# Patient Record
Sex: MALE
[De-identification: names, ages, dates, MRNs, and addresses within clinical notes are randomized per-mention and may not be internally consistent; named-entity substitution may affect disease eponyms.]

---

## 2017-01-23 ENCOUNTER — HOSPITAL (OUTPATIENT)
Dept: OTHER | Age: 67
End: 2017-01-23
Attending: INTERNAL MEDICINE

## 2017-01-23 LAB
GLUCOSE BLDC GLUCOMTR-MCNC: 137 MG/DL (ref 65–99)
GLUCOSE BLDC GLUCOMTR-MCNC: 140 MG/DL (ref 65–99)

## 2017-01-30 ENCOUNTER — HOSPITAL (OUTPATIENT)
Dept: OTHER | Age: 67
End: 2017-01-30
Attending: INTERNAL MEDICINE

## 2017-01-30 LAB
GLUCOSE BLDC GLUCOMTR-MCNC: 191 MG/DL (ref 65–99)
GLUCOSE BLDC GLUCOMTR-MCNC: 373 MG/DL (ref 65–99)

## 2017-01-31 ENCOUNTER — DIAGNOSTIC TRANS (OUTPATIENT)
Dept: OTHER | Age: 67
End: 2017-01-31

## 2017-01-31 LAB
BUN SERPL-MCNC: 36 MG/DL (ref 10–20)
BUN/CREAT SERPL: 28 (ref 7–25)
CREAT SERPL-MCNC: 1.29 MG/DL (ref 0.67–1.17)
GLUCOSE BLDC GLUCOMTR-MCNC: 122 MG/DL (ref 65–99)
GLUCOSE BLDC GLUCOMTR-MCNC: 161 MG/DL (ref 65–99)

## 2017-02-07 PROBLEM — I21.4 NON-ST ELEVATION MYOCARDIAL INFARCTION (NSTEMI), SUBSEQUENT EPISODE OF CARE (HCC): Status: ACTIVE | Noted: 2017-02-07

## 2017-02-07 PROBLEM — R94.30 CARDIAC LV EJECTION FRACTION >40%: Status: ACTIVE | Noted: 2017-02-07

## 2017-02-07 PROBLEM — Z95.5 PRESENCE OF DRUG COATED STENT IN RIGHT CORONARY ARTERY: Status: ACTIVE | Noted: 2017-02-07

## 2017-02-07 PROBLEM — IMO0002 CARDIAC LV EJECTION FRACTION >40%: Status: ACTIVE | Noted: 2017-02-07

## 2017-02-07 PROBLEM — Z95.5 PRESENCE OF STENT IN CORONARY ARTERY: Status: ACTIVE | Noted: 2017-02-07

## 2017-02-07 PROBLEM — I25.10 ATHEROSCLEROSIS OF NATIVE CORONARY ARTERY OF NATIVE HEART WITHOUT ANGINA PECTORIS: Status: ACTIVE | Noted: 2017-02-07

## 2017-02-07 PROBLEM — Z95.5 PRESENCE OF DRUG COATED STENT IN LAD CORONARY ARTERY: Status: ACTIVE | Noted: 2017-02-07

## 2017-02-24 PROBLEM — N18.30 CKD STAGE 3 DUE TO TYPE 2 DIABETES MELLITUS (HCC): Status: ACTIVE | Noted: 2017-02-24

## 2017-02-24 PROBLEM — E11.22 CKD STAGE 3 DUE TO TYPE 2 DIABETES MELLITUS (HCC): Status: ACTIVE | Noted: 2017-02-24

## 2017-02-24 PROBLEM — I25.10 CORONARY ARTERY DISEASE INVOLVING NATIVE CORONARY ARTERY OF NATIVE HEART WITHOUT ANGINA PECTORIS: Status: ACTIVE | Noted: 2017-02-24

## 2017-04-12 ENCOUNTER — HOSPITAL (OUTPATIENT)
Dept: OTHER | Age: 67
End: 2017-04-12
Attending: EMERGENCY MEDICINE

## 2017-12-22 PROCEDURE — 82570 ASSAY OF URINE CREATININE: CPT | Performed by: INTERNAL MEDICINE

## 2017-12-22 PROCEDURE — 82043 UR ALBUMIN QUANTITATIVE: CPT | Performed by: INTERNAL MEDICINE

## 2017-12-22 PROCEDURE — 81001 URINALYSIS AUTO W/SCOPE: CPT | Performed by: INTERNAL MEDICINE

## 2018-01-05 PROBLEM — M17.0 PRIMARY OSTEOARTHRITIS OF BOTH KNEES: Status: ACTIVE | Noted: 2018-01-05

## 2018-01-07 PROBLEM — M25.561 CHRONIC PAIN OF BOTH KNEES: Status: ACTIVE | Noted: 2018-01-07

## 2018-01-07 PROBLEM — G89.29 CHRONIC PAIN OF BOTH KNEES: Status: ACTIVE | Noted: 2018-01-07

## 2018-01-07 PROBLEM — M25.562 CHRONIC PAIN OF BOTH KNEES: Status: ACTIVE | Noted: 2018-01-07

## 2018-03-20 PROBLEM — M75.42 IMPINGEMENT SYNDROME OF SHOULDER, LEFT: Status: ACTIVE | Noted: 2018-03-20

## 2018-12-14 ENCOUNTER — HOSPITAL (OUTPATIENT)
Dept: OTHER | Age: 68
End: 2018-12-14
Attending: EMERGENCY MEDICINE

## 2018-12-14 ENCOUNTER — DIAGNOSTIC TRANS (OUTPATIENT)
Dept: OTHER | Age: 68
End: 2018-12-14

## 2018-12-14 LAB
ANALYZER ANC (IANC): ABNORMAL
ANION GAP SERPL CALC-SCNC: 18 MMOL/L (ref 10–20)
BASOPHILS # BLD: 0 THOUSAND/MCL (ref 0–0.3)
BASOPHILS NFR BLD: 1 %
BUN SERPL-MCNC: 38 MG/DL (ref 6–20)
BUN/CREAT SERPL: 27 (ref 7–25)
CALCIUM SERPL-MCNC: 9.1 MG/DL (ref 8.4–10.2)
CHLORIDE: 106 MMOL/L (ref 98–107)
CO2 SERPL-SCNC: 23 MMOL/L (ref 21–32)
CREAT SERPL-MCNC: 1.39 MG/DL (ref 0.67–1.17)
DIFFERENTIAL METHOD BLD: ABNORMAL
EOSINOPHIL # BLD: 0.2 THOUSAND/MCL (ref 0.1–0.5)
EOSINOPHIL NFR BLD: 2 %
ERYTHROCYTE [DISTWIDTH] IN BLOOD: 11.7 % (ref 11–15)
GLUCOSE SERPL-MCNC: 174 MG/DL (ref 65–99)
HEMATOCRIT: 38.7 % (ref 39–51)
HGB BLD-MCNC: 13.3 GM/DL (ref 13–17)
IMM GRANULOCYTES # BLD AUTO: 0 THOUSAND/MCL (ref 0–0.2)
IMM GRANULOCYTES NFR BLD: 0 %
LYMPHOCYTES # BLD: 1.3 THOUSAND/MCL (ref 1–4)
LYMPHOCYTES NFR BLD: 18 %
MCH RBC QN AUTO: 33.2 PG (ref 26–34)
MCHC RBC AUTO-ENTMCNC: 34.4 GM/DL (ref 32–36.5)
MCV RBC AUTO: 96.5 FL (ref 78–100)
MONOCYTES # BLD: 0.7 THOUSAND/MCL (ref 0.3–0.9)
MONOCYTES NFR BLD: 9 %
NEUTROPHILS # BLD: 4.8 THOUSAND/MCL (ref 1.8–7.7)
NEUTROPHILS NFR BLD: 70 %
NEUTS SEG NFR BLD: ABNORMAL %
NRBC (NRBCRE): 0 /100 WBC
PLATELET # BLD: 242 THOUSAND/MCL (ref 140–450)
POTASSIUM SERPL-SCNC: 3.9 MMOL/L (ref 3.4–5.1)
RBC # BLD: 4.01 MILLION/MCL (ref 4.5–5.9)
SODIUM SERPL-SCNC: 143 MMOL/L (ref 135–145)
TROPONIN I SERPL HS-MCNC: <0.02 NG/ML
TROPONIN I SERPL HS-MCNC: <0.02 NG/ML
WBC # BLD: 7 THOUSAND/MCL (ref 4.2–11)

## 2018-12-15 ENCOUNTER — CHARTING TRANS (OUTPATIENT)
Dept: OTHER | Age: 68
End: 2018-12-15

## 2018-12-18 PROCEDURE — 81001 URINALYSIS AUTO W/SCOPE: CPT | Performed by: INTERNAL MEDICINE

## 2019-03-25 ENCOUNTER — HOSPITAL ENCOUNTER (INPATIENT)
Facility: HOSPITAL | Age: 69
LOS: 3 days | Discharge: HOME | DRG: 194 | End: 2019-03-28
Attending: HOSPITALIST | Admitting: HOSPITALIST
Payer: MEDICARE

## 2019-03-25 PROBLEM — J18.9 PNEUMONIA: Status: ACTIVE | Noted: 2019-03-25

## 2019-03-25 PROCEDURE — 87486 CHLMYD PNEUM DNA AMP PROBE: CPT | Performed by: HOSPITALIST

## 2019-03-25 PROCEDURE — 87581 M.PNEUMON DNA AMP PROBE: CPT | Performed by: HOSPITALIST

## 2019-03-25 PROCEDURE — 87798 DETECT AGENT NOS DNA AMP: CPT | Performed by: HOSPITALIST

## 2019-03-25 PROCEDURE — 87633 RESP VIRUS 12-25 TARGETS: CPT | Performed by: HOSPITALIST

## 2019-03-25 PROCEDURE — 87040 BLOOD CULTURE FOR BACTERIA: CPT | Performed by: PHYSICIAN ASSISTANT

## 2019-03-25 PROCEDURE — 82962 GLUCOSE BLOOD TEST: CPT

## 2019-03-25 RX ORDER — DEXTROSE MONOHYDRATE 25 G/50ML
50 INJECTION, SOLUTION INTRAVENOUS
Status: DISCONTINUED | OUTPATIENT
Start: 2019-03-25 | End: 2019-03-28

## 2019-03-25 RX ORDER — AMLODIPINE BESYLATE 5 MG/1
10 TABLET ORAL DAILY
Status: DISCONTINUED | OUTPATIENT
Start: 2019-03-26 | End: 2019-03-28

## 2019-03-25 RX ORDER — SODIUM PHOSPHATE, DIBASIC AND SODIUM PHOSPHATE, MONOBASIC 7; 19 G/133ML; G/133ML
1 ENEMA RECTAL ONCE AS NEEDED
Status: DISCONTINUED | OUTPATIENT
Start: 2019-03-25 | End: 2019-03-28

## 2019-03-25 RX ORDER — HYDRALAZINE HYDROCHLORIDE 20 MG/ML
10 INJECTION INTRAMUSCULAR; INTRAVENOUS EVERY 6 HOURS PRN
Status: DISCONTINUED | OUTPATIENT
Start: 2019-03-25 | End: 2019-03-26

## 2019-03-25 RX ORDER — SODIUM CHLORIDE 9 MG/ML
INJECTION, SOLUTION INTRAVENOUS CONTINUOUS
Status: ACTIVE | OUTPATIENT
Start: 2019-03-25 | End: 2019-03-26

## 2019-03-25 RX ORDER — NEBIVOLOL 10 MG/1
10 TABLET ORAL DAILY
Status: DISCONTINUED | OUTPATIENT
Start: 2019-03-26 | End: 2019-03-27

## 2019-03-25 RX ORDER — HEPARIN SODIUM 5000 [USP'U]/ML
5000 INJECTION, SOLUTION INTRAVENOUS; SUBCUTANEOUS EVERY 8 HOURS SCHEDULED
Status: DISCONTINUED | OUTPATIENT
Start: 2019-03-25 | End: 2019-03-28

## 2019-03-25 RX ORDER — POLYETHYLENE GLYCOL 3350 17 G/17G
17 POWDER, FOR SOLUTION ORAL DAILY PRN
Status: DISCONTINUED | OUTPATIENT
Start: 2019-03-25 | End: 2019-03-28

## 2019-03-25 RX ORDER — ONDANSETRON 2 MG/ML
4 INJECTION INTRAMUSCULAR; INTRAVENOUS EVERY 6 HOURS PRN
Status: DISCONTINUED | OUTPATIENT
Start: 2019-03-25 | End: 2019-03-28

## 2019-03-25 RX ORDER — ASPIRIN 325 MG
325 TABLET ORAL DAILY
Status: DISCONTINUED | OUTPATIENT
Start: 2019-03-26 | End: 2019-03-28

## 2019-03-25 RX ORDER — CLOPIDOGREL BISULFATE 75 MG/1
75 TABLET ORAL DAILY
Status: DISCONTINUED | OUTPATIENT
Start: 2019-03-26 | End: 2019-03-28

## 2019-03-25 RX ORDER — METOCLOPRAMIDE HYDROCHLORIDE 5 MG/ML
5 INJECTION INTRAMUSCULAR; INTRAVENOUS EVERY 8 HOURS PRN
Status: DISCONTINUED | OUTPATIENT
Start: 2019-03-25 | End: 2019-03-28

## 2019-03-25 RX ORDER — ACETAMINOPHEN 325 MG/1
650 TABLET ORAL EVERY 6 HOURS PRN
Status: DISCONTINUED | OUTPATIENT
Start: 2019-03-25 | End: 2019-03-28

## 2019-03-25 RX ORDER — BISACODYL 10 MG
10 SUPPOSITORY, RECTAL RECTAL
Status: DISCONTINUED | OUTPATIENT
Start: 2019-03-25 | End: 2019-03-28

## 2019-03-25 NOTE — H&P
[facility] Hospitalist H&P       CC: direct admit from [facility] for suspected pneumonia    PCP: Casimir Leyden, MD    History of Present Illness: Pt is a 79 yo with mmp including but not limited to HTN/HL, CAD, DMII with nephropathy, CKD III, is directly a Maximiliano  2/7/2017    [place]     • Pure hypercholesterolemia 5/2/2008   • Refused influenza vaccine 9/24/12   • S/P drug eluting coronary stent placement 1/23/17    R coronary x 2, Dr. Gabriela Ballesteros   • Screening for cardiovascular condition 9-6-11    [facility] nl wit edema, says hands slightly edematous by 25% (hard to appreciate)   Abdomen:   Soft, non-tender.  Obese, Bowel sounds normal. . Non distended, no overt hernias   Extremities: Extremities b/l LE with trace edema   Skin: Skin color, texture, turgor normal. No pending  -dona prn  -supportive care, O2 protocol  -CXR with R perihilar masslike density  -UA neg, BC pending.  Holding off on PCT given ANIBAL/CKD  -incidental finding of elevated pro-BNP but clinically pt looks more infectious-- could also be elevated d/

## 2019-03-25 NOTE — PROGRESS NOTES
Pt came to  room 501 at 1850 accompanied with wife and transport. alertoriented. c/o sob and 02 sats were . placed 5L02 via NC.02 sats 90%. MD at bedside.   At 1900 pts 02 lowered to 2L via NC

## 2019-03-26 ENCOUNTER — APPOINTMENT (OUTPATIENT)
Dept: CT IMAGING | Facility: HOSPITAL | Age: 69
DRG: 194 | End: 2019-03-26
Attending: INTERNAL MEDICINE
Payer: MEDICARE

## 2019-03-26 PROCEDURE — 71250 CT THORAX DX C-: CPT | Performed by: INTERNAL MEDICINE

## 2019-03-26 PROCEDURE — 94640 AIRWAY INHALATION TREATMENT: CPT

## 2019-03-26 PROCEDURE — 85007 BL SMEAR W/DIFF WBC COUNT: CPT | Performed by: HOSPITALIST

## 2019-03-26 PROCEDURE — 84132 ASSAY OF SERUM POTASSIUM: CPT | Performed by: HOSPITALIST

## 2019-03-26 PROCEDURE — 85025 COMPLETE CBC W/AUTO DIFF WBC: CPT | Performed by: HOSPITALIST

## 2019-03-26 PROCEDURE — 80053 COMPREHEN METABOLIC PANEL: CPT | Performed by: HOSPITALIST

## 2019-03-26 PROCEDURE — 82962 GLUCOSE BLOOD TEST: CPT

## 2019-03-26 PROCEDURE — 83605 ASSAY OF LACTIC ACID: CPT | Performed by: INTERNAL MEDICINE

## 2019-03-26 PROCEDURE — 83735 ASSAY OF MAGNESIUM: CPT | Performed by: HOSPITALIST

## 2019-03-26 PROCEDURE — 85027 COMPLETE CBC AUTOMATED: CPT | Performed by: HOSPITALIST

## 2019-03-26 PROCEDURE — 84145 PROCALCITONIN (PCT): CPT | Performed by: INTERNAL MEDICINE

## 2019-03-26 PROCEDURE — 84443 ASSAY THYROID STIM HORMONE: CPT | Performed by: HOSPITALIST

## 2019-03-26 RX ORDER — HYDRALAZINE HYDROCHLORIDE 20 MG/ML
5 INJECTION INTRAMUSCULAR; INTRAVENOUS
Status: DISCONTINUED | OUTPATIENT
Start: 2019-03-26 | End: 2019-03-28

## 2019-03-26 RX ORDER — POTASSIUM CHLORIDE 14.9 MG/ML
20 INJECTION INTRAVENOUS ONCE
Status: COMPLETED | OUTPATIENT
Start: 2019-03-26 | End: 2019-03-26

## 2019-03-26 RX ORDER — IPRATROPIUM BROMIDE AND ALBUTEROL SULFATE 2.5; .5 MG/3ML; MG/3ML
3 SOLUTION RESPIRATORY (INHALATION)
Status: DISCONTINUED | OUTPATIENT
Start: 2019-03-26 | End: 2019-03-27

## 2019-03-26 RX ORDER — MAGNESIUM OXIDE 400 MG (241.3 MG MAGNESIUM) TABLET
400 TABLET ONCE
Status: COMPLETED | OUTPATIENT
Start: 2019-03-26 | End: 2019-03-26

## 2019-03-26 RX ORDER — SODIUM CHLORIDE 9 MG/ML
INJECTION, SOLUTION INTRAVENOUS CONTINUOUS
Status: DISCONTINUED | OUTPATIENT
Start: 2019-03-26 | End: 2019-03-26

## 2019-03-26 RX ORDER — ROSUVASTATIN CALCIUM 10 MG/1
10 TABLET, COATED ORAL NIGHTLY
Status: DISCONTINUED | OUTPATIENT
Start: 2019-03-26 | End: 2019-03-28

## 2019-03-26 RX ORDER — ECHINACEA PURPUREA EXTRACT 125 MG
1 TABLET ORAL
Status: DISCONTINUED | OUTPATIENT
Start: 2019-03-26 | End: 2019-03-28

## 2019-03-26 NOTE — PROGRESS NOTES
Pt is a 79 y/o male admitted with cough,sob due to pna.alert oriented. on room air,02 sats 90-93%. no fever,pain and N/V.up as tolerated. on ctx and zithromax. encouraged ambulation and IS.k and mag replaced. wife at bedside. RVP negative. bp is in the 160s. [place]

## 2019-03-26 NOTE — CM/SW NOTE
03/26/19 1500   CM/SW Screening   Referral Source    Information Source Chart review;Nursing rounds   Patient's Mental Status Alert;Oriented   Patient's [address]   Patient lives with Spouse   Patient Status Prior to Admission

## 2019-03-26 NOTE — PLAN OF CARE
Problem: Patient/Family Goals  Goal: Patient/Family Short Term Goal  Patient's Short Term Goal: 3/26-resolve pna    Interventions:   - 3/26-iv abx,02 as needed  - See additional Care Plan goals for specific interventions  Outcome: Progressing

## 2019-03-26 NOTE — CONSULTS
Pulmonary Consult       NAME: Bird Mcnair - ROOM: 176/476-J - MRN: [id number] - Age: 80year old - :  1950    Date of Admission: 3/25/2019  6:21 PM  Admission Diagnosis: Pneumonia  Pneumonia    History of Present Illness: asked to see patient for for neuro f/u   • Type 1 diabetes mellitus ([facility] [address]    • Unspecified essential hypertension       Past Surgical History:   Procedure Laterality Date   • COLONOSCOPY  12/15/2008    tics, rhoids, polyps, repeat in 2013   • HEMORRHOIDECTOMY     • TONSILLECTOMY Taking   Blood Glucose Monitoring Suppl (ONETOUCH ULTRA 2) W/DEVICE Does not apply Kit One Touch Ultra 2 device for testing Disp: 1 kit Rfl: 0 Taking       Tetanus Toxoid              Social History    Socioeconomic History      Marital status:  Seat Belt: Yes        Self-Exams: No          discussed    Social History Narrative      Not on file    Family History   Adopted: Yes         Scheduled Medication:  • potassium chloride 40mEq IVPB (peripheral line)  40 mEq Intravenous Once    Followed b Resp 16   Wt 221 lb 1.9 oz (100.3 kg)   SpO2 (!) 89%   BMI 36.80 kg/m²   General:  Alert, cooperative, no distress, appears stated age. Head:  Normocephalic, without obvious abnormality, atraumatic. Eyes:  Conjunctivae/corneas clear.    Neck: Supple, sy 10ˆ3/µL    Lymphocytes Absolute 0.70 (L) 0.90 - 4.00 10ˆ3/µL    Monocytes Absolute 1.22 (H) 0.10 - 0.60 10ˆ3/µL    Eosinophils Absolute 0.03 0.00 - 0.30 10ˆ3/µL    Basophils Absolute 0.02 0.00 - 0.10 10ˆ3/µL    nRBC Absolute 0.000 0.000 - 0.012 10ˆ3/µL 99 mg/dL    Sodium 138 136 - 145 mmol/L    Potassium 3.2 (L) 3.5 - 5.1 mmol/L    Chloride 103 98 - 107 mmol/L    CO2 25.0 21.0 - 32.0 mmol/L    Anion Gap 10 0 - 18 mmol/L    BUN 33 (H) 7 - 18 mg/dL    Creatinine 1.82 (H) 0.70 - 1.30 mg/dL    BUN/CREA Ratio Manual 19 %    Monocyte % Manual 8 %    Eosinophil % Manual 1 %    Basophil % Manual 1 %    Total Cells Counted 100     RBC Morphology Normal Normal, Slide reviewed, see previous RBC morphology.     Platelet Morphology Normal Normal   POCT GLUCOSE    Ksenia

## 2019-03-26 NOTE — PROGRESS NOTES
[facility] Hospitalist Progress Note     PCP: Agatha Paniagua MD    CC:  Follow up    SUBJECTIVE:  Sitting up in bed, feels better than yesterday. +still coughing productive but sob improved.  No pain    OBJECTIVE:  Temp:  [98.3 °F (36.8 °C)-100 °F (37.8 mg Intravenous Q24H   • Insulin Aspart Pen  1-5 Units Subcutaneous TID CC and HS   • Nebivolol HCl  10 mg Oral Daily   • amLODIPine Besylate  10 mg Oral Daily   • aspirin  325 mg Oral Daily   • Clopidogrel Bisulfate  75 mg Oral Daily       Saline Nasal Spr Questions/concerns were discussed with patient and/or family by bedside.          Thank Stuart Bashir MD    [facility] Hospitalist  Pager: [phone]

## 2019-03-26 NOTE — PROGRESS NOTES
03/26/19 1035   Clinical Encounter Type   Visited With Patient   Routine Visit ( provided introductory visit. )   Referral To Nurse  ( provided HPOA form to patient. Patient would like to talk with his wife before completing the form.

## 2019-03-26 NOTE — PLAN OF CARE
Diabetes/Glucose Control    • Glucose maintained within prescribed range Progressing          Patient/Family Goals    • Patient/Family Long Term Goal Progressing    • Patient/Family Short Term Goal Progressing          RESPIRATORY - ADULT    • Achieves opt

## 2019-03-26 NOTE — PROGRESS NOTES
[facility] Pharmacy Note:  Renal Dose Adjustment for Metoclopramide (REGLAN)    David Chua has been prescribed Metoclopramide (REGLAN) 10 mg every 8 hours as needed for N/V. Estimated Creatinine Clearance: 32 mL/min (A) (based on SCr of 1.95 mg/dL (H)).

## 2019-03-26 NOTE — PROGRESS NOTES
03/26/19 0042   Provider Notification   Reason for Communication Change in status; Review case     Pt bp still elevated after apresoline was given, new temp, paged MD for new prn orders, lactic, and nebs per pt request.wctm

## 2019-03-26 NOTE — CONSULTS
[address]    PATIENT'S NAME: Yordy Sen   ATTENDING PHYSICIAN: Margy Sainz. Leopoldo Swanson MD   CONSULTING PHYSICIAN: Mike Leon M.D.    PATIENT ACCOUNT#:   [de-identified]    LOCATION:  [address] [place] [address]  MEDICAL RECORD #:   [id number]       DATE OF BIRTH: SYSTEMS:  Was obtained and negative, including no history of thyroid disease. PHYSICAL EXAMINATION:    GENERAL:  He is a pleasant, heavyset gentleman in no distress. VITAL SIGNS:  Blood pressure is 166/74, pulse 68. HEENT:  Head is normocephalic.

## 2019-03-26 NOTE — PROGRESS NOTES
NURSING ADMISSION NOTE      Patient admitted via Cart  Oriented to room. Safety precautions initiated. Bed in low position. Call light in reach. Med rec and admission navigator complete. RN notified. Paged consults.  All pt and family questions an

## 2019-03-27 PROCEDURE — 84132 ASSAY OF SERUM POTASSIUM: CPT | Performed by: HOSPITALIST

## 2019-03-27 PROCEDURE — 84484 ASSAY OF TROPONIN QUANT: CPT | Performed by: INTERNAL MEDICINE

## 2019-03-27 PROCEDURE — 80048 BASIC METABOLIC PNL TOTAL CA: CPT | Performed by: HOSPITALIST

## 2019-03-27 PROCEDURE — 94640 AIRWAY INHALATION TREATMENT: CPT

## 2019-03-27 PROCEDURE — 82962 GLUCOSE BLOOD TEST: CPT

## 2019-03-27 PROCEDURE — 85025 COMPLETE CBC W/AUTO DIFF WBC: CPT | Performed by: HOSPITALIST

## 2019-03-27 PROCEDURE — 83735 ASSAY OF MAGNESIUM: CPT | Performed by: HOSPITALIST

## 2019-03-27 RX ORDER — IPRATROPIUM BROMIDE AND ALBUTEROL SULFATE 2.5; .5 MG/3ML; MG/3ML
3 SOLUTION RESPIRATORY (INHALATION)
Status: DISCONTINUED | OUTPATIENT
Start: 2019-03-27 | End: 2019-03-28

## 2019-03-27 RX ORDER — HYDRALAZINE HYDROCHLORIDE 50 MG/1
50 TABLET, FILM COATED ORAL EVERY 8 HOURS SCHEDULED
Status: DISCONTINUED | OUTPATIENT
Start: 2019-03-27 | End: 2019-03-28

## 2019-03-27 RX ORDER — POTASSIUM CHLORIDE 20 MEQ/1
40 TABLET, EXTENDED RELEASE ORAL ONCE
Status: COMPLETED | OUTPATIENT
Start: 2019-03-27 | End: 2019-03-27

## 2019-03-27 RX ORDER — LABETALOL 200 MG/1
200 TABLET, FILM COATED ORAL EVERY 8 HOURS SCHEDULED
Status: DISCONTINUED | OUTPATIENT
Start: 2019-03-27 | End: 2019-03-28

## 2019-03-27 RX ORDER — FLUTICASONE PROPIONATE 50 MCG
1 SPRAY, SUSPENSION (ML) NASAL DAILY
Status: DISCONTINUED | OUTPATIENT
Start: 2019-03-27 | End: 2019-03-28

## 2019-03-27 RX ORDER — GUAIFENESIN 600 MG
600 TABLET, EXTENDED RELEASE 12 HR ORAL 2 TIMES DAILY
Status: DISCONTINUED | OUTPATIENT
Start: 2019-03-27 | End: 2019-03-28

## 2019-03-27 RX ORDER — POTASSIUM CHLORIDE 20 MEQ/1
40 TABLET, EXTENDED RELEASE ORAL EVERY 4 HOURS
Status: COMPLETED | OUTPATIENT
Start: 2019-03-27 | End: 2019-03-27

## 2019-03-27 RX ORDER — SODIUM CHLORIDE 9 MG/ML
INJECTION, SOLUTION INTRAVENOUS CONTINUOUS
Status: DISCONTINUED | OUTPATIENT
Start: 2019-03-27 | End: 2019-03-28

## 2019-03-27 RX ORDER — IBUPROFEN 200 MG
CAPSULE ORAL 2 TIMES DAILY
Status: DISCONTINUED | OUTPATIENT
Start: 2019-03-27 | End: 2019-03-28

## 2019-03-27 NOTE — PROGRESS NOTES
[facility] PULMONARY/CRITICAL CARE    S: No new events overnight. Feeling better today.     Meds:  • Potassium Chloride ER  40 mEq Oral Q4H   • hydrALAzine HCl  50 mg Oral Q8H [address]   • Labetalol HCl  200 mg Oral Q8H [address]   • guaiFENesin ER  600 mg Oral BID   • Fluti Lab  03/25/19   1519  03/26/19   0657  03/27/19   0652   RBC  3.94  3.48*  3.53*   HGB  13.1  11.5*  11.6*   HCT  35.6*  32.0*  32.7*   MCV  90.4  92.0  92.6   MCH  33.2  33.0  32.9   MCHC  36.8  35.9  35.5   RDW  11.5  11.5  11.9   NEPRELIM   --   3.77 Medicine

## 2019-03-27 NOTE — PROGRESS NOTES
[facility] Cardiology/[facility]    Progress Note    Christiano Gee  80year old  [id number]  Avis Silva MD    ASSESSMENT:    1. Chest discomfort with atypical features.   2.       Coronary artery disease status post weight 230 lb 3.2 oz (104.4 kg), SpO2 99 %. General: Alert and oriented in no apparent distress. HEENT: No focal deficits. Neck: No JVD, carotids no bruits. Cardiac: Regular rate and rhythm, S1, S2 normal, no murmur, rub or gallop.   Lungs: Clear withou Metoclopramide HCl (REGLAN) injection 5 mg, 5 mg, Intravenous, Q8H PRN  •  azithromycin (ZITHROMAX) 500 mg in sodium chloride 0.9% 250 mL IVPB, 500 mg, Intravenous, Q24H  •  glucose (DEX4) oral liquid 15 g, 15 g, Oral, Q15 Min PRN **OR** Glucose-Vitamin C

## 2019-03-27 NOTE — PROGRESS NOTES
Multidisciplinary Discharge Rounds held 3/27/2019. Treatment team members present today include , , Charge Nurse,  Nurse, RT, PT and Pharmacy caring for [employer].      Other care providers present:    Patient Active Problem

## 2019-03-27 NOTE — PROGRESS NOTES
[facility] Hospitalist Progress Note     PCP: Amilcar Dowling MD    CC:  Follow up    SUBJECTIVE:  Pt sitting up in bed, on RA. Says feels congested. Still with ROSENBAUM but much improved. No cp/n/v/f/c.   Asking for neosporin for cut he received on L finger f ALT  16   AST  14*   ALB  2.5*       Recent Labs   Lab  03/26/19   0801  03/26/19   1148  03/26/19   1603  03/26/19   2058  03/27/19   0805   PGLU  178*  155*  174*  243*  218*       Recent Labs   Lab  03/25/19   1519  03/27/19   0652   TROP  <0.017  <0. masslike density  -pulmonary consult, appreciate. On abx as above  -CT chest was recommended but pt with ANIBAL on CKD  -CT chest RLL consolidating mass- ?infectious, ? inflammatory, ?  Neoplasm      **acute kidney injury on CKD III- bumped slightly as IVF d/c'

## 2019-03-28 VITALS
DIASTOLIC BLOOD PRESSURE: 71 MMHG | TEMPERATURE: 98 F | HEART RATE: 74 BPM | RESPIRATION RATE: 22 BRPM | BODY MASS INDEX: 38 KG/M2 | SYSTOLIC BLOOD PRESSURE: 168 MMHG | OXYGEN SATURATION: 97 % | WEIGHT: 231 LBS

## 2019-03-28 PROCEDURE — 82962 GLUCOSE BLOOD TEST: CPT

## 2019-03-28 PROCEDURE — 94640 AIRWAY INHALATION TREATMENT: CPT

## 2019-03-28 PROCEDURE — 90471 IMMUNIZATION ADMIN: CPT

## 2019-03-28 PROCEDURE — 83735 ASSAY OF MAGNESIUM: CPT | Performed by: HOSPITALIST

## 2019-03-28 PROCEDURE — 94668 MNPJ CHEST WALL SBSQ: CPT

## 2019-03-28 PROCEDURE — 80048 BASIC METABOLIC PNL TOTAL CA: CPT | Performed by: INTERNAL MEDICINE

## 2019-03-28 PROCEDURE — 94664 DEMO&/EVAL PT USE INHALER: CPT

## 2019-03-28 RX ORDER — HYDRALAZINE HYDROCHLORIDE 25 MG/1
75 TABLET, FILM COATED ORAL EVERY 8 HOURS SCHEDULED
Qty: 90 TABLET | Refills: 5 | Status: SHIPPED | OUTPATIENT
Start: 2019-03-28 | End: 2019-04-05

## 2019-03-28 RX ORDER — ROSUVASTATIN CALCIUM 10 MG/1
10 TABLET, COATED ORAL NIGHTLY
Qty: 30 TABLET | Refills: 2 | Status: SHIPPED | OUTPATIENT
Start: 2019-03-28 | End: 2019-06-18

## 2019-03-28 RX ORDER — ASPIRIN 325 MG
325 TABLET ORAL DAILY
Refills: 0 | Status: ON HOLD | COMMUNITY
Start: 2019-03-29 | End: 2020-12-29

## 2019-03-28 RX ORDER — CEFUROXIME AXETIL 500 MG/1
500 TABLET ORAL 2 TIMES DAILY
Qty: 10 TABLET | Refills: 0 | Status: SHIPPED | OUTPATIENT
Start: 2019-03-28 | End: 2019-04-02

## 2019-03-28 RX ORDER — LABETALOL 200 MG/1
200 TABLET, FILM COATED ORAL EVERY 8 HOURS SCHEDULED
Qty: 30 TABLET | Refills: 2 | Status: SHIPPED | OUTPATIENT
Start: 2019-03-28 | End: 2019-04-24

## 2019-03-28 NOTE — PROGRESS NOTES
[facility] hospitalist Daily Note    Patient was seen/examined on 3/28/19    S breathing Is better  Denies bleeding at this time  Aware of anemia and will follow up with PCP and GI and get evaluated  No chest pain  No nausea  Aware that he needs to be evaluated f

## 2019-03-28 NOTE — CERTIFICATION
**Certification    PHYSICIAN Certification of Need for Inpatient Hospitalization    Based on the his current state of illness, Oseas Aliza requires inpatient hospitalization for his Pneumonia

## 2019-03-28 NOTE — PROGRESS NOTES
[address] Patient Status:  Inpatient    1950 MRN [id number]   [facility] 5NW-A Attending Satnam Ash MD   Hosp Day # 3 PCP Ruben Carson MD     Pulm / Critical Care Progress Note     S: didn't sleep we Normocephalic, without obvious abnormality, atraumatic. Lungs: slight crackles R base   Chest wall: No tenderness or deformity. Heart: Regular rate and rhythm, normal S1S2, no murmur. Abdomen: soft, non-tender, non-distended, positive BS.    Extremity at that time, with eventual ct chest as outlined above.              Roseann Bustos M.D.  [facility] pulmonary / critical care  3/28/2019  10:21 AM

## 2019-03-28 NOTE — PROGRESS NOTES
Multidisciplinary Discharge Rounds held 3/28/2019. Treatment team members present today include , , Charge Nurse,  Nurse, RT, PT and Pharmacy caring for [employer].      Other care providers present:    Patient Active Problem

## 2019-03-28 NOTE — PROGRESS NOTES
[facility] Cardiology/[facility]    Progress Note    Melchor Interiano  80year old  [id number]  Ramona Alberto MD    ASSESSMENT:    1. Chest discomfort with atypical features.   2.       Coronary artery disease status post weight 231 lb (104.8 kg), SpO2 95 %. General: Alert and oriented in no apparent distress. HEENT: No focal deficits. Neck: No JVD, carotids no bruits. Cardiac: Regular rate and rhythm, S1, S2 normal, no murmur, rub or gallop.   Lungs: Clear without wheez ENEMA (FLEET) 7-19 GM/118ML enema 133 mL, 1 enema, Rectal, Once PRN  •  ondansetron HCl (ZOFRAN) injection 4 mg, 4 mg, Intravenous, Q6H PRN  •  Metoclopramide HCl (REGLAN) injection 5 mg, 5 mg, Intravenous, Q8H PRN  •  glucose (DEX4) oral liquid 15 g, 15 g

## 2019-03-28 NOTE — PROGRESS NOTES
NURSING DISCHARGE NOTE    Discharged Home via Wheelchair. Accompanied by Spouse and Support staff  Belongings Taken by patient/family. Pt and wife received detailed discharge instructions. PIV removed. New prescriptions and side effects discussed.

## 2019-04-10 NOTE — DISCHARGE SUMMARY
[facility]  Discharge Summary    Belkis Romero Patient Status:  Inpatient    1950 MRN [id number]   [facility] 5NW-A Attending No att. providers found   Hosp Day # 3 PCP Zander Sanchez MD     Date of Admission: 3/25/2019 details     CAD seen by cardiology  Currently no chest pain  Cont ASA and plavix     DM2 insulin ordered in the hospital   Due to elevated Creatinine,  your Metformin and Invokana are stopped for now.  Follow up with Dr. Almaz Aragon MD  Superficial tablet, R-2    hydrALAzine HCl 25 MG Oral Tab  Take 3 tablets (75 mg total) by mouth every 8 (eight) hours. , Normal, Disp-90 tablet, R-5    Labetalol HCl 200 MG Oral Tab  Take 1 tablet (200 mg total) by mouth every 8 (eight) hours. , Normal, Disp-30 tablet, please call to make appt within a week after you leave hospital  Contact information:  [address] [place] Virk MD In 1 week.     Specialty:  Internal Medicine  Contac or green/yellow discharge)  5. difficulty breathing, headache or visual disturbances  6. hives  7. persistent dizziness or light-headedness  8. extreme fatigue  9. Numbness/tingling  10.  Difficulty urinating or defecating  11. bleeding     Do not drive whe

## 2019-04-12 ENCOUNTER — HOSPITAL ENCOUNTER (OUTPATIENT)
Dept: ULTRASOUND IMAGING | Facility: HOSPITAL | Age: 69
Discharge: HOME | End: 2019-04-12
Attending: NURSE PRACTITIONER
Payer: MEDICARE

## 2019-04-12 ENCOUNTER — HOSPITAL ENCOUNTER (OUTPATIENT)
Dept: GENERAL RADIOLOGY | Facility: HOSPITAL | Age: 69
Discharge: HOME | End: 2019-04-12
Attending: RADIOLOGY
Payer: MEDICARE

## 2019-04-12 ENCOUNTER — LAB ENCOUNTER (OUTPATIENT)
Dept: LAB | Facility: HOSPITAL | Age: 69
End: 2019-04-12
Attending: RADIOLOGY
Payer: MEDICARE

## 2019-04-12 VITALS
HEIGHT: 65.5 IN | DIASTOLIC BLOOD PRESSURE: 67 MMHG | RESPIRATION RATE: 20 BRPM | TEMPERATURE: 99 F | WEIGHT: 230 LBS | HEART RATE: 70 BPM | OXYGEN SATURATION: 95 % | SYSTOLIC BLOOD PRESSURE: 148 MMHG | BODY MASS INDEX: 37.86 KG/M2

## 2019-04-12 DIAGNOSIS — J90 PLEURAL EFFUSION ON RIGHT: ICD-10-CM

## 2019-04-12 DIAGNOSIS — J90 PLEURAL EFFUSION ON RIGHT: Primary | ICD-10-CM

## 2019-04-12 PROCEDURE — 87116 MYCOBACTERIA CULTURE: CPT

## 2019-04-12 PROCEDURE — 85610 PROTHROMBIN TIME: CPT

## 2019-04-12 PROCEDURE — 71045 X-RAY EXAM CHEST 1 VIEW: CPT | Performed by: RADIOLOGY

## 2019-04-12 PROCEDURE — 87206 SMEAR FLUORESCENT/ACID STAI: CPT | Performed by: NURSE PRACTITIONER

## 2019-04-12 PROCEDURE — 82945 GLUCOSE OTHER FLUID: CPT

## 2019-04-12 PROCEDURE — 89051 BODY FLUID CELL COUNT: CPT

## 2019-04-12 PROCEDURE — 36415 COLL VENOUS BLD VENIPUNCTURE: CPT

## 2019-04-12 PROCEDURE — 82150 ASSAY OF AMYLASE: CPT

## 2019-04-12 PROCEDURE — 82800 BLOOD PH: CPT

## 2019-04-12 PROCEDURE — 83615 LACTATE (LD) (LDH) ENZYME: CPT

## 2019-04-12 PROCEDURE — 84478 ASSAY OF TRIGLYCERIDES: CPT

## 2019-04-12 PROCEDURE — 32555 ASPIRATE PLEURA W/ IMAGING: CPT | Performed by: NURSE PRACTITIONER

## 2019-04-12 PROCEDURE — 89050 BODY FLUID CELL COUNT: CPT

## 2019-04-12 PROCEDURE — 87205 SMEAR GRAM STAIN: CPT

## 2019-04-12 PROCEDURE — 84157 ASSAY OF PROTEIN OTHER: CPT

## 2019-04-12 PROCEDURE — 82378 CARCINOEMBRYONIC ANTIGEN: CPT

## 2019-04-12 PROCEDURE — 87070 CULTURE OTHR SPECIMN AEROBIC: CPT

## 2019-04-12 PROCEDURE — 82465 ASSAY BLD/SERUM CHOLESTEROL: CPT

## 2019-04-12 NOTE — IMAGING NOTE
Pt had 500 mL removed from right side of the chest by Dr. Emili Cuellar , band-aid applied and CDI. Pt tolerated procedure well, VSS on RA. Report called to [facility] RN and reported off that pt awaiting chest XRAY.  Pt awaiting transport to room #2247 with family a

## 2019-04-14 PROBLEM — Z09 HOSPITAL DISCHARGE FOLLOW-UP: Status: ACTIVE | Noted: 2019-04-14

## 2019-04-14 PROBLEM — E78.2 MIXED HYPERLIPIDEMIA: Status: ACTIVE | Noted: 2019-04-14

## 2019-07-10 PROCEDURE — 82570 ASSAY OF URINE CREATININE: CPT | Performed by: INTERNAL MEDICINE

## 2019-07-10 PROCEDURE — 84156 ASSAY OF PROTEIN URINE: CPT | Performed by: INTERNAL MEDICINE

## 2019-10-01 PROBLEM — M70.22 OLECRANON BURSITIS, LEFT ELBOW: Status: ACTIVE | Noted: 2019-10-01

## 2019-11-20 ENCOUNTER — APPOINTMENT (OUTPATIENT)
Dept: GENERAL RADIOLOGY | Facility: HOSPITAL | Age: 69
End: 2019-11-20
Attending: EMERGENCY MEDICINE
Payer: MEDICARE

## 2019-11-20 ENCOUNTER — HOSPITAL ENCOUNTER (EMERGENCY)
Facility: HOSPITAL | Age: 69
Discharge: HOME | End: 2019-11-20
Attending: EMERGENCY MEDICINE
Payer: MEDICARE

## 2019-11-20 VITALS
TEMPERATURE: 98 F | SYSTOLIC BLOOD PRESSURE: 178 MMHG | DIASTOLIC BLOOD PRESSURE: 77 MMHG | RESPIRATION RATE: 19 BRPM | OXYGEN SATURATION: 96 % | HEART RATE: 71 BPM

## 2019-11-20 DIAGNOSIS — R42 DIZZINESS: Primary | ICD-10-CM

## 2019-11-20 DIAGNOSIS — N28.9 RENAL INSUFFICIENCY: ICD-10-CM

## 2019-11-20 DIAGNOSIS — I10 ESSENTIAL HYPERTENSION: ICD-10-CM

## 2019-11-20 PROCEDURE — 93010 ELECTROCARDIOGRAM REPORT: CPT

## 2019-11-20 PROCEDURE — 99284 EMERGENCY DEPT VISIT MOD MDM: CPT

## 2019-11-20 PROCEDURE — 36415 COLL VENOUS BLD VENIPUNCTURE: CPT

## 2019-11-20 PROCEDURE — 84484 ASSAY OF TROPONIN QUANT: CPT | Performed by: EMERGENCY MEDICINE

## 2019-11-20 PROCEDURE — 71045 X-RAY EXAM CHEST 1 VIEW: CPT | Performed by: EMERGENCY MEDICINE

## 2019-11-20 PROCEDURE — 93005 ELECTROCARDIOGRAM TRACING: CPT

## 2019-11-20 PROCEDURE — 99285 EMERGENCY DEPT VISIT HI MDM: CPT

## 2019-11-20 PROCEDURE — 80053 COMPREHEN METABOLIC PANEL: CPT | Performed by: EMERGENCY MEDICINE

## 2019-11-20 PROCEDURE — 85025 COMPLETE CBC W/AUTO DIFF WBC: CPT | Performed by: EMERGENCY MEDICINE

## 2019-11-20 NOTE — ED PROVIDER NOTES
Patient Seen in: [facility] Emergency Department      History   Patient presents with:  Dizziness (neurologic)    Stated Complaint: DIZZINESS    HPI    Patient is a 46-year-old male presents the emergency room complaining of dizziness.   Patient has right coronary artery Distal 3/28 mm Xience & Prox 3.5x18mm Xience 1/24/2017 2/7/2017    Good Shahid    • Presence of DRUG stent in coronary artery 1/31/2017 mid RI 3/38mm Xience  2/7/2017    Good Shahid     • Problems with swallowing    • Pure hypercholesterole murmurs. Regular rate and rhythm. Abdomen: Soft and nontender throughout. No rebound or guarding  Extremities: No clubbing/cyanosis/edema. Skin: No rashes, no pallor  Neuro: Awake oriented ×3. Nonfocal.  Good strength throughout. Normal speech.   Nonf Initial blood pressure elevated here though improved with observation. Blood work reviewed. Renal insufficiently slightly elevated from baseline. Patient is being followed for this. Troponin negative.   EKG normal.  Discussed with cardiology on-call for

## 2019-11-20 NOTE — ED INITIAL ASSESSMENT (HPI)
Patient presents to the ER with complaint of dizziness, onset this morning upon awakening. Neuro intact. Denies any dizziness at this present time. No acute distress noted.

## 2019-11-20 NOTE — ED NOTES
Pt ambulated well to brp and back to a4. Side rails up, call light with in reach, wife at bedside, vs done.

## 2019-12-19 PROBLEM — E11.9 DIABETES MELLITUS (HCC): Status: ACTIVE | Noted: 2019-12-19

## 2020-02-11 PROCEDURE — 88305 TISSUE EXAM BY PATHOLOGIST: CPT | Performed by: INTERNAL MEDICINE

## 2020-09-21 PROBLEM — J44.1 CHRONIC OBSTRUCTIVE PULMONARY DISEASE WITH (ACUTE) EXACERBATION (HCC): Status: ACTIVE | Noted: 2020-09-21

## 2020-09-21 PROBLEM — D63.1 ANEMIA DUE TO STAGE 4 CHRONIC KIDNEY DISEASE (HCC): Status: ACTIVE | Noted: 2020-09-21

## 2020-09-21 PROBLEM — J81.0 ACUTE PULMONARY EDEMA (HCC): Status: ACTIVE | Noted: 2020-09-21

## 2020-09-21 PROBLEM — N18.4 ANEMIA DUE TO STAGE 4 CHRONIC KIDNEY DISEASE (HCC): Status: ACTIVE | Noted: 2020-09-21

## 2020-09-21 PROBLEM — N18.4 ANEMIA DUE TO STAGE 4 CHRONIC KIDNEY DISEASE: Status: ACTIVE | Noted: 2020-09-21

## 2020-09-21 PROBLEM — D63.1 ANEMIA DUE TO STAGE 4 CHRONIC KIDNEY DISEASE: Status: ACTIVE | Noted: 2020-09-21

## 2020-09-23 PROBLEM — M17.11 PRIMARY OSTEOARTHRITIS OF RIGHT KNEE: Status: ACTIVE | Noted: 2020-09-23

## 2020-09-28 PROBLEM — J18.9 PNEUMONIA: Status: RESOLVED | Noted: 2019-03-25 | Resolved: 2020-09-28

## 2020-09-28 PROBLEM — J44.1 CHRONIC OBSTRUCTIVE PULMONARY DISEASE WITH (ACUTE) EXACERBATION (HCC): Status: RESOLVED | Noted: 2020-09-21 | Resolved: 2020-09-28

## 2020-09-28 PROBLEM — J81.0 ACUTE PULMONARY EDEMA (HCC): Status: RESOLVED | Noted: 2020-09-21 | Resolved: 2020-09-28

## 2020-10-05 PROCEDURE — 88305 TISSUE EXAM BY PATHOLOGIST: CPT | Performed by: INTERNAL MEDICINE

## 2020-12-23 PROBLEM — M17.11 PRIMARY OSTEOARTHRITIS OF RIGHT KNEE: Status: RESOLVED | Noted: 2020-09-23 | Resolved: 2020-12-23

## 2020-12-23 PROBLEM — E10.22 CKD STAGE 4 DUE TO TYPE 1 DIABETES MELLITUS (HCC): Status: ACTIVE | Noted: 2017-02-24

## 2020-12-23 PROBLEM — R94.30 CARDIAC LV EJECTION FRACTION >40%: Status: RESOLVED | Noted: 2017-02-07 | Resolved: 2020-12-23

## 2020-12-23 PROBLEM — N18.4 CKD STAGE 4 DUE TO TYPE 1 DIABETES MELLITUS (HCC): Status: ACTIVE | Noted: 2017-02-24

## 2020-12-23 PROBLEM — M70.22 OLECRANON BURSITIS, LEFT ELBOW: Status: RESOLVED | Noted: 2019-10-01 | Resolved: 2020-12-23

## 2020-12-23 PROBLEM — IMO0002 CARDIAC LV EJECTION FRACTION >40%: Status: RESOLVED | Noted: 2017-02-07 | Resolved: 2020-12-23

## 2020-12-26 ENCOUNTER — LAB ENCOUNTER (OUTPATIENT)
Dept: LAB | Facility: HOSPITAL | Age: 70
End: 2020-12-26
Attending: INTERNAL MEDICINE
Payer: MEDICARE

## 2020-12-26 DIAGNOSIS — K86.2 PANCREAS CYST: ICD-10-CM

## 2020-12-29 ENCOUNTER — ANESTHESIA EVENT (OUTPATIENT)
Dept: ENDOSCOPY | Facility: HOSPITAL | Age: 70
End: 2020-12-29
Payer: MEDICARE

## 2020-12-29 ENCOUNTER — HOSPITAL ENCOUNTER (OUTPATIENT)
Facility: HOSPITAL | Age: 70
Setting detail: HOSPITAL OUTPATIENT SURGERY
Discharge: HOME | End: 2020-12-29
Attending: INTERNAL MEDICINE | Admitting: INTERNAL MEDICINE
Payer: MEDICARE

## 2020-12-29 ENCOUNTER — ANESTHESIA (OUTPATIENT)
Dept: ENDOSCOPY | Facility: HOSPITAL | Age: 70
End: 2020-12-29
Payer: MEDICARE

## 2020-12-29 VITALS
OXYGEN SATURATION: 99 % | WEIGHT: 205 LBS | HEART RATE: 53 BPM | RESPIRATION RATE: 20 BRPM | BODY MASS INDEX: 33.75 KG/M2 | SYSTOLIC BLOOD PRESSURE: 148 MMHG | HEIGHT: 65.5 IN | DIASTOLIC BLOOD PRESSURE: 58 MMHG

## 2020-12-29 DIAGNOSIS — R10.9 RIGHT SIDED ABDOMINAL PAIN: ICD-10-CM

## 2020-12-29 DIAGNOSIS — K86.2 PANCREAS CYST: Primary | ICD-10-CM

## 2020-12-29 PROCEDURE — 82962 GLUCOSE BLOOD TEST: CPT

## 2020-12-29 PROCEDURE — 88305 TISSUE EXAM BY PATHOLOGIST: CPT | Performed by: INTERNAL MEDICINE

## 2020-12-29 PROCEDURE — 0DB98ZX EXCISION OF DUODENUM, VIA NATURAL OR ARTIFICIAL OPENING ENDOSCOPIC, DIAGNOSTIC: ICD-10-PCS | Performed by: INTERNAL MEDICINE

## 2020-12-29 PROCEDURE — 0DB68ZX EXCISION OF STOMACH, VIA NATURAL OR ARTIFICIAL OPENING ENDOSCOPIC, DIAGNOSTIC: ICD-10-PCS | Performed by: INTERNAL MEDICINE

## 2020-12-29 PROCEDURE — 0DJ08ZZ INSPECTION OF UPPER INTESTINAL TRACT, VIA NATURAL OR ARTIFICIAL OPENING ENDOSCOPIC: ICD-10-PCS | Performed by: INTERNAL MEDICINE

## 2020-12-29 RX ORDER — LEVOFLOXACIN 5 MG/ML
500 INJECTION, SOLUTION INTRAVENOUS ONCE
Status: DISCONTINUED | OUTPATIENT
Start: 2020-12-29 | End: 2020-12-29

## 2020-12-29 RX ORDER — DEXTROSE MONOHYDRATE 25 G/50ML
50 INJECTION, SOLUTION INTRAVENOUS
Status: DISCONTINUED | OUTPATIENT
Start: 2020-12-29 | End: 2020-12-29

## 2020-12-29 RX ORDER — LIDOCAINE HYDROCHLORIDE 40 MG/ML
INJECTION, SOLUTION RETROBULBAR; TOPICAL AS NEEDED
Status: DISCONTINUED | OUTPATIENT
Start: 2020-12-29 | End: 2020-12-29 | Stop reason: SURG

## 2020-12-29 RX ORDER — LEVOFLOXACIN 5 MG/ML
INJECTION, SOLUTION INTRAVENOUS
Status: DISCONTINUED
Start: 2020-12-29 | End: 2020-12-29 | Stop reason: WASHOUT

## 2020-12-29 RX ORDER — SODIUM CHLORIDE, SODIUM LACTATE, POTASSIUM CHLORIDE, CALCIUM CHLORIDE 600; 310; 30; 20 MG/100ML; MG/100ML; MG/100ML; MG/100ML
INJECTION, SOLUTION INTRAVENOUS CONTINUOUS
Status: DISCONTINUED | OUTPATIENT
Start: 2020-12-29 | End: 2020-12-29

## 2020-12-29 RX ADMIN — SODIUM CHLORIDE, SODIUM LACTATE, POTASSIUM CHLORIDE, CALCIUM CHLORIDE: 600; 310; 30; 20 INJECTION, SOLUTION INTRAVENOUS at 09:48:00

## 2020-12-29 RX ADMIN — LIDOCAINE HYDROCHLORIDE 50 MG: 40 INJECTION, SOLUTION RETROBULBAR; TOPICAL at 09:29:00

## 2020-12-29 NOTE — OPERATIVE REPORT
[facility] OPERATIVE REPORT   PATIENT NAME: Skylar Emeyr  MRN: [id number]  DATE OF OPERATION: 12/29/2020  PREOPERATIVE DIAGNOSIS: weight loss, RUQ abdominal pain, pancreatic cysts  POSTOPERATIVE DIAGNOSIS:    1.   EGD- normal except for small gastric Pancreatic duct was normal in caliber throughout. Pancreatic duct was seen and measured 1.8 mm in the neck and was seen going toward the head of pancreas. The pancreatic tail was seen next to the left kidney and spleen.   The scope was advanced into the d consultation. I really appreciate it.     Phillip De Luna MD

## 2020-12-29 NOTE — ANESTHESIA POSTPROCEDURE EVALUATION
[address] Patient Status:  Hospital Outpatient Surgery   Age/Gender 72year old male MRN [id number]   Location [facility]. Attending Shana Walters MD   Hosp Day # 0 PCP Chapin Melara MD       Anesthesia Post-op N

## 2020-12-29 NOTE — ANESTHESIA PREPROCEDURE EVALUATION
PRE-OP EVALUATION    Patient Name: Saúl Cameron    Pre-op Diagnosis: Right sided abdominal pain [R10.9]  Pancreas cyst [K86.2]    Procedure(s):  ENDOSCOPIC ULTRASOUND (EUS) ESOPHAGOGASTRODUODENOSCOPY (EGD)  POSSIBLE FINE NEEDLE ASPIRATION      Surgeon(s) 90 tablet, Rfl: 4, 12/28/2020 at Unknown time    •  Olmesartan Medoxomil 40 MG Oral Tab, Take 1 tablet (40 mg total) by mouth daily. , Disp: 90 tablet, Rfl: 3, 12/28/2020 at Unknown time    •  CLOPIDOGREL BISULFATE 75 MG Oral Tab, TAKE 1 TABLET BY MOUTH ONC for flexible sigmoidoscopy           (+) chronic renal disease and CRI                   Cardiovascular  Comment: MI 2017  Stent in place    ECG reviewed.           (+) obesity  (+) hypertension     (+) CAD  (+) past MI  (+) CABG/stent Pulmonary    Pulmonary exam normal.                 Other findings            ASA: 3   Plan: MAC  NPO status verified and patient meets guidelines. Post-procedure pain management plan discussed with surgeon and patient.     Comment: I explained the intri

## 2020-12-29 NOTE — H&P
History & Physical Examination    Patient Name: Saúl Cameron  MRN: [id number]  [facility]: [id number]  YOB: 1950    Diagnosis: Right sided abdominal pain [R10.9]  Pancreas cyst [K86.2]      Present Illness:  Saúl Cameron is a 72year old male is period, had CT, MRI, EEG, labs, carotid dop and echo all nl, will see Dr. Sanjuana Ramon for neuro f/u   • Type 1 diabetes mellitus ([facility] [address]    • Visual impairment     prescription glasses       Procedure: EGD    Physician Pre-Sedation Assessment    Pre-Sedation As Check if Physical Exam is Normal If not normal, please explain:   HEENT [x ] [x ]    NECK & BACK [x ] [x ]    HEART [x ] [x ]    LUNGS [x ] [x ]    ABDOMEN [x ] [x ]    UROGENITAL [ ] [ ]    EXTREMITIES [ ] [ ]    OTHER        [ x ] I have discussed the ri

## 2021-01-18 NOTE — PROGRESS NOTES
EGD showed a small gastric lesion. Here are the biopsy/pathology findings from your recent EGD (Upper  Endoscopy): These was intestinal metaplasia- intestinal cells- no cancer or even pre-cancerous cells were seen.   No Vera's esophagus was noted

## 2021-04-11 DIAGNOSIS — Z23 NEED FOR VACCINATION: ICD-10-CM

## 2021-12-28 PROBLEM — Z95.5 PRESENCE OF DRUG COATED STENT IN LAD CORONARY ARTERY: Status: RESOLVED | Noted: 2017-02-07 | Resolved: 2021-12-28

## 2021-12-28 PROBLEM — Z95.5 PRESENCE OF STENT IN CORONARY ARTERY: Status: RESOLVED | Noted: 2017-02-07 | Resolved: 2021-12-28

## 2022-08-10 RX ORDER — ACETAMINOPHEN 500 MG
1000 TABLET ORAL ONCE
Status: CANCELLED | OUTPATIENT
Start: 2022-08-10 | End: 2022-08-10

## 2022-08-13 ENCOUNTER — LAB ENCOUNTER (OUTPATIENT)
Dept: LAB | Age: 72
End: 2022-08-13
Attending: INTERNAL MEDICINE
Payer: MEDICARE

## 2022-08-13 DIAGNOSIS — Z20.822 ENCOUNTER FOR PREPROCEDURE SCREENING LABORATORY TESTING FOR COVID-19: ICD-10-CM

## 2022-08-13 DIAGNOSIS — Z01.812 ENCOUNTER FOR PREPROCEDURE SCREENING LABORATORY TESTING FOR COVID-19: ICD-10-CM

## 2022-08-14 LAB — SARS-COV-2 RNA RESP QL NAA+PROBE: NOT DETECTED

## 2022-08-15 ENCOUNTER — ANESTHESIA EVENT (OUTPATIENT)
Dept: ENDOSCOPY | Facility: HOSPITAL | Age: 72
End: 2022-08-15
Payer: MEDICARE

## 2022-08-16 ENCOUNTER — HOSPITAL ENCOUNTER (OUTPATIENT)
Facility: HOSPITAL | Age: 72
Setting detail: HOSPITAL OUTPATIENT SURGERY
Discharge: HOME | End: 2022-08-16
Attending: INTERNAL MEDICINE | Admitting: INTERNAL MEDICINE
Payer: MEDICARE

## 2022-08-16 ENCOUNTER — ANESTHESIA (OUTPATIENT)
Dept: ENDOSCOPY | Facility: HOSPITAL | Age: 72
End: 2022-08-16
Payer: MEDICARE

## 2022-08-16 VITALS
RESPIRATION RATE: 20 BRPM | DIASTOLIC BLOOD PRESSURE: 61 MMHG | HEIGHT: 65 IN | WEIGHT: 190 LBS | OXYGEN SATURATION: 96 % | SYSTOLIC BLOOD PRESSURE: 129 MMHG | HEART RATE: 50 BPM | BODY MASS INDEX: 31.65 KG/M2 | TEMPERATURE: 98 F

## 2022-08-16 DIAGNOSIS — Z01.812 ENCOUNTER FOR PREPROCEDURE SCREENING LABORATORY TESTING FOR COVID-19: Primary | ICD-10-CM

## 2022-08-16 DIAGNOSIS — Z20.822 ENCOUNTER FOR PREPROCEDURE SCREENING LABORATORY TESTING FOR COVID-19: Primary | ICD-10-CM

## 2022-08-16 LAB — GLUCOSE BLD-MCNC: 131 MG/DL (ref 70–99)

## 2022-08-16 PROCEDURE — 0DB68ZX EXCISION OF STOMACH, VIA NATURAL OR ARTIFICIAL OPENING ENDOSCOPIC, DIAGNOSTIC: ICD-10-PCS | Performed by: INTERNAL MEDICINE

## 2022-08-16 PROCEDURE — 88305 TISSUE EXAM BY PATHOLOGIST: CPT | Performed by: INTERNAL MEDICINE

## 2022-08-16 PROCEDURE — 82962 GLUCOSE BLOOD TEST: CPT

## 2022-08-16 PROCEDURE — 88104 CYTOPATH FL NONGYN SMEARS: CPT | Performed by: INTERNAL MEDICINE

## 2022-08-16 RX ORDER — METOCLOPRAMIDE HYDROCHLORIDE 5 MG/ML
10 INJECTION INTRAMUSCULAR; INTRAVENOUS AS NEEDED
Status: DISCONTINUED | OUTPATIENT
Start: 2022-08-16 | End: 2022-08-16

## 2022-08-16 RX ORDER — DIPHENHYDRAMINE HYDROCHLORIDE 50 MG/ML
12.5 INJECTION INTRAMUSCULAR; INTRAVENOUS AS NEEDED
Status: DISCONTINUED | OUTPATIENT
Start: 2022-08-16 | End: 2022-08-16

## 2022-08-16 RX ORDER — NICOTINE POLACRILEX 4 MG
30 LOZENGE BUCCAL
Status: DISCONTINUED | OUTPATIENT
Start: 2022-08-16 | End: 2022-08-16

## 2022-08-16 RX ORDER — SODIUM CHLORIDE, SODIUM LACTATE, POTASSIUM CHLORIDE, CALCIUM CHLORIDE 600; 310; 30; 20 MG/100ML; MG/100ML; MG/100ML; MG/100ML
INJECTION, SOLUTION INTRAVENOUS CONTINUOUS
Status: DISCONTINUED | OUTPATIENT
Start: 2022-08-16 | End: 2022-08-16

## 2022-08-16 RX ORDER — ONDANSETRON 2 MG/ML
4 INJECTION INTRAMUSCULAR; INTRAVENOUS AS NEEDED
Status: DISCONTINUED | OUTPATIENT
Start: 2022-08-16 | End: 2022-08-16

## 2022-08-16 RX ORDER — NALOXONE HYDROCHLORIDE 0.4 MG/ML
80 INJECTION, SOLUTION INTRAMUSCULAR; INTRAVENOUS; SUBCUTANEOUS AS NEEDED
Status: DISCONTINUED | OUTPATIENT
Start: 2022-08-16 | End: 2022-08-16

## 2022-08-16 RX ORDER — NICOTINE POLACRILEX 4 MG
15 LOZENGE BUCCAL
Status: DISCONTINUED | OUTPATIENT
Start: 2022-08-16 | End: 2022-08-16

## 2022-08-16 RX ORDER — DEXTROSE MONOHYDRATE 25 G/50ML
50 INJECTION, SOLUTION INTRAVENOUS
Status: DISCONTINUED | OUTPATIENT
Start: 2022-08-16 | End: 2022-08-16

## 2022-08-16 RX ORDER — LIDOCAINE HYDROCHLORIDE 10 MG/ML
INJECTION, SOLUTION EPIDURAL; INFILTRATION; INTRACAUDAL; PERINEURAL AS NEEDED
Status: DISCONTINUED | OUTPATIENT
Start: 2022-08-16 | End: 2022-08-16 | Stop reason: SURG

## 2022-08-16 RX ORDER — HYDROMORPHONE HYDROCHLORIDE 1 MG/ML
0.4 INJECTION, SOLUTION INTRAMUSCULAR; INTRAVENOUS; SUBCUTANEOUS EVERY 5 MIN PRN
Status: DISCONTINUED | OUTPATIENT
Start: 2022-08-16 | End: 2022-08-16

## 2022-08-16 RX ADMIN — LIDOCAINE HYDROCHLORIDE 50 MG: 10 INJECTION, SOLUTION EPIDURAL; INFILTRATION; INTRACAUDAL; PERINEURAL at 08:26:00

## 2022-08-16 NOTE — ANESTHESIA POSTPROCEDURE EVALUATION
[address] Patient Status:  Hospital Outpatient Surgery   Age/Gender 71year old male MRN [id number]   Location [address] [address] Attending Lisette Millan MD   Hosp Day # 0 PCP Keiry Sarabia MD       Anesthesia Post-op Note    ESOPHAGOGASTRODUODENOSCOPY (EGD) with biopsies and brushing      Procedure Summary     Date: 08/16/22 Room / Location: [facility] ENDOSCOPY 03 / [facility] ENDOSCOPY    Anesthesia Start: 5162 Anesthesia Stop: 5003    Procedure: ESOPHAGOGASTRODUODENOSCOPY (EGD) with biopsies and brushing  (N/A ) Diagnosis: (Adriano Farah)    Surgeons: Lisette Millan MD Anesthesiologist: Rachael Farah MD    Anesthesia Type: MAC ASA Status: 3          Anesthesia Type: MAC    Vitals Value Taken Time   /59 08/16/22 0838   Temp  08/16/22 0838   Pulse 51 08/16/22 0838   Resp 16 08/16/22 0838   SpO2 93 08/16/22 0838       Patient Location: Endoscopy    Anesthesia Type: MAC    Airway Patency: patent    Postop Pain Control: adequate    Mental Status: mildly sedated but able to meaningfully participate in the post-anesthesia evaluation    Nausea/Vomiting: none    Cardiopulmonary/Hydration status: stable euvolemic    Complications: no apparent anesthesia related complications    Postop vital signs: stable    Dental Exam: Unchanged from Preop    Patient to be discharged home when criteria met.

## 2022-08-16 NOTE — OPERATIVE REPORT
OPERATIVE REPORT   PATIENT NAME: Sherly Daley  MRN: [id number]  DATE OF OPERATION: 8/16/2022  PREOPERATIVE DIAGNOSIS: previous gastric nodule showed intestinal metaplasia; here for surveillance  POSTOPERATIVE DIAGNOSIS:    1.  No gastric nodule seen   2. Mild diffuse candida esophagitis  PROCEDURE PERFORMED: Esophagogastroduodenoscopy  SEDATION MEDICATIONS: MAC  MODERATE SEDATION TIME: None. Deep sedation provided by anesthesia  PREPROCEDURE ASSESSMENT: The indication for this procedure is to assess for gastric nodule. The patient was identified by myself and nursing staff in the exam room. Informed consent was obtained. The patient was seen in clinic and a full H&P was obtained. On brief physical examination, airway is patent. Chest is clear. Heart has regular rate and rhythm. Abdomen is soft, nontender with good bowel sounds. A medication list was taken by nursing today and reviewed by myself. The patient is an ASA grade 2. PROCEDURE NOTE: The procedure was completed without difficulty. The patient tolerated the procedure well. The endoscope was inserted through the mouth and advanced to the level of the duodenum, 3rd portion. Esophagus appeared normal without stricture or esophagitis. There was diffuse white exudate throughout the esophagus suggestive of Candida esophagitis. Esophageal brushings were taken for histology. No hiatal hernia or Vera's esophagus was noted. Stomach was normal in the antrum and the body. Duodenum was normal in the bulb and 2nd duodenum. Retroflexed view in the stomach revealed normal fundus and cardia. Previously seen gastric nodule was no longer seen. This was likely erosive in nature. No biopsies were taken from this area. Random gastric biopsies were taken for Helicobacter pylori. There were no immediate complications. FINDINGS   1. Previously seen gastric nodule was likely erosive in nature and has resolved with antiacid medications. 2. Candida esophagitis. This is most likely related to underlying diabetes. He may also be using a steroid nasal spray or inhaler. RECOMMENDATIONS: If Lizbeth identified, will treat with fluconazole for 21 days. DISCHARGE:  On discharge, the patient was given an after-visit summary detailing the procedure, findings, followup plans, and an updated medication list.     Thank you very much for the consultation. I really appreciate it.     Phuong Ivey MD

## 2022-08-22 LAB — NON GYNE INTERPRETATION: NEGATIVE

## 2023-09-02 ENCOUNTER — HOSPITAL ENCOUNTER (EMERGENCY)
Facility: HOSPITAL | Age: 73
Discharge: HOME | End: 2023-09-02
Attending: EMERGENCY MEDICINE
Payer: MEDICARE

## 2023-09-02 VITALS
SYSTOLIC BLOOD PRESSURE: 178 MMHG | HEIGHT: 66 IN | DIASTOLIC BLOOD PRESSURE: 71 MMHG | BODY MASS INDEX: 30.53 KG/M2 | OXYGEN SATURATION: 95 % | WEIGHT: 190 LBS | HEART RATE: 73 BPM | TEMPERATURE: 99 F | RESPIRATION RATE: 17 BRPM

## 2023-09-02 DIAGNOSIS — I10 PRIMARY HYPERTENSION: Primary | ICD-10-CM

## 2023-09-02 LAB
ALBUMIN SERPL-MCNC: 4.1 G/DL (ref 3.4–5)
ALBUMIN/GLOB SERPL: 1.3 {RATIO} (ref 1–2)
ALP LIVER SERPL-CCNC: 117 U/L
ALT SERPL-CCNC: 19 U/L
ANION GAP SERPL CALC-SCNC: 7 MMOL/L (ref 0–18)
AST SERPL-CCNC: 20 U/L (ref 15–37)
BASOPHILS # BLD AUTO: 0.03 X10(3) UL (ref 0–0.2)
BASOPHILS NFR BLD AUTO: 0.7 %
BILIRUB SERPL-MCNC: 0.5 MG/DL (ref 0.1–2)
BUN BLD-MCNC: 85 MG/DL (ref 7–18)
CALCIUM BLD-MCNC: 9.2 MG/DL (ref 8.5–10.1)
CHLORIDE SERPL-SCNC: 107 MMOL/L (ref 98–112)
CO2 SERPL-SCNC: 28 MMOL/L (ref 21–32)
CREAT BLD-MCNC: 4.62 MG/DL
EGFRCR SERPLBLD CKD-EPI 2021: 13 ML/MIN/1.73M2 (ref 60–?)
EOSINOPHIL # BLD AUTO: 0.11 X10(3) UL (ref 0–0.7)
EOSINOPHIL NFR BLD AUTO: 2.7 %
ERYTHROCYTE [DISTWIDTH] IN BLOOD BY AUTOMATED COUNT: 13.2 %
GLOBULIN PLAS-MCNC: 3.1 G/DL (ref 2.8–4.4)
GLUCOSE BLD-MCNC: 263 MG/DL (ref 70–99)
HCT VFR BLD AUTO: 34.4 %
HGB BLD-MCNC: 12 G/DL
IMM GRANULOCYTES # BLD AUTO: 0.01 X10(3) UL (ref 0–1)
IMM GRANULOCYTES NFR BLD: 0.2 %
LYMPHOCYTES # BLD AUTO: 0.9 X10(3) UL (ref 1–4)
LYMPHOCYTES NFR BLD AUTO: 21.7 %
MCH RBC QN AUTO: 34.3 PG (ref 26–34)
MCHC RBC AUTO-ENTMCNC: 34.9 G/DL (ref 31–37)
MCV RBC AUTO: 98.3 FL
MONOCYTES # BLD AUTO: 0.65 X10(3) UL (ref 0.1–1)
MONOCYTES NFR BLD AUTO: 15.7 %
NEUTROPHILS # BLD AUTO: 2.44 X10 (3) UL (ref 1.5–7.7)
NEUTROPHILS # BLD AUTO: 2.44 X10(3) UL (ref 1.5–7.7)
NEUTROPHILS NFR BLD AUTO: 59 %
OSMOLALITY SERPL CALC.SUM OF ELEC: 329 MOSM/KG (ref 275–295)
PLATELET # BLD AUTO: 205 10(3)UL (ref 150–450)
POTASSIUM SERPL-SCNC: 4.7 MMOL/L (ref 3.5–5.1)
PROT SERPL-MCNC: 7.2 G/DL (ref 6.4–8.2)
RBC # BLD AUTO: 3.5 X10(6)UL
SODIUM SERPL-SCNC: 142 MMOL/L (ref 136–145)
TROPONIN I HIGH SENSITIVITY: 20 NG/L
WBC # BLD AUTO: 4.1 X10(3) UL (ref 4–11)

## 2023-09-02 PROCEDURE — 84484 ASSAY OF TROPONIN QUANT: CPT | Performed by: EMERGENCY MEDICINE

## 2023-09-02 PROCEDURE — 93010 ELECTROCARDIOGRAM REPORT: CPT

## 2023-09-02 PROCEDURE — 96374 THER/PROPH/DIAG INJ IV PUSH: CPT

## 2023-09-02 PROCEDURE — 99284 EMERGENCY DEPT VISIT MOD MDM: CPT

## 2023-09-02 PROCEDURE — 93005 ELECTROCARDIOGRAM TRACING: CPT

## 2023-09-02 PROCEDURE — 99285 EMERGENCY DEPT VISIT HI MDM: CPT

## 2023-09-02 PROCEDURE — 80053 COMPREHEN METABOLIC PANEL: CPT | Performed by: EMERGENCY MEDICINE

## 2023-09-02 PROCEDURE — 85025 COMPLETE CBC W/AUTO DIFF WBC: CPT | Performed by: EMERGENCY MEDICINE

## 2023-09-02 RX ORDER — LACTULOSE 10 G/15ML
20 SOLUTION ORAL AS NEEDED
COMMUNITY

## 2023-09-02 RX ORDER — HYDRALAZINE HYDROCHLORIDE 20 MG/ML
10 INJECTION INTRAMUSCULAR; INTRAVENOUS ONCE
Status: COMPLETED | OUTPATIENT
Start: 2023-09-02 | End: 2023-09-02

## 2023-09-02 RX ORDER — ISOSORBIDE MONONITRATE 60 MG/1
60 TABLET, EXTENDED RELEASE ORAL DAILY
COMMUNITY

## 2023-09-02 RX ORDER — HYDRALAZINE HYDROCHLORIDE 100 MG/1
100 TABLET, FILM COATED ORAL 2 TIMES DAILY
Qty: 28 TABLET | Refills: 0 | Status: SHIPPED | OUTPATIENT
Start: 2023-09-02 | End: 2023-09-16

## 2023-09-02 RX ORDER — CLOPIDOGREL BISULFATE 75 MG/1
75 TABLET ORAL DAILY
COMMUNITY

## 2023-09-03 LAB
ATRIAL RATE: 70 BPM
P-R INTERVAL: 320 MS
Q-T INTERVAL: 388 MS
QRS DURATION: 72 MS
QTC CALCULATION (BEZET): 419 MS
R AXIS: -11 DEGREES
T AXIS: 12 DEGREES
VENTRICULAR RATE: 70 BPM

## 2023-09-03 NOTE — ED INITIAL ASSESSMENT (HPI)
Persistent high BP with elevated SBP= >195 DBP=90s. Mild headache.  Recently dc from [facility] for CP, + changes with home HTN pills

## 2024-09-23 ENCOUNTER — HOSPITAL ENCOUNTER (EMERGENCY)
Facility: HOSPITAL | Age: 74
Discharge: HOME | End: 2024-09-23
Attending: EMERGENCY MEDICINE
Payer: MEDICARE

## 2024-09-23 VITALS
HEART RATE: 58 BPM | SYSTOLIC BLOOD PRESSURE: 143 MMHG | DIASTOLIC BLOOD PRESSURE: 63 MMHG | WEIGHT: 190 LBS | HEIGHT: 65 IN | RESPIRATION RATE: 14 BRPM | OXYGEN SATURATION: 100 % | TEMPERATURE: 97 F | BODY MASS INDEX: 31.65 KG/M2

## 2024-09-23 DIAGNOSIS — N18.9 CHRONIC RENAL FAILURE, UNSPECIFIED CKD STAGE: Primary | ICD-10-CM

## 2024-09-23 LAB
ALBUMIN SERPL-MCNC: 4.1 G/DL (ref 3.2–4.8)
ALBUMIN/GLOB SERPL: 1.8 {RATIO} (ref 1–2)
ALP LIVER SERPL-CCNC: 166 U/L
ALT SERPL-CCNC: <7 U/L
ANION GAP SERPL CALC-SCNC: 16 MMOL/L (ref 0–18)
AST SERPL-CCNC: 11 U/L (ref ?–34)
BASOPHILS # BLD AUTO: 0.03 X10(3) UL (ref 0–0.2)
BASOPHILS NFR BLD AUTO: 0.2 %
BILIRUB SERPL-MCNC: 0.2 MG/DL (ref 0.2–1.1)
BUN BLD-MCNC: 100 MG/DL (ref 9–23)
CALCIUM BLD-MCNC: 9.4 MG/DL (ref 8.7–10.4)
CHLORIDE SERPL-SCNC: 98 MMOL/L (ref 98–112)
CO2 SERPL-SCNC: 20 MMOL/L (ref 21–32)
CREAT BLD-MCNC: 13 MG/DL
EGFRCR SERPLBLD CKD-EPI 2021: 4 ML/MIN/1.73M2 (ref 60–?)
EOSINOPHIL # BLD AUTO: 0.1 X10(3) UL (ref 0–0.7)
EOSINOPHIL NFR BLD AUTO: 0.7 %
ERYTHROCYTE [DISTWIDTH] IN BLOOD BY AUTOMATED COUNT: 12.6 %
GLOBULIN PLAS-MCNC: 2.3 G/DL (ref 2–3.5)
GLUCOSE BLD-MCNC: 177 MG/DL (ref 70–99)
HCT VFR BLD AUTO: 29.1 %
HGB BLD-MCNC: 10.7 G/DL
IMM GRANULOCYTES # BLD AUTO: 0.17 X10(3) UL (ref 0–1)
IMM GRANULOCYTES NFR BLD: 1.1 %
LYMPHOCYTES # BLD AUTO: 0.98 X10(3) UL (ref 1–4)
LYMPHOCYTES NFR BLD AUTO: 6.6 %
MCH RBC QN AUTO: 35.4 PG (ref 26–34)
MCHC RBC AUTO-ENTMCNC: 36.8 G/DL (ref 31–37)
MCV RBC AUTO: 96.4 FL
MONOCYTES # BLD AUTO: 1.18 X10(3) UL (ref 0.1–1)
MONOCYTES NFR BLD AUTO: 8 %
NEUTROPHILS # BLD AUTO: 12.35 X10 (3) UL (ref 1.5–7.7)
NEUTROPHILS # BLD AUTO: 12.35 X10(3) UL (ref 1.5–7.7)
NEUTROPHILS NFR BLD AUTO: 83.4 %
OSMOLALITY SERPL CALC.SUM OF ELEC: 314 MOSM/KG (ref 275–295)
PLATELET # BLD AUTO: 266 10(3)UL (ref 150–450)
POTASSIUM SERPL-SCNC: 3 MMOL/L (ref 3.5–5.1)
PROT SERPL-MCNC: 6.4 G/DL (ref 5.7–8.2)
RBC # BLD AUTO: 3.02 X10(6)UL
SODIUM SERPL-SCNC: 134 MMOL/L (ref 136–145)
WBC # BLD AUTO: 14.8 X10(3) UL (ref 4–11)

## 2024-09-23 PROCEDURE — 99283 EMERGENCY DEPT VISIT LOW MDM: CPT

## 2024-09-23 PROCEDURE — 80053 COMPREHEN METABOLIC PANEL: CPT | Performed by: EMERGENCY MEDICINE

## 2024-09-23 PROCEDURE — 85025 COMPLETE CBC W/AUTO DIFF WBC: CPT | Performed by: EMERGENCY MEDICINE

## 2024-09-23 PROCEDURE — 99284 EMERGENCY DEPT VISIT MOD MDM: CPT

## 2024-09-23 PROCEDURE — 36415 COLL VENOUS BLD VENIPUNCTURE: CPT

## 2024-09-23 NOTE — ED INITIAL ASSESSMENT (HPI)
Pt ambulatory to ED, unable to urinate for 1 week, pt is a dialysis patient, states he has an occasional urge to urinate but is unable to go, called nephrologist today who referred pt to ED for further follow up

## 2024-09-24 NOTE — ED PROVIDER NOTES
Patient Seen in: [facility] Emergency Department      History     Chief Complaint   Patient presents with    Urinary Symptoms            Stated Complaint: no urine output in one week, sees nephrology    Subjective:   HPI    74-year-old male presenting emerged part.  No urine output.  Patient receives peritoneal dialysis every night.  He has had decreased urine output as of late only 70 cc recorded by urology/nephrology last week.  Patient has not been able to urinate for leakage, sometimes feels an urge that he has a sleep to urinate but nothing comes out he has noticed a sensation of bloating no suprapubic discomfort no flank pain no other exacerbating factors or associated symptoms he denies fevers chills he denies abdominal pain    Objective:   Past Medical History:    Anemia due to stage 4 chronic kidney disease (HCC)    sees Dr. 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 Redding, last visit 11/11/20, 5 stents total since 2009    Diabetes mellitus ([facility])    Diabetic eye exam ([facility])    [place] vision    Essential hypertension    Gastric lesion    Dr. 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 Orr, SHAYNE LAD    History of cardiovascular stress test    IPMN (intraductal papillary mucinous neoplasm)    saw Dr. Moura, seeing Dr. Hearn, EUS 12/29/20    Living will in place    OBESITY    JANAY (obstructive sleep apnea)    AHI 6 RDI 6 REM AHI 0 Supine AHI 46 non-supine AHI 1.3 Sao2 Dirk 90%     Osteoarthritis    knees    Pneumonia due to organism    Post covid-19 condition, unspecified    asymptomatic; not hospitalized; no current symptoms    Presence of drug coated stent  in LAD coronary artery 1/31/2017 3x12 mm Xience     good Shahid      Presence of drug coated stent in right coronary artery Distal 3/28 mm Xience & Prox 3.5x18mm Xience 1/24/2017    Good Shahid     Presence of DRUG stent in coronary artery 1/31/2017 mid RI 3/38mm Xience     Good Shahid      Pure hypercholesterolemia    TGA (transient global amnesia)    at [facility]. couldn't remember about 12 hr period, had CT, MRI, EEG, labs, carotid dop and echo all nl, will see Dr. Laguna for neuro f/u    Type 1 diabetes mellitus (HCC)    Vertigo    Visual impairment    prescription glasses              Past Surgical History:   Procedure Laterality Date    Cath bare metal stent (bms)      x5 ?    Cath percutaneous  transluminal coronary angioplasty      Colonoscopy  12/15/2008    tics, rhoids, polyps, repeat in 2013    Colonoscopy  02/14/2020    mult tubulovillous adenomas, Dr. Moura. repeat 2023    Colonoscopy N/A 2/11/2020    Procedure: COLONOSCOPY, POSSIBLE BIOPSY, POSSIBLE POLYPECTOMY 40304;  Surgeon: Tay Moura MD;  Location: [facility]    Egd  12/2020    gastric cardia lesion- intestinal metaplasia    Hemorrhoidectomy      Tonsillectomy  age 6                Social History     Socioeconomic History    Marital status:     Number of children: 3   Occupational History    Occupation: retail, part time   Tobacco Use    Smoking status: Never    Smokeless tobacco: Never   Vaping Use    Vaping status: Never Used   Substance and Sexual Activity    Alcohol use: No    Drug use: No    Sexual activity: Yes     Partners: Female   Other Topics Concern     Service No    Blood Transfusions No    Exercise No     Comment: discussed, \" little to none.\" 12/19/19, 12/23/20    Seat Belt Yes    Self-Exams Yes     Social Determinants of Health      Received from [facility]              Review of Systems    Positive for stated Chief Complaint: Urinary Symptoms (/)    Other systems are as noted in  HPI.  Constitutional and vital signs reviewed.      All other systems reviewed and negative except as noted above.    Physical Exam     ED Triage Vitals [09/23/24 1900]   BP (!) 161/67   Pulse 59   Resp 18   Temp 97.3 °F (36.3 °C)   Temp src Temporal   SpO2 97 %   O2 Device        Current Vitals:   Vital Signs  BP: (!) 161/67  Pulse: 59  Resp: 18  Temp: 97.3 °F (36.3 °C)  Temp src: Temporal    Oxygen Therapy  SpO2: 97 %            Physical Exam    Awake alert patient appears no distress HEENT exam is normal lungs are clear cardiovascular exam regular rhythm abdomen soft nontender extremities no Cyanosis or edema no rash back exam is normal skin is nondiaphoretic     ED Course     Labs Reviewed   COMP METABOLIC PANEL (14) - Abnormal; Notable for the following components:       Result Value    Glucose 177 (*)     Sodium 134 (*)     Potassium 3.0 (*)     CO2 20.0 (*)      (*)     Creatinine 13.00 (*)     Calculated Osmolality 314 (*)     eGFR-Cr 4 (*)     ALT <7 (*)     Alkaline Phosphatase 166 (*)     All other components within normal limits   CBC WITH DIFFERENTIAL WITH PLATELET - Abnormal; Notable for the following components:    WBC 14.8 (*)     RBC 3.02 (*)     HGB 10.7 (*)     HCT 29.1 (*)     MCH 35.4 (*)     Neutrophil Absolute Prelim 12.35 (*)     Neutrophil Absolute 12.35 (*)     Lymphocyte Absolute 0.98 (*)     Monocyte Absolute 1.18 (*)     All other components within normal limits   URINALYSIS WITH CULTURE REFLEX   RAINBOW DRAW GOLD   RAINBOW DRAW BLUE             ,differential diagnosis includes acute renal failure UTI         MDM                                         Medical Decision Making  74-year-old male presenting with urinary urgency.  IV established cardiac monitor shows a sinus rhythm pulse ox shows no signs of hypoxia.  Bladder scan shows nothing on bladder scan straight cath shows no urine output laboratory tests show stable hemoglobin level and elevated BUN and creatinine potassium  level is low I believe this elevated BUN and creatinine is because she still has to do peritoneal dialysis tonight patient was discussed with nephrology on-call patient will be discharged home to do his peritoneal dialysis he is to return to the emergency department for worsening symptoms other complaints    Amount and/or Complexity of Data Reviewed  Labs: ordered. Decision-making details documented in ED Course.  ECG/medicine tests: ordered and independent interpretation performed. Decision-making details documented in ED Course.        Disposition and Plan     Clinical Impression:  1. Chronic renal failure, unspecified CKD stage         Disposition:  Discharge  9/23/2024 10:34 pm    Follow-up:  Hawk Cochran DO  [address]  [facility] [address]  [phone]    Follow up in 2 day(s)            Medications Prescribed:  Current Discharge Medication List

## 2024-10-20 ENCOUNTER — HOSPITAL ENCOUNTER (EMERGENCY)
Facility: HOSPITAL | Age: 74
Discharge: HOME | End: 2024-10-20
Attending: EMERGENCY MEDICINE
Payer: MEDICARE

## 2024-10-20 ENCOUNTER — APPOINTMENT (OUTPATIENT)
Dept: CT IMAGING | Facility: HOSPITAL | Age: 74
End: 2024-10-20
Attending: EMERGENCY MEDICINE
Payer: MEDICARE

## 2024-10-20 ENCOUNTER — HOSPITAL ENCOUNTER (INPATIENT)
Facility: HOSPITAL | Age: 74
LOS: 2 days | Discharge: HOME | End: 2024-10-23
Attending: EMERGENCY MEDICINE | Admitting: HOSPITALIST
Payer: MEDICARE

## 2024-10-20 VITALS
WEIGHT: 190 LBS | HEIGHT: 65 IN | TEMPERATURE: 98 F | HEART RATE: 72 BPM | DIASTOLIC BLOOD PRESSURE: 56 MMHG | SYSTOLIC BLOOD PRESSURE: 113 MMHG | RESPIRATION RATE: 20 BRPM | BODY MASS INDEX: 31.65 KG/M2 | OXYGEN SATURATION: 99 %

## 2024-10-20 DIAGNOSIS — N18.6 ESRD ON PERITONEAL DIALYSIS (HCC): ICD-10-CM

## 2024-10-20 DIAGNOSIS — Z99.2 ESRD ON PERITONEAL DIALYSIS (HCC): ICD-10-CM

## 2024-10-20 DIAGNOSIS — R10.9 ABDOMINAL PAIN OF UNKNOWN ETIOLOGY: Primary | ICD-10-CM

## 2024-10-20 DIAGNOSIS — E87.6 HYPOKALEMIA: ICD-10-CM

## 2024-10-20 DIAGNOSIS — T85.71XA PERITONITIS ASSOCIATED WITH PERITONEAL DIALYSIS, INITIAL ENCOUNTER (HCC): Primary | ICD-10-CM

## 2024-10-20 DIAGNOSIS — R60.0 BILATERAL LEG EDEMA: ICD-10-CM

## 2024-10-20 LAB
ALBUMIN SERPL-MCNC: 4.1 G/DL (ref 3.2–4.8)
ALBUMIN/GLOB SERPL: 1.6 {RATIO} (ref 1–2)
ALP LIVER SERPL-CCNC: 137 U/L
ALT SERPL-CCNC: 13 U/L
ANION GAP SERPL CALC-SCNC: 8 MMOL/L (ref 0–18)
AST SERPL-CCNC: 14 U/L (ref ?–34)
BASOPHILS # BLD AUTO: 0.02 X10(3) UL (ref 0–0.2)
BASOPHILS NFR BLD AUTO: 0.5 %
BASOPHILS NFR PRT: 0 %
BILIRUB SERPL-MCNC: 0.3 MG/DL (ref 0.2–1.1)
BUN BLD-MCNC: 51 MG/DL (ref 9–23)
CALCIUM BLD-MCNC: 9.7 MG/DL (ref 8.7–10.4)
CHLORIDE SERPL-SCNC: 99 MMOL/L (ref 98–112)
CO2 SERPL-SCNC: 29 MMOL/L (ref 21–32)
COLOR FLD: COLORLESS
CREAT BLD-MCNC: 7.99 MG/DL
EGFRCR SERPLBLD CKD-EPI 2021: 7 ML/MIN/1.73M2 (ref 60–?)
EOSINOPHIL # BLD AUTO: 0.06 X10(3) UL (ref 0–0.7)
EOSINOPHIL NFR BLD AUTO: 1.4 %
EOSINOPHIL NFR PRT: 2 %
ERYTHROCYTE [DISTWIDTH] IN BLOOD BY AUTOMATED COUNT: 12.8 %
GLOBULIN PLAS-MCNC: 2.5 G/DL (ref 2–3.5)
GLUCOSE BLD-MCNC: 203 MG/DL (ref 70–99)
HCT VFR BLD AUTO: 27.2 %
HGB BLD-MCNC: 9.7 G/DL
IMM GRANULOCYTES # BLD AUTO: 0.03 X10(3) UL (ref 0–1)
IMM GRANULOCYTES NFR BLD: 0.7 %
LIPASE SERPL-CCNC: 26 U/L (ref 12–53)
LYMPHOCYTES # BLD AUTO: 0.91 X10(3) UL (ref 1–4)
LYMPHOCYTES NFR BLD AUTO: 21.5 %
LYMPHOCYTES NFR PRT: 9 %
MCH RBC QN AUTO: 34.8 PG (ref 26–34)
MCHC RBC AUTO-ENTMCNC: 35.7 G/DL (ref 31–37)
MCV RBC AUTO: 97.5 FL
MESOTHL CELL NFR PRT: 4 %
MONOCYTES # BLD AUTO: 0.64 X10(3) UL (ref 0.1–1)
MONOCYTES NFR BLD AUTO: 15.1 %
MONOS+MACROS NFR PRT: 31 %
NEUTROPHILS # BLD AUTO: 2.58 X10 (3) UL (ref 1.5–7.7)
NEUTROPHILS # BLD AUTO: 2.58 X10(3) UL (ref 1.5–7.7)
NEUTROPHILS NFR BLD AUTO: 60.8 %
NEUTROPHILS PERITONEAL FLUID: 54 %
OSMOLALITY SERPL CALC.SUM OF ELEC: 301 MOSM/KG (ref 275–295)
PLATELET # BLD AUTO: 217 10(3)UL (ref 150–450)
POTASSIUM SERPL-SCNC: 2.9 MMOL/L (ref 3.5–5.1)
PROT SERPL-MCNC: 6.6 G/DL (ref 5.7–8.2)
RBC # BLD AUTO: 2.79 X10(6)UL
RBC PERITONEAL FLUID: <3000 /MM3
SODIUM SERPL-SCNC: 136 MMOL/L (ref 136–145)
TOTAL CELLS COUNTED FLD: 100
TURBIDITY CSF QL: CLEAR
WBC # BLD AUTO: 4.2 X10(3) UL (ref 4–11)
WBC # PRT: 1735 /MM3

## 2024-10-20 PROCEDURE — 99285 EMERGENCY DEPT VISIT HI MDM: CPT

## 2024-10-20 PROCEDURE — 85025 COMPLETE CBC W/AUTO DIFF WBC: CPT | Performed by: EMERGENCY MEDICINE

## 2024-10-20 PROCEDURE — 83690 ASSAY OF LIPASE: CPT | Performed by: EMERGENCY MEDICINE

## 2024-10-20 PROCEDURE — 96365 THER/PROPH/DIAG IV INF INIT: CPT

## 2024-10-20 PROCEDURE — 87070 CULTURE OTHR SPECIMN AEROBIC: CPT | Performed by: EMERGENCY MEDICINE

## 2024-10-20 PROCEDURE — 74176 CT ABD & PELVIS W/O CONTRAST: CPT | Performed by: EMERGENCY MEDICINE

## 2024-10-20 PROCEDURE — 87077 CULTURE AEROBIC IDENTIFY: CPT | Performed by: EMERGENCY MEDICINE

## 2024-10-20 PROCEDURE — 87205 SMEAR GRAM STAIN: CPT | Performed by: EMERGENCY MEDICINE

## 2024-10-20 PROCEDURE — 87040 BLOOD CULTURE FOR BACTERIA: CPT | Performed by: EMERGENCY MEDICINE

## 2024-10-20 PROCEDURE — 36415 COLL VENOUS BLD VENIPUNCTURE: CPT

## 2024-10-20 PROCEDURE — 89051 BODY FLUID CELL COUNT: CPT | Performed by: EMERGENCY MEDICINE

## 2024-10-20 PROCEDURE — 80053 COMPREHEN METABOLIC PANEL: CPT | Performed by: EMERGENCY MEDICINE

## 2024-10-20 PROCEDURE — 83690 ASSAY OF LIPASE: CPT

## 2024-10-20 PROCEDURE — 87186 SC STD MICRODIL/AGAR DIL: CPT | Performed by: EMERGENCY MEDICINE

## 2024-10-20 PROCEDURE — 80053 COMPREHEN METABOLIC PANEL: CPT

## 2024-10-20 PROCEDURE — 89050 BODY FLUID CELL COUNT: CPT | Performed by: EMERGENCY MEDICINE

## 2024-10-20 PROCEDURE — 85025 COMPLETE CBC W/AUTO DIFF WBC: CPT

## 2024-10-20 RX ORDER — VANCOMYCIN HYDROCHLORIDE
15 ONCE
Status: COMPLETED | OUTPATIENT
Start: 2024-10-20 | End: 2024-10-21

## 2024-10-20 RX ORDER — VANCOMYCIN HYDROCHLORIDE
15 EVERY 12 HOURS
Status: DISCONTINUED | OUTPATIENT
Start: 2024-10-20 | End: 2024-10-21

## 2024-10-20 RX ORDER — DEXTROSE MONOHYDRATE, SODIUM CHLORIDE, SODIUM LACTATE, CALCIUM CHLORIDE, MAGNESIUM CHLORIDE 2.5; 538; 448; 18.4; 5.08 G/100ML; MG/100ML; MG/100ML; MG/100ML; MG/100ML
5000 SOLUTION INTRAPERITONEAL
Status: COMPLETED | OUTPATIENT
Start: 2024-10-20 | End: 2024-10-20

## 2024-10-20 RX ORDER — POTASSIUM CHLORIDE 1500 MG/1
40 TABLET, EXTENDED RELEASE ORAL ONCE
Status: COMPLETED | OUTPATIENT
Start: 2024-10-20 | End: 2024-10-20

## 2024-10-20 NOTE — ED PROVIDER NOTES
Patient Seen in: [facility] Emergency Department      History     Chief Complaint   Patient presents with    Abdomen/Flank Pain     Stated Complaint: abdominal pain    Subjective:   HPI      74-year-old male with history of ESRD on peritoneal dialysis presents reporting generalized abdominal pain over the last 2 to 3 days.  Says the pain is worse at night while his dialysis fluid is draining.  He started feeling it Friday night but Saturday morning he was feeling much better and they spent the day in [place].  Over the night last night the pain increased again.  It is still present at this time but not very severe.  Denies any fever.  No vomiting or diarrhea.  Spoke with his PD nurse who instructed him to go to the emergency room for evaluation.    Objective:     Past Medical History:    Anemia due to stage 4 chronic kidney disease (HCC)    sees Dr. 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 Redding, last visit 11/11/20, 5 stents total since 2009    Diabetes mellitus ([facility])    Diabetic eye exam ([facility])    [place] vision    Essential hypertension    Gastric lesion    Dr. 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 Orr, SHAYNE LAD    History of cardiovascular stress test    IPMN (intraductal papillary mucinous neoplasm)    saw Dr. Moura, seeing Dr. Hearn, EUS 12/29/20    Living will in place    OBESITY    JANAY (obstructive sleep apnea)    AHI 6 RDI 6 REM AHI 0 Supine AHI 46 non-supine AHI 1.3 Sao2 Dirk 90%     Osteoarthritis    knees    Pneumonia due to organism    Post covid-19 condition, unspecified    asymptomatic; not hospitalized; no current symptoms    Presence of  drug coated stent in LAD coronary artery 1/31/2017 3x12 mm Xience     good Shahid      Presence of drug coated stent in right coronary artery Distal 3/28 mm Xience & Prox 3.5x18mm Xience 1/24/2017    Good Shahid     Presence of DRUG stent in coronary artery 1/31/2017 mid RI 3/38mm Xience     Good Shahid      Pure hypercholesterolemia    TGA (transient global amnesia)    at [facility]. couldn't remember about 12 hr period, had CT, MRI, EEG, labs, carotid dop and echo all nl, will see Dr. Laguna for neuro f/u    Type 1 diabetes mellitus (HCC)    Vertigo    Visual impairment    prescription glasses              Past Surgical History:   Procedure Laterality Date    Cath bare metal stent (bms)      x5 ?    Cath percutaneous  transluminal coronary angioplasty      Colonoscopy  12/15/2008    tics, rhoids, polyps, repeat in 2013    Colonoscopy  02/14/2020    mult tubulovillous adenomas, Dr. Moura. repeat 2023    Colonoscopy N/A 2/11/2020    Procedure: COLONOSCOPY, POSSIBLE BIOPSY, POSSIBLE POLYPECTOMY 00943;  Surgeon: Tay Moura MD;  Location: [facility]    Egd  12/2020    gastric cardia lesion- intestinal metaplasia    Hemorrhoidectomy      Tonsillectomy  age 6                Social History     Socioeconomic History    Marital status:     Number of children: 3   Occupational History    Occupation: retail, part time   Tobacco Use    Smoking status: Never    Smokeless tobacco: Never   Vaping Use    Vaping status: Never Used   Substance and Sexual Activity    Alcohol use: No    Drug use: No    Sexual activity: Yes     Partners: Female   Other Topics Concern     Service No    Blood Transfusions No    Exercise No     Comment: discussed, \" little to none.\" 12/19/19, 12/23/20    Seat Belt Yes    Self-Exams Yes     Social Drivers of Health      Received from [facility]                  Physical Exam     ED Triage Vitals [10/20/24 1228]   /72   Pulse 68   Resp 18   Temp 98.1 °F (36.7  °C)   Temp src    SpO2 96 %   O2 Device None (Room air)       Current Vitals:   Vital Signs  BP: 113/56  Pulse: 72  Resp: 20  Temp: 98.1 °F (36.7 °C)  MAP (mmHg): 75    Oxygen Therapy  SpO2: 99 %  O2 Device: None (Room air)        Physical Exam  General:  Vitals as listed.  No acute distress   Lungs: good air exchange and clear   Heart: regular rate rhythm and no murmur   Abdomen: Tenderness on palpation of the abdomen primarily on the left abdomen in the left lower quadrant of the abdomen.  No rebound.  Normal bowel sounds.  Distention consistent with peritoneal dialysis.  No abdominal masses.  No peritoneal signs   Extremities: no edema, normal peripheral pulses   Neuro: Alert oriented and nonfocal   Skin: no rashes or nodules    ED Course     Labs Reviewed   COMP METABOLIC PANEL (14) - Abnormal; Notable for the following components:       Result Value    Glucose 203 (*)     Potassium 2.9 (*)     BUN 51 (*)     Creatinine 7.99 (*)     Calculated Osmolality 301 (*)     eGFR-Cr 7 (*)     Alkaline Phosphatase 137 (*)     All other components within normal limits   CBC WITH DIFFERENTIAL WITH PLATELET - Abnormal; Notable for the following components:    RBC 2.79 (*)     HGB 9.7 (*)     HCT 27.2 (*)     MCH 34.8 (*)     Lymphocyte Absolute 0.91 (*)     All other components within normal limits   LIPASE - Normal   CELL COUNT PERITONEAL FLUID   CELL COUNT/DIFF PERITONEAL FL   PATH COMMENT PERITONEAL FLUID   BODY FLUID CULT,AEROBIC AND ANAEROBIC            CT ABDOMEN+PELVIS(CPT=74176)    Result Date: 10/20/2024  PROCEDURE:  CT ABDOMEN+PELVIS (CPT=74176)  COMPARISON:  None.  INDICATIONS:  abdominal pain  TECHNIQUE:  Unenhanced multislice CT scanning was performed from the dome of the diaphragm to the pubic symphysis.  Dose reduction techniques were used. Dose information is transmitted to the ACR (American College of Radiology) NRDR (National Radiology Data Registry) which includes the Dose Index Registry.  PATIENT STATED  HISTORY: (As transcribed by Technologist)  Patient c/o lower abdominal pain for the past 3 days.     FINDINGS:  LUNG BASES:  Linear atelectasis within left lower lobe.  Coronary artery calcifications.  Mild bilateral gynecomastia. LIVER:  Nodular contour to the liver. BILIARY:   Gallbladder is unremarkable in appearance.  No intrahepatic biliary dilatation. SPLEEN:  Normal.  No enlargement or focal lesion. PANCREAS:  No coni-pancreatic inflammatory stranding ADRENALS:  Normal.  No mass or enlargement.  KIDNEYS:  The kidneys are symmetric in size and shape without evidence of hydronephrosis.  No obstructing urolithiasis.  Parapelvic right renal cyst.  Left renal cortical cyst. BOWEL/MESENTERY:  Bowel is normal in caliber. No evidence of obstruction.  Ascites.  Colonic diverticulosis without evidence of diverticulitis.  The appendix is normal appearance.  There is a peritoneal dialysis catheter with tip coiled within the left abdomen.  Mild wall thickening of the rectum. PELVIS:  Bladder wall thickening.  Prostatomegaly measuring 5.2 x 4.1 cm.  Bilateral fat containing inguinal hernias, right greater than left.  Free pelvic fluid.   AORTA/VASCULAR:  Atheromatous calcifications of the aorta. BONES:  Degenerative changes of the lumbar spine.            CONCLUSION:  1. Mild wall thickening of the rectum which may represent proctitis.  Correlation with clinical symptoms is recommended.. 2. Ascites.  There is a peritoneal dialysis catheter noted with tip coiled within the left abdomen. 3. Colonic diverticulosis without evidence of diverticulitis. 4. Bladder wall thickening.  Correlation with urinalysis is recommended. 5. Bilateral fat containing inguinal hernias, right greater than left. 6. Nodular contour to the liver suggestive of cirrhotic changes.    LOCATION:  [facility]   Dictated by (CST): Marie Jaimes MD on 10/20/2024 at 3:19 PM     Finalized by (CST): Marie Jaimes MD on 10/20/2024 at 3:22 PM             MDM       74-year-old male in peritoneal dialysis presents reporting abdominal pain over the last 3 days.  Fluctuating in intensity.  No infectious symptoms.    Additional history obtained by patient's wife who reports that he was complaining of pain more overnight while he was draining his peritoneal dialysis fluid and during the day the pain has been much less    Differential includes but is not limited to diverticulitis, colitis, kidney stone, SBP ,, a life threat.    CBC, CMP, lipase, PD fluid analysis, CT abdomen and pelvis ordered for further evaluation.    My independent interpretation of CT scan is that there is no obvious diverticulitis.    CBC shows no leukocytosis.  Lipase within normal limits and does not suggest acute pancreatitis.  Normal LFTs.  Hypokalemia with potassium at 2.9.  40 mEq oral potassium ordered.  We were able to test the PD fluid and reviewed it with nephrology and it does not suggest significant infection/SBP at this time.  Patient is afebrile, comfortable.  Okay for discharge home.  Instructed to notify his PD nurse of his lab results and workup today.  Patient and his wife are comfortable with plan for discharge home.  Instructed to continue monitoring symptoms and return if fever, worsening pain, or with any concerns.            Medical Decision Making      Disposition and Plan     Clinical Impression:  1. Abdominal pain of unknown etiology         Disposition:  Discharge  10/20/2024  8:52 pm    Follow-up:  Jacob Hicks MD  [address]  [address] [place] [id number] [phone]    Follow up      Notify your PD nurse of today's pain and results.  Results are in your MyChart    Follow up            Medications Prescribed:  Current Discharge Medication List              Supplementary Documentation:

## 2024-10-20 NOTE — ED QUICK NOTES
PT confined verbally that he dopes not produce urine, he will not be able to provide a urine sample

## 2024-10-20 NOTE — ED QUICK NOTES
Rounding Completed    Plan of Care reviewed. Waiting for CT scan results.  Elimination needs assessed.    Bed is locked and in lowest position. Call light within reach.

## 2024-10-20 NOTE — ED QUICK NOTES
Rounding Completed    Plan of Care reviewed. Waiting for [facility] call back to determine if they can collect dialysis fluid as requested by [facility]  ( They spoke with Dr. Handley)     Elimination needs assessed.  Provided .    Bed is locked and in lowest position. Call light within reach.

## 2024-10-20 NOTE — ED INITIAL ASSESSMENT (HPI)
Pt here with c.o abdominal pain since Friday,  hx of peritoneal dialysis.  Pt denies fever and vomiting.

## 2024-10-21 PROBLEM — E87.6 HYPOKALEMIA: Status: ACTIVE | Noted: 2024-10-21

## 2024-10-21 PROBLEM — N18.6 ESRD ON PERITONEAL DIALYSIS (HCC): Status: ACTIVE | Noted: 2024-10-21

## 2024-10-21 PROBLEM — Z99.2 ESRD ON PERITONEAL DIALYSIS (HCC): Status: ACTIVE | Noted: 2024-10-21

## 2024-10-21 LAB
ANION GAP SERPL CALC-SCNC: 7 MMOL/L (ref 0–18)
ANION GAP SERPL CALC-SCNC: 9 MMOL/L (ref 0–18)
BUN BLD-MCNC: 50 MG/DL (ref 9–23)
BUN BLD-MCNC: 54 MG/DL (ref 9–23)
CALCIUM BLD-MCNC: 9 MG/DL (ref 8.7–10.4)
CALCIUM BLD-MCNC: 9.6 MG/DL (ref 8.7–10.4)
CHLORIDE SERPL-SCNC: 100 MMOL/L (ref 98–112)
CHLORIDE SERPL-SCNC: 100 MMOL/L (ref 98–112)
CO2 SERPL-SCNC: 25 MMOL/L (ref 21–32)
CO2 SERPL-SCNC: 26 MMOL/L (ref 21–32)
CREAT BLD-MCNC: 8.15 MG/DL
CREAT BLD-MCNC: 8.34 MG/DL
EGFRCR SERPLBLD CKD-EPI 2021: 6 ML/MIN/1.73M2 (ref 60–?)
EGFRCR SERPLBLD CKD-EPI 2021: 6 ML/MIN/1.73M2 (ref 60–?)
ERYTHROCYTE [DISTWIDTH] IN BLOOD BY AUTOMATED COUNT: 12.8 %
EST. AVERAGE GLUCOSE BLD GHB EST-MCNC: 160 MG/DL (ref 68–126)
GLUCOSE BLD-MCNC: 113 MG/DL (ref 70–99)
GLUCOSE BLD-MCNC: 176 MG/DL (ref 70–99)
GLUCOSE BLD-MCNC: 177 MG/DL (ref 70–99)
GLUCOSE BLD-MCNC: 220 MG/DL (ref 70–99)
GLUCOSE BLD-MCNC: 337 MG/DL (ref 70–99)
HBA1C MFR BLD: 7.2 % (ref ?–5.7)
HCT VFR BLD AUTO: 24.7 %
HGB BLD-MCNC: 8.8 G/DL
MAGNESIUM SERPL-MCNC: 1.4 MG/DL (ref 1.6–2.6)
MCH RBC QN AUTO: 34.6 PG (ref 26–34)
MCHC RBC AUTO-ENTMCNC: 35.6 G/DL (ref 31–37)
MCV RBC AUTO: 97.2 FL
OSMOLALITY SERPL CALC.SUM OF ELEC: 293 MOSM/KG (ref 275–295)
OSMOLALITY SERPL CALC.SUM OF ELEC: 300 MOSM/KG (ref 275–295)
PHOSPHATE SERPL-MCNC: 3.4 MG/DL (ref 2.4–5.1)
PLATELET # BLD AUTO: 208 10(3)UL (ref 150–450)
POTASSIUM SERPL-SCNC: 2.8 MMOL/L (ref 3.5–5.1)
POTASSIUM SERPL-SCNC: 3 MMOL/L (ref 3.5–5.1)
POTASSIUM SERPL-SCNC: 3.3 MMOL/L (ref 3.5–5.1)
RBC # BLD AUTO: 2.54 X10(6)UL
SODIUM SERPL-SCNC: 132 MMOL/L (ref 136–145)
SODIUM SERPL-SCNC: 135 MMOL/L (ref 136–145)
WBC # BLD AUTO: 4.7 X10(3) UL (ref 4–11)

## 2024-10-21 PROCEDURE — 97161 PT EVAL LOW COMPLEX 20 MIN: CPT

## 2024-10-21 PROCEDURE — 83735 ASSAY OF MAGNESIUM: CPT

## 2024-10-21 PROCEDURE — 83036 HEMOGLOBIN GLYCOSYLATED A1C: CPT | Performed by: INTERNAL MEDICINE

## 2024-10-21 PROCEDURE — 96367 TX/PROPH/DG ADDL SEQ IV INF: CPT

## 2024-10-21 PROCEDURE — 85027 COMPLETE CBC AUTOMATED: CPT

## 2024-10-21 PROCEDURE — 84100 ASSAY OF PHOSPHORUS: CPT | Performed by: INTERNAL MEDICINE

## 2024-10-21 PROCEDURE — 84132 ASSAY OF SERUM POTASSIUM: CPT | Performed by: INTERNAL MEDICINE

## 2024-10-21 PROCEDURE — 3E1M39Z IRRIGATION OF PERITONEAL CAVITY USING DIALYSATE, PERCUTANEOUS APPROACH: ICD-10-PCS | Performed by: INTERNAL MEDICINE

## 2024-10-21 PROCEDURE — 80048 BASIC METABOLIC PNL TOTAL CA: CPT

## 2024-10-21 PROCEDURE — 82962 GLUCOSE BLOOD TEST: CPT

## 2024-10-21 PROCEDURE — 97530 THERAPEUTIC ACTIVITIES: CPT

## 2024-10-21 RX ORDER — CALCITRIOL 0.25 UG/1
0.25 CAPSULE, LIQUID FILLED ORAL
Status: DISCONTINUED | OUTPATIENT
Start: 2024-10-21 | End: 2024-10-23

## 2024-10-21 RX ORDER — ISOSORBIDE MONONITRATE 30 MG/1
60 TABLET, EXTENDED RELEASE ORAL DAILY
Status: DISCONTINUED | OUTPATIENT
Start: 2024-10-22 | End: 2024-10-23

## 2024-10-21 RX ORDER — TENAPANOR 31.9 MG/1
30 TABLET, FILM COATED ORAL 2 TIMES DAILY
COMMUNITY

## 2024-10-21 RX ORDER — ACETAMINOPHEN 500 MG
500 TABLET ORAL EVERY 4 HOURS PRN
Status: DISCONTINUED | OUTPATIENT
Start: 2024-10-21 | End: 2024-10-23

## 2024-10-21 RX ORDER — SENNOSIDES 8.6 MG
17.2 TABLET ORAL NIGHTLY PRN
Status: DISCONTINUED | OUTPATIENT
Start: 2024-10-21 | End: 2024-10-23

## 2024-10-21 RX ORDER — FUROSEMIDE 40 MG/1
80 TABLET ORAL DAILY
Status: DISCONTINUED | OUTPATIENT
Start: 2024-10-22 | End: 2024-10-23

## 2024-10-21 RX ORDER — HEPARIN SODIUM 5000 [USP'U]/ML
5000 INJECTION, SOLUTION INTRAVENOUS; SUBCUTANEOUS EVERY 8 HOURS SCHEDULED
Status: DISCONTINUED | OUTPATIENT
Start: 2024-10-21 | End: 2024-10-22

## 2024-10-21 RX ORDER — AMLODIPINE BESYLATE 5 MG/1
5 TABLET ORAL DAILY
Status: DISCONTINUED | OUTPATIENT
Start: 2024-10-22 | End: 2024-10-23

## 2024-10-21 RX ORDER — POTASSIUM CHLORIDE 750 MG/1
10 TABLET, EXTENDED RELEASE ORAL DAILY
Status: DISCONTINUED | OUTPATIENT
Start: 2024-10-22 | End: 2024-10-23

## 2024-10-21 RX ORDER — ONDANSETRON 2 MG/ML
4 INJECTION INTRAMUSCULAR; INTRAVENOUS EVERY 6 HOURS PRN
Status: DISCONTINUED | OUTPATIENT
Start: 2024-10-21 | End: 2024-10-23

## 2024-10-21 RX ORDER — METOCLOPRAMIDE HYDROCHLORIDE 5 MG/ML
10 INJECTION INTRAMUSCULAR; INTRAVENOUS EVERY 8 HOURS PRN
Status: DISCONTINUED | OUTPATIENT
Start: 2024-10-21 | End: 2024-10-21 | Stop reason: SDUPTHER

## 2024-10-21 RX ORDER — NICOTINE POLACRILEX 4 MG
30 LOZENGE BUCCAL
Status: DISCONTINUED | OUTPATIENT
Start: 2024-10-21 | End: 2024-10-23

## 2024-10-21 RX ORDER — CLOPIDOGREL BISULFATE 75 MG/1
75 TABLET ORAL DAILY
Status: DISCONTINUED | OUTPATIENT
Start: 2024-10-22 | End: 2024-10-22

## 2024-10-21 RX ORDER — HYDROCODONE BITARTRATE AND ACETAMINOPHEN 5; 325 MG/1; MG/1
1 TABLET ORAL EVERY 4 HOURS PRN
Status: DISCONTINUED | OUTPATIENT
Start: 2024-10-21 | End: 2024-10-23

## 2024-10-21 RX ORDER — POTASSIUM CHLORIDE 750 MG/1
20 CAPSULE, EXTENDED RELEASE ORAL DAILY
Status: ON HOLD | COMMUNITY
End: 2024-10-23

## 2024-10-21 RX ORDER — CARVEDILOL 6.25 MG/1
6.25 TABLET ORAL 2 TIMES DAILY WITH MEALS
Status: DISCONTINUED | OUTPATIENT
Start: 2024-10-21 | End: 2024-10-23

## 2024-10-21 RX ORDER — POTASSIUM CHLORIDE 1500 MG/1
20 TABLET, EXTENDED RELEASE ORAL ONCE
Status: DISCONTINUED | OUTPATIENT
Start: 2024-10-21 | End: 2024-10-21 | Stop reason: HOSPADM

## 2024-10-21 RX ORDER — ACETAMINOPHEN 325 MG/1
650 TABLET ORAL EVERY 4 HOURS PRN
Status: DISCONTINUED | OUTPATIENT
Start: 2024-10-21 | End: 2024-10-23

## 2024-10-21 RX ORDER — BISACODYL 10 MG
10 SUPPOSITORY, RECTAL RECTAL
Status: DISCONTINUED | OUTPATIENT
Start: 2024-10-21 | End: 2024-10-23

## 2024-10-21 RX ORDER — LACTULOSE 10 G/15ML
20 SOLUTION ORAL EVERY 6 HOURS PRN
Status: DISCONTINUED | OUTPATIENT
Start: 2024-10-21 | End: 2024-10-23

## 2024-10-21 RX ORDER — POTASSIUM CHLORIDE 1500 MG/1
20 TABLET, EXTENDED RELEASE ORAL ONCE
Status: COMPLETED | OUTPATIENT
Start: 2024-10-21 | End: 2024-10-21

## 2024-10-21 RX ORDER — CARVEDILOL 12.5 MG/1
25 TABLET ORAL 2 TIMES DAILY WITH MEALS
Status: DISCONTINUED | OUTPATIENT
Start: 2024-10-21 | End: 2024-10-21

## 2024-10-21 RX ORDER — NICOTINE POLACRILEX 4 MG
15 LOZENGE BUCCAL
Status: DISCONTINUED | OUTPATIENT
Start: 2024-10-21 | End: 2024-10-23

## 2024-10-21 RX ORDER — POTASSIUM CHLORIDE 1500 MG/1
40 TABLET, EXTENDED RELEASE ORAL ONCE
Status: COMPLETED | OUTPATIENT
Start: 2024-10-21 | End: 2024-10-21

## 2024-10-21 RX ORDER — METOCLOPRAMIDE HYDROCHLORIDE 5 MG/ML
5 INJECTION INTRAMUSCULAR; INTRAVENOUS EVERY 8 HOURS PRN
Status: DISCONTINUED | OUTPATIENT
Start: 2024-10-21 | End: 2024-10-23

## 2024-10-21 RX ORDER — DEXTROSE MONOHYDRATE, SODIUM CHLORIDE, SODIUM LACTATE, CALCIUM CHLORIDE, MAGNESIUM CHLORIDE 2.5; 538; 448; 18.4; 5.08 G/100ML; MG/100ML; MG/100ML; MG/100ML; MG/100ML
5000 SOLUTION INTRAPERITONEAL
Status: DISCONTINUED | OUTPATIENT
Start: 2024-10-22 | End: 2024-10-22

## 2024-10-21 RX ORDER — CHOLECALCIFEROL (VITAMIN D3) 50 MCG
2000 TABLET ORAL DAILY
Status: DISCONTINUED | OUTPATIENT
Start: 2024-10-22 | End: 2024-10-23

## 2024-10-21 RX ORDER — POLYETHYLENE GLYCOL 3350 17 G/17G
17 POWDER, FOR SOLUTION ORAL DAILY PRN
Status: DISCONTINUED | OUTPATIENT
Start: 2024-10-21 | End: 2024-10-23

## 2024-10-21 RX ORDER — DEXTROSE MONOHYDRATE 25 G/50ML
50 INJECTION, SOLUTION INTRAVENOUS
Status: DISCONTINUED | OUTPATIENT
Start: 2024-10-21 | End: 2024-10-23

## 2024-10-21 RX ORDER — ROSUVASTATIN CALCIUM 10 MG/1
10 TABLET, COATED ORAL DAILY
Status: DISCONTINUED | OUTPATIENT
Start: 2024-10-21 | End: 2024-10-23

## 2024-10-21 RX ORDER — HYDROCODONE BITARTRATE AND ACETAMINOPHEN 5; 325 MG/1; MG/1
2 TABLET ORAL EVERY 4 HOURS PRN
Status: DISCONTINUED | OUTPATIENT
Start: 2024-10-21 | End: 2024-10-23

## 2024-10-21 NOTE — ED QUICK NOTES
Rounding Completed.    Plan of Care reviewed. Waiting for disposition.  Elimination needs assessed.    Bed is locked and in lowest position. Call light within reach.

## 2024-10-21 NOTE — ED QUICK NOTES
Rounding Completed. Bedside cleaning done. Pt ate 80% of dinner served. Cardiac and pulse ox monitoring resumed.    Plan of Care reviewed. Waiting for lab results.  Elimination needs assessed.    Bed is locked and in lowest position. Call light within reach.

## 2024-10-21 NOTE — OCCUPATIONAL THERAPY NOTE
Received OT evaluation order.  Per PT, patient is independent with mobility and ADL. No skilled OT needs, no ADL concerns. Will sign off.

## 2024-10-21 NOTE — ED QUICK NOTES
Orders for admission, patient is aware of plan and ready to go upstairs. Any questions, please call ED RN Marla at extension [phone].     Patient Covid vaccination status: Fully vaccinated     COVID Test Ordered in ED: None    COVID Suspicion at Admission: N/A    Running Infusions:      Mental Status/LOC at time of transport: AO x 4    Other pertinent information:   CIWA score: N/A   NIH score:  N/A

## 2024-10-21 NOTE — H&P
[facility] HOSPITALIST  History and Physical     Tariq Nolan Patient Status:  Inpatient    1950 MRN [id number]   Location [facility] 5NW-A Attending Carine Conner MD   Hosp Day # 0 PCP Jacob Hicks MD     Chief Complaint:   Andominal/flank pain    History of Present Illness: Tariq Nolan is a 74 year old male with PMH sig  for CAD w/ stents, anemia, DM2, HTN, DL, and  ESRD on PD who presents to the ED with abdominal pain.  Patient was seen in the ED earlier where he had extensive workup including CT abdomen, blood work, analysis of PD fluid.  Due to lack of concern for peritonitis patient was discharged.  However nephrologist called and had patient come back to ED for admission due to concern for SBP.  Patient was started on IV cefepime and vancomycin.  Nephrology was consulted, ID was consulted.  Patient was then admitted for further care and management.      Past Medical History:  Past Medical History:    Anemia due to stage 4 chronic kidney disease (HCC)    sees Dr. Cochran    Asymptomatic PVCs    EKG at [facility]    Cardiac LV ejection fraction 55%    Chronic kidney disease due to type 2 diabetes mellitus (HCC)    Constipation    Coronary atherosclerosis    Coronary atherosclerosis of native coronary artery    sees Dr. Redding, last visit 20, 5 stents total since     Diabetes mellitus (HCC)    Diabetic eye exam ([facility])    [place] vision    Essential hypertension    Gastric lesion    Dr. 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 Orr, SHAYNE LAD    History of cardiovascular stress test    IPMN (intraductal papillary mucinous neoplasm)    saw Dr. Moura, seeing Dr. Hearn, EUS 20    Living will in place    OBESITY    JANAY (obstructive sleep apnea)    AHI 6 RDI 6 REM AHI 0 Supine AHI 46 non-supine AHI 1.3 Sao2 Dirk 90%     Osteoarthritis    knees     Pneumonia due to organism    Post covid-19 condition, unspecified    asymptomatic; not hospitalized; no current symptoms    Presence of drug coated stent in LAD coronary artery 1/31/2017 3x12 mm Xience     good Shahid      Presence of drug coated stent in right coronary artery Distal 3/28 mm Xience & Prox 3.5x18mm Xience 1/24/2017    Good Shahid     Presence of DRUG stent in coronary artery 1/31/2017 mid RI 3/38mm Xience     Good Shahid      Pure hypercholesterolemia    TGA (transient global amnesia)    at [facility]. couldn't remember about 12 hr period, had CT, MRI, EEG, labs, carotid dop and echo all nl, will see Dr. Laguna for neuro f/u    Type 1 diabetes mellitus (HCC)    Vertigo    Visual impairment    prescription glasses        Past Surgical History:   Past Surgical History:   Procedure Laterality Date    Cath bare metal stent (bms)      x5 ?    Cath percutaneous  transluminal coronary angioplasty      Colonoscopy  12/15/2008    tics, rhoids, polyps, repeat in 2013    Colonoscopy  02/14/2020    mult tubulovillous adenomas, Dr. Moura. repeat 2023    Colonoscopy N/A 2/11/2020    Procedure: COLONOSCOPY, POSSIBLE BIOPSY, POSSIBLE POLYPECTOMY 05446;  Surgeon: Tay Moura MD;  Location: [facility]    Egd  12/2020    gastric cardia lesion- intestinal metaplasia    Hemorrhoidectomy      Tonsillectomy  age 6       Social History:  reports that he has never smoked. He has never used smokeless tobacco. He reports that he does not drink alcohol and does not use drugs.    Family History:   Family History   Adopted: Yes   Problem Relation Age of Onset    No Known Problems Daughter     No Known Problems Daughter     No Known Problems Daughter         Allergies: Allergies[1]    Medications:  Medications Ordered Prior to Encounter[2]    Review of Systems:   A comprehensive 14 point review of systems was completed.    Pertinent positives and negatives noted in the HPI.    Physical Exam:    /51 (BP Location:  Right arm)   Pulse 68   Temp 98.9 °F (37.2 °C) (Oral)   Resp 18   Ht 5' 5\" (1.651 m)   Wt 192 lb 14.4 oz (87.5 kg)   SpO2 98%   BMI 32.10 kg/m²   General: No acute distress. Alert and oriented x 3.  HEENT: Normocephalic atraumatic. Moist mucous membranes. EOM-I. PERRLA. Anicteric.  Neck: No lymphadenopathy. No JVD. No carotid bruits.  Respiratory: Clear to auscultation bilaterally. No wheezes. No rhonchi.  Cardiovascular: S1, S2. Regular rate and rhythm. No murmurs, rubs or gallops. Equal pulses.   Chest and Back: No tenderness or deformity.  Abdomen: Soft, nontender, nondistended.  Positive bowel sounds. No rebound, guarding or organomegaly.  Neurologic: No focal neurological deficits. CNII-XII grossly intact.  Musculoskeletal: Moves all extremities.  Extremities: No edema or cyanosis.  Integument: No rashes or lesions.   Psychiatric: Appropriate mood and affect.      Diagnostic Data:      Labs:  Recent Labs   Lab 10/20/24  1242 10/21/24  0635   WBC 4.2 4.7   HGB 9.7* 8.8*   MCV 97.5 97.2   .0 208.0       Recent Labs   Lab 10/20/24  1242 10/20/24  2328   * 177*   BUN 51* 54*   CREATSERUM 7.99* 8.34*   CA 9.7 9.6   ALB 4.1  --     132*   K 2.9* 3.0*   CL 99 100   CO2 29.0 25.0   ALKPHO 137*  --    AST 14  --    ALT 13  --    BILT 0.3  --    TP 6.6  --        Estimated Creatinine Clearance: 6.9 mL/min (A) (based on SCr of 8.34 mg/dL (H)).    No results for input(s): \"PTP\", \"INR\" in the last 168 hours.    COVID-19 Lab Results    COVID-19  Lab Results   Component Value Date    COVID19 Not Detected 08/13/2022    COVID19 Not Detected 12/26/2020    COVID19 NEGATIVE 10/01/2020       Pro-Calcitonin  No results for input(s): \"PCT\" in the last 168 hours.    Cardiac  No results for input(s): \"TROP\", \"PBNP\" in the last 168 hours.    Creatinine Kinase  No results for input(s): \"CK\" in the last 168 hours.    Inflammatory Markers  No results for input(s): \"CRP\", \"MADELIN\", \"LDH\", \"DDIMER\" in the last 168  hours.    No results for input(s): \"TROP\", \"TROPHS\", \"CK\" in the last 168 hours.    Imaging: Imaging data reviewed in Epic.      ASSESSMENT / PLAN:    Tariq Nolan is a 74 year old male with PMH sig  for CAD w/ stents, anemia, DM2, HTN, DL, and  ESRD on PD who presents to the ED with abdominal pain.     Abd pain concern for SBP  ESRD on PD  -this is recurrent acute secondary peritonitis  -fu PD cultures  -stop iv cefepime and cont IV vanco  -ID following >> apprec recs  -supportive care  -PT/OT    Hypokalemia  Hypomagnesemia  -replace lytes per protocol    DM2  -hold home meds  -A1c 7.2  -ISS+accuchecks  -cannot use our in house accuchecks due to issue with PD fluid >> dw diabetes APN >> ok to use the CGM  -pt understand risks involved with using his own CGM and willing to accept that risk    HTN  -resume home meds    DL  -statin    Anemia  -hgb 9.7 >> 8.8  -no bleeding  -monitor    CAD sp stents  -resume home meds    Quality:  DVT Prophylaxis: heparin, scds  CODE status: full code   Gavin: no  If COVID testing is negative, may discontinue isolation: yes     Plan of care discussed with patient and all questions answered.         Carine Conner MD  [facility] Hospitalist  Pager [phone]  Answering Service number: [phone]          **Certification      PHYSICIAN Certification of Need for Inpatient Hospitalization - Initial Certification    Patient will require inpatient services that will reasonably be expected to span two midnight's based on the clinical documentation in H+P.   Based on patients current state of illness, I anticipate that, after discharge, patient will require TBD.                  [1]   Allergies  Allergen Reactions    Tetanus-Diphtheria Toxoids Td  [Diphteria And Tetanus] OTHER (SEE COMMENTS)    Tetanus Toxoid    [2]   Current Facility-Administered Medications on File Prior to Encounter   Medication Dose Route Frequency Provider Last Rate Last Admin    [COMPLETED] potassium chloride (Klor-Con  M20) tab 40 mEq  40 mEq Oral Once Arnoldo Handley MD   40 mEq at 10/20/24 2100     Current Outpatient Medications on File Prior to Encounter   Medication Sig Dispense Refill    Potassium Chloride ER 10 MEQ Oral Cap CR Take 2 capsules (20 mEq total) by mouth daily.      apixaban 5 MG Oral Tab Take 1 tablet (5 mg total) by mouth 2 (two) times daily.      CALCIUM CITRATE OR Take 667 mg by mouth in the morning, at noon, and at bedtime.      clopidogrel 75 MG Oral Tab Take 1 tablet (75 mg total) by mouth daily.      isosorbide mononitrate ER 60 MG Oral Tablet 24 Hr Take 1 tablet (60 mg total) by mouth daily.      furosemide 40 MG Oral Tab Take 1 tablet (40 mg total) by mouth daily. (Patient taking differently: Take 2 tablets (80 mg total) by mouth daily.) 90 tablet 1    AMLODIPINE 10 MG Oral Tab TAKE 1 TABLET(10 MG) BY MOUTH DAILY (Patient taking differently: Take 0.5 tablets (5 mg total) by mouth daily.) 90 tablet 0    CARVEDILOL 25 MG Oral Tab TAKE 1 TABLET(25 MG) BY MOUTH TWICE DAILY WITH MEALS 180 tablet 0    ROSUVASTATIN 10 MG Oral Tab TAKE 1 TABLET BY MOUTH EVERY NIGHT (Patient taking differently: Take 2 tablets (20 mg total) by mouth daily.) 90 tablet 0    insulin glargine (LANTUS SOLOSTAR) 100 UNIT/ML Subcutaneous Solution Pen-injector Inject 20 Units into the skin nightly. (Patient taking differently: Inject 25 Units into the skin nightly.) 15.3 mL 4    Meclizine HCl 12.5 MG Oral Tab Take 1 tablet (12.5 mg total) by mouth 3 (three) times daily as needed. (Patient taking differently: Take 1 tablet (12.5 mg total) by mouth 3 (three) times daily as needed for Dizziness.) 30 tablet 1    Cholecalciferol (VITAMIN D) 2000 UNITS Oral Tab Take 2,000 Units by mouth daily. 30 tablet 11    cloNIDine 0.05 mg Oral Tab Take 1 Partial Tablet (0.05 mg total) by mouth at bedtime. (Patient not taking: Reported on 10/21/2024)      lactulose 10 GM/15ML Oral Solution Take 30 mL (20 g total) by mouth as needed.      Azelastine HCl 0.1  % Nasal Solution 2 sprays by Nasal route 2 (two) times daily. 30 mL 5    CLONIDINE 0.3 MG/24HR Transdermal Patch Weekly APPLY 1 PATCH TOPICALLY TO THE SKIN 1 TIME A WEEK 12 patch 1    calcitriol 0.25 MCG Oral Cap TAKE 1 CAPSULE BY MOUTH 3 TIMES PER WEEK, EVERY MONDAY WEDNESDAY AND FRIDAY (Patient taking differently: Take 1 capsule (0.25 mcg total) by mouth daily. Monday and thursdays) 40 capsule 1    Glucose Blood (ONETOUCH VERIO) In Vitro Strip USE TO CHECK BLOOD GLUCOSE FOUR TIMES DAILY AS DIRECTED. 150 strip 3    OneTouch Delica Lancets 33G Does not apply Misc Check BG 4 times daily 150 each 6    Insulin Pen Needle (BD PEN NEEDLE JAVIER U/F) 32G X 4 MM Does not apply Misc Inject 1 each into the skin daily. 100 each 4    Glucose Blood (BLOOD GLUCOSE TEST) In Vitro Strip Check blood sugars 4x daily. 400 strip 3    ONETOUCH DELICA LANCETS FINE Does not apply Misc 1 lancet by Finger stick route 2 (two) times daily. 300 each 5    Blood Glucose Monitoring Suppl (ONETOUCH ULTRA 2) W/DEVICE Does not apply Kit One Touch Ultra 2 device for testing 1 kit 0

## 2024-10-21 NOTE — PROGRESS NOTES
[facility] Dosing Service      Initial Pharmacokinetic Consult for Vancomycin Dosing     Tariq Nolan is a 74 year old male who is being initiated on vancomycin therapy for intra-abdominal infection.  Pharmacy has been asked to dose vancomycin by Dr. Mary Gramajo.  The initial treatment and monitoring approach will be non-AUC strategy.        Weight and Temperature:    Wt Readings from Last 1 Encounters:   10/20/24 86.2 kg (190 lb)        Temp Readings from Last 1 Encounters:   10/20/24 98.3 °F (36.8 °C) (Oral)      Labs:   Recent Labs   Lab 10/20/24  1242 10/20/24  2328   CREATSERUM 7.99* 8.34*      Estimated Creatinine Clearance: 6.9 mL/min (A) (based on SCr of 8.34 mg/dL (H)).     Recent Labs   Lab 10/20/24  1242   WBC 4.2          The Pharmacokinetic Target is:      Trough/random 10-15 mg/L    Renal Dosing Considerations:      Peritoneal Dialysis     Assessment/Plan:     Initial/Loading dose: Has received 1250 mg IV (15 mg/kg, capped at 2250 mg) x 1 loading dose.      Maintenance dose: Pharmacy will dose vancomycin per levels.    Monitorin) Plan for vancomycin random level to be obtained in approximately 48 hours.    2) Pharmacy will order SCr as clinically indicated to assess renal function.    3) Pharmacy will monitor for toxicity and efficacy, adjust vancomycin dose and/or frequency, and order vancomycin levels as appropriate per the Pharmacy and Therapeutics Committee approved protocol until discontinuation of the medication.       We appreciate the opportunity to assist in the care of this patient.     Karla Jones PharmD  10/21/2024  12:38 AM  [facility] Extension: [phone]

## 2024-10-21 NOTE — ED PROVIDER NOTES
Patient Seen in: [facility] Emergency Department      History     Chief Complaint   Patient presents with    Abdomen/Flank Pain     Stated Complaint: Direct admit    Subjective:   HPI      74-year-old male history of end-stage renal disease on peritoneal dialysis presents emergency room for evaluation of abdominal pain.  Patient was seen in the ER earlier today, had extensive workup performed including CT of the abdomen, blood work and peritoneal dialysis fluid evaluation.  Patient was discharged and eventually called back due to concern for peritonitis after review of the peritoneal fluid.  Nephrologist did recommend patient be admitted and be given 1 g of IV cefepime and vancomycin.  Patient states he does have some dull discomfort in the abdomen denies any chest pain or shortness of breath.    Objective:     Past Medical History:    Anemia due to stage 4 chronic kidney disease (HCC)    sees Dr. Cochran    Asymptomatic PVCs    EKG at [facility]    Cardiac LV ejection fraction 55%    Chronic kidney disease due to type 2 diabetes mellitus (HCC)    Constipation    Coronary atherosclerosis    Coronary atherosclerosis of native coronary artery    sees Dr. Redding, last visit 11/11/20, 5 stents total since 2009    Diabetes mellitus ([facility])    Diabetic eye exam ([facility])    [place] vision    Essential hypertension    Gastric lesion    Dr. 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 Orr, SHAYNE LAD    History of cardiovascular stress test    IPMN (intraductal papillary mucinous neoplasm)    saw Dr. Moura, seeing Dr. Hearn, EUS 12/29/20    Living will in place    OBESITY    JANAY (obstructive sleep apnea)    AHI 6 RDI 6 REM AHI 0 Supine AHI 46 non-supine AHI 1.3 Sao2 Dirk 90%     Osteoarthritis    knees    Pneumonia due to organism    Post covid-19 condition, unspecified    asymptomatic; not  hospitalized; no current symptoms    Presence of drug coated stent in LAD coronary artery 1/31/2017 3x12 mm Xience     good Shahid      Presence of drug coated stent in right coronary artery Distal 3/28 mm Xience & Prox 3.5x18mm Xience 1/24/2017    Good Shahid     Presence of DRUG stent in coronary artery 1/31/2017 mid RI 3/38mm Xience     Good Shahid      Pure hypercholesterolemia    TGA (transient global amnesia)    at [facility]. couldn't remember about 12 hr period, had CT, MRI, EEG, labs, carotid dop and echo all nl, will see Dr. Laguna for neuro f/u    Type 1 diabetes mellitus (HCC)    Vertigo    Visual impairment    prescription glasses              Past Surgical History:   Procedure Laterality Date    Cath bare metal stent (bms)      x5 ?    Cath percutaneous  transluminal coronary angioplasty      Colonoscopy  12/15/2008    tics, rhoids, polyps, repeat in 2013    Colonoscopy  02/14/2020    mult tubulovillous adenomas, Dr. Moura. repeat 2023    Colonoscopy N/A 2/11/2020    Procedure: COLONOSCOPY, POSSIBLE BIOPSY, POSSIBLE POLYPECTOMY 22418;  Surgeon: Tay Moura MD;  Location: [facility]    Egd  12/2020    gastric cardia lesion- intestinal metaplasia    Hemorrhoidectomy      Tonsillectomy  age 6                Social History     Socioeconomic History    Marital status:     Number of children: 3   Occupational History    Occupation: retail, part time   Tobacco Use    Smoking status: Never    Smokeless tobacco: Never   Vaping Use    Vaping status: Never Used   Substance and Sexual Activity    Alcohol use: No    Drug use: No    Sexual activity: Yes     Partners: Female   Other Topics Concern     Service No    Blood Transfusions No    Exercise No     Comment: discussed, \" little to none.\" 12/19/19, 12/23/20    Seat Belt Yes    Self-Exams Yes     Social Drivers of Health      Received from [facility]    [facility]                  Physical Exam     ED Triage Vitals   BP 10/20/24 2330  125/68   Pulse 10/20/24 2330 75   Resp 10/20/24 2330 21   Temp 10/20/24 2343 98.3 °F (36.8 °C)   Temp src 10/20/24 2343 Oral   SpO2 10/20/24 2330 100 %   O2 Device 10/20/24 2330 None (Room air)       Current Vitals:   Vital Signs  BP: 134/64  Pulse: 73  Resp: 16  Temp: 98.3 °F (36.8 °C)  Temp src: Oral  MAP (mmHg): 83    Oxygen Therapy  SpO2: 100 %  O2 Device: None (Room air)        Physical Exam  GENERAL: Patient is awake, alert, well-appearing, in no acute distress.  HEENT:  no scleral icterus.  Mucous membranes are moist  HEART: Regular rate and rhythm, no murmurs.  LUNGS: Clear to auscultation bilaterally.  No Rales, no rhonchi, no wheezing, no stridor.  ABDOMEN: Soft, nondistended, mild mid abdominal tender, bowel sounds are present, no rebound, no rigidity, no guarding.no pulsatile masses.  EXTREMITIES: No peripheral edema, no calf tenderness      ED Course     Labs Reviewed   BASIC METABOLIC PANEL (8) - Abnormal; Notable for the following components:       Result Value    Glucose 177 (*)     Sodium 132 (*)     Potassium 3.0 (*)     BUN 54 (*)     Creatinine 8.34 (*)     eGFR-Cr 6 (*)     All other components within normal limits   BASIC METABOLIC PANEL (8)   MAGNESIUM   CBC, PLATELET; NO DIFFERENTIAL   PHOSPHORUS   BLOOD CULTURE   BLOOD CULTURE                   MDM        Differential diagnosis before testing includes but not limited to peritonitis, electrolyte abnormality, which is a medical condition that poses a threat to life/function    Past Medical History/comorbidities-as noted in HPI        Discussion of management (consult/physicians, social work, pharmacy,ect) [facility] nephrology Dr. Bello, abhi hospitalist     Medications Provided: IV cefepime, IV vancomycin    Course of Events during Emergency Room Visit include IV established blood work obtained.  Results and imaging from ER visit from earlier today reviewed including peritoneal fluid and CT of the abdomen.  Blood cultures were performed.   BMP performed as patient was noted to be hypokalemic earlier today did receive potassium at earlier ER visit.  Patient was given cefepime and vancomycin after discussion with nephrologist, also discussed with hospitalist physician.  Patient was admitted for further evaluation and treatment.  Patient and wife agree with plan.    Shared decision making was utilized           Disposition:    Admission  I have discussed with the patient the results of test, differential diagnosis, and treatment plan. They expressed clear understanding of these instructions and agrees to the plan provided.   s were answered.    Note to patient: The 21st Century Cures Act makes medical notes like these available to patients in the interest of transparency. However, this is a medical document intended as peer to peer communication. It is written in medical language and may contain abbreviations or verbiage that are unfamiliar. It may appear blunt or direct. Medical documents are intended to carry relevant information, facts as evident, and the clinical opinion of the practitioner.             Admission disposition: 10/20/2024 11:12 PM           Medical Decision Making      Disposition and Plan     Clinical Impression:  1. Peritonitis associated with peritoneal dialysis, initial encounter ([facility])    2. ESRD on peritoneal dialysis ([facility])    3. Hypokalemia         Disposition:  Admit  10/20/2024 11:12 pm    Follow-up:  No follow-up provider specified.        Medications Prescribed:  Current Discharge Medication List              Supplementary Documentation:         Hospital Problems       Present on Admission  Date Reviewed: 9/23/2024            ICD-10-CM Noted POA    * (Principal) Peritonitis associated with peritoneal dialysis, initial encounter ([facility]) T85.71XA 10/20/2024 Unknown    ESRD on peritoneal dialysis ([facility]) N18.6, Z99.2 10/21/2024 Unknown    Hypokalemia E87.6 10/21/2024 Unknown

## 2024-10-21 NOTE — ED NOTES
Dr. Handley spoke to Dr. Bello and they want patient to be admitted for IV abx related too spontaneous bacterial peritonitis.  This writer spoke with patient and he is going to be coming back this evening.

## 2024-10-21 NOTE — PLAN OF CARE
Pt is a&ox4. PD patient.  on RA. NSR on tele. IV abx. Heparin. Aneuric. Up SBA. Tolerating diabetic diet w/ QID accuchecks. Potassium replaced per nephrology. Home med Xphozah taken to pharmacy for verification. ID consulted. Jimmieius called at for PD tonight. Pt and wife updated on poc. No further needs at this time.    Problem: Diabetes/Glucose Control  Goal: Glucose maintained within prescribed range  Description: INTERVENTIONS:  - Monitor Blood Glucose as ordered  - Assess for signs and symptoms of hyperglycemia and hypoglycemia  - Administer ordered medications to maintain glucose within target range  - Assess barriers to adequate nutritional intake and initiate nutrition consult as needed  - Instruct patient on self management of diabetes  Outcome: Progressing

## 2024-10-21 NOTE — CONSULTS
Infectious Disease Initial Consultation      Date of admission: 10/20/2024 11:10 PM     Date of service: 10/21/24 7:54 AM    Consult requested by: Carine Conner MD     Reason for consult: Secondary peritonitis    Chief complaint: Abdominal pain    History of present illness: Tariq Nolan is a 74 year old male with history of end-stage renal disease on peritoneal dialysis, who presented to the emergency room with worsening abdominal pain.  The patient reported the pain had started 2 to 3 days prior to admission.  His pain is worse during his dialysis sessions.  No fevers or chills.  No other constitutional symptoms.    In the emergency room, the patient was afebrile, hemodynamically stable.  Labs revealed anemia otherwise unremarkable CBC.  BMP consistent with his end-stage renal disease.  LFTs were unremarkable.  Peritoneal fluid analysis was obtained that showed 1735 white blood cells, 54% of which were neutrophils.  Cultures from her aspiration of fluid at this point are growing gram-positive rods along with gram-positive cocci in pairs and clusters.  Case was discussed with ID and the patient has started on cefepime and vancomycin.  Blood cultures are no growth to date.    Review of systems:  All other components of the review of systems are negative, except those described in the history of present illness.     Past Medical History:    Anemia due to stage 4 chronic kidney disease (HCC)    sees Dr. 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 Redding, last visit 11/11/20, 5 stents total since 2009    Diabetes mellitus ([facility])    Diabetic eye exam ([facility])    [place] vision    Essential hypertension    Gastric lesion    Dr. 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 Orr, SHAYNE LAD    History of cardiovascular stress test    IPMN (intraductal papillary mucinous neoplasm)    saw Dr. Moura, seeing Dr. Hearn, EUS 12/29/20    Living will in place    OBESITY    JANAY (obstructive sleep apnea)    AHI 6 RDI 6 REM AHI 0 Supine AHI 46 non-supine AHI 1.3 Sao2 Dirk 90%     Osteoarthritis    knees    Pneumonia due to organism    Post covid-19 condition, unspecified    asymptomatic; not hospitalized; no current symptoms    Presence of drug coated stent in LAD coronary artery 1/31/2017 3x12 mm Xience     good Shahid      Presence of drug coated stent in right coronary artery Distal 3/28 mm Xience & Prox 3.5x18mm Xience 1/24/2017    Good Shahid     Presence of DRUG stent in coronary artery 1/31/2017 mid RI 3/38mm Xience     Good Shahid      Pure hypercholesterolemia    TGA (transient global amnesia)    at [facility]. couldn't remember about 12 hr period, had CT, MRI, EEG, labs, carotid dop and echo all nl, will see Dr. Laguna for neuro f/u    Type 1 diabetes mellitus (HCC)    Vertigo    Visual impairment    prescription glasses     Past Surgical History:   Procedure Laterality Date    Cath bare metal stent (bms)      x5 ?    Cath percutaneous  transluminal coronary angioplasty      Colonoscopy  12/15/2008    tics, rhoids, polyps, repeat in 2013    Colonoscopy  02/14/2020    mult tubulovillous adenomas, Dr. Moura. repeat 2023    Colonoscopy N/A 2/11/2020    Procedure: COLONOSCOPY, POSSIBLE BIOPSY, POSSIBLE POLYPECTOMY 80873;  Surgeon: Tay Moura MD;  Location: [facility]    Egd  12/2020    gastric cardia lesion- intestinal metaplasia    Hemorrhoidectomy      Tonsillectomy  age 6     Social History     Socioeconomic History    Marital status:     Number of children: 3   Occupational History    Occupation: retail, part time   Tobacco Use    Smoking status: Never    Smokeless tobacco:  Never   Vaping Use    Vaping status: Never Used   Substance and Sexual Activity    Alcohol use: No    Drug use: No    Sexual activity: Yes     Partners: Female   Other Topics Concern     Service No    Blood Transfusions No    Exercise No     Comment: discussed, \" little to none.\" 12/19/19, 12/23/20    Seat Belt Yes    Self-Exams Yes     Social Drivers of Health     Food Insecurity: No Food Insecurity (10/21/2024)    Food Insecurity     Food Insecurity: Never true   Transportation Needs: No Transportation Needs (10/21/2024)    Transportation Needs     Lack of Transportation: No   Housing Stability: Low Risk  (10/21/2024)    Housing Stability     Housing Instability: No     Family History   Adopted: Yes   Problem Relation Age of Onset    No Known Problems Daughter     No Known Problems Daughter     No Known Problems Daughter      Reviewed, see above    Medications:    potassium chloride    cefepime     Allergies:  Allergies[1]    Physical Exam:  Vitals:    10/21/24 0552   BP: 121/51   Pulse: 68   Resp: 18   Temp: 98.9 °F (37.2 °C)     Vitals signs and nursing note reviewed.   Constitutional:       Appearance: Normal appearance.   HENT:      Head: Normocephalic and atraumatic.      Mouth: Mucous membranes are moist.   Neck:      Musculoskeletal: Neck supple.   Cardiovascular:      Rate and Rhythm: Normal rate.      Heart sounds: Normal heart sounds. No murmur. No friction rub. No gallop.    Pulmonary:      Effort: Pulmonary effort is normal. No respiratory distress.      Breath sounds: Normal breath sounds. No stridor. No wheezing, rhonchi or rales.   Chest:      Chest wall: No tenderness.   Abdominal:      General: Abdomen is flat. There is no distension.      Palpations: Abdomen is soft. There is no mass.      Tenderness: There is no tenderness. There is no guarding or rebound.      Hernia: No hernia is present.   Musculoskeletal:      Right lower leg: No edema.      Left lower leg: No edema.   Skin:      General: Skin is warm and dry.   Neurological:      General: No focal deficit present.      Mental Status: Alert and oriented to person, place, and time.     Laboratory data:  I have independently reviewed all lab results; including old microbiological results.  Lab Results   Component Value Date    WBC 4.7 10/21/2024    HGB 8.8 10/21/2024    HCT 24.7 10/21/2024    .0 10/21/2024    CREATSERUM 8.34 10/20/2024    BUN 54 10/20/2024     10/20/2024    K 3.0 10/20/2024     10/20/2024    CO2 25.0 10/20/2024     10/20/2024    CA 9.6 10/20/2024        Recent Labs   Lab 10/20/24  1242 10/21/24  0635   RBC 2.79* 2.54*   HGB 9.7* 8.8*   HCT 27.2* 24.7*   MCV 97.5 97.2   MCH 34.8* 34.6*   MCHC 35.7 35.6   RDW 12.8 12.8   NEPRELIM 2.58  --    WBC 4.2 4.7   .0 208.0       Microbiology data:  No results found for this visit on 10/20/24.      Radiology:  I have reviewed all imaging data available independently.   CT abdomen pelvis on 10/21:  Mild thickening of the rectal wall.  Ascites consistent with his known peritoneal dialysis.  No other acute infectious abnormalities.    Impression:  Tariq Nolan is a 74 year old male with     Recurrent acute secondary peritonitis  Peritoneal fluid with gram-positive cocci and gram-positive rods  Peritoneal analysis with 1775 white blood cells, 54% of which were neutrophils  Currently on IV vancomycin and IV cefepime  The patient reports a previous episode a month ago for which she received 2 weeks of intraperitoneal antibiotics  End-stage renal disease  On peritoneal dialysis  Antibiotics will be renally adjusted    Recommendations:     Discontinue IV cefepime  Continue IV vancomycin for now  Continue to follow-up on peritoneal fluid cultures  Continue to monitor daily labs for antibiotic toxicity  Further recommendations will depend on the above work-up and clinical progress     The plan of care was discussed with the primary hospital team, Carine Conner,  MD     Recommendations were also discussed with the patient; all questions were answered.     Thank you for this consultation. Please don't hesitate to call the ID team for questions or any acute changes in patient's clinical condition.    Please note that this report has been produced using speech recognition software and may contain errors related to that system including, but not limited to, errors in grammar, punctuation, and spelling, as well as words and phrases that possibly may have been recognized inappropriately.  If there are any questions or concerns, contact the dictating provider for clarification.    The  [employer] Cures Act makes medical notes like these available to patients in the interest of transparency. Please be advised this is a medical document. Medical documents are intended to carry relevant information, facts as evident, and the clinical opinion of the practitioner. The medical note is intended as peer to peer communication and may appear blunt or direct. It is written in medical language and may contain abbreviations or verbiage that are unfamiliar.     Sofia Stafford MD  [facility]. Tel: [phone]. Fax: [phone].     Tariq Nolan : 1950 MRN: [id number] CSN: [id number]          [1]   Allergies  Allergen Reactions    Tetanus-Diphtheria Toxoids Td  [Diphteria And Tetanus] OTHER (SEE COMMENTS)    Tetanus Toxoid

## 2024-10-21 NOTE — ED QUICK NOTES
ED PCT - Patrick called lab to inquire about estimated time for results of  cell count - \"not too much longer.\" This was relayed to ED Physician - Dr. Handley.

## 2024-10-21 NOTE — CONSULTS
[facility]   part of [facility]    Report of Consultation    Tariq Nolan Patient Status:  Inpatient    1950 MRN [id number]   Location [facility] 5NW-A Attending Carine Conner MD   Hosp Day # 0 PCP Jacob Hicks MD     Date of consult: 10/21/2024    REASON FOR CONSULT:     ESRD    HISTORY OF PRESENT ILLNESS:     Tariq Nolan is a a(n) 74 year old male w ho ESRD on PD, CAD sp stents, DM, HTN, HL who presented with abdominal pain x 3 days. Notes has been on PD x 1 year about. With Andrey Wu [place]. Makes very little urine. Recently added LF with purple due to decreased urine. He also notes recently started on potassium pill     REVIEW OF SYSTEMS:     Please see HPI for pertinent positives. 10 point review of systems otherwise reviewed and negative.     HISTORY:     Past Medical History:    Anemia due to stage 4 chronic kidney disease (HCC)    sees Dr. 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 Redding, last visit 20, 5 stents total since     Diabetes mellitus (HCC)    Diabetic eye exam ([facility])    [place] vision    Essential hypertension    Gastric lesion    Dr. 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 Orr, SHAYNE LAD    History of cardiovascular stress test    IPMN (intraductal papillary mucinous neoplasm)    saw Dr. Moura, seeing Dr. Hearn, EUS 20    Living will in place    OBESITY    JANAY (obstructive sleep apnea)    AHI 6 RDI 6 REM AHI 0 Supine AHI 46 non-supine AHI 1.3 Sao2 Dirk 90%     Osteoarthritis    knees    Pneumonia due to organism    Post covid-19 condition, unspecified    asymptomatic; not  hospitalized; no current symptoms    Presence of drug coated stent in LAD coronary artery 1/31/2017 3x12 mm Xience     good Shahid      Presence of drug coated stent in right coronary artery Distal 3/28 mm Xience & Prox 3.5x18mm Xience 1/24/2017    Good Shahid     Presence of DRUG stent in coronary artery 1/31/2017 mid RI 3/38mm Xience     Good Shahid      Pure hypercholesterolemia    TGA (transient global amnesia)    at [facility]. couldn't remember about 12 hr period, had CT, MRI, EEG, labs, carotid dop and echo all nl, will see Dr. Laguna for neuro f/u    Type 1 diabetes mellitus (HCC)    Vertigo    Visual impairment    prescription glasses     Past Surgical History:   Procedure Laterality Date    Cath bare metal stent (bms)      x5 ?    Cath percutaneous  transluminal coronary angioplasty      Colonoscopy  12/15/2008    tics, rhoids, polyps, repeat in 2013    Colonoscopy  02/14/2020    mult tubulovillous adenomas, Dr. Moura. repeat 2023    Colonoscopy N/A 2/11/2020    Procedure: COLONOSCOPY, POSSIBLE BIOPSY, POSSIBLE POLYPECTOMY 90603;  Surgeon: Tay Moura MD;  Location: [facility]    Egd  12/2020    gastric cardia lesion- intestinal metaplasia    Hemorrhoidectomy      Tonsillectomy  age 6     Family History   Adopted: Yes   Problem Relation Age of Onset    No Known Problems Daughter     No Known Problems Daughter     No Known Problems Daughter       reports that he has never smoked. He has never used smokeless tobacco. He reports that he does not drink alcohol and does not use drugs.    ALLERGIES:     Allergies[1]    MEDICATIONS:       Current Facility-Administered Medications:     glucose (Dex4) 15 GM/59ML oral liquid 15 g, 15 g, Oral, Q15 Min PRN **OR** glucose (Glutose) 40% oral gel 15 g, 15 g, Oral, Q15 Min PRN **OR** glucose-vitamin C (Dex-4) chewable tab 4 tablet, 4 tablet, Oral, Q15 Min PRN **OR** dextrose 50% injection 50 mL, 50 mL, Intravenous, Q15 Min PRN **OR** glucose (Dex4) 15 GM/59ML  oral liquid 30 g, 30 g, Oral, Q15 Min PRN **OR** glucose (Glutose) 40% oral gel 30 g, 30 g, Oral, Q15 Min PRN **OR** glucose-vitamin C (Dex-4) chewable tab 8 tablet, 8 tablet, Oral, Q15 Min PRN    insulin aspart (NovoLOG) 100 Units/mL FlexPen 1-10 Units, 1-10 Units, Subcutaneous, TID AC and HS    heparin (Porcine) 5000 UNIT/ML injection 5,000 Units, 5,000 Units, Subcutaneous, Q8H INDU    acetaminophen (Tylenol Extra Strength) tab 500 mg, 500 mg, Oral, Q4H PRN    ondansetron (Zofran) 4 MG/2ML injection 4 mg, 4 mg, Intravenous, Q6H PRN    polyethylene glycol (PEG 3350) (Miralax) 17 g oral packet 17 g, 17 g, Oral, Daily PRN    sennosides (Senokot) tab 17.2 mg, 17.2 mg, Oral, Nightly PRN    bisacodyl (Dulcolax) 10 MG rectal suppository 10 mg, 10 mg, Rectal, Daily PRN    melatonin tab 3 mg, 3 mg, Oral, Nightly PRN    acetaminophen (Tylenol) tab 650 mg, 650 mg, Oral, Q4H PRN **OR** HYDROcodone-acetaminophen (Norco) 5-325 MG per tab 1 tablet, 1 tablet, Oral, Q4H PRN **OR** HYDROcodone-acetaminophen (Norco) 5-325 MG per tab 2 tablet, 2 tablet, Oral, Q4H PRN    metoclopramide (Reglan) 5 mg/mL injection 5 mg, 5 mg, Intravenous, Q8H PRN    lactulose (CHRONULAC) 10 GM/15ML solution 20 g, 20 g, Oral, Q6H PRN    [START ON 10/22/2024] amLODIPine (Norvasc) tab 5 mg, 5 mg, Oral, Daily    calcitriol (Rocaltrol) cap 0.25 mcg, 0.25 mcg, Oral, Once per day on Monday Thursday    carvedilol (Coreg) tab 25 mg, 25 mg, Oral, BID with meals    [START ON 10/22/2024] cholecalciferol (Vitamin D3) tab 2,000 Units, 2,000 Units, Oral, Daily    [START ON 10/22/2024] clopidogrel (Plavix) tab 75 mg, 75 mg, Oral, Daily    [START ON 10/22/2024] furosemide (Lasix) tab 80 mg, 80 mg, Oral, Daily    [START ON 10/22/2024] isosorbide mononitrate ER (Imdur) 24 hr tab 60 mg, 60 mg, Oral, Daily    rosuvastatin (Crestor) tab 10 mg, 10 mg, Oral, Daily    [START ON 10/22/2024] potassium chloride (Klor-Con M10) tab 10 mEq, 10 mEq, Oral, Daily    Tenapanor HCl (CKD)  30 mg Tablet \"Patient Supplied\", 30 mg, Oral, BID  No current outpatient medications on file.         PHYSICAL EXAM:     Vital Signs: BP (!) 148/93 (BP Location: Right arm)   Pulse 60   Temp 98.5 °F (36.9 °C) (Oral)   Resp 18   Ht 5' 5\" (1.651 m)   Wt 192 lb 14.4 oz (87.5 kg)   SpO2 100%   BMI 32.10 kg/m²   Temp (24hrs), Av.4 °F (36.9 °C), Min:98 °F (36.7 °C), Max:98.9 °F (37.2 °C)       Intake/Output Summary (Last 24 hours) at 10/21/2024 1739  Last data filed at 10/21/2024 0026  Gross per 24 hour   Intake 100 ml   Output --   Net 100 ml     Wt Readings from Last 3 Encounters:   10/21/24 192 lb 14.4 oz (87.5 kg)   10/20/24 190 lb (86.2 kg)   24 190 lb (86.2 kg)       General: NAD  HEENT: NCAT, MMM, EOMI  Neck: Supple   Cardiac: Regular rate and rhythm   Lungs: CTAB   Abdomen: Soft, non-tender, non-distended   : No CVA tenderness  Extremities: no leg edema  Neurologic/Psych: mentating well, no asterixis  Skin: No rashes    LABORATORY DATA:       Lab Results   Component Value Date     (H) 10/21/2024    BUN 50 (H) 10/21/2024    BUNCREA 26.0 (H) 2022    CREATSERUM 8.15 (H) 10/21/2024    ANIONGAP 9 10/21/2024    GFR >59 10/05/2010    GFRNAA 28 (L) 2019    GFRAA 33 (L) 2019    CA 9.0 10/21/2024    OSMOCALC 300 (H) 10/21/2024    ALKPHO 137 (H) 10/20/2024    AST 14 10/20/2024    ALT 13 10/20/2024    BILT 0.3 10/20/2024    TP 6.6 10/20/2024    ALB 4.1 10/20/2024    GLOBULIN 2.5 10/20/2024    AGRATIO 2.1 06/10/2015     (L) 10/21/2024    K 3.3 (L) 10/21/2024     10/21/2024    CO2 26.0 10/21/2024     Lab Results   Component Value Date    WBC 4.7 10/21/2024    RBC 2.54 (L) 10/21/2024    HGB 8.8 (L) 10/21/2024    HCT 24.7 (L) 10/21/2024    .0 10/21/2024    MPV 9.9 2012    MCV 97.2 10/21/2024    MCH 34.6 (H) 10/21/2024    MCHC 35.6 10/21/2024    RDW 12.8 10/21/2024    NEPRELIM 2.58 10/20/2024    NEUTABS 3.98 2019    LYMPHABS 1.06 2019    EOSABS 0.06  03/26/2019    BASABS 0.06 03/26/2019    NEUT 41 03/26/2019    LYMPH 19 03/26/2019    MON 8 03/26/2019    EOS 1 03/26/2019    BASO 1 03/26/2019    NEPERCENT 60.8 10/20/2024    LYPERCENT 21.5 10/20/2024    MOPERCENT 15.1 10/20/2024    EOPERCENT 1.4 10/20/2024    BAPERCENT 0.5 10/20/2024    NE 2.58 10/20/2024    LYMABS 0.91 (L) 10/20/2024    MOABSO 0.64 10/20/2024    EOABSO 0.06 10/20/2024    BAABSO 0.02 10/20/2024     Lab Results   Component Value Date    MALBP 5.9 02/28/2022    CREUR 33.38 02/28/2022     Lab Results   Component Value Date    COLORUR Yellow 12/18/2018    CLARITY Clear 12/18/2018    SPECGRAVITY 1.009 02/28/2022    GLUUR >=500 (A) 12/18/2018    BILUR Negative 12/18/2018    KETUR Negative 12/18/2018    BLOODURINE Negative 12/18/2018    PHURINE 5.0 12/18/2018    PROUR 9.9 02/28/2022    UROBILINOGEN <2.0 12/18/2018    NITRITE Negative 02/28/2022    LEUUR Negative 12/18/2018    NMIC Microscopic not indicated 12/28/2016    WBCUR 1-4 12/18/2018    RBCUR 0-2 12/18/2018    EPIUR None Seen 12/18/2018    BACUR None seen 02/28/2022    HYLUR Present (A) 12/18/2018         IMAGING:     All imaging studies personally reviewed.    No results found.      ASSESSMENT/PLAN:   Tariq Nolan is a a(n) 74 year old male w ho ESRD on PD, CAD sp stents, DM, HTN, HL who presented with abdominal pain x 3 days. Notes has been on PD x 1 year about. With Andrey Wu. Makes very little urine. Recently added LF with purple due to decreased urine. He also notes recently started on potassium pill     ESRD on PD  -- home Rx = 8.5hrs, 4 exchanges, 2.8L fill volume, alternates w all green and half green/half yellow, LF 2L purple   -- continue nightly PD  -- on lasix, unclear if needs this given minimal UOP  -- avoid morphine. Renally dose meds     Abdominal pain 2/2 Peritonitis  -- sp CT scan - defer other finding to primary team   -- per pt had peritonitis a month or so ago as well   -- on abx. Cultures pending. ID  consult appreciated     Secondary hyperparathyroidism   -- continue calcitriol     Hypokalemia  -- replete and monitor per nephrology. Do not use electrolyte protocol  Got 40 this am, ordered another 20 + his standing 10meq    Anemia  -- monitor     D/w RN and Dr Stafford    Thank you for allowing me to participate in the care of your patient. Please do not hesitate to contact me with concerns or questions.    Evelina Collado MD  [facility]    10/21/2024  5:39 PM         [1]   Allergies  Allergen Reactions    Tetanus-Diphtheria Toxoids Td  [Diphteria And Tetanus] OTHER (SEE COMMENTS)    Tetanus Toxoid

## 2024-10-21 NOTE — ED QUICK NOTES
Rounding Completed. Report received from YURIY Pratt    Plan of Care reviewed. Waiting for in patient room.  Elimination needs assessed.  Provided updates.    Bed is locked and in lowest position. Call light within reach.

## 2024-10-21 NOTE — PROGRESS NOTES
NURSING ADMISSION NOTE      Patient admitted via Cart  Oriented to room 525  Safety precautions initiated.  Bed in low position.    Admission navigator completed.   Alert x4. Glasses. Room air. Vitals WNL. Anuric. Up SBA. Denies any c/o pain at this time. Per pt and wife, pt is unable to receive finger sticks to checks glucose levels. Has glucose monitor to R arm and has a red id card in his wallet (copy in chart) stating that finger sticks may show an inaccurate reading, thus causing us to give an inacurrate amount of insulin which may interact with the fluid he received during peritoneal dialysis. Receiving PD at this time. Updated on POC, all questions and concerns addressed to understanding.     Notified MD regarding peritoneal fluid results  Consulted ID    Call light in reach.

## 2024-10-21 NOTE — PHYSICAL THERAPY NOTE
PHYSICAL THERAPY EVALUATION - INPATIENT     Room Number: 525/525-A  Evaluation Date: 10/21/2024  Type of Evaluation: Initial  Physician Order: PT Eval and Treat    Presenting Problem: peritonitis  Co-Morbidities : CAD, ESRD on dialysis  Reason for Therapy: Mobility Dysfunction and Discharge Planning    PHYSICAL THERAPY ASSESSMENT   Patient is a 74 year old male admitted 10/20/2024 for peritonitis.   Patient is currently functioning near baseline with bed mobility, transfers, and gait. Prior to admission, patient's baseline is independent.       PLAN  Patient has been evaluated and presents with no skilled Physical Therapy needs at this time.  Patient discharged from Physical Therapy services.  Please re-order if a new functional limitation presents during this admission.         GOALS  Patient was able to achieve the following goals ...    Patient was able to transfer At previous, functional level  Safely and independently   Patient able to ambulate on level surfaces At previous, functional level  Safely and independently     HOME SITUATION  Type of Home: [facility]  Home Layout: One level                     Lives With: Spouse              Prior Level of [place]: Pt reports ind PTA, works and enjoys spending time with woodworking at [employer].      SUBJECTIVE  \"I'm still pretty active.\"    OBJECTIVE  Precautions: Bed/chair alarm  Fall Risk: High fall risk    WEIGHT BEARING RESTRICTION     PAIN ASSESSMENT  Ratin          COGNITION  Overall Cognitive Status:  WFL - within functional limits    RANGE OF MOTION AND STRENGTH ASSESSMENT  Upper extremity ROM and strength are within functional limits     Lower extremity ROM is within functional limits     Lower extremity strength is within functional limits     BALANCE  Static Sitting: Good  Dynamic Sitting: Good  Static Standing: Good  Dynamic Standing: Good    ADDITIONAL TESTS                                    ACTIVITY TOLERANCE                         O2 WALK        NEUROLOGICAL FINDINGS                        AM-PAC '6-Clicks' INPATIENT SHORT FORM - BASIC MOBILITY  How much difficulty does the patient currently have...  Patient Difficulty: Turning over in bed (including adjusting bedclothes, sheets and blankets)?: None   Patient Difficulty: Sitting down on and standing up from a chair with arms (e.g., wheelchair, bedside commode, etc.): None   Patient Difficulty: Moving from lying on back to sitting on the side of the bed?: None   How much help from another person does the patient currently need...   Help from Another: Moving to and from a bed to a chair (including a wheelchair)?: None   Help from Another: Need to walk in hospital room?: None   Help from Another: Climbing 3-5 steps with a railing?: None       AM-PAC Score:  Raw Score: 24   Approx Degree of Impairment: 0%   Standardized Score (AM-PAC Scale): 61.14   CMS Modifier (G-Code): CH    FUNCTIONAL ABILITY STATUS  Gait Assessment   Functional Mobility/Gait Assessment  Gait Assistance: Independent  Distance (ft): 600  Assistive Device: None  Pattern: Within Functional Limits    Skilled Therapy Provided     Bed Mobility:  Rolling: ind  Supine to sit: ind   Sit to supine: ind     Transfer Mobility:  Sit to stand: ind   Stand to sit: ind  Gait = ind in hallway above.    Therapist's comments:RN present to assist with changing tele battery while ambulating hallway.  Encouraged pt to ambulate frequently throughout the day.    Exercise/Education Provided:  Bed mobility  Body mechanics  Energy conservation  Functional activity tolerated  Gait training  Posture  Transfer training    Patient End of Session: Needs met;RN aware of session/findings;All patient questions and concerns addressed;In bed;Hospital anti-slip socks;Family present    Patient Evaluation Complexity Level:  History Low - no personal factors and/or co-morbidities   Examination of body systems Low -  addressing 1-2 elements   Clinical Presentation Low- Stable    Clinical Decision Making Low Complexity       PT Session Time: 20 minutes    Therapeutic Activity: 8 minutes

## 2024-10-22 LAB
ALBUMIN SERPL-MCNC: 3.5 G/DL (ref 3.2–4.8)
ANION GAP SERPL CALC-SCNC: 10 MMOL/L (ref 0–18)
BASOPHILS # BLD AUTO: 0.02 X10(3) UL (ref 0–0.2)
BASOPHILS NFR BLD AUTO: 0.4 %
BUN BLD-MCNC: 46 MG/DL (ref 9–23)
CALCIUM BLD-MCNC: 9 MG/DL (ref 8.7–10.4)
CHLORIDE SERPL-SCNC: 100 MMOL/L (ref 98–112)
CO2 SERPL-SCNC: 24 MMOL/L (ref 21–32)
CREAT BLD-MCNC: 7.97 MG/DL
EGFRCR SERPLBLD CKD-EPI 2021: 7 ML/MIN/1.73M2 (ref 60–?)
EOSINOPHIL # BLD AUTO: 0.06 X10(3) UL (ref 0–0.7)
EOSINOPHIL NFR BLD AUTO: 1.3 %
ERYTHROCYTE [DISTWIDTH] IN BLOOD BY AUTOMATED COUNT: 12.6 %
GLUCOSE BLD-MCNC: 244 MG/DL (ref 70–99)
GLUCOSE BLD-MCNC: 254 MG/DL (ref 70–99)
GLUCOSE BLD-MCNC: 284 MG/DL (ref 70–99)
GLUCOSE BLD-MCNC: 295 MG/DL (ref 70–99)
GLUCOSE BLD-MCNC: 335 MG/DL (ref 70–99)
HCT VFR BLD AUTO: 26.4 %
HGB BLD-MCNC: 9.4 G/DL
IMM GRANULOCYTES # BLD AUTO: 0.02 X10(3) UL (ref 0–1)
IMM GRANULOCYTES NFR BLD: 0.4 %
LYMPHOCYTES # BLD AUTO: 0.82 X10(3) UL (ref 1–4)
LYMPHOCYTES NFR BLD AUTO: 17.7 %
MAGNESIUM SERPL-MCNC: 1.3 MG/DL (ref 1.6–2.6)
MCH RBC QN AUTO: 34.6 PG (ref 26–34)
MCHC RBC AUTO-ENTMCNC: 35.6 G/DL (ref 31–37)
MCV RBC AUTO: 97.1 FL
MONOCYTES # BLD AUTO: 0.58 X10(3) UL (ref 0.1–1)
MONOCYTES NFR BLD AUTO: 12.6 %
NEUTROPHILS # BLD AUTO: 3.12 X10 (3) UL (ref 1.5–7.7)
NEUTROPHILS # BLD AUTO: 3.12 X10(3) UL (ref 1.5–7.7)
NEUTROPHILS NFR BLD AUTO: 67.6 %
OSMOLALITY SERPL CALC.SUM OF ELEC: 303 MOSM/KG (ref 275–295)
PHOSPHATE SERPL-MCNC: 3.3 MG/DL (ref 2.4–5.1)
PLATELET # BLD AUTO: 198 10(3)UL (ref 150–450)
POTASSIUM SERPL-SCNC: 3 MMOL/L (ref 3.5–5.1)
RBC # BLD AUTO: 2.72 X10(6)UL
SODIUM SERPL-SCNC: 134 MMOL/L (ref 136–145)
VANCOMYCIN TROUGH SERPL-MCNC: 15.7 UG/ML (ref 10–20)
WBC # BLD AUTO: 4.6 X10(3) UL (ref 4–11)

## 2024-10-22 PROCEDURE — 80069 RENAL FUNCTION PANEL: CPT | Performed by: INTERNAL MEDICINE

## 2024-10-22 PROCEDURE — 80202 ASSAY OF VANCOMYCIN: CPT | Performed by: INTERNAL MEDICINE

## 2024-10-22 PROCEDURE — 85025 COMPLETE CBC W/AUTO DIFF WBC: CPT | Performed by: INTERNAL MEDICINE

## 2024-10-22 PROCEDURE — 82962 GLUCOSE BLOOD TEST: CPT

## 2024-10-22 PROCEDURE — 83735 ASSAY OF MAGNESIUM: CPT | Performed by: INTERNAL MEDICINE

## 2024-10-22 RX ORDER — CLOPIDOGREL BISULFATE 75 MG/1
75 TABLET ORAL DAILY
Status: DISCONTINUED | OUTPATIENT
Start: 2024-10-23 | End: 2024-10-23

## 2024-10-22 RX ORDER — DEXTROSE MONOHYDRATE, SODIUM CHLORIDE, SODIUM LACTATE, CALCIUM CHLORIDE, MAGNESIUM CHLORIDE 2.5; 538; 448; 18.4; 5.08 G/100ML; MG/100ML; MG/100ML; MG/100ML; MG/100ML
5000 SOLUTION INTRAPERITONEAL
Status: COMPLETED | OUTPATIENT
Start: 2024-10-22 | End: 2024-10-22

## 2024-10-22 RX ORDER — DEXTROSE MONOHYDRATE, SODIUM CHLORIDE, SODIUM LACTATE, CALCIUM CHLORIDE, MAGNESIUM CHLORIDE 1.5; 538; 448; 18.4; 5.08 G/100ML; MG/100ML; MG/100ML; MG/100ML; MG/100ML
5000 SOLUTION INTRAPERITONEAL
Status: COMPLETED | OUTPATIENT
Start: 2024-10-22 | End: 2024-10-22

## 2024-10-22 RX ORDER — MAGNESIUM SULFATE 1 G/100ML
1 INJECTION INTRAVENOUS ONCE
Status: COMPLETED | OUTPATIENT
Start: 2024-10-22 | End: 2024-10-23

## 2024-10-22 RX ORDER — DEXTROSE MONOHYDRATE, SODIUM CHLORIDE, SODIUM LACTATE, CALCIUM CHLORIDE, MAGNESIUM CHLORIDE 1.5; 538; 448; 18.4; 5.08 G/100ML; MG/100ML; MG/100ML; MG/100ML; MG/100ML
5000 SOLUTION INTRAPERITONEAL
Status: DISCONTINUED | OUTPATIENT
Start: 2024-10-23 | End: 2024-10-22

## 2024-10-22 NOTE — PLAN OF CARE
Alert x4. Room air. NSR on tele. Alexandrea. Accuchecks QID- CGM to R arm. Up ad joie. PD ongoing tonight until 9am. C/o generalized discomfort but refusing pain meds at this time. Updated on POC, no questions or concerns. Verbalized understanding. Call light within reach.     Problem: Diabetes/Glucose Control  Goal: Glucose maintained within prescribed range  Description: INTERVENTIONS:  - Monitor Blood Glucose as ordered  - Assess for signs and symptoms of hyperglycemia and hypoglycemia  - Administer ordered medications to maintain glucose within target range  - Assess barriers to adequate nutritional intake and initiate nutrition consult as needed  - Instruct patient on self management of diabetes  Outcome: Progressing     Problem: Patient/Family Goals  Goal: Patient/Family Long Term Goal  Description: Patient's Long Term Goal: discharge with appropriate resources    Interventions:  - comply with POC  - See additional Care Plan goals for specific interventions  Outcome: Progressing  Goal: Patient/Family Short Term Goal  Description: Patient's Short Term Goal:   10/21 noc: feel better, go home     Interventions:   - PD nightly  - See additional Care Plan goals for specific interventions  Outcome: Progressing

## 2024-10-22 NOTE — CDS QUERY
Clinical Documentation Clarification     Dear Doctor Dominique,   Is peritonitis associated with or due to peritoneal dialysis?   (  ) Peritonitis is associated with or due to peritoneal dialysis.   (  ) Peritonitis is not associated with or due to peritoneal dialysis.  (  ) Unable to determine_______________________________________________________    Clinical Documentation from the chart     Clinical Indicators:  >H&P- patients comes in with abdominal pain.  Abd pain concern for SBP ESRD on PD -this is recurrent acute secondary peritonitis.  >10/21/24 ID consult- he patient reported the pain had started 2 to 3 days prior to admission. His pain is worse during his dialysis sessions. Impression- Recurrent acute secondary peritonitis Peritoneal fluid with gram-positive cocci and gram-positive rods Peritoneal analysis with 1775 white blood cells, 54% of which were neutrophils  Currently on IV vancomycin and IV cefepime The patient reports a previous episode a month ago for which she received 2  weeks of intraperitoneal antibiotics    Risk factors: Advanced, age, ESRD on PD, recent acute secondary peritonitis a month ago.     Treatment Renal and Infectious disease consults, IV antibiotics, supportive care      Use of terms such as suspected, possible, or probable (associated with a specific diagnosis that is being evaluated, monitored, or treated as if it exists) are acceptable and can be coded in the inpatient setting, when documented at the time of discharge.   Please add any additional documentation to your progress note and continue to document this through discharge.  If you have any questions, please contact Clinical : Dorinda Conde RN /BSN @ [phone]   Thank You!                                                    THIS FORM IS A PERMANENT PART OF THE MEDICAL RECORD

## 2024-10-22 NOTE — PROGRESS NOTES
[facility]   part of [facility]    Nephrology Progress Note    Tariq Nolan Attending:  Carine Conner MD     Cc: ESRD    SUBJECTIVE     Some abd pain, No complaints  PD completed, UF 1363cc    PHYSICAL EXAM   Vital signs: /63 (BP Location: Right arm)   Pulse 70   Temp 98 °F (36.7 °C) (Oral)   Resp 19   Ht 5' 5\" (1.651 m)   Wt 192 lb 14.4 oz (87.5 kg)   SpO2 99%   BMI 32.10 kg/m²   Temp (24hrs), Av.2 °F (36.8 °C), Min:98 °F (36.7 °C), Max:98.5 °F (36.9 °C)     No intake or output data in the 24 hours ending 10/22/24 1129  Wt Readings from Last 3 Encounters:   10/21/24 192 lb 14.4 oz (87.5 kg)   10/20/24 190 lb (86.2 kg)   24 190 lb (86.2 kg)     General: NAD  HEENT: NCAT, EOMI, MMM  Neck: Supple   Cardiac: Regular rate and rhythm   Lungs: CTAB  Abdomen: Soft, non-tender, nondistended   Extremities: No edema  Neurologic: No asterxis  Skin: Warm and dry, no rashes     MEDS     Current Facility-Administered Medications   Medication Dose Route Frequency    potassium chloride 40 mEq in 250mL sodium chloride 0.9% IVPB premix  40 mEq Intravenous Once    apixaban (Eliquis) tab 5 mg  5 mg Oral BID    Vancomycin: PHARMACY DOSING  1 each Intravenous See Admin Instructions (RX holding)    [START ON 10/23/2024] clopidogrel (Plavix) tab 75 mg  75 mg Oral Daily    glucose (Dex4) 15 GM/59ML oral liquid 15 g  15 g Oral Q15 Min PRN    Or    glucose (Glutose) 40% oral gel 15 g  15 g Oral Q15 Min PRN    Or    glucose-vitamin C (Dex-4) chewable tab 4 tablet  4 tablet Oral Q15 Min PRN    Or    dextrose 50% injection 50 mL  50 mL Intravenous Q15 Min PRN    Or    glucose (Dex4) 15 GM/59ML oral liquid 30 g  30 g Oral Q15 Min PRN    Or    glucose (Glutose) 40% oral gel 30 g  30 g Oral Q15 Min PRN    Or    glucose-vitamin C (Dex-4) chewable tab 8 tablet  8 tablet Oral Q15 Min PRN    insulin aspart (NovoLOG) 100 Units/mL FlexPen 1-10 Units  1-10 Units Subcutaneous TID AC and HS    acetaminophen (Tylenol Extra  Strength) tab 500 mg  500 mg Oral Q4H PRN    ondansetron (Zofran) 4 MG/2ML injection 4 mg  4 mg Intravenous Q6H PRN    polyethylene glycol (PEG 3350) (Miralax) 17 g oral packet 17 g  17 g Oral Daily PRN    sennosides (Senokot) tab 17.2 mg  17.2 mg Oral Nightly PRN    bisacodyl (Dulcolax) 10 MG rectal suppository 10 mg  10 mg Rectal Daily PRN    melatonin tab 3 mg  3 mg Oral Nightly PRN    acetaminophen (Tylenol) tab 650 mg  650 mg Oral Q4H PRN    Or    HYDROcodone-acetaminophen (Norco) 5-325 MG per tab 1 tablet  1 tablet Oral Q4H PRN    Or    HYDROcodone-acetaminophen (Norco) 5-325 MG per tab 2 tablet  2 tablet Oral Q4H PRN    metoclopramide (Reglan) 5 mg/mL injection 5 mg  5 mg Intravenous Q8H PRN    lactulose (CHRONULAC) 10 GM/15ML solution 20 g  20 g Oral Q6H PRN    amLODIPine (Norvasc) tab 5 mg  5 mg Oral Daily    calcitriol (Rocaltrol) cap 0.25 mcg  0.25 mcg Oral Once per day on Monday Thursday    cholecalciferol (Vitamin D3) tab 2,000 Units  2,000 Units Oral Daily    furosemide (Lasix) tab 80 mg  80 mg Oral Daily    isosorbide mononitrate ER (Imdur) 24 hr tab 60 mg  60 mg Oral Daily    rosuvastatin (Crestor) tab 10 mg  10 mg Oral Daily    potassium chloride (Klor-Con M10) tab 10 mEq  10 mEq Oral Daily    Tenapanor HCl (CKD) 30 mg Tablet \"Patient Supplied\"  30 mg Oral BID    dextrose 2.5%, calcium 2.5 meq/L peritoneal solution  5,000 mL Intraperitoneal Once in dialysis    carvedilol (Coreg) tab 6.25 mg  6.25 mg Oral BID with meals       LABS     Lab Results   Component Value Date    WBC 4.6 10/22/2024    HGB 9.4 10/22/2024    HCT 26.4 10/22/2024    .0 10/22/2024    CREATSERUM 7.97 10/22/2024    BUN 46 10/22/2024     10/22/2024    K 3.0 10/22/2024     10/22/2024    CO2 24.0 10/22/2024     10/22/2024    CA 9.0 10/22/2024    ALB 3.5 10/22/2024    MG 1.3 10/22/2024    PHOS 3.3 10/22/2024    PGLU 295 10/22/2024       IMAGING   All imaging studies personally reviewed.    No orders to display      10/20/24 CT scan   CONCLUSION:    1. Mild wall thickening of the rectum which may represent proctitis.  Correlation with clinical symptoms is recommended..   2. Ascites.  There is a peritoneal dialysis catheter noted with tip coiled within the left abdomen.   3. Colonic diverticulosis without evidence of diverticulitis.   4. Bladder wall thickening.  Correlation with urinalysis is recommended.   5. Bilateral fat containing inguinal hernias, right greater than left.   6. Nodular contour to the liver suggestive of cirrhotic changes.      ASSESSMENT & PLAN   74 year old male w ho ESRD on PD, CAD sp stents, DM, HTN, HL who presented with abdominal pain x 3 days. Notes has been on PD x 1 year about. With Andrey Wu [place]. Makes very little urine. Recently added LF with purple due to decreased urine. He also notes recently started on potassium pill      ESRD on PD  -- home Rx = 8.5hrs, 4 exchanges, 2.8L fill volume, alternates w all green and half green/half yellow, LF 2L purple   -- continue nightly PD  -- on lasix, unclear if needs this given minimal UOP  -- avoid morphine. Renally dose meds      Abdominal pain 2/2 Peritonitis  -- sp CT scan - defer other finding to primary team   -- per pt had peritonitis a month or so ago as well   -- on abx. Cultures w Staph epidermidis, Corynebacterium species, not JK or striatum abnormal. ID consult appreciated. Recommend 3wks IP vanco through 11/10/24. Follow-up with ID.      Secondary hyperparathyroidism   -- continue calcitriol      Hypokalemia  -- replete and monitor per nephrology. Do not use electrolyte protocol  -- has home standing 10meq KCL, will plan to increase to 20 on discharge    Hypomagnesium   -- getting 1g Mg IV     Anemia  -- monitor     CT findings --> see above/see report. defer to primary team re non-renal findings including possible proctitis, diverticulosis without diverticulitis, bladder wall thickening, bilateral fat containing inguinal  hernias, nodular contour to the liver suggestive of cirrhotic changes       D/w RN and Dr Rivera Chan PD RN Mac[facility]a at [facility] outpt x 2 -- they will not have the vanco till tomorrow so will keep pt today and discharge tomorrow. Would also recommend anti-fungal ppx protocol  D/w pharmacy -- vanco trough 15.7 today sp IV vanco on 10/21. Will Plan for Getting vanco 15mg/kg in 2.5% bag, will let dwell 6hrs, then drain. Then start on his nightly PD  Approximally > 35min of non-face to face prolonged service relating to on-going patient management as discussed above    Thank you for allowing me to participate in the care of this patient. Please do not hesitate to call with questions or concerns.       Evelina Collado MD  [facility]ph[facility]

## 2024-10-22 NOTE — PROGRESS NOTES
[facility] Hospitalist Progress Note       SUBJECTIVE:  Pt feels okay, some abd pain  No nausea no vomiting  No fevers noted     OBJECTIVE:  Scheduled Meds:    potassium chloride  40 mEq Intravenous Once    magnesium sulfate  1 g Intravenous Once    apixaban  5 mg Oral BID    Vancomycin IV  1 each Intravenous See Admin Instructions (RX holding)    [START ON 10/23/2024] clopidogrel  75 mg Oral Daily    insulin aspart  1-10 Units Subcutaneous TID AC and HS    amLODIPine  5 mg Oral Daily    calcitriol  0.25 mcg Oral Once per day on Monday Thursday    cholecalciferol  2,000 Units Oral Daily    furosemide  80 mg Oral Daily    isosorbide mononitrate ER  60 mg Oral Daily    rosuvastatin  10 mg Oral Daily    potassium chloride  10 mEq Oral Daily    Tenapanor HCl (CKD)  30 mg Oral BID    dextrose 2.5%, calcium 2.5 meq/L  5,000 mL Intraperitoneal Once in dialysis    carvedilol  6.25 mg Oral BID with meals     Continuous Infusions:   PRN Meds:   glucose **OR** glucose **OR** glucose-vitamin C **OR** dextrose **OR** glucose **OR** glucose **OR** glucose-vitamin C    acetaminophen    ondansetron    polyethylene glycol (PEG 3350)    sennosides    bisacodyl    melatonin    acetaminophen **OR** HYDROcodone-acetaminophen **OR** HYDROcodone-acetaminophen    metoclopramide    lactulose    Vitals  Vitals:    10/22/24 0735   BP: 138/63   Pulse: 70   Resp: 19   Temp: 98 °F (36.7 °C)         Exam   Gen-    no acute distress, alert and oriented x 3   RESP-   Lungs CTA, normal respiratory effort  CV-      Heart RRR, no mgr  Abd-    soft, nondistended, nontender, bowel sounds present  Skin-    no rash  Neuro-  no focal neurologic deficits  Ext-      No edema in extremities   Psych- alert and oriented x 3     Labs:     Chem:  Recent Labs   Lab 10/20/24  1242 10/20/24  2328 10/21/24  0635 10/21/24  1423 10/22/24  0622    132* 135*  --  134*   K 2.9* 3.0* 2.8* 3.3* 3.0*   CL 99 100 100  --  100   CO2 29.0 25.0 26.0  --  24.0   BUN 51* 54* 50*   --  46*   MG  --   --  1.4*  --  1.3*   ALT 13  --   --   --   --    AST 14  --   --   --   --    ALB 4.1  --   --   --  3.5       HEM:  Recent Labs   Lab 10/20/24  1242 10/21/24  0635 10/22/24  0622   WBC 4.2 4.7 4.6   HGB 9.7* 8.8* 9.4*   .0 208.0 198.0   MCV 97.5 97.2 97.1       Coagulation:  Recent Labs   Lab 10/20/24  1242 10/21/24  0635 10/22/24  0622   HCT 27.2* 24.7* 26.4*   HGB 9.7* 8.8* 9.4*   .0 208.0 198.0            AP:   74 year old male with PMH sig  for CAD w/ stents, anemia, DM2, HTN, DL, and  ESRD on PD who presents to the ED with abdominal pain.      Abd pain concern for SBP  ESRD on PD  -this is recurrent acute secondary peritonitis  -fu PD cultures  -abx per ID   -ID following >> apprec recs  -supportive care  -PT/OT     Hypokalemia  Hypomagnesemia  -replace lytes per protocol     DM2  -hold home meds  -A1c 7.2  -ISS+accuchecks  -cannot use our in house accuchecks due to issue with PD fluid >> dw diabetes APN >> ok to use the CGM  -pt understand risks involved with using his own CGM and willing to accept that risk     HTN  -resume home meds     DL  -statin     Anemia  -hgb 9.7 >> 8.8  -no bleeding  -monitor     CAD sp stents  -resume home meds          Sydnie Fox MD

## 2024-10-22 NOTE — PROGRESS NOTES
[facility]   part of [facility] Infectious Disease  Progress Note    Tariq Nolan Patient Status:  Inpatient    1950 MRN [id number]   Location [facility] 5NW-A Attending Carine Conner MD   Hosp Day # 1 PCP Jacob Hicks MD     Subjective:  Patient seen and examined lying in bed. Reports feeling the same with no changes. No acute overnight events. Still with some waxing/waning generalized abdominal pain; however, none at present time. Remains afebrile. Denies n/v/d. Otherwise no new complaints.     Objective:  Blood pressure 138/63, pulse 70, temperature 98 °F (36.7 °C), temperature source Oral, resp. rate 19, height 5' 5\" (1.651 m), weight 192 lb 14.4 oz (87.5 kg), SpO2 99%.    Intake/Output:  No intake or output data in the 24 hours ending 10/22/24 0858    Physical Exam:  General: Awake, alert, non-tox, NAD.  HEENT:  Oropharynx clear, trachea ML.  Heart: RRR S1S2 no murmurs.  Lungs: Essentially CTA b/l, no rhonchi, rales, wheezes.  Abdomen: Soft, NT/ND.  BS present.  No guarding or rebound.  Extremity: No edema.  Neurological: No focal deficits.  Derm:  Warm and dry.    Lab Data Review:  Lab Results   Component Value Date    WBC 4.6 10/22/2024    HGB 9.4 10/22/2024    HCT 26.4 10/22/2024    .0 10/22/2024    CREATSERUM 7.97 10/22/2024    BUN 46 10/22/2024     10/22/2024    K 3.0 10/22/2024     10/22/2024    CO2 24.0 10/22/2024     10/22/2024    CA 9.0 10/22/2024    ALB 3.5 10/22/2024    MG 1.3 10/22/2024    PHOS 3.3 10/22/2024        Cultures:  Hospital Encounter on 10/20/24   1. Blood Culture     Status: None (Preliminary result)    Collection Time: 10/20/24 11:28 PM    Specimen: Blood,peripheral   Result Value Ref Range    Blood Culture Result No Growth 1 Day N/A       Radiology:  CT ABDOMEN+PELVIS(CPT=74176)    Result Date: 10/20/2024  CONCLUSION:  1. Mild wall thickening of the rectum which may represent proctitis.  Correlation  with clinical symptoms is recommended.. 2. Ascites.  There is a peritoneal dialysis catheter noted with tip coiled within the left abdomen. 3. Colonic diverticulosis without evidence of diverticulitis. 4. Bladder wall thickening.  Correlation with urinalysis is recommended. 5. Bilateral fat containing inguinal hernias, right greater than left. 6. Nodular contour to the liver suggestive of cirrhotic changes.    LOCATION:  [facility]   Dictated by (CST): Marie Jaimes MD on 10/20/2024 at 3:19 PM     Finalized by (CST): Marie Jaimes MD on 10/20/2024 at 3:22 PM          Assessment and Plan:  1.  Recurrent acute secondary peritonitis  - Patient reports previous episode a month ago for which he received 2 weeks of intraperitoneal antibiotics.  - Blood cultures negative.  - Peritoneal fluid isolating Corynebacterium spp and Staph epi thus far.  - Peritoneal analysis with 1735 WBCs, 54% neutrophils.  - IV vancomycin, ongoing.    2.  ESRD  - On peritoneal dialysis.  - Antibiotics renally adjusted.  - As per nephrology.    3.  Recs  - Will transition from IV vancomycin to IP vancomycin at this time, pharmacy to dose. Plan to discharge on same, IP vancomycin, with pharmacy to dose to complete 3 weeks through 11/10/24. Discussed plan of care with Pharmacy who will coordinate abx with nephrology/[facility].  - F/u WBC and fever curve.  - F/u cultures.  - Dialysis as per nephrology.  - Supportive care as per the primary team.  - Patient stable for discharge from ID perspective pending clearance from other care teams.  - Please draw weekly CBC with diff, CMP, CRP, ESR and vancomycin trough for duration of abx therapy and fax results to [facility] at [phone].  - F/u with ID in 1 week or sooner if necessary.  - Discussed plan of care with nursing.  - Discussed plan of care with patient.  All questions addressed understanding verbalized.  - Further recommendations pending clinical course.    Discussed case with ID  attending/collaborating physician, Dr. Sofia Stafford, who is in agreement with the above plan of care    Please note that this report has been produced using speech recognition software and may contain errors related to that system including, but not limited to, errors in grammar, punctuation, and spelling, as well as words and phrases that possibly may have been recognized inappropriately.  If there are any questions or concerns, contact the dictating provider for clarification.     The 21st Century Cures Act makes medical notes like these available to patients in the interest of transparency. Please be advised this is a medical document. Medical documents are intended to carry relevant information, facts as evident, and the clinical opinion of the practitioner. The medical note is intended as peer to peer communication and may appear blunt or direct. It is written in medical language and may contain abbreviations or verbiage that are unfamiliar.     If you have any questions or concerns please call [facility] Infectious Disease at [phone].     BISHOP Don    10/22/2024  8:58 AM

## 2024-10-22 NOTE — PROGRESS NOTES
[facility] Dosing Service      Initial Pharmacokinetic Consult for Vancomycin Dosing     Tariq Nolan is a 74 year old male who was initially on vancomycin therapy but has been switched to intraperitoneal vancomycin therapy for  peritonitis .  Pharmacy has been dosing vancomycin per Dr. Stafford. The treatment and monitoring approach will be non-AUC strategy.        Weight and Temperature:    Wt Readings from Last 1 Encounters:   10/21/24 87.5 kg (192 lb 14.4 oz)        Temp Readings from Last 1 Encounters:   10/22/24 98 °F (36.7 °C) (Oral)      Labs:   Recent Labs   Lab 10/20/24  2328 10/21/24  0635 10/22/24  0622   CREATSERUM 8.34* 8.15* 7.97*      Estimated Creatinine Clearance: 7.2 mL/min (A) (based on SCr of 7.97 mg/dL (H)).     Recent Labs   Lab 10/20/24  1242 10/21/24  0635 10/22/24  0622   WBC 4.2 4.7 4.6      10/22/24 1039    Vancomycin trough 15.7 mcg/ml     The Pharmacokinetic Target is:    Trough/random 15-20 mg/L (applies to IV dosing in HD and IP dosing in APD)    Renal Dosing Considerations:    APD: Current regimen:  CCPD nightly,  8.5 hours, 4 exchanges, fill volume 2.9L. No last fill.  All 2.5 % dextrose bags     Assessment/Plan:     Vancomycin trough of 15.7 mcg/ml following systemic dose of 1250 mg on 10/21 @ 0027 around the time of nightly PD session.      This level is appropriate and we will initiate intraperitoneal vancomycin 1250 mg (15 mg/kg) by adding it to the peritoneal solution for tonight.      Maintenance dose: Pharmacy will dose intraperitoneal vancomycin at 1250 mg IV per levels .    Monitorin) Plan for vancomycin random level to be obtained in approximately 48 hours.    2) Pharmacy will order SCr as clinically indicated to assess renal function.    3) Pharmacy will monitor for toxicity and efficacy, adjust vancomycin dose and/or frequency, and order vancomycin levels as appropriate per the Pharmacy and Therapeutics Committee approved protocol until  discontinuation of the medication.       We appreciate the opportunity to assist in the care of this patient.     Latesha Reyes, PharmD  10/22/2024  2:21 PM  [facility] Extension: [phone]

## 2024-10-23 VITALS
RESPIRATION RATE: 16 BRPM | SYSTOLIC BLOOD PRESSURE: 113 MMHG | WEIGHT: 190.06 LBS | HEART RATE: 57 BPM | HEIGHT: 65 IN | DIASTOLIC BLOOD PRESSURE: 66 MMHG | TEMPERATURE: 98 F | BODY MASS INDEX: 31.67 KG/M2 | OXYGEN SATURATION: 98 %

## 2024-10-23 LAB
ALBUMIN SERPL-MCNC: 3.7 G/DL (ref 3.2–4.8)
ANION GAP SERPL CALC-SCNC: 9 MMOL/L (ref 0–18)
BASOPHILS # BLD AUTO: 0.03 X10(3) UL (ref 0–0.2)
BASOPHILS NFR BLD AUTO: 0.6 %
BUN BLD-MCNC: 44 MG/DL (ref 9–23)
CALCIUM BLD-MCNC: 9.6 MG/DL (ref 8.7–10.4)
CHLORIDE SERPL-SCNC: 98 MMOL/L (ref 98–112)
CO2 SERPL-SCNC: 26 MMOL/L (ref 21–32)
CREAT BLD-MCNC: 7.54 MG/DL
EGFRCR SERPLBLD CKD-EPI 2021: 7 ML/MIN/1.73M2 (ref 60–?)
EOSINOPHIL # BLD AUTO: 0.06 X10(3) UL (ref 0–0.7)
EOSINOPHIL NFR BLD AUTO: 1.1 %
ERYTHROCYTE [DISTWIDTH] IN BLOOD BY AUTOMATED COUNT: 12.4 %
GLUCOSE BLD-MCNC: 202 MG/DL (ref 70–99)
GLUCOSE BLD-MCNC: 267 MG/DL (ref 70–99)
GLUCOSE BLD-MCNC: 285 MG/DL (ref 70–99)
HCT VFR BLD AUTO: 27.9 %
HGB BLD-MCNC: 10 G/DL
IMM GRANULOCYTES # BLD AUTO: 0.03 X10(3) UL (ref 0–1)
IMM GRANULOCYTES NFR BLD: 0.6 %
LYMPHOCYTES # BLD AUTO: 0.9 X10(3) UL (ref 1–4)
LYMPHOCYTES NFR BLD AUTO: 17.2 %
MAGNESIUM SERPL-MCNC: 1.5 MG/DL (ref 1.6–2.6)
MCH RBC QN AUTO: 34.7 PG (ref 26–34)
MCHC RBC AUTO-ENTMCNC: 35.8 G/DL (ref 31–37)
MCV RBC AUTO: 96.9 FL
MONOCYTES # BLD AUTO: 0.54 X10(3) UL (ref 0.1–1)
MONOCYTES NFR BLD AUTO: 10.3 %
NEUTROPHILS # BLD AUTO: 3.67 X10 (3) UL (ref 1.5–7.7)
NEUTROPHILS # BLD AUTO: 3.67 X10(3) UL (ref 1.5–7.7)
NEUTROPHILS NFR BLD AUTO: 70.2 %
OSMOLALITY SERPL CALC.SUM OF ELEC: 297 MOSM/KG (ref 275–295)
PHOSPHATE SERPL-MCNC: 3.4 MG/DL (ref 2.4–5.1)
PLATELET # BLD AUTO: 224 10(3)UL (ref 150–450)
POTASSIUM SERPL-SCNC: 3.2 MMOL/L (ref 3.5–5.1)
RBC # BLD AUTO: 2.88 X10(6)UL
SODIUM SERPL-SCNC: 133 MMOL/L (ref 136–145)
WBC # BLD AUTO: 5.2 X10(3) UL (ref 4–11)

## 2024-10-23 PROCEDURE — 85025 COMPLETE CBC W/AUTO DIFF WBC: CPT | Performed by: INTERNAL MEDICINE

## 2024-10-23 PROCEDURE — 80069 RENAL FUNCTION PANEL: CPT | Performed by: INTERNAL MEDICINE

## 2024-10-23 PROCEDURE — 82962 GLUCOSE BLOOD TEST: CPT

## 2024-10-23 PROCEDURE — 83735 ASSAY OF MAGNESIUM: CPT | Performed by: INTERNAL MEDICINE

## 2024-10-23 RX ORDER — CALCITRIOL 0.25 UG/1
0.25 CAPSULE, LIQUID FILLED ORAL SEE ADMIN INSTRUCTIONS
COMMUNITY

## 2024-10-23 RX ORDER — CARVEDILOL 6.25 MG/1
6.25 TABLET ORAL 2 TIMES DAILY WITH MEALS
COMMUNITY

## 2024-10-23 RX ORDER — ONDANSETRON 4 MG/1
4 TABLET, ORALLY DISINTEGRATING ORAL EVERY 8 HOURS PRN
Qty: 20 TABLET | Refills: 0 | Status: SHIPPED
Start: 2024-10-23

## 2024-10-23 RX ORDER — FUROSEMIDE 40 MG/1
80 TABLET ORAL DAILY
Status: SHIPPED | COMMUNITY
Start: 2024-10-23

## 2024-10-23 RX ORDER — INSULIN GLARGINE 100 [IU]/ML
25 INJECTION, SOLUTION SUBCUTANEOUS NIGHTLY
Status: SHIPPED | COMMUNITY
Start: 2024-10-23

## 2024-10-23 RX ORDER — ROSUVASTATIN CALCIUM 20 MG/1
20 TABLET, COATED ORAL DAILY
Status: ON HOLD | COMMUNITY
Start: 2024-09-18 | End: 2024-10-23

## 2024-10-23 RX ORDER — MAGNESIUM SULFATE 1 G/100ML
1 INJECTION INTRAVENOUS ONCE
Status: COMPLETED | OUTPATIENT
Start: 2024-10-23 | End: 2024-10-23

## 2024-10-23 RX ORDER — ROSUVASTATIN CALCIUM 20 MG/1
20 TABLET, COATED ORAL DAILY
Status: SHIPPED | COMMUNITY
Start: 2024-10-23

## 2024-10-23 RX ORDER — MAGNESIUM CHLORIDE 71.5 G/G
1 TABLET ORAL DAILY
Qty: 30 TABLET | Refills: 0 | Status: SHIPPED | OUTPATIENT
Start: 2024-10-23

## 2024-10-23 RX ORDER — POTASSIUM CHLORIDE 750 MG/1
20 CAPSULE, EXTENDED RELEASE ORAL 2 TIMES DAILY
Qty: 30 CAPSULE | Refills: 0 | Status: SHIPPED | OUTPATIENT
Start: 2024-10-23

## 2024-10-23 NOTE — PROGRESS NOTES
Infectious Disease Progress Note      Date of admission: 10/20/2024 11:10 PM     Reason for consult: Secondary peritonitis    Referring physician: Carine Conner MD    Subjective: The patient is feeling better.  Abdominal pain is improving.  No nausea or vomiting.  No diarrhea.  No shortness of breath.  No cough or sputum production.    The rest of the systems were reviewed and found to be negative except was mentioned above    Interval events: This is a 74-year-old male patient with history of end-stage renal disease, currently on peritoneal dialysis, peritoneal cultures now with staph epi and corynebacterium species not striatum or JK.  Initially on IV cefepime and IV vancomycin, currently on IP vancomycin    Medications:    potassium chloride    magnesium sulfate    lidocaine-menthol    apixaban    Vancomycin IV    clopidogrel    glucose **OR** glucose **OR** glucose-vitamin C **OR** dextrose **OR** glucose **OR** glucose **OR** glucose-vitamin C    insulin aspart    acetaminophen    ondansetron    polyethylene glycol (PEG 3350)    sennosides    bisacodyl    melatonin    acetaminophen **OR** HYDROcodone-acetaminophen **OR** HYDROcodone-acetaminophen    metoclopramide    lactulose    amLODIPine    calcitriol    cholecalciferol    furosemide    isosorbide mononitrate ER    rosuvastatin    potassium chloride    Tenapanor HCl (CKD)    carvedilol     Allergies:  Allergies[1]    Physical Exam:  Vitals:    10/23/24 0810   BP: 130/55   Pulse: 57   Resp: 19   Temp: 98.4 °F (36.9 °C)     Vitals signs and nursing note reviewed.   Constitutional:       Appearance: Normal appearance.   HENT:      Head: Normocephalic and atraumatic.      Mouth: Mucous membranes are moist.   Neck:      Musculoskeletal: Neck supple.   Cardiovascular:      Rate and Rhythm: Normal rate.   Pulmonary:      Effort: Pulmonary effort is normal. No respiratory distress.   Abdominal:      General: Abdomen is flat. There is no distension.  PD catheter  noted without any signs of infection     Palpations: Abdomen is soft. There is no mass.      Tenderness: There is mild diffuse tenderness. There is no guarding or rebound.      Hernia: No hernia is present.   Musculoskeletal:      Right lower leg: No edema.      Left lower leg: No edema.   Skin:     General: Skin is warm and dry.   Neurological:      General: No focal deficit present.      Mental Status: Alert and oriented to person, place, and time.       Laboratory data:  I have reviewed all the lab results independently.  Lab Results   Component Value Date    WBC 5.2 10/23/2024    HGB 10.0 10/23/2024    HCT 27.9 10/23/2024    .0 10/23/2024    CREATSERUM 7.54 10/23/2024    BUN 44 10/23/2024     10/23/2024    K 3.2 10/23/2024    CL 98 10/23/2024    CO2 26.0 10/23/2024     10/23/2024    CA 9.6 10/23/2024    ALB 3.7 10/23/2024    MG 1.5 10/23/2024    PHOS 3.4 10/23/2024      Recent Labs   Lab 10/23/24  0643   RBC 2.88*   HGB 10.0*   HCT 27.9*   MCV 96.9   MCH 34.7*   MCHC 35.8   RDW 12.4   NEPRELIM 3.67   WBC 5.2   .0      Microbiology data:  Hospital Encounter on 10/20/24   1. Blood Culture     Status: None (Preliminary result)    Collection Time: 10/20/24 11:28 PM    Specimen: Blood,peripheral   Result Value Ref Range    Blood Culture Result No Growth 2 Days N/A        Impression:  Tariq Nolan is a 74 year old male with    Recurrent acute secondary peritonitis  Peritoneal fluid cultures with Staphylococcus epidermidis and Corynebacterium, not striatum or JK  Peritoneal analysis with 1775 white blood cells, 54% of which were neutrophils  Currently on IP vancomycin  The patient reports a previous episode a month ago for which she received 2 weeks of intraperitoneal antibiotics  End-stage renal disease  On peritoneal dialysis  Antibiotics will be renally adjusted    Recommendations:    Discharge patient on 3 weeks of IP vancomycin, pharmacy to dose  ID will sign off, please call us with  any questions or changes status.  Follow-up with infectious disease as needed    The plan of care was discussed with the primary hospital team, Carine Conner MD     Recommendations were also discussed with the patient; all questions were answered.     Thank you for this consultation. Please don't hesitate to call the ID team for questions or any acute changes in patient's clinical condition.    Please note that this report has been produced using speech recognition software and may contain errors related to that system including, but not limited to, errors in grammar, punctuation, and spelling, as well as words and phrases that possibly may have been recognized inappropriately.  If there are any questions or concerns, contact the dictating provider for clarification.    The  Century Cures Act makes medical notes like these available to patients in the interest of transparency. Please be advised this is a medical document. Medical documents are intended to carry relevant information, facts as evident, and the clinical opinion of the practitioner. The medical note is intended as peer to peer communication and may appear blunt or direct. It is written in medical language and may contain abbreviations or verbiage that are unfamiliar.     Sofia Stafford MD  [facility]. Tel: [phone]. Fax: [phone].     Tariq Nolan : 1950 MRN: [id number] [facility]: [id number]          [1]   Allergies  Allergen Reactions    Tetanus-Diphtheria Toxoids Td  [Diphteria And Tetanus] OTHER (SEE COMMENTS)    Tetanus Toxoid

## 2024-10-23 NOTE — PROGRESS NOTES
[facility]   part of [facility]    Nephrology Progress Note    Tariq Nolan Attending:  Carine Conner MD     Cc: ESRD    SUBJECTIVE     Some abd pain, No complaints  PD completed     PHYSICAL EXAM   Vital signs: /66 (BP Location: Right arm)   Pulse 57   Temp 98.1 °F (36.7 °C) (Oral)   Resp 16   Ht 5' 5\" (1.651 m)   Wt 190 lb 0.6 oz (86.2 kg)   SpO2 98%   BMI 31.62 kg/m²   Temp (24hrs), Av.6 °F (37 °C), Min:97.8 °F (36.6 °C), Max:99 °F (37.2 °C)       Intake/Output Summary (Last 24 hours) at 10/23/2024 1250  Last data filed at 10/23/2024 0635  Gross per 24 hour   Intake 110 ml   Output 2742 ml   Net -2632 ml     Wt Readings from Last 3 Encounters:   10/23/24 190 lb 0.6 oz (86.2 kg)   10/20/24 190 lb (86.2 kg)   24 190 lb (86.2 kg)     General: NAD  HEENT: NCAT, EOMI, MMM  Neck: Supple   Cardiac: Regular rate and rhythm   Lungs: CTAB  Abdomen: Soft, non-tender, nondistended   Extremities: No edema  Neurologic: No asterxis  Skin: Warm and dry, no rashes     MEDS     Current Facility-Administered Medications   Medication Dose Route Frequency    potassium chloride 40 mEq in 250mL sodium chloride 0.9% IVPB premix  40 mEq Intravenous Once    magnesium sulfate in dextrose 5% 1 g/100mL infusion premix 1 g  1 g Intravenous Once    lidocaine-menthol 4-1 % patch 1 patch  1 patch Transdermal Daily    apixaban (Eliquis) tab 5 mg  5 mg Oral BID    Vancomycin: PHARMACY DOSING  1 each Intravenous See Admin Instructions (RX holding)    clopidogrel (Plavix) tab 75 mg  75 mg Oral Daily    glucose (Dex4) 15 GM/59ML oral liquid 15 g  15 g Oral Q15 Min PRN    Or    glucose (Glutose) 40% oral gel 15 g  15 g Oral Q15 Min PRN    Or    glucose-vitamin C (Dex-4) chewable tab 4 tablet  4 tablet Oral Q15 Min PRN    Or    dextrose 50% injection 50 mL  50 mL Intravenous Q15 Min PRN    Or    glucose (Dex4) 15 GM/59ML oral liquid 30 g  30 g Oral Q15 Min PRN    Or    glucose (Glutose) 40% oral gel 30 g  30 g Oral  Q15 Min PRN    Or    glucose-vitamin C (Dex-4) chewable tab 8 tablet  8 tablet Oral Q15 Min PRN    insulin aspart (NovoLOG) 100 Units/mL FlexPen 1-10 Units  1-10 Units Subcutaneous TID AC and HS    acetaminophen (Tylenol Extra Strength) tab 500 mg  500 mg Oral Q4H PRN    ondansetron (Zofran) 4 MG/2ML injection 4 mg  4 mg Intravenous Q6H PRN    polyethylene glycol (PEG 3350) (Miralax) 17 g oral packet 17 g  17 g Oral Daily PRN    sennosides (Senokot) tab 17.2 mg  17.2 mg Oral Nightly PRN    bisacodyl (Dulcolax) 10 MG rectal suppository 10 mg  10 mg Rectal Daily PRN    melatonin tab 3 mg  3 mg Oral Nightly PRN    acetaminophen (Tylenol) tab 650 mg  650 mg Oral Q4H PRN    Or    HYDROcodone-acetaminophen (Norco) 5-325 MG per tab 1 tablet  1 tablet Oral Q4H PRN    Or    HYDROcodone-acetaminophen (Norco) 5-325 MG per tab 2 tablet  2 tablet Oral Q4H PRN    metoclopramide (Reglan) 5 mg/mL injection 5 mg  5 mg Intravenous Q8H PRN    lactulose (CHRONULAC) 10 GM/15ML solution 20 g  20 g Oral Q6H PRN    amLODIPine (Norvasc) tab 5 mg  5 mg Oral Daily    calcitriol (Rocaltrol) cap 0.25 mcg  0.25 mcg Oral Once per day on Monday Thursday    cholecalciferol (Vitamin D3) tab 2,000 Units  2,000 Units Oral Daily    furosemide (Lasix) tab 80 mg  80 mg Oral Daily    isosorbide mononitrate ER (Imdur) 24 hr tab 60 mg  60 mg Oral Daily    rosuvastatin (Crestor) tab 10 mg  10 mg Oral Daily    potassium chloride (Klor-Con M10) tab 10 mEq  10 mEq Oral Daily    Tenapanor HCl (CKD) 30 mg Tablet \"Patient Supplied\"  30 mg Oral BID    carvedilol (Coreg) tab 6.25 mg  6.25 mg Oral BID with meals       LABS     Lab Results   Component Value Date    WBC 5.2 10/23/2024    HGB 10.0 10/23/2024    HCT 27.9 10/23/2024    .0 10/23/2024    CREATSERUM 7.54 10/23/2024    BUN 44 10/23/2024     10/23/2024    K 3.2 10/23/2024    CL 98 10/23/2024    CO2 26.0 10/23/2024     10/23/2024    CA 9.6 10/23/2024    ALB 3.7 10/23/2024    MG 1.5  10/23/2024    PHOS 3.4 10/23/2024    PGLU 202 10/23/2024       IMAGING   All imaging studies personally reviewed.    No orders to display     10/20/24 CT scan   CONCLUSION:    1. Mild wall thickening of the rectum which may represent proctitis.  Correlation with clinical symptoms is recommended..   2. Ascites.  There is a peritoneal dialysis catheter noted with tip coiled within the left abdomen.   3. Colonic diverticulosis without evidence of diverticulitis.   4. Bladder wall thickening.  Correlation with urinalysis is recommended.   5. Bilateral fat containing inguinal hernias, right greater than left.   6. Nodular contour to the liver suggestive of cirrhotic changes.      ASSESSMENT & PLAN   74 year old male w ho ESRD on PD, CAD sp stents, DM, HTN, HL who presented with abdominal pain x 3 days. Notes has been on PD x 1 year about. With Andrey [facility] [place]. Makes very little urine. Recently added LF with purple due to decreased urine. He also notes recently started on potassium pill      ESRD on PD  -- home Rx = 8.5hrs, 4 exchanges, 2.8L fill volume, alternates w all green and half green/half yellow, LF 2L purple   -- continue nightly PD  -- on lasix, unclear if needs this given minimal UOP  -- avoid morphine. Renally dose meds      Abdominal pain 2/2 Peritonitis  -- sp CT scan - defer other finding to primary team   -- per pt had peritonitis a month or so ago as well   -- on abx. Cultures w Staph epidermidis, Corynebacterium species, not JK or striatum abnormal. ID consult appreciated. Recommend 3wks IP vanco through 11/10/24. Sp first dose 10/22/24. Follow-up with ID.      Secondary hyperparathyroidism   -- continue calcitriol      Hypokalemia  -- replete and monitor per nephrology. Do not use electrolyte protocol  -- has home standing 10meq KCL, will plan to increase to 20 BID on discharge    Hypomagnesium   -- getting 1g Mg IV. Start 1 tab on discharge     Anemia  -- monitor     CT findings --> see  above/see report. defer to primary team re non-renal findings including possible proctitis, diverticulosis without diverticulitis, bladder wall thickening, bilateral fat containing inguinal hernias, nodular contour to the liver suggestive of cirrhotic changes    Ok to discharge from nephrology   Kcl 20 BID. 1 slomg tab   Labs next week   abx per PD unit and ID  D/w RN and Dr Cochran      Thank you for allowing me to participate in the care of this patient. Please do not hesitate to call with questions or concerns.       Evelina Collado MD  [facility] Nephrology

## 2024-10-23 NOTE — PROGRESS NOTES
10/23/24    To whom it may concern,        Tariq Nolan can return to work with minimal walking.  He was admitted at [facility] 10/21 until 10/23.            Sydnie Fxo MD  [phone]

## 2024-10-23 NOTE — PROGRESS NOTES
NURSING DISCHARGE NOTE    Discharged Home via Ambulatory.  Accompanied by Spouse  Belongings Taken by patient/family.  Discharge instructions discussed with patient and his wife, questions answered at bedside. Antibiotic administration with PD arranged with existing company. Patient verbalized understanding and agreement to discharge. Left with wife at 1655.

## 2024-10-23 NOTE — DISCHARGE SUMMARY
[facility] Hospitalist Discharge Summary    Patient ID  Tariq Nolan  [id number]  74 year old  7/22/1950  Admit date: 10/20/2024  Discharge date: No discharge date for patient encounter. ***  Attending: Carine Conner MD   Primary Care Physician: Jacob Hicks MD ***  Reason for admission: *** (see HPI on HP for further detail)  Discharge condition: {condition:18234}  Disposition: {disposition:83819}    Important follow up:  -PCP within 7*** 14*** days or other ***  -specialists: ***    -labs: ***  -radiology: ***    Additional patient instructions  ***n/a    Discharge med list     Medication List        START taking these medications      ondansetron 4 MG Tbdp  Commonly known as: Zofran-ODT  Take 1 tablet (4 mg total) by mouth every 8 (eight) hours as needed for Nausea.            CHANGE how you take these medications      Potassium Chloride ER 10 MEQ Cpcr  Commonly known as: MICRO-K  Take 2 capsules (20 mEq total) by mouth 2 (two) times daily.  What changed: when to take this            CONTINUE taking these medications      amLODIPine 10 MG Tabs  Commonly known as: Norvasc  TAKE 1 TABLET(10 MG) BY MOUTH DAILY     apixaban 5 MG Tabs  Commonly known as: Eliquis     azelastine 0.1 % Soln  Commonly known as: Astelin  2 sprays by Nasal route 2 (two) times daily.     BD Pen Needle Jyoti U/F 32G X 4 MM Misc  Generic drug: Insulin Pen Needle  Inject 1 each into the skin daily.     * Blood Glucose Test Strp  Check blood sugars 4x daily.     * OneTouch Verio Strp  USE TO CHECK BLOOD GLUCOSE FOUR TIMES DAILY AS DIRECTED.     calcitriol 0.25 MCG Caps  Commonly known as: Rocaltrol     CALCIUM CITRATE OR     carvedilol 6.25 MG Tabs  Commonly known as: Coreg     clopidogrel 75 MG Tabs  Commonly known as: Plavix     furosemide 40 MG Tabs  Commonly known as: Lasix     isosorbide mononitrate ER 60 MG Tb24  Commonly known as: Imdur     Lantus SoloStar 100 UNIT/ML Sopn  Generic drug: insulin glargine     * OneTouch Delica Lancets  Fine Misc  1 lancet by Finger stick route 2 (two) times daily.     * OneTouch Delica Lancets 33G Misc  Check BG 4 times daily     OneTouch Ultra 2 w/Device Kit  One Touch Ultra 2 device for testing     rosuvastatin 20 MG Tabs  Commonly known as: Crestor     Vitamin D 50 MCG (2000 UT) Tabs  Take 2,000 Units by mouth daily.     Xphozah 30 MG Tabs  Generic drug: Tenapanor HCl (CKD)           * This list has 4 medication(s) that are the same as other medications prescribed for you. Read the directions carefully, and ask your doctor or other care provider to review them with you.                STOP taking these medications      cloNIDine 0.3 MG/24HR Ptwk  Commonly known as: Catapres     meclizine 12.5 MG Tabs  Commonly known as: Antivert               Where to Get Your Medications        These medications were sent to [id number] - [place], [place]  [address] AT [address] [phone], [phone] [address], [place] [address]      Hours: 24-hours Phone: [phone]   Potassium Chloride ER 10 MEQ Cpcr       You can get these medications from any pharmacy    Bring a paper prescription for each of these medications  ondansetron 4 MG Tbdp         Discharge Diagnoses/Hospital course:  ***    Consults:  IP CONSULT TO NEPHROLOGY  IP CONSULT TO INFECTIOUS DISEASE  IP CONSULT TO INFECTIOUS DISEASE  IP CONSULT TO PHARMACY    Radiology:  CT ABDOMEN+PELVIS(CPT=74176)    Result Date: 10/20/2024  PROCEDURE:  CT ABDOMEN+PELVIS (CPT=74176)  COMPARISON:  None.  INDICATIONS:  abdominal pain  TECHNIQUE:  Unenhanced multislice CT scanning was performed from the dome of the diaphragm to the pubic symphysis.  Dose reduction techniques were used. Dose information is transmitted to the ACR (American College of Radiology) NRDR (National Radiology Data Registry) which includes the Dose Index Registry.  PATIENT STATED HISTORY: (As transcribed by Technologist)  Patient c/o lower abdominal pain for the  past 3 days.     FINDINGS:  LUNG BASES:  Linear atelectasis within left lower lobe.  Coronary artery calcifications.  Mild bilateral gynecomastia. LIVER:  Nodular contour to the liver. BILIARY:   Gallbladder is unremarkable in appearance.  No intrahepatic biliary dilatation. SPLEEN:  Normal.  No enlargement or focal lesion. PANCREAS:  No coni-pancreatic inflammatory stranding ADRENALS:  Normal.  No mass or enlargement.  KIDNEYS:  The kidneys are symmetric in size and shape without evidence of hydronephrosis.  No obstructing urolithiasis.  Parapelvic right renal cyst.  Left renal cortical cyst. BOWEL/MESENTERY:  Bowel is normal in caliber. No evidence of obstruction.  Ascites.  Colonic diverticulosis without evidence of diverticulitis.  The appendix is normal appearance.  There is a peritoneal dialysis catheter with tip coiled within the left abdomen.  Mild wall thickening of the rectum. PELVIS:  Bladder wall thickening.  Prostatomegaly measuring 5.2 x 4.1 cm.  Bilateral fat containing inguinal hernias, right greater than left.  Free pelvic fluid.   AORTA/VASCULAR:  Atheromatous calcifications of the aorta. BONES:  Degenerative changes of the lumbar spine.            CONCLUSION:  1. Mild wall thickening of the rectum which may represent proctitis.  Correlation with clinical symptoms is recommended.. 2. Ascites.  There is a peritoneal dialysis catheter noted with tip coiled within the left abdomen. 3. Colonic diverticulosis without evidence of diverticulitis. 4. Bladder wall thickening.  Correlation with urinalysis is recommended. 5. Bilateral fat containing inguinal hernias, right greater than left. 6. Nodular contour to the liver suggestive of cirrhotic changes.    LOCATION:  [facility]   Dictated by (CST): Marie Jaimes MD on 10/20/2024 at 3:19 PM     Finalized by (CST): Marie Jaimes MD on 10/20/2024 at 3:22 PM         Operative reports:          Day of discharge exam:  Vitals:    10/23/24 1138   BP: 113/66    Pulse:    Resp: 16   Temp: 98.1 °F (36.7 °C)     No acute distress, alert and oriented***  Lungs clear  Heart regular  Abdomen benign     Patient and/or family had opportunity to ask questions and expressed understanding and agreement with therapeutic plan as outlined      >30 minutes spent on discharge    Sydnie Fox MD

## 2024-10-23 NOTE — PLAN OF CARE
Received patient visiting with his wife Alisa, patient is alert, oriented, awaiting for peritoneal dialysis Rn. Pd catheter clamped, dressing clean, dry, intact, abdomen soft, positive bowel sound x 4, complains of some tenderness to abdomen, refused pain meds, said he will call if need anything. Plan of care discussed, call light within reach.     Around 10 pm Cayden  Rn came in and started patient on peritoneal dialysis.     Problem: METABOLIC/FLUID AND ELECTROLYTES - ADULT  Goal: Glucose maintained within prescribed range  Description: INTERVENTIONS:  - Monitor Blood Glucose as ordered  - Assess for signs and symptoms of hyperglycemia and hypoglycemia  - Administer ordered medications to maintain glucose within target range  - Assess barriers to adequate nutritional intake and initiate nutrition consult as needed  - Instruct patient on self management of diabetes  Outcome: Progressing     Problem: PAIN - ADULT  Goal: Verbalizes/displays adequate comfort level or patient's stated pain goal  Description: INTERVENTIONS:  - Encourage pt to monitor pain and request assistance  - Assess pain using appropriate pain scale  - Administer analgesics based on type and severity of pain and evaluate response  - Implement non-pharmacological measures as appropriate and evaluate response  - Consider cultural and social influences on pain and pain management  - Manage/alleviate anxiety  - Utilize distraction and/or relaxation techniques  - Monitor for opioid side effects  - Notify MD/LIP if interventions unsuccessful or patient reports new pain  - Anticipate increased pain with activity and pre-medicate as appropriate  Outcome: Progressing     Problem: RISK FOR INFECTION - ADULT  Goal: Absence of fever/infection during anticipated neutropenic period  Description: INTERVENTIONS  - Monitor WBC  - Administer growth factors as ordered  - Implement neutropenic guidelines  Outcome: Progressing

## 2025-04-26 ENCOUNTER — APPOINTMENT (OUTPATIENT)
Dept: CT IMAGING | Facility: HOSPITAL | Age: 75
DRG: 907 | End: 2025-04-26
Attending: EMERGENCY MEDICINE
Payer: MEDICARE

## 2025-04-26 ENCOUNTER — HOSPITAL ENCOUNTER (INPATIENT)
Facility: HOSPITAL | Age: 75
LOS: 16 days | Discharge: NURSING, REHAB OR LONG-TERM CARE | DRG: 907 | End: 2025-05-12
Attending: EMERGENCY MEDICINE | Admitting: HOSPITALIST
Payer: MEDICARE

## 2025-04-26 ENCOUNTER — APPOINTMENT (OUTPATIENT)
Dept: MRI IMAGING | Facility: HOSPITAL | Age: 75
DRG: 907 | End: 2025-04-26
Attending: EMERGENCY MEDICINE
Payer: MEDICARE

## 2025-04-26 DIAGNOSIS — Z99.2 ESRD ON DIALYSIS (HCC): ICD-10-CM

## 2025-04-26 DIAGNOSIS — N18.6 ESRD ON DIALYSIS (HCC): ICD-10-CM

## 2025-04-26 DIAGNOSIS — R41.0 ACUTE CONFUSION: Primary | ICD-10-CM

## 2025-04-26 DIAGNOSIS — Z79.4 TYPE 2 DIABETES MELLITUS WITH HYPERGLYCEMIA, WITH LONG-TERM CURRENT USE OF INSULIN (HCC): ICD-10-CM

## 2025-04-26 DIAGNOSIS — E11.65 TYPE 2 DIABETES MELLITUS WITH HYPERGLYCEMIA, WITH LONG-TERM CURRENT USE OF INSULIN (HCC): ICD-10-CM

## 2025-04-26 DIAGNOSIS — Z79.01 ANTICOAGULATED: ICD-10-CM

## 2025-04-26 LAB
ALBUMIN SERPL-MCNC: 4.3 G/DL (ref 3.2–4.8)
ALBUMIN/GLOB SERPL: 1.7 {RATIO} (ref 1–2)
ALP LIVER SERPL-CCNC: 185 U/L (ref 45–117)
ALT SERPL-CCNC: 10 U/L (ref 10–49)
ANION GAP SERPL CALC-SCNC: 12 MMOL/L (ref 0–18)
APTT PPP: 36.8 SECONDS (ref 23–36)
AST SERPL-CCNC: 23 U/L (ref ?–34)
BASOPHILS # BLD AUTO: 0.03 X10(3) UL (ref 0–0.2)
BASOPHILS NFR BLD AUTO: 0.5 %
BILIRUB SERPL-MCNC: 0.2 MG/DL (ref 0.2–1.1)
BUN BLD-MCNC: 56 MG/DL (ref 9–23)
CALCIUM BLD-MCNC: 10 MG/DL (ref 8.7–10.6)
CHLORIDE SERPL-SCNC: 101 MMOL/L (ref 98–112)
CHOLEST SERPL-MCNC: 122 MG/DL (ref ?–200)
CO2 SERPL-SCNC: 24 MMOL/L (ref 21–32)
CREAT BLD-MCNC: 10.83 MG/DL (ref 0.7–1.3)
EGFRCR SERPLBLD CKD-EPI 2021: 5 ML/MIN/1.73M2 (ref 60–?)
EOSINOPHIL # BLD AUTO: 0.08 X10(3) UL (ref 0–0.7)
EOSINOPHIL NFR BLD AUTO: 1.3 %
ERYTHROCYTE [DISTWIDTH] IN BLOOD BY AUTOMATED COUNT: 12 %
EST. AVERAGE GLUCOSE BLD GHB EST-MCNC: 148 MG/DL (ref 68–126)
GLOBULIN PLAS-MCNC: 2.6 G/DL (ref 2–3.5)
GLUCOSE BLD-MCNC: 129 MG/DL (ref 70–99)
GLUCOSE BLD-MCNC: 131 MG/DL (ref 70–99)
GLUCOSE BLD-MCNC: 76 MG/DL (ref 70–99)
HBA1C MFR BLD: 6.8 % (ref ?–5.7)
HCT VFR BLD AUTO: 33.5 % (ref 39–53)
HDLC SERPL-MCNC: 38 MG/DL (ref 40–59)
HGB BLD-MCNC: 12 G/DL (ref 13–17.5)
IMM GRANULOCYTES # BLD AUTO: 0.02 X10(3) UL (ref 0–1)
IMM GRANULOCYTES NFR BLD: 0.3 %
INR BLD: 1.25 (ref 0.8–1.2)
LDLC SERPL CALC-MCNC: 65 MG/DL (ref ?–100)
LYMPHOCYTES # BLD AUTO: 0.71 X10(3) UL (ref 1–4)
LYMPHOCYTES NFR BLD AUTO: 11.7 %
MCH RBC QN AUTO: 34.1 PG (ref 26–34)
MCHC RBC AUTO-ENTMCNC: 35.8 G/DL (ref 31–37)
MCV RBC AUTO: 95.2 FL (ref 80–100)
MONOCYTES # BLD AUTO: 0.54 X10(3) UL (ref 0.1–1)
MONOCYTES NFR BLD AUTO: 8.9 %
NEUTROPHILS # BLD AUTO: 4.7 X10 (3) UL (ref 1.5–7.7)
NEUTROPHILS # BLD AUTO: 4.7 X10(3) UL (ref 1.5–7.7)
NEUTROPHILS NFR BLD AUTO: 77.3 %
NONHDLC SERPL-MCNC: 84 MG/DL (ref ?–130)
OSMOLALITY SERPL CALC.SUM OF ELEC: 301 MOSM/KG (ref 275–295)
PLATELET # BLD AUTO: 210 10(3)UL (ref 150–450)
POTASSIUM SERPL-SCNC: 4.2 MMOL/L (ref 3.5–5.1)
PROT SERPL-MCNC: 6.9 G/DL (ref 5.7–8.2)
PROTHROMBIN TIME: 15.8 SECONDS (ref 11.6–14.8)
RBC # BLD AUTO: 3.52 X10(6)UL (ref 3.8–5.8)
SODIUM SERPL-SCNC: 137 MMOL/L (ref 136–145)
TRIGL SERPL-MCNC: 103 MG/DL (ref 30–149)
TROPONIN I SERPL HS-MCNC: 17 NG/L (ref ?–53)
VLDLC SERPL CALC-MCNC: 15 MG/DL (ref 0–30)
WBC # BLD AUTO: 6.1 X10(3) UL (ref 4–11)

## 2025-04-26 PROCEDURE — 70551 MRI BRAIN STEM W/O DYE: CPT | Performed by: EMERGENCY MEDICINE

## 2025-04-26 PROCEDURE — 70498 CT ANGIOGRAPHY NECK: CPT | Performed by: EMERGENCY MEDICINE

## 2025-04-26 PROCEDURE — 70496 CT ANGIOGRAPHY HEAD: CPT | Performed by: EMERGENCY MEDICINE

## 2025-04-26 PROCEDURE — 70450 CT HEAD/BRAIN W/O DYE: CPT | Performed by: EMERGENCY MEDICINE

## 2025-04-26 PROCEDURE — 0042T CT STROKE (DAWN) CTA BRAIN/CTA NECK+PERF(CPT=70496/70498/0042T): CPT | Performed by: EMERGENCY MEDICINE

## 2025-04-26 RX ORDER — METOCLOPRAMIDE HYDROCHLORIDE 5 MG/ML
10 INJECTION INTRAMUSCULAR; INTRAVENOUS EVERY 8 HOURS PRN
Status: DISCONTINUED | OUTPATIENT
Start: 2025-04-26 | End: 2025-04-30

## 2025-04-26 RX ORDER — NICOTINE POLACRILEX 4 MG
30 LOZENGE BUCCAL
Status: DISCONTINUED | OUTPATIENT
Start: 2025-04-26 | End: 2025-05-12

## 2025-04-26 RX ORDER — ROSUVASTATIN CALCIUM 10 MG/1
10 TABLET, COATED ORAL DAILY
Status: DISCONTINUED | OUTPATIENT
Start: 2025-04-27 | End: 2025-05-12

## 2025-04-26 RX ORDER — SODIUM CHLORIDE 9 MG/ML
INJECTION, SOLUTION INTRAVENOUS CONTINUOUS
Status: DISCONTINUED | OUTPATIENT
Start: 2025-04-26 | End: 2025-04-26

## 2025-04-26 RX ORDER — ACETAMINOPHEN 650 MG/1
650 SUPPOSITORY RECTAL EVERY 4 HOURS PRN
Status: CANCELLED | OUTPATIENT
Start: 2025-04-26

## 2025-04-26 RX ORDER — ACETAMINOPHEN 650 MG/1
650 SUPPOSITORY RECTAL EVERY 4 HOURS PRN
Status: DISCONTINUED | OUTPATIENT
Start: 2025-04-26 | End: 2025-05-12

## 2025-04-26 RX ORDER — ATORVASTATIN CALCIUM 80 MG/1
80 TABLET, FILM COATED ORAL NIGHTLY
Status: DISCONTINUED | OUTPATIENT
Start: 2025-04-26 | End: 2025-04-26

## 2025-04-26 RX ORDER — ACETAMINOPHEN 325 MG/1
650 TABLET ORAL EVERY 4 HOURS PRN
Status: DISCONTINUED | OUTPATIENT
Start: 2025-04-26 | End: 2025-05-12

## 2025-04-26 RX ORDER — METOCLOPRAMIDE HYDROCHLORIDE 5 MG/ML
10 INJECTION INTRAMUSCULAR; INTRAVENOUS EVERY 8 HOURS PRN
Status: CANCELLED | OUTPATIENT
Start: 2025-04-26

## 2025-04-26 RX ORDER — LORAZEPAM 1 MG/1
1 TABLET ORAL ONCE
Status: DISCONTINUED | OUTPATIENT
Start: 2025-04-26 | End: 2025-04-26

## 2025-04-26 RX ORDER — NICOTINE POLACRILEX 4 MG
15 LOZENGE BUCCAL
Status: DISCONTINUED | OUTPATIENT
Start: 2025-04-26 | End: 2025-05-12

## 2025-04-26 RX ORDER — AMLODIPINE BESYLATE 5 MG/1
10 TABLET ORAL DAILY
Status: DISCONTINUED | OUTPATIENT
Start: 2025-04-26 | End: 2025-05-11

## 2025-04-26 RX ORDER — VENLAFAXINE 37.5 MG/1
37.5 TABLET ORAL DAILY
Status: DISCONTINUED | OUTPATIENT
Start: 2025-04-26 | End: 2025-05-12

## 2025-04-26 RX ORDER — ACETAMINOPHEN 325 MG/1
650 TABLET ORAL EVERY 4 HOURS PRN
Status: CANCELLED | OUTPATIENT
Start: 2025-04-26

## 2025-04-26 RX ORDER — LORAZEPAM 2 MG/ML
1 INJECTION INTRAMUSCULAR ONCE
Status: DISCONTINUED | OUTPATIENT
Start: 2025-04-26 | End: 2025-05-10

## 2025-04-26 RX ORDER — POTASSIUM CHLORIDE 750 MG/1
10 TABLET, EXTENDED RELEASE ORAL DAILY
Status: DISCONTINUED | OUTPATIENT
Start: 2025-04-27 | End: 2025-04-28

## 2025-04-26 RX ORDER — CLOPIDOGREL BISULFATE 75 MG/1
75 TABLET ORAL DAILY
Status: DISCONTINUED | OUTPATIENT
Start: 2025-04-26 | End: 2025-05-12

## 2025-04-26 RX ORDER — DEXTROSE MONOHYDRATE, SODIUM CHLORIDE, SODIUM LACTATE, CALCIUM CHLORIDE, MAGNESIUM CHLORIDE 1.5; 538; 448; 18.4; 5.08 G/100ML; MG/100ML; MG/100ML; MG/100ML; MG/100ML
5000 SOLUTION INTRAPERITONEAL
Status: COMPLETED | OUTPATIENT
Start: 2025-04-27 | End: 2025-04-27

## 2025-04-26 RX ORDER — SODIUM CHLORIDE 9 MG/ML
INJECTION, SOLUTION INTRAVENOUS CONTINUOUS
Status: CANCELLED | OUTPATIENT
Start: 2025-04-26 | End: 2025-04-28

## 2025-04-26 RX ORDER — ACETAMINOPHEN 500 MG
500 TABLET ORAL EVERY 4 HOURS PRN
Status: DISCONTINUED | OUTPATIENT
Start: 2025-04-26 | End: 2025-04-26 | Stop reason: DRUGHIGH

## 2025-04-26 RX ORDER — BACLOFEN 10 MG/1
10 TABLET ORAL 2 TIMES DAILY
Status: DISCONTINUED | OUTPATIENT
Start: 2025-04-26 | End: 2025-05-08

## 2025-04-26 RX ORDER — VENLAFAXINE 37.5 MG/1
37.5 TABLET ORAL DAILY
COMMUNITY

## 2025-04-26 RX ORDER — CARVEDILOL 3.12 MG/1
3.12 TABLET ORAL 2 TIMES DAILY WITH MEALS
Status: DISCONTINUED | OUTPATIENT
Start: 2025-04-27 | End: 2025-04-26

## 2025-04-26 RX ORDER — SODIUM CHLORIDE 9 MG/ML
INJECTION, SOLUTION INTRAVENOUS CONTINUOUS
Status: DISCONTINUED | OUTPATIENT
Start: 2025-04-26 | End: 2025-04-27

## 2025-04-26 RX ORDER — ONDANSETRON 2 MG/ML
4 INJECTION INTRAMUSCULAR; INTRAVENOUS EVERY 6 HOURS PRN
Status: DISCONTINUED | OUTPATIENT
Start: 2025-04-26 | End: 2025-05-12

## 2025-04-26 RX ORDER — HYDRALAZINE HYDROCHLORIDE 20 MG/ML
10 INJECTION INTRAMUSCULAR; INTRAVENOUS EVERY 2 HOUR PRN
Status: DISCONTINUED | OUTPATIENT
Start: 2025-04-26 | End: 2025-05-12

## 2025-04-26 RX ORDER — ISOSORBIDE MONONITRATE 30 MG/1
60 TABLET, EXTENDED RELEASE ORAL DAILY
Status: DISCONTINUED | OUTPATIENT
Start: 2025-04-27 | End: 2025-05-12

## 2025-04-26 RX ORDER — ONDANSETRON 2 MG/ML
4 INJECTION INTRAMUSCULAR; INTRAVENOUS EVERY 6 HOURS PRN
Status: CANCELLED | OUTPATIENT
Start: 2025-04-26

## 2025-04-26 RX ORDER — HYDRALAZINE HYDROCHLORIDE 20 MG/ML
10 INJECTION INTRAMUSCULAR; INTRAVENOUS EVERY 2 HOUR PRN
Status: CANCELLED | OUTPATIENT
Start: 2025-04-26

## 2025-04-26 RX ORDER — ONDANSETRON 2 MG/ML
4 INJECTION INTRAMUSCULAR; INTRAVENOUS EVERY 6 HOURS PRN
Status: DISCONTINUED | OUTPATIENT
Start: 2025-04-26 | End: 2025-04-30

## 2025-04-26 RX ORDER — DEXTROSE MONOHYDRATE 25 G/50ML
50 INJECTION, SOLUTION INTRAVENOUS
Status: DISCONTINUED | OUTPATIENT
Start: 2025-04-26 | End: 2025-05-12

## 2025-04-26 RX ORDER — ASPIRIN 300 MG/1
300 SUPPOSITORY RECTAL DAILY
Status: CANCELLED | OUTPATIENT
Start: 2025-04-26

## 2025-04-26 RX ORDER — LABETALOL HYDROCHLORIDE 5 MG/ML
10 INJECTION, SOLUTION INTRAVENOUS EVERY 10 MIN PRN
Status: DISCONTINUED | OUTPATIENT
Start: 2025-04-26 | End: 2025-05-12

## 2025-04-26 RX ORDER — LABETALOL HYDROCHLORIDE 5 MG/ML
10 INJECTION, SOLUTION INTRAVENOUS EVERY 10 MIN PRN
Status: CANCELLED | OUTPATIENT
Start: 2025-04-26

## 2025-04-26 RX ORDER — BACLOFEN 10 MG/1
10 TABLET ORAL 2 TIMES DAILY
COMMUNITY
End: 2025-05-12

## 2025-04-26 RX ORDER — ASPIRIN 325 MG
325 TABLET ORAL DAILY
Status: CANCELLED | OUTPATIENT
Start: 2025-04-26

## 2025-04-26 RX ORDER — LORAZEPAM 2 MG/ML
0.5 INJECTION INTRAMUSCULAR ONCE
Status: COMPLETED | OUTPATIENT
Start: 2025-04-26 | End: 2025-04-26

## 2025-04-26 RX ORDER — INSULIN DEGLUDEC 100 U/ML
15 INJECTION, SOLUTION SUBCUTANEOUS NIGHTLY
Status: DISCONTINUED | OUTPATIENT
Start: 2025-04-26 | End: 2025-04-26

## 2025-04-26 NOTE — ED QUICK NOTES
Orders for admission, patient is aware of plan and ready to go upstairs. Any questions, please call ED RN Rosalia at extension [phone]    Patient Covid vaccination status: Fully vaccinated     COVID Test Ordered in ED: None    COVID Suspicion at Admission: N/A    Running Infusions: Medication Infusions[1] None    Mental Status/LOC at time of transport: A&Ox1    Other pertinent information:   CIWA score: N/A   NIH score:  4             [1]

## 2025-04-26 NOTE — SIGNIFICANT EVENT
Stroke Alert initiated in ED  Last Know Normal at 0800  Pre-morbid MRS 3  Per ED, Initial NIHSS 4   Met patient in CT scanner  Repeat NIHSS completed back in ED room     NIH Stroke Scale  1a.  Level of consciousness: 0   1b. LOC questions:  2 (States he is \"75\", says \"I don't know\" when asked the month).   1c. LOC commands: 0   2.  Best Gaze: 0 (Patient's gaze is fixed straight ahead. However, when directed to follow finger he can cross midline, but then goes right back to looking straight ahead).   3. Visual: 0   4. Facial Palsy: 0   5a. Motor left arm: 2 (Can raise arm off the bed, holds for a few seconds, then begins drifting down significantly and falls to the bed before 10 seconds).   5b.  Motor right arm: 0   6a. Motor left le   6b.  Motor right le   7. Limb Ataxia: 0   8.  Sensory: 0   9. Best Language:  1 (Repeating phrases, answers some simple questions, names most objects but could not recall \"bridge\" or \"pale\". Patient had an obvious loss of fluency when asked to describe the picture. He said, \"the man is climbing\" but could not describe anything else. The listener carries the burden of the conversation).   10. Dysarthria: 0   11. Extinction and Inattention: 0     Total:   5         Per Dr Zelaya, patient is not a candidate for TNK, exclusions include on Eliquis  Case discussed with Dr Fonseca, ED  Case discussed with Dr. Tobar, neurology. Orders received for stroke protocol upon admission and continue Plavix and Eliquis, no aspirin.  Patient & spouse educated on stroke work up; all pertinent questions and concerns addressed     Of note:      Patient from home with spouse, unable to provide his own history. Per spouse she last saw him normal at 0800 before she left the house. While she was out she attempted to call him, but he did not answer. When she got home around 1400 to check on him, she found him confused, unsure if he fell, he was hooked up to his peritoneal dialysis machine which was  beeping.    Per ED RN, initial assessment upon arrival did not reveal any focal weakness or other focal findings. However, once back from CT scans, he has a drift to his left arm, and his hand grasp is weaker on that side. RN says this is a new finding since arrival. Dr. Negrete updated.     Stroke Alert timing affected by: N/A     Please refer to the Stroke Data Flowsheets for additional information and Stroke Alert response times.

## 2025-04-26 NOTE — ED INITIAL ASSESSMENT (HPI)
Patient presented to ED for sudden onset of altered mental status. Wife reported she left the house around 8AM today patient is still on his baseline A&Ox4, attached to his peritoneal dialysis machine. Wife went home at around 2PM, patient machine was beeping and patient is confused on his bed. Unsure if the patient had a fall.    Patient was given 1mg atropine at home with his low heart rate        Patient is on Eliquis and Plavix

## 2025-04-27 ENCOUNTER — APPOINTMENT (OUTPATIENT)
Dept: GENERAL RADIOLOGY | Facility: HOSPITAL | Age: 75
DRG: 907 | End: 2025-04-27
Attending: STUDENT IN AN ORGANIZED HEALTH CARE EDUCATION/TRAINING PROGRAM
Payer: MEDICARE

## 2025-04-27 ENCOUNTER — APPOINTMENT (OUTPATIENT)
Dept: CV DIAGNOSTICS | Facility: HOSPITAL | Age: 75
DRG: 907 | End: 2025-04-27
Attending: Other
Payer: MEDICARE

## 2025-04-27 LAB
ALBUMIN SERPL-MCNC: 3.9 G/DL (ref 3.2–4.8)
ANION GAP SERPL CALC-SCNC: 12 MMOL/L (ref 0–18)
BASOPHILS # BLD AUTO: 0.02 X10(3) UL (ref 0–0.2)
BASOPHILS NFR BLD AUTO: 0.3 %
BILIRUB UR QL STRIP.AUTO: NEGATIVE
BUN BLD-MCNC: 51 MG/DL (ref 9–23)
CALCIUM BLD-MCNC: 9.5 MG/DL (ref 8.7–10.6)
CHLORIDE SERPL-SCNC: 102 MMOL/L (ref 98–112)
CLARITY UR REFRACT.AUTO: CLEAR
CO2 SERPL-SCNC: 23 MMOL/L (ref 21–32)
CREAT BLD-MCNC: 11.2 MG/DL (ref 0.7–1.3)
EGFRCR SERPLBLD CKD-EPI 2021: 4 ML/MIN/1.73M2 (ref 60–?)
EOSINOPHIL # BLD AUTO: 0.08 X10(3) UL (ref 0–0.7)
EOSINOPHIL NFR BLD AUTO: 1.2 %
ERYTHROCYTE [DISTWIDTH] IN BLOOD BY AUTOMATED COUNT: 12.3 %
GLUCOSE BLD-MCNC: 116 MG/DL (ref 70–99)
GLUCOSE BLD-MCNC: 124 MG/DL (ref 70–99)
GLUCOSE BLD-MCNC: 138 MG/DL (ref 70–99)
GLUCOSE BLD-MCNC: 155 MG/DL (ref 70–99)
GLUCOSE BLD-MCNC: 182 MG/DL (ref 70–99)
GLUCOSE UR STRIP.AUTO-MCNC: 300 MG/DL
HCT VFR BLD AUTO: 29.6 % (ref 39–53)
HGB BLD-MCNC: 10.5 G/DL (ref 13–17.5)
IMM GRANULOCYTES # BLD AUTO: 0.02 X10(3) UL (ref 0–1)
IMM GRANULOCYTES NFR BLD: 0.3 %
KETONES UR STRIP.AUTO-MCNC: NEGATIVE MG/DL
LEUKOCYTE ESTERASE UR QL STRIP.AUTO: NEGATIVE
LYMPHOCYTES # BLD AUTO: 0.71 X10(3) UL (ref 1–4)
LYMPHOCYTES NFR BLD AUTO: 10.6 %
MAGNESIUM SERPL-MCNC: 1.9 MG/DL (ref 1.6–2.6)
MCH RBC QN AUTO: 33.8 PG (ref 26–34)
MCHC RBC AUTO-ENTMCNC: 35.5 G/DL (ref 31–37)
MCV RBC AUTO: 95.2 FL (ref 80–100)
MONOCYTES # BLD AUTO: 0.7 X10(3) UL (ref 0.1–1)
MONOCYTES NFR BLD AUTO: 10.5 %
NEUTROPHILS # BLD AUTO: 5.16 X10 (3) UL (ref 1.5–7.7)
NEUTROPHILS # BLD AUTO: 5.16 X10(3) UL (ref 1.5–7.7)
NEUTROPHILS NFR BLD AUTO: 77.1 %
NITRITE UR QL STRIP.AUTO: NEGATIVE
OSMOLALITY SERPL CALC.SUM OF ELEC: 299 MOSM/KG (ref 275–295)
PH UR STRIP.AUTO: 6 [PH] (ref 5–8)
PHOSPHATE SERPL-MCNC: 6.3 MG/DL (ref 2.4–5.1)
PLATELET # BLD AUTO: 200 10(3)UL (ref 150–450)
POTASSIUM SERPL-SCNC: 4 MMOL/L (ref 3.5–5.1)
PROT UR STRIP.AUTO-MCNC: 100 MG/DL
RBC # BLD AUTO: 3.11 X10(6)UL (ref 3.8–5.8)
SODIUM SERPL-SCNC: 137 MMOL/L (ref 136–145)
SP GR UR STRIP.AUTO: >1.03 (ref 1–1.03)
T4 FREE SERPL-MCNC: 1 NG/DL (ref 0.8–1.7)
TSI SER-ACNC: 1.75 UIU/ML (ref 0.55–4.78)
UROBILINOGEN UR STRIP.AUTO-MCNC: NORMAL MG/DL
WBC # BLD AUTO: 6.7 X10(3) UL (ref 4–11)

## 2025-04-27 PROCEDURE — 99223 1ST HOSP IP/OBS HIGH 75: CPT | Performed by: OTHER

## 2025-04-27 PROCEDURE — 71045 X-RAY EXAM CHEST 1 VIEW: CPT | Performed by: STUDENT IN AN ORGANIZED HEALTH CARE EDUCATION/TRAINING PROGRAM

## 2025-04-27 PROCEDURE — 3E1M39Z IRRIGATION OF PERITONEAL CAVITY USING DIALYSATE, PERCUTANEOUS APPROACH: ICD-10-PCS | Performed by: INTERNAL MEDICINE

## 2025-04-27 PROCEDURE — 93306 TTE W/DOPPLER COMPLETE: CPT | Performed by: OTHER

## 2025-04-27 RX ORDER — DEXTROSE MONOHYDRATE, SODIUM CHLORIDE, SODIUM LACTATE, CALCIUM CHLORIDE, MAGNESIUM CHLORIDE 2.5; 538; 448; 18.4; 5.08 G/100ML; MG/100ML; MG/100ML; MG/100ML; MG/100ML
5000 SOLUTION INTRAPERITONEAL
Status: COMPLETED | OUTPATIENT
Start: 2025-04-27 | End: 2025-04-27

## 2025-04-27 NOTE — CONSULTS
[facility]  [facility] Neurology Consult Note    Tariq Nolan Patient Status:  Inpatient    1950 MRN [id number]   Location [facility] 3NE-A Attending Herrera Mariscal MD   Hosp Day # 1 PCP Jacob Hicks MD     REASON FOR EVALUATION:  AMS, jerking    HISTORY OF PRESENT ILLNESS:  Mr. Nolan is a 74-year-old man with coronary artery disease, diabetes mellitus, hypertension, dyslipidemia and end-stage renal disease on peritoneal dialysis who was brought to the hospital because of alteration in awareness and shaking.  He is able to provide some history but he remains encephalopathic and his wife states that some elements of what he is saying is not true, such as that he goes around in a wheelchair.  Ancillary history is from his wife, at the bedside, and chart review.  He was apparently found to be in a state of confusion and possible left-sided weakness when his wife came upon him yesterday afternoon.  There were signs that he had a failed peritoneal dialysis session.  He has had peritonitis in the past and they have also noticed jerking in all of his limbs over time that seems to be getting progressively worse in the last several months.    PAST MEDICAL HISTORY:  Past Medical History:    Anemia due to stage 4 chronic kidney disease ([facility])    sees Dr. Cochran    Asymptomatic PVCs    EKG at [facility]    Cardiac LV ejection fraction 55%    Chronic kidney disease due to type 2 diabetes mellitus ([facility])    Constipation    Coronary atherosclerosis    Coronary atherosclerosis of native coronary artery    sees Dr. Redding, last visit 20, 5 stents total since     Diabetes mellitus ([facility])    Diabetic eye exam ([facility])    [place] vision    Essential hypertension    Gastric lesion    Dr. 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 Orr, SHAYNE LAD    History of cardiovascular stress  test    IPMN (intraductal papillary mucinous neoplasm)    saw Dr. Moura, seeing Dr. Hearn, EUS 12/29/20    Living will in place    OBESITY    JANAY (obstructive sleep apnea)    AHI 6 RDI 6 REM AHI 0 Supine AHI 46 non-supine AHI 1.3 Sao2 Dirk 90%     Osteoarthritis    knees    Pneumonia due to organism    Post covid-19 condition, unspecified    asymptomatic; not hospitalized; no current symptoms    Presence of drug coated stent in LAD coronary artery 1/31/2017 3x12 mm Xience     good Shahid      Presence of drug coated stent in right coronary artery Distal 3/28 mm Xience & Prox 3.5x18mm Xience 1/24/2017    Good Shahid     Presence of DRUG stent in coronary artery 1/31/2017 mid RI 3/38mm Xience     Good Shahid      Pure hypercholesterolemia    TGA (transient global amnesia)    at [facility]. couldn't remember about 12 hr period, had CT, MRI, EEG, labs, carotid dop and echo all nl, will see Dr. Laguna for neuro f/u    Type 1 diabetes mellitus (HCC)    Vertigo    Visual impairment    prescription glasses       PAST SURGICAL HISTORY:  Past Surgical History:   Procedure Laterality Date    Cath bare metal stent (bms)      x5 ?    Cath percutaneous  transluminal coronary angioplasty      Colonoscopy  12/15/2008    tics, rhoids, polyps, repeat in 2013    Colonoscopy  02/14/2020    mult tubulovillous adenomas, Dr. Moura. repeat 2023    Colonoscopy N/A 2/11/2020    Procedure: COLONOSCOPY, POSSIBLE BIOPSY, POSSIBLE POLYPECTOMY 64094;  Surgeon: Tay Moura MD;  Location: [facility]    Egd  12/2020    gastric cardia lesion- intestinal metaplasia    Hemorrhoidectomy      Tonsillectomy  age 6       FAMILY HISTORY:  family history includes No Known Problems in his daughter, daughter, and daughter. He was adopted.    SOCIAL HISTORY:   reports that he has never smoked. He has never used smokeless tobacco. He reports that he does not drink alcohol and does not use drugs.    ALLERGIES:  Allergies   Allergen Reactions     Tetanus-Diphtheria Toxoids Td  [Diphteria And Tetanus] OTHER (SEE COMMENTS)    Tetanus Toxoid        MEDICATIONS:  No current outpatient medications on file.     Current Facility-Administered Medications   Medication Dose Route Frequency    acetaminophen (Tylenol) tab 650 mg  650 mg Oral Q4H PRN    Or    acetaminophen (Tylenol) rectal suppository 650 mg  650 mg Rectal Q4H PRN    labetalol (Trandate) 5 mg/mL injection 10 mg  10 mg Intravenous Q10 Min PRN    hydrALAzine (Apresoline) 20 mg/mL injection 10 mg  10 mg Intravenous Q2H PRN    ondansetron (Zofran) 4 MG/2ML injection 4 mg  4 mg Intravenous Q6H PRN    metoclopramide (Reglan) 5 mg/mL injection 10 mg  10 mg Intravenous Q8H PRN    clopidogrel (Plavix) tab 75 mg  75 mg Oral Daily    apixaban (Eliquis) tab 5 mg  5 mg Oral BID    sodium chloride 0.9% infusion   Intravenous Continuous    melatonin tab 3 mg  3 mg Oral Nightly PRN    ondansetron (Zofran) 4 MG/2ML injection 4 mg  4 mg Intravenous Q6H PRN    metoclopramide (Reglan) 5 mg/mL injection 10 mg  10 mg Intravenous Q8H PRN    LORazepam (Ativan) 2 mg/mL injection 1 mg  1 mg Intravenous Once    dextrose 1.5%-calcium 2.5 mEq/L peritoneal solution  5,000 mL Intraperitoneal Once in dialysis    amLODIPine (Norvasc) tab 10 mg  10 mg Oral Daily    baclofen (Lioresal) tab 10 mg  10 mg Oral BID    isosorbide mononitrate ER (Imdur) 24 hr tab 60 mg  60 mg Oral Daily    potassium chloride (Klor-Con M10) tab 10 mEq  10 mEq Oral Daily    rosuvastatin (Crestor) tab 10 mg  10 mg Oral Daily    venlafaxine (Effexor) tab 37.5 mg  37.5 mg Oral Daily    glucose (Dex4) 15 GM/59ML oral liquid 15 g  15 g Oral Q15 Min PRN    Or    glucose (Glutose) 40% oral gel 15 g  15 g Oral Q15 Min PRN    Or    glucose-vitamin C (Dex-4) chewable tab 4 tablet  4 tablet Oral Q15 Min PRN    Or    dextrose 50% injection 50 mL  50 mL Intravenous Q15 Min PRN    Or    glucose (Dex4) 15 GM/59ML oral liquid 30 g  30 g Oral Q15 Min PRN    Or    glucose  (Glutose) 40% oral gel 30 g  30 g Oral Q15 Min PRN    Or    glucose-vitamin C (Dex-4) chewable tab 8 tablet  8 tablet Oral Q15 Min PRN    insulin aspart (NovoLOG) 100 Units/mL FlexPen 1-10 Units  1-10 Units Subcutaneous TID AC and HS       REVIEW OF SYSTEMS:  A 10-point system was reviewed.  Pertinent positives and negatives are noted in HPI.      PHYSICAL EXAMINATION:  VITAL SIGNS: BP (!) 140/96 (BP Location: Left arm)   Pulse 50   Temp 98.4 °F (36.9 °C) (Axillary)   Resp 18   Ht 65\"   Wt 207 lb 7.3 oz (94.1 kg)   SpO2 94%   BMI 34.52 kg/m²   GENERAL:  Patient is a 74 year old male in no acute distress.    NEUROLOGICAL:   Mental status: Mildly somnolent, easily arouses to voice, oriented to person, place, and time  Speech & Language: Fluent but eventually trails off into non sequitur, no dysarthria  Comprehension: Intact  Cranial Nerves: conjugate gaze, face symmetric, tongue midline, shoulder shrug equal  Motor: diffuse myoclonia; tone, bulk, strength are normal in all flexors and extensors   Sensory: Intact to light touch in all limbs  Coordination: FTN intact  Tendon Reflexes: normal for habitus, no asymmetry  Gait: Deferred    DIAGNOSTIC DATA:  Labs:  Recent Labs   Lab 04/26/25  1548 04/27/25  0756   RBC 3.52* 3.11*   HGB 12.0* 10.5*   HCT 33.5* 29.6*   MCV 95.2 95.2   MCH 34.1* 33.8   MCHC 35.8 35.5   RDW 12.0 12.3   NEPRELIM 4.70 5.16   WBC 6.1 6.7   .0 200.0         Recent Labs   Lab 04/26/25  1548 04/27/25  0756   * 124*   BUN 56* 51*   CREATSERUM 10.83* 11.20*   EGFRCR 5* 4*   CA 10.0 9.5    137   K 4.2 4.0    102   CO2 24.0 23.0         IMAGING:  XR CHEST AP PORTABLE  (CPT=71045)  Result Date: 4/27/2025  CONCLUSION:  Shallow inspiration accentuates the heart size.  Normal pulmonary vascularity.  Minimal atelectasis in the lung bases.  No focal consolidation, effusion or pneumothorax.   LOCATION:  [facility]      Dictated by (CST): Nancy Brady MD on 4/27/2025 at 7:37 AM      Finalized by (CST): Nancy Brday MD on 4/27/2025 at 7:38 AM       MRI BRAIN (VBY=05100)  Result Date: 4/26/2025  CONCLUSION:  No evidence of acute intracranial process.   LOCATION:  [facility]   Dictated by (CST): Kirk Mccord MD on 4/26/2025 at 7:52 PM     Finalized by (CST): Kirk Mccord MD on 4/26/2025 at 7:53 PM       CT STROKE (YOLA) CTA BRAIN/CTA NECK+PERF(CPT=70496/07752/0042T)  Result Date: 4/26/2025  CONCLUSION:   1. No acute intracranial process.  2. Unremarkable CTA of the [id number].  3. There is 50-60% stenosis secondary smooth calcified plaque involving the proximal right internal carotid artery.  4.  Less than 50% stenosis secondary to smooth calcified plaque involving the left proximal terminal carotid artery.   Findings were discussed with Dr. Negrete at 1700 hours on 4/26/2025 with read back.   LOCATION:  [facility]   Dictated by (CST): Kirk Mccord MD on 4/26/2025 at 4:53 PM     Finalized by (CST): Kirk Mccord MD on 4/26/2025 at 5:02 PM       CT STROKE BRAIN (NO IV)(CPT=70450)  Result Date: 4/26/2025  CONCLUSION:  No acute intracranial process.  Critical results were called to Dr. Negrete at 1635 hours on 4/26/2025.  There was appropriate read back.    LOCATION:  [facility]   Dictated by (CST): Kirk Mccord MD on 4/26/2025 at 4:34 PM     Finalized by (CST): Kirk Mccord MD on 4/26/2025 at 4:36 PM           ASSESSMENT:  Mr. Nolan is a 74-year-old man with hypertension, dyslipidemia, diabetes mellitus, coronary artery disease and end-stage renal disease on peritoneal dialysis who is likely experiencing symptomatic renal failure in the setting of incomplete dialysis, potentially exacerbated by polypharmacy.    PLAN:  Principal Problem:    Acute confusion  Active Problems:    Anticoagulated    ESRD on dialysis (HCC)    Type 2 diabetes mellitus with hyperglycemia, with long-term current use of insulin ([facility])  His mild encephalopathy and myoclonus, while nonspecific, are hallmarks of renal  failure.  In association with a failed session of peritoneal dialysis, it makes it very likely that this explains his current symptomatology.  Arguably, the worsening myoclonus over time might be suggestive that he has had incomplete results from peritoneal dialysis for some time now.    I would recommend a revisitation of dialysis strategy.  I would anticipate him getting better with hemodialysis.    No further urgent neurological diagnostics or therapeutics indicated.  In addition to effective dialysis would recommend a focus on quality and regularity of sleep, nutrition, hydration (as appropriate for one on dialysis) and physical activity as well as day/night cycle integrity.    Baclofen may be worsening his symptoms and, particularly if myoclonus persists, would attempt a taper of the medication.    We are following    Prabhu Tobar MD

## 2025-04-27 NOTE — PLAN OF CARE
Assumed care of pt @ 0730  Denies pain or discomfort  A&O to self  SB on tele (30-40's), MD aware  On RA,   Incontinent of B&B  Diminshed UO  Peritoneal dialysis in place, dressing c/d/I  NQ4, NIH daily  0.9Ns infusing @ 50ml/hr via rt wrist PIV   Pt w/ consistent twitching, MD aware  Bed alarm on, safety precautions in place  Spouse updated on POC  Problem: SAFETY ADULT - FALL  Goal: Free from fall injury  Description: INTERVENTIONS:- Assess pt frequently for physical needs- Identify cognitive and physical deficits and behaviors that affect risk of falls.- [place] fall precautions as indicated by assessment.- Educate pt/family on patient safety including physical limitations- Instruct pt to call for assistance with activity based on assessment- Modify environment to reduce risk of injury- Provide assistive devices as appropriate- Consider OT/PT consult to assist with strengthening/mobility- Encourage toileting schedule  Outcome: Progressing  Problem: CARDIOVASCULAR - ADULT  Goal: Absence of cardiac arrhythmias or at baseline  Description: INTERVENTIONS:- Continuous cardiac monitoring, monitor vital signs, obtain 12 lead EKG if indicated- Evaluate effectiveness of antiarrhythmic and heart rate control medications as ordered- Initiate emergency measures for life threatening arrhythmias- Monitor electrolytes and administer replacement therapy as ordered  Outcome: Progressing  Problem: SKIN/TISSUE INTEGRITY - ADULT  Goal: Skin integrity remains intact  Description: INTERVENTIONS- Assess and document risk factors for pressure ulcer development- Assess and document skin integrity- Monitor for areas of redness and/or skin breakdown- Initiate interventions, skin care algorithm/standards of care as needed  Outcome: Progressing  Problem: NEUROLOGICAL - ADULT  Goal: Achieves stable or improved neurological status  Description: INTERVENTIONS- Assess for and report changes in neurological status- Initiate measures to  prevent increased intracranial pressure- Maintain blood pressure and fluid volume within ordered parameters to optimize cerebral perfusion and minimize risk of hemorrhage- Monitor temperature, glucose, and sodium. Initiate appropriate interventions as ordered  Outcome: Progressing  Problem: Impaired Cognition  Goal: Patient will exhibit improved attention, thought processing and/or memory  Description: Interventions:- Minimize distractions in the room when full attention is required  - Allow additional time for processing after asking questions or providing instructions  Outcome: Progressing

## 2025-04-27 NOTE — OCCUPATIONAL THERAPY NOTE
OCCUPATIONAL THERAPY EVALUATION - INPATIENT     Room Number: 3618/3618-A  Evaluation Date: 4/27/2025  Type of Evaluation: Initial  Presenting Problem: acute confusion    Physician Order: IP Consult to Occupational Therapy  Reason for Therapy: ADL/IADL Dysfunction and Discharge Planning    OCCUPATIONAL THERAPY ASSESSMENT   Patient is currently functioning below baseline with toileting, bathing, upper body dressing, lower body dressing, grooming, eating, bed mobility, transfers, and static sitting balance. Prior to admission, patient's baseline is modified independent.  Patient is requiring mod to max assist as a result of the following impairments: decreased functional strength, decreased functional reach, impaired sitting  balance, impaired coordination, impaired motor planning, cognitive deficits (confusion), and medical status. Occupational Therapy will continue to follow for duration of hospitalization.    Patient will benefit from continued skilled OT Services to promote return to prior level of function and safety with continuous assistance and gradual rehabilitative therapy    History Related to Current Admission: Patient is a 74 year old male admitted on 4/26/2025 with Presenting Problem: acute confusion. Co-Morbidities : ESRD on PD, CAD, anemia, DM, tremors    Per EMR, patient will likely require transition from PD to HD for ESRD    WEIGHT BEARING RESTRICTION       Recommendations for nursing staff:   Transfers: NT, too shaky and confused  Toileting location: bed level    EVALUATION SESSION:  Patient Start of Session: supine    FUNCTIONAL TRANSFER ASSESSMENT     BED MOBILITY  Rolling: Maximum Assist  Supine to Sit : Maximum Assist  Sit to Supine (OT): Maximum Assist  Scooting: MAX    BALANCE ASSESSMENT     FUNCTIONAL ADL ASSESSMENT  Grooming Seated: Maximum Assist  LB Dressing Seated: Maximum Assist    ACTIVITY TOLERANCE: stable on room air                         O2 SATURATIONS        COGNITION  Arousal/Alertness:  appropriate responses to stimuli and delayed responses to stimuli  Attention Span:  attends with cues to redirect, difficulty attending to directions, and difficulty dividing attention  Orientation Level:  oriented to place, oriented to person, and thought month was September and could not state the year  Following Commands:  follows one step commands with increased time and follows one step commands with repetition  Initiation: cues to initiate tasks  Motor Planning: impaired  Awareness of Errors:  decreased awareness of errors   Awareness of Deficits:  decreased awareness of deficits  Problem Solving:  assistance required to identify errors made, assistance required to generate solutions, and assistance required to implement solutions    Upper Extremity   ROM: AROM intact  Strength: within functional limits difficult to assess, observed to be at least 3+/5 LUE, 4/5 RUE  Coordination  Gross motor: impaired due to myoclonic movements  Fine motor: impaired  Sensation: no deficits reported    EDUCATION PROVIDED  Patient Education : Role of Occupational Therapy; Plan of Care; Functional Transfer Techniques; Fall Prevention; Posture/Positioning  Patient's Response to Education: Verbalized Understanding; Requires Further Education  Family/Caregiver Education : Discharge Recommendations; Plan of Care; Role of Occupational Therapy  Family/Caregiver's Response to Education: Verbalized Understanding    Equipment used: none  Demonstrates functional use, Would benefit from additional trial yes     Therapist comments: Patient's spouse provided history. UEs assessed while patient in semi Holman position. MOD A fr supine to sit. Max A to scoot hips. MIN A needed for trunk balance, Patient noted to have difficulty with coordination/motor planning nad force generation to scoot hips laterally on bed to L side. MAX A x2 to return to supine. MAX assist  x2 to boost up in bed. Patient was able to read  TV channel listings/descriptions and correctly see number of visual targets on either side.     Patient End of Session: Hospital anti-slip socks, All patient questions and concerns addressed, RN aware of session/findings, Call light within reach, Needs met, With  staff, In bed, Alarm set    OCCUPATIONAL PROFILE    HOME SITUATION  Type of Home: Townhouse  Home Layout: One level  Lives With: Spouse       Shower/Tub and Equipment: Shower chair, Grab bar, Walk-in shower       Occupation/Status: retired     Drives: No       Prior Level of Function: Typically mod independent with ADLs and mobility in a townhouse with spouse. Spouse reports that he has had increasing brain fog nad balance difficulty over the last 6 weeks.    SUBJECTIVE   \"I want to be able to do what it is that I'm supposed to be doing.\"    PAIN ASSESSMENT  Ratin          OBJECTIVE  Precautions: Bed/chair alarm  Fall Risk: High fall risk    ASSESSMENTS    AM-PAC ‘6-Clicks’ Inpatient Daily Activity Short Form  -   Putting on and taking off regular lower body clothing?: A Lot  -   Bathing (including washing, rinsing, drying)?: A Lot  -   Toileting, which includes using toilet, bedpan or urinal? : A Lot  -   Putting on and taking off regular upper body clothing?: A Lot  -   Taking care of personal grooming such as brushing teeth?: A Lot  -   Eating meals?: A Lot    AM-PAC Score:  Score: 12  Approx Degree of Impairment: 66.57%  Standardized Score (AM-PAC Scale): 30.6    ADDITIONAL TESTS     NEUROLOGICAL FINDINGS      COGNITION ASSESSMENTS     PLAN     OT Treatment Plan: Endurance training, Cognitive reorientation, UE strengthening/ROM, Functional transfer training, ADL training, Energy conservation/work simplification techniques, Balance activities, Patient/Family education, Patient/Family training, Equipment eval/education, Compensatory technique education  Rehab Potential : Good  Frequency: 3-5x/week  Number of Visits to Meet Established Goals:  5    ADL Goals   Patient will perform eating: with set up and with supervision  Patient will perform grooming: with supervision and while in chair  Patient will perform lower body dressing:  with mod assist and with adaptive equipment PRN  Patient will perform toileting: with mod assist and with bedside commode    Functional Transfer Goals  Patient will transfer from sit to supine:  with min assist  Patient will transfer from supine to sit:  with min assist    UE Exercise Program Goal  Patient will be supervision with bilateral AROM HEP (home exercise program).    Therapist Goals  Clock draw test    Patient Evaluation Complexity Level:   Occupational Profile/Medical History MODERATE - Expanded review of history including review of medical or therapy record   Specific performance deficits impacting engagement in ADL/IADL MODERATE  3 - 5 performance deficits   Client Assessment/Performance Deficits MODERATE - Comorbidities and min to mod modifications of tasks    Clinical Decision Making MODERATE - Analysis of occupational profile, detailed assessments, several treatment options    Overall Complexity MODERATE     OT Session Time: 30 minutes    Therapeutic Activity: 25 minutes

## 2025-04-27 NOTE — CONSULTS
[facility]   part of [facility]    Report of Consultation    Tariq Nolan Patient Status:  Inpatient    1950 MRN [id number]   Location [facility] 3NE-A Attending Herrera Mariscal MD   Hosp Day # 1 PCP Jacob Hicks MD     Date of consult: 2025    REASON FOR CONSULT:     ESRD    HISTORY OF PRESENT ILLNESS:     Tariq Nolan is a a(n) 74 year old male w ho ESRD on PD, CAD sp stents, DM, HTN, HL who presents with acute onset of confusion and weakness. Sp CT and MRI brain neg for stroke (but MRI limited due to movement per notes). Neuro on consult. No abdominal pain. No n/v/d. No sob. States fluid is clear.     REVIEW OF SYSTEMS:     Please see HPI for pertinent positives. 10 point review of systems otherwise reviewed and negative.     HISTORY:     Past Medical History[1]  Past Surgical History[2]  Family History[3]   reports that he has never smoked. He has never used smokeless tobacco. He reports that he does not drink alcohol and does not use drugs.    ALLERGIES:     Allergies[4]    MEDICATIONS:     Current Hospital Medications[5]  Prior to Admission Medications[6]      PHYSICAL EXAM:     Vital Signs: BP (!) 140/96 (BP Location: Left arm)   Pulse 50   Temp 98.4 °F (36.9 °C) (Axillary)   Resp 18   Ht 5' 5\" (1.651 m)   Wt 207 lb 7.3 oz (94.1 kg)   SpO2 94%   BMI 34.52 kg/m²   Temp (24hrs), Av °F (36.7 °C), Min:97.4 °F (36.3 °C), Max:98.4 °F (36.9 °C)       Intake/Output Summary (Last 24 hours) at 2025 1409  Last data filed at 2025 0604  Gross per 24 hour   Intake 65 ml   Output --   Net 65 ml     Wt Readings from Last 3 Encounters:   25 207 lb 7.3 oz (94.1 kg)   10/23/24 190 lb 0.6 oz (86.2 kg)   10/20/24 190 lb (86.2 kg)       General: NAD  HEENT: NCAT, MMM, EOMI  Neck: Supple   Cardiac: Regular rate and rhythm   Lungs: CTAB   Abdomen: Soft, non-tender, non-distended   : No CVA tenderness  Extremities: no leg edema  Neurologic/Psych: mentating well, no  asterixis  Skin: No rashes    LABORATORY DATA:       Lab Results   Component Value Date     (H) 04/27/2025    BUN 51 (H) 04/27/2025    BUNCREA 26.0 (H) 02/28/2022    CREATSERUM 11.20 (H) 04/27/2025    ANIONGAP 12 04/27/2025    GFR >59 10/05/2010    GFRNAA 28 (L) 11/20/2019    GFRAA 33 (L) 11/20/2019    CA 9.5 04/27/2025    OSMOCALC 299 (H) 04/27/2025    ALKPHO 185 (H) 04/26/2025    AST 23 04/26/2025    ALT 10 04/26/2025    BILT 0.2 04/26/2025    TP 6.9 04/26/2025    ALB 3.9 04/27/2025    GLOBULIN 2.6 04/26/2025    AGRATIO 2.1 06/10/2015     04/27/2025    K 4.0 04/27/2025     04/27/2025    CO2 23.0 04/27/2025     Lab Results   Component Value Date    WBC 6.7 04/27/2025    RBC 3.11 (L) 04/27/2025    HGB 10.5 (L) 04/27/2025    HCT 29.6 (L) 04/27/2025    .0 04/27/2025    MPV 9.9 09/18/2012    MCV 95.2 04/27/2025    MCH 33.8 04/27/2025    MCHC 35.5 04/27/2025    RDW 12.3 04/27/2025    NEPRELIM 5.16 04/27/2025    NEUTABS 3.98 03/26/2019    LYMPHABS 1.06 03/26/2019    EOSABS 0.06 03/26/2019    BASABS 0.06 03/26/2019    NEUT 41 03/26/2019    LYMPH 19 03/26/2019    MON 8 03/26/2019    EOS 1 03/26/2019    BASO 1 03/26/2019    NEPERCENT 77.1 04/27/2025    LYPERCENT 10.6 04/27/2025    MOPERCENT 10.5 04/27/2025    EOPERCENT 1.2 04/27/2025    BAPERCENT 0.3 04/27/2025    NE 5.16 04/27/2025    LYMABS 0.71 (L) 04/27/2025    MOABSO 0.70 04/27/2025    EOABSO 0.08 04/27/2025    BAABSO 0.02 04/27/2025     Lab Results   Component Value Date    MALBP 5.9 02/28/2022    CREUR 33.38 02/28/2022     Lab Results   Component Value Date    COLORUR Yellow 12/18/2018    CLARITY Clear 12/18/2018    SPECGRAVITY 1.009 02/28/2022    GLUUR >=500 (A) 12/18/2018    BILUR Negative 12/18/2018    KETUR Negative 12/18/2018    BLOODURINE Negative 12/18/2018    PHURINE 5.0 12/18/2018    PROUR 9.9 02/28/2022    UROBILINOGEN <2.0 12/18/2018    NITRITE Negative 02/28/2022    LEUUR Negative 12/18/2018    NMIC Microscopic not indicated  12/28/2016    WBCUR 1-4 12/18/2018    RBCUR 0-2 12/18/2018    EPIUR None Seen 12/18/2018    BACUR None seen 02/28/2022    HYLUR Present (A) 12/18/2018         IMAGING:     All imaging studies personally reviewed.    XR CHEST AP PORTABLE  (CPT=71045)  Result Date: 4/27/2025  CONCLUSION:  Shallow inspiration accentuates the heart size.  Normal pulmonary vascularity.  Minimal atelectasis in the lung bases.  No focal consolidation, effusion or pneumothorax.   LOCATION:  [facility]      Dictated by (CST): Nancy Brady MD on 4/27/2025 at 7:37 AM     Finalized by (CST): Nancy Brady MD on 4/27/2025 at 7:38 AM       MRI BRAIN (FAS=80043)  Result Date: 4/26/2025  CONCLUSION:  No evidence of acute intracranial process.   LOCATION:  [facility]   Dictated by (CST): Kirk Mccord MD on 4/26/2025 at 7:52 PM     Finalized by (CST): Kirk Mccord MD on 4/26/2025 at 7:53 PM       CT STROKE (YOLA) CTA BRAIN/CTA NECK+PERF(CPT=70496/51760/0042T)  Result Date: 4/26/2025  CONCLUSION:   1. No acute intracranial process.  2. Unremarkable CTA of the [id number].  3. There is 50-60% stenosis secondary smooth calcified plaque involving the proximal right internal carotid artery.  4.  Less than 50% stenosis secondary to smooth calcified plaque involving the left proximal terminal carotid artery.   Findings were discussed with Dr. Negrete at 1700 hours on 4/26/2025 with read back.   LOCATION:  [facility]   Dictated by (CST): Kirk Mccord MD on 4/26/2025 at 4:53 PM     Finalized by (CST): Kirk Mccord MD on 4/26/2025 at 5:02 PM       CT STROKE BRAIN (NO IV)(CPT=70450)  Result Date: 4/26/2025  CONCLUSION:  No acute intracranial process.  Critical results were called to Dr. Negrete at 1635 hours on 4/26/2025.  There was appropriate read back.    LOCATION:  [facility]   Dictated by (CST): Kirk Mccord MD on 4/26/2025 at 4:34 PM     Finalized by (CST): Kirk Mccord MD on 4/26/2025 at 4:36 PM           ASSESSMENT/PLAN:   Tariq Nolan is a a(n) 74  year old male w ho ESRD on PD, CAD sp stents, DM, HTN, HL who presents with acute onset of confusion and weakness. Sp CT and MRI brain neg for stroke (but MRI limited due to movement per notes). Neuro on consult.     ESRD on PD   -- home Rx = 8.5hrs, 4 exchanges, 3L fill volume, all green, LF 2L purple   -- did yellow last night to not drop BP given workup stroke pending, will do greens tonight.   -- will do cell count and cultures to ensure given ams though do not suspect  -- continue nightly PD  -- on lasix, unclear if needs this given minimal UOP  -- avoid morphine. Renally dose meds      AMS/Weakness  -- CT, MRI, ECHO orders, defer to neuro  -- infectious workup recommended     Bradycardia   -- cards on consult, BB held     HTN  -- resume other home meds    Anemia  -- monitor     Hypokalemia  -- on daily kcl at home. Monitor levels     D/w wife and Dr Conner    Thank you for allowing me to participate in the care of your patient. Please do not hesitate to contact me with concerns or questions.    Evelina Collado MD  [facility]    4/27/2025  2:09 PM         [1]   Past Medical History:   Anemia due to stage 4 chronic kidney disease (HCC)    sees Dr. 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 Redding, last visit 11/11/20, 5 stents total since 2009    Diabetes mellitus (HCC)    Diabetic eye exam (HCC)    [place] vision    Essential hypertension    Gastric lesion    Dr. 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 Orr, SHAYNE LAD    History of cardiovascular stress test    IPMN (intraductal papillary mucinous neoplasm)    saw Dr. Moura, seeing Dr. Hearn,  EUS 12/29/20    Living will in place    OBESITY    JANAY (obstructive sleep apnea)    AHI 6 RDI 6 REM AHI 0 Supine AHI 46 non-supine AHI 1.3 Sao2 Dirk 90%     Osteoarthritis    knees    Pneumonia due to organism    Post covid-19 condition, unspecified    asymptomatic; not hospitalized; no current symptoms    Presence of drug coated stent in LAD coronary artery 1/31/2017 3x12 mm Xience     good Shahid      Presence of drug coated stent in right coronary artery Distal 3/28 mm Xience & Prox 3.5x18mm Xience 1/24/2017    Good Shahid     Presence of DRUG stent in coronary artery 1/31/2017 mid RI 3/38mm Xience     Good Shahid      Pure hypercholesterolemia    TGA (transient global amnesia)    at [facility]. couldn't remember about 12 hr period, had CT, MRI, EEG, labs, carotid dop and echo all nl, will see Dr. Laguna for neuro f/u    Type 1 diabetes mellitus (HCC)    Vertigo    Visual impairment    prescription glasses   [2]   Past Surgical History:  Procedure Laterality Date    Cath bare metal stent (bms)      x5 ?    Cath percutaneous  transluminal coronary angioplasty      Colonoscopy  12/15/2008    tics, rhoids, polyps, repeat in 2013    Colonoscopy  02/14/2020    mult tubulovillous adenomas, Dr. Moura. repeat 2023    Colonoscopy N/A 2/11/2020    Procedure: COLONOSCOPY, POSSIBLE BIOPSY, POSSIBLE POLYPECTOMY 01242;  Surgeon: Tay Moura MD;  Location: [facility]    Egd  12/2020    gastric cardia lesion- intestinal metaplasia    Hemorrhoidectomy      Tonsillectomy  age 6   [3]   Family History  Adopted: Yes   Problem Relation Age of Onset    No Known Problems Daughter     No Known Problems Daughter     No Known Problems Daughter    [4]   Allergies  Allergen Reactions    Tetanus-Diphtheria Toxoids Td  [Diphteria And Tetanus] OTHER (SEE COMMENTS)    Tetanus Toxoid    [5]   Current Facility-Administered Medications:     acetaminophen (Tylenol) tab 650 mg, 650 mg, Oral, Q4H PRN **OR** acetaminophen (Tylenol) rectal  suppository 650 mg, 650 mg, Rectal, Q4H PRN    labetalol (Trandate) 5 mg/mL injection 10 mg, 10 mg, Intravenous, Q10 Min PRN    hydrALAzine (Apresoline) 20 mg/mL injection 10 mg, 10 mg, Intravenous, Q2H PRN    ondansetron (Zofran) 4 MG/2ML injection 4 mg, 4 mg, Intravenous, Q6H PRN    metoclopramide (Reglan) 5 mg/mL injection 10 mg, 10 mg, Intravenous, Q8H PRN    clopidogrel (Plavix) tab 75 mg, 75 mg, Oral, Daily    apixaban (Eliquis) tab 5 mg, 5 mg, Oral, BID    sodium chloride 0.9% infusion, , Intravenous, Continuous    melatonin tab 3 mg, 3 mg, Oral, Nightly PRN    ondansetron (Zofran) 4 MG/2ML injection 4 mg, 4 mg, Intravenous, Q6H PRN    metoclopramide (Reglan) 5 mg/mL injection 10 mg, 10 mg, Intravenous, Q8H PRN    LORazepam (Ativan) 2 mg/mL injection 1 mg, 1 mg, Intravenous, Once    dextrose 1.5%-calcium 2.5 mEq/L peritoneal solution, 5,000 mL, Intraperitoneal, Once in dialysis    amLODIPine (Norvasc) tab 10 mg, 10 mg, Oral, Daily    baclofen (Lioresal) tab 10 mg, 10 mg, Oral, BID    isosorbide mononitrate ER (Imdur) 24 hr tab 60 mg, 60 mg, Oral, Daily    potassium chloride (Klor-Con M10) tab 10 mEq, 10 mEq, Oral, Daily    rosuvastatin (Crestor) tab 10 mg, 10 mg, Oral, Daily    venlafaxine (Effexor) tab 37.5 mg, 37.5 mg, Oral, Daily    glucose (Dex4) 15 GM/59ML oral liquid 15 g, 15 g, Oral, Q15 Min PRN **OR** glucose (Glutose) 40% oral gel 15 g, 15 g, Oral, Q15 Min PRN **OR** glucose-vitamin C (Dex-4) chewable tab 4 tablet, 4 tablet, Oral, Q15 Min PRN **OR** dextrose 50% injection 50 mL, 50 mL, Intravenous, Q15 Min PRN **OR** glucose (Dex4) 15 GM/59ML oral liquid 30 g, 30 g, Oral, Q15 Min PRN **OR** glucose (Glutose) 40% oral gel 30 g, 30 g, Oral, Q15 Min PRN **OR** glucose-vitamin C (Dex-4) chewable tab 8 tablet, 8 tablet, Oral, Q15 Min PRN    insulin aspart (NovoLOG) 100 Units/mL FlexPen 1-10 Units, 1-10 Units, Subcutaneous, TID AC and HS  [6]   No current outpatient medications on file.

## 2025-04-27 NOTE — SLP NOTE
ADULT BEDSIDE SWALLOWING EVALUATION    ASSESSMENT    ASSESSMENT/OVERALL IMPRESSION:  Order received; chart reviewed. Briefly, this is a 74 y.o male who presented from home with AMS and left sided weakness.  Patient was at home on peritoneal dialysis as usual, however, when wife came home she noticed he was confused.  In ED, left sided weakness noted and code stroke initiated.  Spouse also reported patient with baseline \"twitches\" and body tremors however significantly worse now.  Medical history includes CAD with stents, anemia, DM, ESRD on PD, hearing impairment in R ear, JANAY, pneumonia.   Patient on regular texture diet with thin liquids at baseline per report.  Spouse reported patient with occasional dysphagia at baseline characterized by globus sensation and regurgitation.   Patient currently NPO.     CXR this AM negative for acute concerns.   Brain MRI yesterday limited due to motion however no evidence of acute infarct.     Patient easily awakened at bedside; spouse present.  Patient noted with tremor like movement; uncontrolled.  Patient with clear but harsh/strained sounding vocal quality.  Patient with incoordinated oral motor movements noted.  Patient able to respond to simple questions and commands with increased loudness due to R Ear hearing impairment. Patient unable to feed himself at this time so SLP rendered 1:1 feeding assist of PO trials for purposes of this evaluation.  Patient noted with sonorous type respirations and required verbal cues for attention to task.     Suspect at least moderate oropharyngeal dysphagia characterized by bolus holding, reduced bolus formation and control, reduced lingual coordination, and suspect reduced swallow coordination.  Immediate s/s aspiration observed with uncontrolled bolus volume thin liquid trials.  No overt clinical s/s aspiration observed with 1:1 controlled bolus volume via tspn feeding of mildly thick liquid and puree texture.  Given underlying suspected  swallow incoordination, patient would benefit from strict 1:1 cautious PO feeding approach.  Stop PO if any s/s aspiration or respiratory status changes.  Video swallow study to be completed if CXR declines, increase in clinical signs of aspiration, and/or MD desires.    Await ongoing medical workup.  D/W patient/spouse/RN.  SLP to follow.             RECOMMENDATIONS   Diet Recommendations - Solids: Puree  Diet Recommendations - Liquids: Nectar thick liquids/ Mildly thick     Compensatory Strategies Recommended: Liquids via spoon  Aspiration Precautions: 1:1 feeding, No straw, Small sips, Small bites, Slow rate, Upright position  Medication Administration Recommendations: Crushed in puree  Treatment Plan/Recommendations: Aspiration precautions, Dysphagia therapy    HISTORY   MEDICAL HISTORY  Reason for Referral: R/O aspiration, Stroke protocol, RN dysphagia screen    Problem List  Principal Problem:    Acute confusion  Active Problems:    Anticoagulated    ESRD on dialysis ([facility])    Type 2 diabetes mellitus with hyperglycemia, with long-term current use of insulin ([facility])      Past Medical History  Past Medical History[1]    Prior Living Situation: Home with support, Home with spouse  Diet Prior to Admission: Regular, Thin liquids       Patient/Family Goals: to speak with neurologist    SWALLOWING HISTORY  Current Diet Consistency: NPO  Dysphagia History: as per above  Imaging Results: as per above      OBJECTIVE   ORAL MOTOR EXAMINATION     Symmetry: Within Functional Limits           Rate of Motion: Reduced    Voice Quality: Clear, Harsh/Strained  Respiratory Status: Unlabored  Consistencies Trialed: Thin liquids, Puree, Nectar thick liquids  Method of Presentation: Staff/Clinician assistance  Patient Positioned: Upright, Midline    Oral Phase of Swallow: Impaired  Bolus Retrieval: Intact  Bilabial Seal: Intact  Bolus Formation: Impaired  Bolus Propulsion: Impaired  Mastication: Impaired  Retention:  Intact    Pharyngeal Phase of Swallow: Appears Impaired  Laryngeal Elevation Timing: Appears intact  Laryngeal Elevation Strength: Appears intact  Laryngeal Elevation Coordination: Appears impaired  (Please note: Silent aspiration cannot be evaluated clinically. Videofluoroscopic Swallow Study is required to rule-out silent aspiration.)    Esophageal Phase of Swallow: No complaints consistent with possible esophageal involvement                GOALS  Goal #1 The patient will tolerate puree consistency and mildly thick liquids without overt signs or symptoms of aspiration with 85 % accuracy over 2-3 session(s).  In Progress   Goal #2 The patient/family/caregiver will demonstrate understanding and implementation of aspiration precautions and swallow strategies independently over 3 session(s).    In Progress   Goal #3 The patient will tolerate trial upgrade of soft/regular consistency and thin liquids without overt signs or symptoms of aspiration with 90 % accuracy over 3-4 session(s).  In Progress   Goal #4 Assess need for VFSS within 2-3 visits    Await medical workup     FOLLOW UP  Treatment Plan/Recommendations: Aspiration precautions, Dysphagia therapy     Follow Up Needed (Documentation Required): Yes  SLP Follow-up Date: 04/28/25    Thank you for your referral.   If you have any questions, please contact SHAILESH Loza, MS CCC-SLP/L  Speech-Language Pathologist  [facility]Link [id number]         [1]   Past Medical History:   Anemia due to stage 4 chronic kidney disease (HCC)    sees Dr. 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 Redding, last visit 11/11/20, 5 stents total since 2009    Diabetes mellitus (HCC)    Diabetic eye exam ([facility])    [place] vision    Essential hypertension    Gastric lesion     Dr. 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 Orr, SHAYNE LAD    History of cardiovascular stress test    IPMN (intraductal papillary mucinous neoplasm)    saw Dr. Moura, seeing Dr. Hearn, EUS 12/29/20    Living will in place    OBESITY    JANAY (obstructive sleep apnea)    AHI 6 RDI 6 REM AHI 0 Supine AHI 46 non-supine AHI 1.3 Sao2 Dirk 90%     Osteoarthritis    knees    Pneumonia due to organism    Post covid-19 condition, unspecified    asymptomatic; not hospitalized; no current symptoms    Presence of drug coated stent in LAD coronary artery 1/31/2017 3x12 mm Xience     good Shahid      Presence of drug coated stent in right coronary artery Distal 3/28 mm Xience & Prox 3.5x18mm Xience 1/24/2017    Good Shahid     Presence of DRUG stent in coronary artery 1/31/2017 mid RI 3/38mm Xience     Good Shahid      Pure hypercholesterolemia    TGA (transient global amnesia)    at [facility]. couldn't remember about 12 hr period, had CT, MRI, EEG, labs, carotid dop and echo all nl, will see Dr. Laguna for neuro f/u    Type 1 diabetes mellitus (HCC)    Vertigo    Visual impairment    prescription glasses

## 2025-04-27 NOTE — PROGRESS NOTES
[place]   part of [facility] Hospitalist Progress Note     Tariq Nolan Patient Status:  Inpatient    1950 MRN [id number]   Location [facility] 3NE-A Attending Herrera Mariscal MD   Hosp Day # 1 PCP Jacob Hicks MD     Subjective:     Patient seen and examined.   Wife at bedside  Moving all 4 ext  Still confused, but little better than yest  Falling asleep easily  HR noted to be in 30s and 40s      Objective:    Review of Systems:   10 point ROS completed and was negative, except for pertinent positive and negatives stated in subjective.    Vital signs:  Temp:  [97.4 °F (36.3 °C)-98.1 °F (36.7 °C)] 98.1 °F (36.7 °C)  Pulse:  [40-68] 44  Resp:  [12-20] 20  BP: (144-181)/(65-83) 148/65  SpO2:  [96 %-100 %] 96 %  General: No acute distress. Alert and oriented x 2, somewhat confused and slow to answer.  HEENT: Normocephalic atraumatic. Moist mucous membranes. EOM-I. PERRLA. Anicteric.  Neck: No lymphadenopathy. No JVD. No carotid bruits.  Respiratory: Clear to auscultation bilaterally. No wheezes. No rhonchi.  Cardiovascular: S1, S2. Regular rate and rhythm. No murmurs, rubs or gallops. Equal pulses.   Chest and Back: No tenderness or deformity.  Abdomen: Soft, nontender, nondistended.  Positive bowel sounds. No rebound, guarding or organomegaly.  Neurologic: No focal neurological deficits. CNII-XII grossly intact.  Musculoskeletal: Moves all extremities.  Extremities: No edema or cyanosis.  Integument: No rashes or lesions.   Psychiatric: Appropriate mood and affect.      Diagnostic Data:    Labs:  Recent Labs   Lab 25  1548   WBC 6.1   HGB 12.0*   MCV 95.2   .0   INR 1.25*       Recent Labs   Lab 25  1548   *   BUN 56*   CREATSERUM 10.83*   CA 10.0   ALB 4.3      K 4.2      CO2 24.0   ALKPHO 185*   AST 23   ALT 10   BILT 0.2   TP 6.9       Estimated Creatinine Clearance: 5.3 mL/min (A) (based on SCr of 10.83 mg/dL  (H)).    Recent Labs   Lab 04/26/25  1548   PTP 15.8*   INR 1.25*       Lab Results   Component Value Date    TSH 1.752 04/26/2025    T4F 1.0 04/26/2025       COVID-19 Lab Results    COVID-19  Lab Results   Component Value Date    COVID19 Not Detected 08/13/2022    COVID19 Not Detected 12/26/2020    COVID19 NEGATIVE 10/01/2020       Pro-Calcitonin  No results for input(s): \"PCT\" in the last 168 hours.    Cardiac  No results for input(s): \"TROP\", \"PBNP\" in the last 168 hours.    Creatinine Kinase  No results for input(s): \"CK\" in the last 168 hours.    Inflammatory Markers  No results for input(s): \"CRP\", \"MADELIN\", \"LDH\", \"DDIMER\" in the last 168 hours.    Imaging: Imaging data reviewed in Epic.    Medications: Scheduled Medications[1]    Assessment & Plan:    Tariq Nolan is a 74 year old male with PMH sig for CAD w/ stents, anemia, DM2, HTN, DL, and ESRD on PD who presents to the ED with acute onset confusion, left sided weakness.     Left sided weakness, transient - 2/2 TIA/CVA  Confusion, ams 2/2 above or infection  -CT imaging reviewed  -MRI brain NEG for stroke - but limited due to pt movement  -supportive measures  -Neuro consulted >> apprec recs  -PT/OT/ST  -ECHO  -no evidence of infection      Bradycardia  Hx of afib  -per wife not new  -EKG ordered  -holding BB  -on AC  -cards consult >> apprec recs    ESRD on PD  -nephro consulted  -r/o peritonitis, no clear evidence on exam or labs  -check blood cx, check UA     DM2  -hold home meds  -ISS+accuchecks     HTN  -resume home meds     DL  -statin     Anemia  -hgb 12.0  -monitor     CAD sp stents  -resume home meds     Quality:  DVT Prophylaxis: heparin, scds  CODE status: full code   Gavin: no  If COVID testing is negative, may discontinue isolation: yes      Plan of care discussed with patient and all questions answered.            Carine Conner MD  [place] Hospitalist  Pager [phone]  Answering Service number: [phone]                        [1]     clopidogrel  75 mg Oral Daily    apixaban  5 mg Oral BID    LORazepam  1 mg Intravenous Once    dextrose 1.5%-calcium 2.5 mEq/L  5,000 mL Intraperitoneal Once in dialysis    amLODIPine  10 mg Oral Daily    baclofen  10 mg Oral BID    isosorbide mononitrate ER  60 mg Oral Daily    potassium chloride  10 mEq Oral Daily    rosuvastatin  10 mg Oral Daily    venlafaxine  37.5 mg Oral Daily    insulin aspart  1-10 Units Subcutaneous TID AC and HS

## 2025-04-27 NOTE — ED QUICK NOTES
Patient was sent back to ER from MRI. Patient is unable to complete MRI as patient is unable to stay still for the MRI with ativan 0.25mg. Notified the provider

## 2025-04-27 NOTE — PHYSICAL THERAPY NOTE
PHYSICAL THERAPY EVALUATION - INPATIENT     Room Number: 3618/3618-A  Evaluation Date: 4/27/2025  Type of Evaluation: Initial  Physician Order: PT Eval and Treat    Presenting Problem: confusion  Co-Morbidities : CAD, anemia, DM2, HTN, ESRD - PD  Reason for Therapy: Mobility Dysfunction and Discharge Planning    PHYSICAL THERAPY ASSESSMENT   Patient is a 74 year old male admitted 4/26/2025 for confusion.  Prior to admission, patient's baseline is indep.  Patient is currently functioning below baseline with bed mobility, transfers, and maintaining seated position.  Patient is requiring moderate assist as a result of the following impairments: decreased functional strength, impaired sitting balance, impaired coordination, decreased muscular endurance, cognitive deficits (altered memory, difficulty following commands), and upper body jerks .  Pt able to participate in bed mobility and sitting, requiring significant assist.  Physical Therapy will continue to follow for duration of hospitalization.    Patient will benefit from continued skilled PT Services to promote return to prior level of function and safety with continuous assistance and gradual rehabilitative therapy .    PLAN DURING HOSPITALIZATION  Nursing Mobility Recommendation : Lift Equipment  PT Device Recommendation: Mechanical lift  PT Treatment Plan: Bed mobility, Patient education, Family education, Neuromuscular re-educate, Gait training, Balance training, Transfer training  Rehab Potential : Good  Frequency (Obs): 3x/week     CURRENT GOALS    Goal #1 Patient is able to demonstrate supine - sit EOB @ level: minimum assistance     Goal #2 Patient is able to demonstrate transfers EOB to/from Chair/Wheelchair at assistance level: minimum assistance     Goal #3 Patient is able to ambulate 20 feet with assist device: walker - rolling at assistance level: minimum assistance     Goal #4    Goal #5    Goal #6    Goal Comments: Goals established on  2025      PHYSICAL THERAPY MEDICAL/SOCIAL HISTORY  History related to current admission: Patient is a 74 year old male admitted on 2025 from home for confusion and sudden L sided weakness.  CTH and MRI negative.  Nephology, cardiology, and neuro on consult.  W/u in progress.    HOME SITUATION  Type of Home: [facility]  Home Layout: One level  Stairs to Enter : 0                  Lives With: Spouse               Prior Level of [place]: indep; per wife, pt with gradual decline over the past several weeks requiring supervision and occasional assistance; declines falls    SUBJECTIVE  Denies pain; eager to return home    OBJECTIVE  Precautions: Bed/chair alarm  Fall Risk: High fall risk    WEIGHT BEARING RESTRICTION     PAIN ASSESSMENT  Ratin          COGNITION  A&O, requires cueing for date/location; follows single step commands with prompts and repetition, distractible and perseverative    RANGE OF MOTION AND STRENGTH ASSESSMENT  Upper extremity ROM and strength are within functional limits : grossly 2/5    Lower extremity ROM is within functional limits     Lower extremity strength is within functional limits     BALANCE  Static Sitting: Poor  Dynamic Sitting: Poor  Static Standing: Not tested  Dynamic Standing: Not tested    ADDITIONAL TESTS                                    ACTIVITY TOLERANCE                         O2 WALK       NEUROLOGICAL FINDINGS         Myoclonic jerks with BUE at rest, intensity decreases with tasks, however still present                AM-PAC '6-Clicks' INPATIENT SHORT FORM - BASIC MOBILITY  How much difficulty does the patient currently have...  Patient Difficulty: Turning over in bed (including adjusting bedclothes, sheets and blankets)?: A Lot   Patient Difficulty: Sitting down on and standing up from a chair with arms (e.g., wheelchair, bedside commode, etc.): Unable   Patient Difficulty: Moving from lying on back to sitting on the side of the bed?: A Lot   How much  help from another person does the patient currently need...   Help from Another: Moving to and from a bed to a chair (including a wheelchair)?: Total   Help from Another: Need to walk in hospital room?: Total   Help from Another: Climbing 3-5 steps with a railing?: Total     AM-PAC Score:  Raw Score: 8   Approx Degree of Impairment: 86.62%   Standardized Score (AM-PAC Scale): 28.58   CMS Modifier (G-Code): CM    FUNCTIONAL ABILITY STATUS  Gait Assessment   Functional Mobility/Gait Assessment  Gait Assistance: Not tested    Skilled Therapy Provided     Bed Mobility:  Rolling: mod A, requires guidance with UE and upper body; able to advance LE/pelvis  Supine to sit: mod A with trunk and upper body, able to advance LE with cueing   Sit to supine: mod A at trunk  Sitting balance: forward posture, requires guidance to maintain UE support, continual jerks throughout requiring assist for balance; attempted scooting, able to advance short distance with mod A and support in upper body  Tolerated sitting at EOB > 3 minutes with min A     Exercise/Education Provided:  Role of PT, benefits of mobility, safety with mobility, POC, pt/wife in agreement    Patient End of Session: In bed, With  staff, Needs met, Call light within reach, RN aware of session/findings, All patient questions and concerns addressed, Hospital anti-slip socks, Alarm set, Family present      Patient Evaluation Complexity Level:  History Moderate - 1 or 2 personal factors and/or co-morbidities   Examination of body systems Moderate - addressing a total of 3 or more elements   Clinical Presentation  Moderate - Evolving   Clinical Decision Making Moderate Complexity       PT Session Time: 35 minutes  Therapeutic Activity: 10 minutes

## 2025-04-27 NOTE — ED PROVIDER NOTES
Patient Seen in: [facility]      History     Chief Complaint   Patient presents with    Altered Mental Status     Stated Complaint: altered mental Status    Subjective:   HPI    74-year-old male comes to the emergency department for evaluation of acute confusion.  The patient has a history of diabetes mellitus and end-stage renal disease on peritoneal dialysis.  When his wife left this morning at 8 AM he reportedly was dozing but he is last exchange was still going.  She was unable to contact him this afternoon and so she returned home to find him still hooked up to his dialysis exchange and he had not yet instilled the last fill volume.  He was confused.  Ambulance was called.  Patient himself states he feels confused but denies any other physical complaints.  He is currently on Eliquis and Plavix.  History of Present Illness               Objective:     Past Medical History:    Anemia due to stage 4 chronic kidney disease ([facility])    sees Dr. Cochran    Asymptomatic PVCs    EKG at [facility]    Cardiac LV ejection fraction 55%    Chronic kidney disease due to type 2 diabetes mellitus ([facility])    Constipation    Coronary atherosclerosis    Coronary atherosclerosis of native coronary artery    sees Dr. Redding, last visit 11/11/20, 5 stents total since 2009    Diabetes mellitus ([facility])    Diabetic eye exam ([facility])    [place] [facility]    Essential hypertension    Gastric lesion    Dr. Hearn, repeat egd in 1 year    H/O echocardiogram    at [facility] mild LAE    Hearing impairment    no hearing in right ear    High blood pressure    History of atherectomy    Dr. rOr, [facility]    History of cardiovascular stress test    IPMN (intraductal papillary mucinous neoplasm)    saw Dr. Moura, seeing Dr. Hearn, EUS 12/29/20    Living will in place    OBESITY    JANAY (obstructive sleep apnea)    AHI 6 RDI 6 REM AHI 0 Supine AHI 46 non-supine AHI 1.3 Sao2 Dirk 90%     Osteoarthritis    knees    Pneumonia due to organism    Post  covid-19 condition, unspecified    asymptomatic; not hospitalized; no current symptoms    Presence of drug coated stent in LAD coronary artery 1/31/2017 3x12 mm Xience     good Shahid      Presence of drug coated stent in right coronary artery Distal 3/28 mm Xience & Prox 3.5x18mm Xience 1/24/2017    Good Shahid     Presence of DRUG stent in coronary artery 1/31/2017 mid RI 3/38mm Xience     Good Shahid      Pure hypercholesterolemia    TGA (transient global amnesia)    at [facility]. couldn't remember about 12 hr period, had CT, MRI, EEG, labs, carotid dop and echo all nl, will see Dr. Laguna for neuro f/u    Type 1 diabetes mellitus (HCC)    Vertigo    Visual impairment    prescription glasses              Past Surgical History:   Procedure Laterality Date    Cath bare metal stent (bms)      x5 ?    Cath percutaneous  transluminal coronary angioplasty      Colonoscopy  12/15/2008    tics, rhoids, polyps, repeat in 2013    Colonoscopy  02/14/2020    mult tubulovillous adenomas, Dr. Moura. repeat 2023    Colonoscopy N/A 2/11/2020    Procedure: COLONOSCOPY, POSSIBLE BIOPSY, POSSIBLE POLYPECTOMY 98146;  Surgeon: Tay Moura MD;  Location: [facility]    Egd  12/2020    gastric cardia lesion- intestinal metaplasia    Hemorrhoidectomy      Tonsillectomy  age 6                Social History     Socioeconomic History    Marital status:     Number of children: 3   Occupational History    Occupation: retail, part time   Tobacco Use    Smoking status: Never    Smokeless tobacco: Never   Vaping Use    Vaping status: Never Used   Substance and Sexual Activity    Alcohol use: No    Drug use: No    Sexual activity: Yes     Partners: Female   Other Topics Concern     Service No    Blood Transfusions No    Exercise No     Comment: discussed, \" little to none.\" 12/19/19, 12/23/20    Seat Belt Yes    Self-Exams Yes     Social Drivers of Health     Food Insecurity: No Food Insecurity (4/26/2025)    NCSS - Food  Insecurity     Worried About Running Out of Food in the Last Year: No     Ran Out of Food in the Last Year: No   Transportation Needs: No Transportation Needs (4/26/2025)    NCSS - Transportation     Lack of Transportation: No   Housing Stability: Not At Risk (4/26/2025)    NCSS - Housing/Utilities     Has Housing: Yes     Worried About Losing Housing: No     Unable to Get Utilities: No                                Physical Exam     ED Triage Vitals   BP 04/26/25 1549 (!) 172/82   Pulse 04/26/25 1549 68   Resp 04/26/25 1549 12   Temp 04/26/25 1546 97.7 °F (36.5 °C)   Temp src 04/26/25 1546 Temporal   SpO2 04/26/25 1549 100 %   O2 Device 04/26/25 1549 None (Room air)       Current Vitals:   Vital Signs  BP: (!) 178/75  Pulse: (!) 40  Resp: 15  Temp: 98 °F (36.7 °C)  Temp src: Oral  MAP (mmHg): (!) 103    Oxygen Therapy  SpO2: 97 %  O2 Device: None (Room air)  Pulse Oximetry Type: Continuous  Oximetry Probe Site Changed: No  Pulse Ox Probe Location: Left hand        Physical Exam     Physical Exam         General Appearance: This is an older male who is awake and conversive but confused sitting on a gurney.  Vital signs were reviewed per nurses notes.  Monitor reveals a sinus rhythm.  Patient is afebrile.  Blood pressure is 178/75.  HEENT: Normocephalic atraumatic.  Anicteric sclera.  Oral mucosa is moist.  Oropharynx is normal.  Neck: No adenopathy or thyromegaly.  Lungs are clear to auscultation.  Heart exam: Normal S1-S2 without extra sounds or murmurs.  Regular rate and rhythm.  Abdomen is nontender.  Dialysis catheter is extruding from the left side of the abdomen.  Extremities are atraumatic.  Skin: No rashes or lesions.  Neuroexam: Awake and conversive.  He is repeating everything that is said in the room like an echo.  He admits that he feels confused.  No lateralizing signs such as facial droop, unilateral weakness or numbness.      ED Course     Labs Reviewed   COMP METABOLIC PANEL (14) - Abnormal; Notable  for the following components:       Result Value    Glucose 129 (*)     BUN 56 (*)     Creatinine 10.83 (*)     Calculated Osmolality 301 (*)     eGFR-Cr 5 (*)     Alkaline Phosphatase 185 (*)     All other components within normal limits   CBC WITH DIFFERENTIAL WITH PLATELET - Abnormal; Notable for the following components:    RBC 3.52 (*)     HGB 12.0 (*)     HCT 33.5 (*)     MCH 34.1 (*)     Lymphocyte Absolute 0.71 (*)     All other components within normal limits   PROTHROMBIN TIME (PT) - Abnormal; Notable for the following components:    PT 15.8 (*)     INR 1.25 (*)     All other components within normal limits   PTT, ACTIVATED - Abnormal; Notable for the following components:    PTT 36.8 (*)     All other components within normal limits   LIPID PANEL - Abnormal; Notable for the following components:    HDL Cholesterol 38 (*)     All other components within normal limits   HEMOGLOBIN A1C - Abnormal; Notable for the following components:    HgbA1C 6.8 (*)     Estimated Average Glucose 148 (*)     All other components within normal limits   POCT GLUCOSE - Abnormal; Notable for the following components:    POC Glucose 131 (*)     All other components within normal limits   TROPONIN I HIGH SENSITIVITY - Normal   POCT GLUCOSE - Normal   CBC WITH DIFFERENTIAL WITH PLATELET   RENAL FUNCTION PANEL   MAGNESIUM   RAINBOW DRAW LAVENDER   RAINBOW DRAW LIGHT GREEN   RAINBOW DRAW BLUE     EKG    Rate, intervals and axes as noted on EKG Report.  Rate: 70  Rhythm: Undetermined rhythm  Reading: Otherwise normal EKG.  Suspect that this is a sinus rhythm based on EKG review.              Results            Code stroke was called.  NIH stroke scale score was 4.    MRI BRAIN (IST=08952)  Result Date: 4/26/2025  PROCEDURE:  MRI BRAIN (CPT=70551)  COMPARISON:  [facility], CT STROKE ([facility]) CTA BRAIN/CTA NECK+PERF(CPT=70496/93419/0042T), 4/26/2025, 4:36 PM.  [facility], MRI BRAIN W O CONT, 9/19/2012, 8:03 AM.  INDICATIONS:  altered  mental Status  TECHNIQUE:  MRI of the brain was performed with multi-planar T1, T2-weighted images with FLAIR sequences and diffusion weighted images without infusion.  PATIENT STATED HISTORY: (As transcribed by Technologist)     FINDINGS:  INTRACRANIAL:  Few foci T2/FLAIR hyperintensity in the periventricular white matter consistent small vessel ischemic disease.  Diffusion weighted imaging was performed and is unremarkable.  There is no evidence for acute infarction.  There is no evidence  of hemorrhage or mass lesion.  VENTRICLES/SULCI:   Ventricles and sulci are normal in caliber.  There are no extra-axial fluid collections. There is no midline shift. SINUSES/ORBITS:       The visualized paranasal sinuses are clear.  The orbits are unremarkable. MASTOIDS:      The mastoids are clear.            CONCLUSION:  No evidence of acute intracranial process.   LOCATION:  [facility]   Dictated by (CST): Kirk Mccord MD on 4/26/2025 at 7:52 PM     Finalized by (CST): Kirk Mccord MD on 4/26/2025 at 7:53 PM       CT STROKE ([facility]) CTA BRAIN/CTA NECK+PERF(CPT=70496/99232/0042T)  Result Date: 4/26/2025  PROCEDURE:  CT STROKE([facility] BRAIN)CTA BRAIN/CTA NECK+PERF(CPT=70496/50425/0042T)  COMPARISON:  [facility] , CT, CT STROKE BRAIN (NO IV)(CPT=70450), 4/26/2025, 4:25 PM.  [facility] , CT, BRAIN W/O CONTRAST, 9/18/2012, 9:26 PM.  INDICATIONS:  altered mental Status  TECHNIQUE:  Unenhanced followed by contrast enhanced multi-slice CT angiography of the brain and neck vasculature using non-ionic contrast was performed.  3D volume renderings are performed by the radiologist on an independent workstation and interpreted  to optimize visualization of vascular anatomy.  PERFUSION:  Axial sections were obtained per stroke protocol. Arterial and venous structures were selected for purposes of brain perfusion mapping. All measurements obtained in this exam were performed using NASCET criteria.  Dose reduction techniques were used. Dose information is  transmitted to the ACR (American College of Radiology) NRDR (National Radiology Data Registry) which includes the Dose Index Registry.  PATIENT STATED HISTORY:(As transcribed by Technologist)  Patient with slurred speech.   CONTRAST USED:  115cc of Isovue 370  FINDINGS:  VASCULATURE:  No significant stenosis.  No visible aneurysm or vascular malformation.  The perfusion maps demonstrates no evidence of ischemia, penumbra, or core infarct.  The cerebral blood flow map and the T-max maps are grossly unremarkable. VENTRICLES:  Normal for age.  No enlargement or displacement. CEREBRUM:  Normal for age.  No excessive atrophy, mass, or hemorrhage, or abnormal enhancement. CEREBELLUM:  Normal for age.  No excessive atrophy, mass, or hemorrhage, or abnormal enhancement. BRAINSTEM:  Normal for age.  No excessive atrophy, mass, or hemorrhage, or abnormal enhancement. BASAL CISTERNS:  No subarachnoid hemorrhage or effacement.  SKULL:  Negative.   LEFT INTERNAL CAROTID:  There is heavily calcified plaque causing just under 50% carotid stenosis of the left proximal internal carotid artery. EXTERNAL CAROTID:  No hemodynamically significant stenosis or dissection COMMON CAROTID:  Calcified plaque at the bulb causing less than 20% luminal narrowing. VERTEBRAL:  No hemodynamically significant stenosis or dissection  RIGHT INTERNAL CAROTID:   There is heavy smooth calcified plaque causing between 50 and 60% stenosis of the proximal internal carotid artery. EXTERNAL CAROTID:    No hemodynamically significant stenosis or dissection COMMON CAROTID:   Calcified plaque at the bulb causing less than 20% luminal narrowing. VERTEBRAL:   No hemodynamically significant stenosis or dissection  OTHER:  The visualized soft tissues of the neck are also unremarkable.              CONCLUSION:   1. No acute intracranial process.  2. Unremarkable CTA of the rezgeu-dt-Yvgjjo.  3. There is 50-60% stenosis secondary smooth calcified plaque involving the  proximal right internal carotid artery.  4.  Less than 50% stenosis secondary to smooth calcified plaque involving the left proximal terminal carotid artery.   Findings were discussed with Dr. Negrete at 1700 hours on 4/26/2025 with read back.   LOCATION:  [facility]   Dictated by (CST): Kirk Mccord MD on 4/26/2025 at 4:53 PM     Finalized by (CST): Kirk Mccord MD on 4/26/2025 at 5:02 PM       CT STROKE BRAIN (NO IV)(CPT=70450)  Result Date: 4/26/2025  PROCEDURE:  CT STROKE BRAIN (NO IV)(CPT=70450)  COMPARISON:  [facility] , CT, BRAIN W/O CONTRAST, 9/18/2012, 9:26 PM.  INDICATIONS:  altered mental Status  TECHNIQUE:  Noncontrast CT scanning is performed through the brain.  Dose reduction techniques were used. Dose information is transmitted to the ACR (American College of Radiology) NRDR (National Radiology Data Registry) which includes the Dose Index Registry.  PATIENT STATED HISTORY: (As transcribed by Technologist)  Slurred speech with confusion.    FINDINGS:  VENTRICLES/SULCI:  Ventricles and sulci are mildly prominent consistent with atrophy. INTRACRANIAL:  Mild low-attenuation within the periventricular white matter consistent small vessel ischemic disease.  There are no abnormal extraaxial fluid collections.  There is no midline shift.  There are no intraparenchymal brain abnormalities.  There is nothing specific for acute infarct.  There is no hemorrhage or mass lesion.  SINUSES:           No sign of acute sinusitis.  MASTOIDS:          No sign of acute inflammation. SKULL:             No evidence for fracture or osseous abnormality. OTHER:             None.            CONCLUSION:  No acute intracranial process.  Critical results were called to Dr. Negrete at 1635 hours on 4/26/2025.  There was appropriate read back.    LOCATION:  [facility]   Dictated by (CST): Kirk Mccord MD on 4/26/2025 at 4:34 PM     Finalized by (CST): Kirk Mccord MD on 4/26/2025 at 4:36 PM       I personally reviewed the images myself  and went over results with patient.    CT and CTA of the head and neck were obtained.  I reviewed the films myself and they showed no acute hemorrhage or other acute intracranial process.  No large vessel occlusion.    The patient was agitated.  MRI was attempted but unsuccessful in the emergency department initially even after sedation with Ativan.  Patient was unable to sit still.  Subsequent MRI was obtained and was unremarkable.    At 1 point the patient seemed to have difficulty holding his left arm up.  Nonetheless the patient is already anticoagulated making him ineligible for thrombolytics and there is no large vessel occlusion.    Case was discussed with JENNIFER LAMA hospitalist and general neurology Dr. Tobar.  Aspirin was not given because of the patient's anticoagulation state.    Test results and treatment plan were discussed with the patient and wife prior to admission.    A total of 35 minutes of critical care time (exclusive of billable procedures) was administered to manage the patient's neurologic instability due to his acute stroke symptoms.  This involved direct patient intervention, complex decision making, and/or extensive discussions with the patient, family, and clinical staff.                      MDM      #1.  Acute confusion.  Symptoms consistent with acute ischemic stroke however not evidenced on imaging studies.  Admitted for neurologic evaluation.  2.  Anticoagulated.  3.  End-stage renal disease on dialysis.  Patient will require peritoneal dialysis in the hospital.  He did receive contrast dye for CT imaging.  4.  Type 2 diabetes    Admission disposition: 4/26/2025  5:26 PM           MDM    Disposition and Plan     Clinical Impression:  1. Acute confusion    2. Anticoagulated    3. ESRD on dialysis (HCC)    4. Type 2 diabetes mellitus with hyperglycemia, with long-term current use of insulin (HCC)         Disposition:  Admit  4/26/2025  5:26 pm    Follow-up:  No follow-up provider  specified.        Medications Prescribed:  Current Discharge Medication List          Supplementary Documentation:         Hospital Problems       Present on Admission  Date Reviewed: 9/23/2024          ICD-10-CM Noted POA    * (Principal) Acute confusion R41.0 4/26/2025 Unknown    Anticoagulated Z79.01 4/26/2025 Unknown    ESRD on dialysis ([facility]) N18.6, Z99.2 4/26/2025 Unknown    Type 2 diabetes mellitus with hyperglycemia, with long-term current use of insulin ([facility]) E11.65, Z79.4 4/26/2025 Unknown         TNK/ NI Documentation:    Date/Time last known well:   4/26/2025 8:00 AM    NIHSS on presentation: 5     Chief Complaint   Patient presents with    Altered Mental Status     IV Tenecteplase (TNK) administered: No; Patient is not a Candidate for IV TNK due to: DOAC- patient on DOAC and took a dose less than 48 hours    Candidate for Endovascular thrombectomy (EVT): No; Patient is not a candidate for Endovascular Thrombectomy due to: No large vessel occlusion ( LVO)  on CTA/MRA imaging      Disposition: Admit

## 2025-04-27 NOTE — PLAN OF CARE
NURSING ADMISSION NOTE      Patient admitted via Cart  Oriented to room.  Safety precautions initiated.  Bed in low position.  Call light in reach.  Admission navigator completed by charge RN Loyda, updated patient/family on plan of care, verbalized understanding and all needs met at this time    Assumed patient care @2000  A&O x1-2  VSS, A-fib on tele, HR 38 - 60's (MD aware)  SBP maintained between 180-90  DBP maintained less than 100  On RA  Q2 Neuro checks till 0600 then Q4  L & R drifts present  Intermittent mild tremors present, baseline but worsening per wife  NIH daily  L AC PIV, infusing 0.9% @50mL/hr  Passed RN dysphagia screening  Cardiac diet, takes pills whole  QID Accu checks  L mid abdomen peritoneal dialysis catheter, CDI  Diminished urine output  Incontinent x2  Up 2x assist w/ walker  Fall, aspiration and skin precautions in place  Bed alarm on, in lowest position, call light within reach and all needs met at this time    2200: Consulted nephrology for peritoneal dialysis tonight.    0300: [facility] at bedside and initiated dialysis.     0500: Pt found to have vomited on self while sleeping, laying flat on back. PRN Zofran given per MAR. MD notified and STAT chest x-ray ordered. New IV placed in L forearm and EMS placed IV removed per protocol.    Problem: SAFETY ADULT - FALL  Goal: Free from fall injury  Description: INTERVENTIONS:- Assess pt frequently for physical needs- Identify cognitive and physical deficits and behaviors that affect risk of falls.- [place] fall precautions as indicated by assessment.- Educate pt/family on patient safety including physical limitations- Instruct pt to call for assistance with activity based on assessment- Modify environment to reduce risk of injury- Provide assistive devices as appropriate- Consider OT/PT consult to assist with strengthening/mobility- Encourage toileting schedule  Outcome: Progressing     Problem: CARDIOVASCULAR - ADULT  Goal: Absence  of cardiac arrhythmias or at baseline  Description: INTERVENTIONS:- Continuous cardiac monitoring, monitor vital signs, obtain 12 lead EKG if indicated- Evaluate effectiveness of antiarrhythmic and heart rate control medications as ordered- Initiate emergency measures for life threatening arrhythmias- Monitor electrolytes and administer replacement therapy as ordered  Outcome: Progressing     Problem: SKIN/TISSUE INTEGRITY - ADULT  Goal: Skin integrity remains intact  Description: INTERVENTIONS- Assess and document risk factors for pressure ulcer development- Assess and document skin integrity- Monitor for areas of redness and/or skin breakdown- Initiate interventions, skin care algorithm/standards of care as needed  Outcome: Progressing     Problem: NEUROLOGICAL - ADULT  Goal: Achieves stable or improved neurological status  Description: INTERVENTIONS- Assess for and report changes in neurological status- Initiate measures to prevent increased intracranial pressure- Maintain blood pressure and fluid volume within ordered parameters to optimize cerebral perfusion and minimize risk of hemorrhage- Monitor temperature, glucose, and sodium. Initiate appropriate interventions as ordered  Outcome: Progressing

## 2025-04-27 NOTE — H&P
SHANAE  HOSPITALIST  History and Physical     Tariq Nolan Patient Status:  Inpatient    1950 MRN [id number]   Location [facility] 3NE-A Attending Herrera Mariscal MD   Hosp Day # 0 PCP Jacob Hicks MD     Chief Complaint:   Confusion, left sided weakness    History of Present Illness: Tariq Nolan is a 74 year old male with PMH sig for CAD w/ stents, anemia, DM2, HTN, DL, and ESRD on PD who presents to the ED with acute onset confusion, left sided weakness. Pt was at home with wife doing his PD, wife left house. When she came home around 2pm, pts machines was beeping and he appeared confused. In ED pt developed L sided weakness and code stroke called. Pt sent to CT and MRI. Routine labs checked in ED and appear close to baseline. Nephro and neuro consulted and pt admitted for further care and management.     Past Medical History:Past Medical History[1]     Past Surgical History: Past Surgical History[2]    Social History:  reports that he has never smoked. He has never used smokeless tobacco. He reports that he does not drink alcohol and does not use drugs.    Family History: Family History[3]     Allergies: Allergies[4]    Medications:  Medications Ordered Prior to Encounter[5]    Review of Systems:   A comprehensive 14 point review of systems was completed.    Pertinent positives and negatives noted in the HPI.    Physical Exam:    BP (!) 178/75 (BP Location: Right arm)   Pulse (!) 40   Temp 98 °F (36.7 °C) (Oral)   Resp 15   Ht 5' 5\" (1.651 m)   Wt 202 lb (91.6 kg)   SpO2 97%   BMI 33.61 kg/m²   General: No acute distress. Alert and oriented x 2, somewhat confused and slow to answer.  HEENT: Normocephalic atraumatic. Moist mucous membranes. EOM-I. PERRLA. Anicteric.  Neck: No lymphadenopathy. No JVD. No carotid bruits.  Respiratory: Clear to auscultation bilaterally. No wheezes. No rhonchi.  Cardiovascular: S1, S2. Regular rate and rhythm. No murmurs, rubs or gallops. Equal pulses.    Chest and Back: No tenderness or deformity.  Abdomen: Soft, nontender, nondistended.  Positive bowel sounds. No rebound, guarding or organomegaly.  Neurologic: No focal neurological deficits. CNII-XII grossly intact.  Musculoskeletal: Moves all extremities.  Extremities: No edema or cyanosis.  Integument: No rashes or lesions.   Psychiatric: Appropriate mood and affect.      Diagnostic Data:      Labs:  Recent Labs   Lab 04/26/25  1548   WBC 6.1   HGB 12.0*   MCV 95.2   .0   INR 1.25*       Recent Labs   Lab 04/26/25  1548   *   BUN 56*   CREATSERUM 10.83*   CA 10.0   ALB 4.3      K 4.2      CO2 24.0   ALKPHO 185*   AST 23   ALT 10   BILT 0.2   TP 6.9       Estimated Creatinine Clearance: 5.3 mL/min (A) (based on SCr of 10.83 mg/dL (H)).    Recent Labs   Lab 04/26/25  1548   PTP 15.8*   INR 1.25*       COVID-19 Lab Results    COVID-19  Lab Results   Component Value Date    COVID19 Not Detected 08/13/2022    COVID19 Not Detected 12/26/2020    COVID19 NEGATIVE 10/01/2020       Pro-Calcitonin  No results for input(s): \"PCT\" in the last 168 hours.    Cardiac  No results for input(s): \"TROP\", \"PBNP\" in the last 168 hours.    Creatinine Kinase  No results for input(s): \"CK\" in the last 168 hours.    Inflammatory Markers  No results for input(s): \"CRP\", \"MADELIN\", \"LDH\", \"DDIMER\" in the last 168 hours.    Recent Labs   Lab 04/26/25  1548   TROPHS 17       Imaging: Imaging data reviewed in Epic.      ASSESSMENT / PLAN:   Tariq Nolan is a 74 year old male with PMH sig for CAD w/ stents, anemia, DM2, HTN, DL, and ESRD on PD who presents to the ED with acute onset confusion, left sided weakness.    Left sided weakness, transient - 2/2 TIA/CVA  Confusion, ams 2/2 above or infection  -CT imaging reviewed  -MRI ordered  -supportive measures  -Neuro consulted >> apprec recs  -PT/OT/ST  -ECHO      ESRD on PD  -nephro consulted  -r/o peritonitis, no clear evidence on exam or labs    DM2  -hold home  meds  -ISS+accuchecks    HTN  -resume home meds     DL  -statin     Anemia  -hgb 12.0  -monitor     CAD sp stents  -resume home meds     Quality:  DVT Prophylaxis: heparin, scds  CODE status: full code   Gavin: no  If COVID testing is negative, may discontinue isolation: yes      Plan of care discussed with patient and all questions answered.            Carine Padillay Hospitalist  Pager [phone]  Answering Service number: [phone]        **Certification      PHYSICIAN Certification of Need for Inpatient Hospitalization - Initial Certification    Patient will require inpatient services that will reasonably be expected to span two midnight's based on the clinical documentation in H+P.   Based on patients current state of illness, I anticipate that, after discharge, patient will require TBD.                  [1]   Past Medical History:   Anemia due to stage 4 chronic kidney disease (HCC)    sees Dr. 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 Redding, last visit 11/11/20, 5 stents total since 2009    Diabetes mellitus (HCC)    Diabetic eye exam ([facility])    [place] vision    Essential hypertension    Gastric lesion    Dr. 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 Orr, [facility] LAD    History of cardiovascular stress test    IPMN (intraductal papillary mucinous neoplasm)    saw Dr. Moura, seeing Dr. Hearn, EUS 12/29/20    Living will in place    OBESITY    JANAY (obstructive sleep apnea)    AHI 6 RDI 6 REM AHI 0 Supine AHI 46 non-supine AHI 1.3 Sao2 Dirk 90%     Osteoarthritis    knees    Pneumonia due to organism    Post covid-19 condition, unspecified    asymptomatic;  not hospitalized; no current symptoms    Presence of drug coated stent in LAD coronary artery 1/31/2017 3x12 mm Xience     good Shahid      Presence of drug coated stent in right coronary artery Distal 3/28 mm Xience & Prox 3.5x18mm Xience 1/24/2017    Good Shahid     Presence of DRUG stent in coronary artery 1/31/2017 mid RI 3/38mm Xience     Good Shahid      Pure hypercholesterolemia    TGA (transient global amnesia)    at [facility]. couldn't remember about 12 hr period, had CT, MRI, EEG, labs, carotid dop and echo all nl, will see Dr. Laguna for neuro f/u    Type 1 diabetes mellitus (HCC)    Vertigo    Visual impairment    prescription glasses   [2]   Past Surgical History:  Procedure Laterality Date    Cath bare metal stent (bms)      x5 ?    Cath percutaneous  transluminal coronary angioplasty      Colonoscopy  12/15/2008    tics, rhoids, polyps, repeat in 2013    Colonoscopy  02/14/2020    mult tubulovillous adenomas, Dr. Moura. repeat 2023    Colonoscopy N/A 2/11/2020    Procedure: COLONOSCOPY, POSSIBLE BIOPSY, POSSIBLE POLYPECTOMY 82032;  Surgeon: Tay Moura MD;  Location: [facility]    Egd  12/2020    gastric cardia lesion- intestinal metaplasia    Hemorrhoidectomy      Tonsillectomy  age 6   [3]   Family History  Adopted: Yes   Problem Relation Age of Onset    No Known Problems Daughter     No Known Problems Daughter     No Known Problems Daughter    [4]   Allergies  Allergen Reactions    Tetanus-Diphtheria Toxoids Td  [Diphteria And Tetanus] OTHER (SEE COMMENTS)    Tetanus Toxoid    [5]   No current facility-administered medications on file prior to encounter.     Current Outpatient Medications on File Prior to Encounter   Medication Sig Dispense Refill    baclofen 10 MG Oral Tab Take 1 tablet (10 mg total) by mouth 2 (two) times daily.      venlafaxine 37.5 MG Oral Tab Take 1 tablet (37.5 mg total) by mouth daily.      furosemide 40 MG Oral Tab Take 2 tablets (80 mg total) by mouth in the  morning.      calcitriol 0.25 MCG Oral Cap Take 1 capsule (0.25 mcg total) by mouth See Admin Instructions. 0.25 mg only on Mondays and Thursday.      carvedilol 6.25 MG Oral Tab Take 1 tablet (6.25 mg total) by mouth in the morning and 1 tablet (6.25 mg total) in the evening. Take with meals.      rosuvastatin 20 MG Oral Tab Take 1 tablet (20 mg total) by mouth in the morning.      apixaban 5 MG Oral Tab Take 1 tablet (5 mg total) by mouth in the morning and 1 tablet (5 mg total) before bedtime.      CALCIUM CITRATE OR Take 667 mg by mouth in the morning, at noon, and at bedtime.      clopidogrel 75 MG Oral Tab Take 1 tablet (75 mg total) by mouth in the morning.      isosorbide mononitrate ER 60 MG Oral Tablet 24 Hr Take 1 tablet (60 mg total) by mouth in the morning.      AMLODIPINE 10 MG Oral Tab TAKE 1 TABLET(10 MG) BY MOUTH DAILY (Patient taking differently: Take 1 tablet (10 mg total) by mouth in the morning.) 90 tablet 0    Cholecalciferol (VITAMIN D) 2000 UNITS Oral Tab Take 2,000 Units by mouth daily. 30 tablet 11    insulin glargine (LANTUS SOLOSTAR) 100 UNIT/ML Subcutaneous Solution Pen-injector Inject 25 Units into the skin nightly.      ondansetron 4 MG Oral Tablet Dispersible Take 1 tablet (4 mg total) by mouth every 8 (eight) hours as needed for Nausea. 20 tablet 0    Potassium Chloride ER 10 MEQ Oral Cap CR Take 2 capsules (20 mEq total) by mouth 2 (two) times daily. 30 capsule 0    Magnesium Cl-Calcium Carbonate (SLOW-MAG) 71.5-119 MG Oral Tab EC Take 1 tablet by mouth daily. 30 tablet 0    Tenapanor HCl, CKD, (XPHOZAH) 30 MG Oral Tab Take 30 mg by mouth in the morning and 30 mg before bedtime.      Azelastine HCl 0.1 % Nasal Solution 2 sprays by Nasal route 2 (two) times daily. 30 mL 5    Glucose Blood (ONETOUCH VERIO) In Vitro Strip USE TO CHECK BLOOD GLUCOSE FOUR TIMES DAILY AS DIRECTED. 150 strip 3    OneTouch Delica Lancets 33G Does not apply Misc Check BG 4 times daily 150 each 6    Insulin  Pen Needle (BD PEN NEEDLE JAVIER U/F) 32G X 4 MM Does not apply Misc Inject 1 each into the skin daily. 100 each 4    Glucose Blood (BLOOD GLUCOSE TEST) In Vitro Strip Check blood sugars 4x daily. 400 strip 3    ONETOUCH DELICA LANCETS FINE Does not apply Misc 1 lancet by Finger stick route 2 (two) times daily. 300 each 5    Blood Glucose Monitoring Suppl (ONETOUCH ULTRA 2) W/DEVICE Does not apply Kit One Touch Ultra 2 device for testing 1 kit 0

## 2025-04-27 NOTE — CONSULTS
[facility] Cardiology Consultation    Tariq Nolan Patient Status:  Inpatient    1950 MRN [id number]   Formerly [facility] 3NE-A Attending Herrera Mariscal MD   Hosp Day # 1 PCP Jacob Hicks MD     Reason for Consultation:  hilario      History of Present Illness:  Tariq Nolan is a a(n) 74 year old male.  With bradycardia.  He was brought into the hospital with confusion.  He is currently confused and not able to give an accurate history.  History mostly is through his wife.  She says he fell out of bed the other day and apparently has been confused.  She says it seems as if he was out of bed without his walker which is unusual for him and brought him to the ER due to confusion.  There there was a concern for certain stroke which was negative.  He always had A-fib with slow ventricular response.  He was previously on very low-dose Coreg which was stopped.  He has no specific cardiac complaints.  He has a history of coronary artery disease with previous stents and known paroxysmal A-fib.  His wife says that she has known he has had a slow heart rate into the 40s previously.  Here on telemetry he has had some heart rates in the upper 30s.  It does not seem as if he has been symptomatic with this.  He has been seen by neurology for myoclonus.  He is also seeing nephrology as there was concern of inadequate dialysis.    History:  Past Medical History[1]  Past Surgical History[2]  Family History[3]      Allergies:  Allergies[4]    Medications:  Scheduled Medications[5]    Continuous Infusions:  Medication Infusions[6]    Social History:   reports that he has never smoked. He has never used smokeless tobacco. He reports that he does not drink alcohol and does not use drugs.    Review of Systems:  All systems were reviewed and are negative except as described above in HPI.    Physical Exam:      Temp:  [97.4 °F (36.3 °C)-98.4 °F (36.9 °C)] 98.4 °F (36.9 °C)  Pulse:  [40-68] 50  Resp:  [12-20] 18  BP:  (112-181)/(65-96) 140/96  SpO2:  [91 %-100 %] 94 %    Wt Readings from Last 3 Encounters:   04/27/25 207 lb 7.3 oz (94.1 kg)   10/23/24 190 lb 0.6 oz (86.2 kg)   10/20/24 190 lb (86.2 kg)       General: No apparent distress, some confusion  HEENT: No focal deficits.  Neck: supple. JVP normal  Cardiac: irreg rhythm, S1, S2 normal,  rub or gallop.  No murmur.  Lungs: CTA  Abdomen: Soft, non-tender.   Extremities: No edema  Neurologic: no focal deficits  Skin: Warm and dry.          Telemetry: AF with slow VR    Laboratories and Data:  Diagnostics:        Labs:   HEM:  Recent Labs   Lab 04/26/25  1548 04/27/25  0756   WBC 6.1 6.7   HGB 12.0* 10.5*   .0 200.0       Chem:  Recent Labs   Lab 04/26/25  1548 04/27/25  0756    137   K 4.2 4.0    102   CO2 24.0 23.0   BUN 56* 51*   CREATSERUM 10.83* 11.20*   CA 10.0 9.5   MG  --  1.9   PHOS  --  6.3*   * 124*       Recent Labs   Lab 04/26/25  1548 04/27/25  0756   ALT 10  --    AST 23  --    ALB 4.3 3.9       Recent Labs   Lab 04/26/25  1548   INR 1.25*       No results for input(s): \"TROP\" in the last 168 hours.      Coronary artery disease involving native coronary artery of native heart without angina pectoris  Non-ST elevation myocardial infarction (NSTEMI), subsequent episode of care ([facility]) 1/23/2017  Presence of drug coated stent in LAD coronary artery 1/31/2017 3x12 mm Xience   Presence of DRUG stent in coronary artery 1/31/2017 mid RI 3/38mm Xience   Presence of drug coated stent in right coronary artery Distal 3/28 mm Xience & Prox 3.5x18mm Xience 1/24/2017     Impression:  Confusion  Af with slow VR-on eliquis, coreg held, TFT's normal  CAD with stents  Esrd on PD  DM  HTN  HL  PAF with slow VR        Recommend:  Continue tele, off coreg, BP fine,  When able to ambulate could see his HR rate response and will discuss with EP  Echo pending    Murphy Salas MD  4/27/2025  3:05 PM        [1]   Past Medical History:   Anemia due to stage 4  chronic kidney disease (HCC)    sees Dr. Cochran    Asymptomatic PVCs    EKG at [place]    Cardiac LV ejection fraction 55%    Chronic kidney disease due to type 2 diabetes mellitus (HCC)    Constipation    Coronary atherosclerosis    Coronary atherosclerosis of native coronary artery    sees Dr. Redding, last visit 11/11/20, 5 stents total since 2009    Diabetes mellitus (HCC)    Diabetic eye exam ([facility])    [place] vision    Essential hypertension    Gastric lesion    Dr. 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 Orr, SHAYNE LAD    History of cardiovascular stress test    IPMN (intraductal papillary mucinous neoplasm)    saw Dr. Moura, seeing Dr. Hearn, EUS 12/29/20    Living will in place    OBESITY    JANAY (obstructive sleep apnea)    AHI 6 RDI 6 REM AHI 0 Supine AHI 46 non-supine AHI 1.3 Sao2 Dirk 90%     Osteoarthritis    knees    Pneumonia due to organism    Post covid-19 condition, unspecified    asymptomatic; not hospitalized; no current symptoms    Presence of drug coated stent in LAD coronary artery 1/31/2017 3x12 mm Xience     good Shahid      Presence of drug coated stent in right coronary artery Distal 3/28 mm Xience & Prox 3.5x18mm Xience 1/24/2017    Good Shahid     Presence of DRUG stent in coronary artery 1/31/2017 mid RI 3/38mm Xience     Good Shahid      Pure hypercholesterolemia    TGA (transient global amnesia)    at [facility]. couldn't remember about 12 hr period, had CT, MRI, EEG, labs, carotid dop and echo all nl, will see Dr. Laguna for neuro f/u    Type 1 diabetes mellitus (HCC)    Vertigo    Visual impairment    prescription glasses   [2]   Past Surgical History:  Procedure Laterality Date    Cath bare metal stent (bms)      x5 ?    Cath percutaneous  transluminal coronary angioplasty      Colonoscopy  12/15/2008    tics, rhoids, polyps, repeat in 2013     Colonoscopy  02/14/2020    mult tubulovillous adenomas, Dr. Moura. repeat 2023    Colonoscopy N/A 2/11/2020    Procedure: COLONOSCOPY, POSSIBLE BIOPSY, POSSIBLE POLYPECTOMY 44189;  Surgeon: Tay Moura MD;  Location: [facility]    Egd  12/2020    gastric cardia lesion- intestinal metaplasia    Hemorrhoidectomy      Tonsillectomy  age 6   [3]   Family History  Adopted: Yes   Problem Relation Age of Onset    No Known Problems Daughter     No Known Problems Daughter     No Known Problems Daughter    [4]   Allergies  Allergen Reactions    Tetanus-Diphtheria Toxoids Td  [Diphteria And Tetanus] OTHER (SEE COMMENTS)    Tetanus Toxoid    [5]    dextrose 2.5%, calcium 2.5 meq/L  5,000 mL Intraperitoneal Once in dialysis    clopidogrel  75 mg Oral Daily    apixaban  5 mg Oral BID    LORazepam  1 mg Intravenous Once    dextrose 1.5%-calcium 2.5 mEq/L  5,000 mL Intraperitoneal Once in dialysis    amLODIPine  10 mg Oral Daily    baclofen  10 mg Oral BID    isosorbide mononitrate ER  60 mg Oral Daily    potassium chloride  10 mEq Oral Daily    rosuvastatin  10 mg Oral Daily    venlafaxine  37.5 mg Oral Daily    insulin aspart  1-10 Units Subcutaneous TID AC and HS   [6]

## 2025-04-28 ENCOUNTER — NURSE ONLY (OUTPATIENT)
Dept: ELECTROPHYSIOLOGY | Facility: HOSPITAL | Age: 75
End: 2025-04-28
Attending: Other
Payer: MEDICARE

## 2025-04-28 PROBLEM — G92.8 TOXIC METABOLIC ENCEPHALOPATHY: Status: ACTIVE | Noted: 2025-04-28

## 2025-04-28 LAB
ALBUMIN SERPL-MCNC: 4.1 G/DL (ref 3.2–4.8)
AMMONIA PLAS-MCNC: 32 UMOL/L (ref 11–32)
ANION GAP SERPL CALC-SCNC: 13 MMOL/L (ref 0–18)
ATRIAL RATE: 79 BPM
BASOPHILS NFR PRT: 0 %
BUN BLD-MCNC: 46 MG/DL (ref 9–23)
CALCIUM BLD-MCNC: 9.4 MG/DL (ref 8.7–10.6)
CHLORIDE SERPL-SCNC: 100 MMOL/L (ref 98–112)
CO2 SERPL-SCNC: 25 MMOL/L (ref 21–32)
COLOR FLD: COLORLESS
CREAT BLD-MCNC: 11.34 MG/DL (ref 0.7–1.3)
EGFRCR SERPLBLD CKD-EPI 2021: 4 ML/MIN/1.73M2 (ref 60–?)
EOSINOPHIL NFR PRT: 0 %
ERYTHROCYTE [DISTWIDTH] IN BLOOD BY AUTOMATED COUNT: 12.1 %
GLUCOSE BLD-MCNC: 173 MG/DL (ref 70–99)
GLUCOSE BLD-MCNC: 188 MG/DL (ref 70–99)
GLUCOSE BLD-MCNC: 191 MG/DL (ref 70–99)
GLUCOSE BLD-MCNC: 232 MG/DL (ref 70–99)
GLUCOSE BLD-MCNC: 252 MG/DL (ref 70–99)
GLUCOSE BLD-MCNC: 262 MG/DL (ref 70–99)
HCT VFR BLD AUTO: 31.4 % (ref 39–53)
HGB BLD-MCNC: 11 G/DL (ref 13–17.5)
LYMPHOCYTES NFR PRT: 28 %
MAGNESIUM SERPL-MCNC: 2 MG/DL (ref 1.6–2.6)
MCH RBC QN AUTO: 33.7 PG (ref 26–34)
MCHC RBC AUTO-ENTMCNC: 35 G/DL (ref 31–37)
MCV RBC AUTO: 96.3 FL (ref 80–100)
MESOTHL CELL NFR PRT: 1 %
MONOS+MACROS NFR PRT: 65 %
NEUTROPHILS PERITONEAL FLUID: 6 %
OSMOLALITY SERPL CALC.SUM OF ELEC: 305 MOSM/KG (ref 275–295)
PHOSPHATE SERPL-MCNC: 6.7 MG/DL (ref 2.4–5.1)
PLATELET # BLD AUTO: 199 10(3)UL (ref 150–450)
POTASSIUM SERPL-SCNC: 4.1 MMOL/L (ref 3.5–5.1)
Q-T INTERVAL: 422 MS
QRS DURATION: 80 MS
QTC CALCULATION (BEZET): 455 MS
R AXIS: -3 DEGREES
RBC # BLD AUTO: 3.26 X10(6)UL (ref 3.8–5.8)
RBC PERITONEAL FLUID: <3000 /MM3
SODIUM SERPL-SCNC: 138 MMOL/L (ref 136–145)
T AXIS: 62 DEGREES
TOTAL CELLS COUNTED FLD: 100
TURBIDITY CSF QL: CLEAR
VENTRICULAR RATE: 70 BPM
WBC # BLD AUTO: 6.2 X10(3) UL (ref 4–11)
WBC # PRT: 30 /MM3

## 2025-04-28 PROCEDURE — 95816 EEG AWAKE AND DROWSY: CPT | Performed by: OTHER

## 2025-04-28 PROCEDURE — 99233 SBSQ HOSP IP/OBS HIGH 50: CPT | Performed by: OTHER

## 2025-04-28 PROCEDURE — 99232 SBSQ HOSP IP/OBS MODERATE 35: CPT

## 2025-04-28 RX ORDER — DEXTROSE MONOHYDRATE, SODIUM CHLORIDE, SODIUM LACTATE, CALCIUM CHLORIDE, MAGNESIUM CHLORIDE 2.5; 538; 448; 18.4; 5.08 G/100ML; MG/100ML; MG/100ML; MG/100ML; MG/100ML
15000 SOLUTION INTRAPERITONEAL DAILY
Status: DISCONTINUED | OUTPATIENT
Start: 2025-04-28 | End: 2025-04-29

## 2025-04-28 NOTE — CM/SW NOTE
04/28/25 1600   CM/SW Referral Data   Referral Source ;Other   Reason for Referral Discharge planning   Informant Patient;Spouse/Significant Other   Patient Info   Patient's Current Mental Status at Time of Assessment Alert;Memory Impairments   Patient's Home Environment [facility]   Number of Levels in Home 1   Patient lives with Spouse/Significant other   Patient Status Prior to Admission   Independent with ADLs and Mobility Yes   Services in place prior to admission DME/Supplies at home   Type of DME/Supplies Wheeled Walker;Shower Chair   Discharge Needs   Anticipated D/C needs To be determined;Subacute rehab   Choice of Post-Acute Provider   Informed patient of right to choose their preferred provider Yes     SW requested CM to meet w/pt and wife for eval.     Pt is a 74 year old male admitted with AMS.  Pt with hx ESRD on PD at home  Pt lives with his wife, who works full time outside the home.  At baseline, he is independent with his ADLs, but has declined over the past few weeks.    Met with pt and wife for eval. Pt was napping but woke up and was able to answer questions along with his wife.   She reports they are considering trialing HD to see if his mental status improves. Neuro also following     PT worked w/pt and Anticipated therapy need: Gradual Rehabilitative Therapy  Wife unsure if this will be needed if pt's mental status continues to improve but agreeable to referrals.  Aidin referral was sent for [facility] that can accommodate PD.  Will follow and reopen if pt opts to transition to HD    / to remain available for support and/or discharge planning.     Shannon MURCIAA MSN, RN Case Manager  [phone]

## 2025-04-28 NOTE — CM/SW NOTE
04/28/25 0900   CM/SW Referral Data   Referral Source Nurse   Reason for Referral Protocol order set   Informant EMR     HOME SITUATION  Type of Home: [facility]  Home Layout: One level  Stairs to Enter : 0                  Lives With: Spouse                Prior Level of Early per PT eval: indep; per wife, pt with gradual decline over the past several weeks requiring supervision and occasional assistance; declines falls  (Per PT eval)     SW received protocol order for HH eval. Pt is a 73 y/o male admitted with acute confusion. Noted therapy worked with pt and Anticipated therapy need: Gradual Rehabilitative Therapy    Request sent to [facility] for review. [facility] referrals sent in [facility]. PASRR needed. SW will f/u with pt/family regarding discharge plan.     FLOYD Maya  Discharge Planner

## 2025-04-28 NOTE — PROCEDURES
EEG REPORT;    Reason for Examination: Altered mental status    Technical Summary:   18 Channels of EEG and 1 Channel of EKG was performed utilizing internation 10/20 method.        Background Activity:   The background activity consisted of 6-7 hz waveforms, reactive to eye opening/ external stimulation.    Abnormality:  Throughout the recording low to medium voltage, polymorphic, 2 to 4 Hz slow activity was noted diffusely over both hemispheres including triphasic waves.      Activation:    Hyperventilation:   Not Performed.    Photic Stimulation:  Driving response seen.No       Sleep:  Stage I sleep seen.     Impression:  This is a Abnormal EEG. No focal, lateralized or generalized epileptiform activity seen.   Moderate diffuse slowing into delta range was noted including triphasic waves.  This constellation of findings can be seen in encephalopathy due to metabolic/toxic etiology, medication effects or diffuse cerebral injury.  Clinical correlation is recommended.        Donal Calvillo MD  [facility].

## 2025-04-28 NOTE — PLAN OF CARE
Assumed care of pt @ 0730  Denies pain or discomfort  A&O x2   SB on tele (30-40's), cards following  On RA,   Incontinent at times  Diminshed UO  Peritoneal dialysis in place, dressing c/d/I  NQ4, NIH daily  BUE twitching  Received meds, because nauseous. Received prn Reglan  Bed alarm and call light in reach  Safety precautions in place  Pt and family updated on POC  All needs met  Problem: SAFETY ADULT - FALL  Goal: Free from fall injury  Description: INTERVENTIONS:- Assess pt frequently for physical needs- Identify cognitive and physical deficits and behaviors that affect risk of falls.- [place] fall precautions as indicated by assessment.- Educate pt/family on patient safety including physical limitations- Instruct pt to call for assistance with activity based on assessment- Modify environment to reduce risk of injury- Provide assistive devices as appropriate- Consider OT/PT consult to assist with strengthening/mobility- Encourage toileting schedule  Outcome: Progressing  Problem: CARDIOVASCULAR - ADULT  Goal: Absence of cardiac arrhythmias or at baseline  Description: INTERVENTIONS:- Continuous cardiac monitoring, monitor vital signs, obtain 12 lead EKG if indicated- Evaluate effectiveness of antiarrhythmic and heart rate control medications as ordered- Initiate emergency measures for life threatening arrhythmias- Monitor electrolytes and administer replacement therapy as ordered  Outcome: Progressing  Problem: SKIN/TISSUE INTEGRITY - ADULT  Goal: Skin integrity remains intact  Description: INTERVENTIONS- Assess and document risk factors for pressure ulcer development- Assess and document skin integrity- Monitor for areas of redness and/or skin breakdown- Initiate interventions, skin care algorithm/standards of care as needed  Outcome: Progressing  Problem: NEUROLOGICAL - ADULT  Goal: Achieves stable or improved neurological status  Description: INTERVENTIONS- Assess for and report changes in neurological  status- Initiate measures to prevent increased intracranial pressure- Maintain blood pressure and fluid volume within ordered parameters to optimize cerebral perfusion and minimize risk of hemorrhage- Monitor temperature, glucose, and sodium. Initiate appropriate interventions as ordered  Outcome: Progressing  Problem: Impaired Cognition  Goal: Patient will exhibit improved attention, thought processing and/or memory  Description: Interventions:- Minimize distractions in the room when full attention is required  - Allow additional time for processing after asking questions or providing instructions  Outcome: Progressing

## 2025-04-28 NOTE — PROGRESS NOTES
[facility]  [facility] Neurology Progress Note    Tariq Nolan Patient Status:  Inpatient    1950 MRN [id number]   Location [facility] 3NE-A Attending Herrera Mariscal MD   Hosp Day # 2 PCP Jcaob Hicks MD     CC: AMS, myoclonic jerking    Subjective:  Tariq Nolan is a 74-years-old male with PMHx significant for CAD, DM, HTN, HLD, and ESRD, on PD, who was admitted to the hospital via ED with altered mental status and shaking on . Per family and chart review, pt was confused and possibly experienced left sided weakness, evidence suggesting recent \"failed PD session\". Jerking in all of patient;s extremities was noted overtime, worsening progressively in the last several months. Initial stroke assessment revealed NIHSS of 5, pt was not a candidate for TNK as is anticoagulated on Eliquis. Neurology was consulted for further investigations. CT head waas unremarkable for acute pathology, CTA head and neck revealed 50-60% stenosis in AVNI, less than 50% in L ICA. MRI was negative for acute stroke. EEG, ammonia level were ordered.     Patient seen for a follow up visit today. Currently, getting an EEG, asleep. Toward the end of the EEG was attempted to be woken up, pt was startled and started flailing his arms and legs. Calmed down by family members. Reports numbness to feet, not new, feels more pronounced now. Action myoclonus with neuro exam noted to BUEs and face. No dizziness at rest, at home has been doing balance/vertigo exercises. Reports vision is \"no good\", no diplopia, able to count fingers properly.         MEDICATIONS:  Prior to Admission Medications[1]  Current Hospital Medications[2]    REVIEW OF SYSTEMS:  A 10-point system was reviewed.  Pertinent positives and negatives are noted in HPI.      PHYSICAL EXAMINATION:  VITAL SIGNS: /59 (BP Location: Right arm)   Pulse 52   Temp 97.8 °F (36.6 °C) (Oral)   Resp 18   Ht 65\"   Wt 207 lb 7.3 oz (94.1 kg)   SpO2 97%   BMI  34.52 kg/m²   GENERAL:  Patient is a 74 year old male in no acute distress.  HEENT:  Normocephalic, atraumatic  ABD: Soft, non tender  SKIN: Warm, dry, no rashes    NEUROLOGICAL:   Mental status: Oriented to person, place, situation and time   Speech: No obvious aphasia or dysarthria  Memory and comprehension: Intact   Cranial Nerves: VFF, PERRL 3mm brisk, EOMI, no nystagmus, facial sensation intact, face symmetric, tongue midline, shoulder shrug equal, remainder CN intact  Motor: Action myoclonic jerking to BUEs noted. Also to face, mouth and cheeks. Motor exam iss 5 out of in all extremities, PARADA spontaneously.  Tone, bulk, strength are normal in all flexors and extensors   Sensory: Intact to light touch  Coordination: FTN intact  Gait: Deferred      Imaging/Diagnostics:  XR CHEST AP PORTABLE  (CPT=71045)  Result Date: 4/27/2025  CONCLUSION:  Shallow inspiration accentuates the heart size.  Normal pulmonary vascularity.  Minimal atelectasis in the lung bases.  No focal consolidation, effusion or pneumothorax.   LOCATION:  [facility]      Dictated by (CST): Nancy Brady MD on 4/27/2025 at 7:37 AM     Finalized by (CST): Nancy Brady MD on 4/27/2025 at 7:38 AM       MRI BRAIN (HQE=01225)  Result Date: 4/26/2025  CONCLUSION:  No evidence of acute intracranial process.   LOCATION:  [facility]   Dictated by (CST): Kirk Mccord MD on 4/26/2025 at 7:52 PM     Finalized by (CST): Kirk Mccord MD on 4/26/2025 at 7:53 PM       CT STROKE (YOLA) CTA BRAIN/CTA NECK+PERF(CPT=70496/67970/0042T)  Result Date: 4/26/2025  CONCLUSION:   1. No acute intracranial process.  2. Unremarkable CTA of the [id number].  3. There is 50-60% stenosis secondary smooth calcified plaque involving the proximal right internal carotid artery.  4.  Less than 50% stenosis secondary to smooth calcified plaque involving the left proximal terminal carotid artery.   Findings were discussed with Dr. Negrete at 1700 hours on 4/26/2025 with read back.    LOCATION:  [facility]   Dictated by (CST): Kirk Mccord MD on 4/26/2025 at 4:53 PM     Finalized by (CST): Kirk Mccord MD on 4/26/2025 at 5:02 PM       CT STROKE BRAIN (NO IV)(CPT=70450)  Result Date: 4/26/2025  CONCLUSION:  No acute intracranial process.  Critical results were called to Dr. Negrete at 1635 hours on 4/26/2025.  There was appropriate read back.    LOCATION:  [facility]   Dictated by (CST): Kirk Mccord MD on 4/26/2025 at 4:34 PM     Finalized by (CST): Kirk Mccord MD on 4/26/2025 at 4:36 PM           Labs:  Recent Labs   Lab 04/26/25  1548 04/27/25  0756 04/28/25  0720   RBC 3.52* 3.11* 3.26*   HGB 12.0* 10.5* 11.0*   HCT 33.5* 29.6* 31.4*   MCV 95.2 95.2 96.3   MCH 34.1* 33.8 33.7   MCHC 35.8 35.5 35.0   RDW 12.0 12.3 12.1   NEPRELIM 4.70 5.16  --    WBC 6.1 6.7 6.2   .0 200.0 199.0         Recent Labs   Lab 04/26/25  1548 04/27/25  0756 04/28/25  0720   * 124* 232*   BUN 56* 51* 46*   CREATSERUM 10.83* 11.20* 11.34*   EGFRCR 5* 4* 4*   CA 10.0 9.5 9.4    137 138   K 4.2 4.0 4.1    102 100   CO2 24.0 23.0 25.0       EEG REPORT 4/28/2025;     Reason for Examination: Altered mental status.       Impression:  This is a Abnormal EEG. No focal, lateralized or generalized epileptiform activity seen.   Moderate diffuse slowing into delta range was noted including triphasic waves.  This constellation of findings can be seen in encephalopathy due to metabolic/toxic etiology, medication effects or diffuse cerebral injury.  Clinical correlation is recommended.    Pre-morbid mRS 3      Assessment and Plan:    A 74 years old male with:    Encephalopathy and shaking - likely symptomatic renal failure in the setting of hx of hypertension, dyslipidemia, diabetes mellitus, coronary artery disease and end-stage renal disease on peritoneal dialysis and incomplete dialysis, potentially exacerbate by polypharmacy. Concern for possible stroke was ruled out. Pt is improving. Work up:  CT head - no  acute pathology  CTA head and neck - No acute intracranial process. Unremarkable CTA of the [id number]. There is 50-60% stenosis secondary smooth calcified plaque involving the proximal right internal carotid artery. Less than 50% stenosis secondary to smooth calcified plaque involving the left proximal terminal carotid artery.   Pt is already on Plavix, statin and Eliquis ( for chronic afib). Continue for stroke prevention  MRI brain - no acute ischemia or other acute IC process  Myoclonus and encephalopathy are hallmarks of renal failure, nephrology following, appreciate recs  Routine EEG - no EFA, moderate diffuse slowing noted.  Ammonia level 32, normal   Baclofen may be worsening his symptoms and, particularly if myoclonus persists, would attempt a taper of the medication.  Hx of ESRD - Recommended a revisitation of dialysis strategy. Would anticipate pt getting better with hemodialysis. Further management per Nephrology  Discussed with pt and wife, all questions answered. Discussed with dr. Calvillo, to follow with further recommendations if indicated.  Is this a shared or split note between Advanced Practice Provider and Physician? Yes       Diana ALAS  [facility]  4/28/2025, 2:00 PM  [place] # [id number]     Impression/plan/MDM:  Patient seen and examined personally.  Investigations reviewed.    Encephalopathy most likely toxic/metabolic/uremic.  Clinically improving.  Further metabolic/toxic abnormality management as per hospitalist.  Increased myoclonic jerks exacerbated by action.  EEG did not show any significant epileptiform activity.  Moderate slowing with triphasic waves were noted suggestive of metabolic encephalopathy.  Wife counseled.  All questions answered.       [1]   No current outpatient medications on file.   [2]   Current Facility-Administered Medications   Medication Dose Route Frequency    acetaminophen (Tylenol) tab 650 mg  650 mg Oral Q4H PRN    Or     acetaminophen (Tylenol) rectal suppository 650 mg  650 mg Rectal Q4H PRN    labetalol (Trandate) 5 mg/mL injection 10 mg  10 mg Intravenous Q10 Min PRN    hydrALAzine (Apresoline) 20 mg/mL injection 10 mg  10 mg Intravenous Q2H PRN    ondansetron (Zofran) 4 MG/2ML injection 4 mg  4 mg Intravenous Q6H PRN    metoclopramide (Reglan) 5 mg/mL injection 10 mg  10 mg Intravenous Q8H PRN    clopidogrel (Plavix) tab 75 mg  75 mg Oral Daily    apixaban (Eliquis) tab 5 mg  5 mg Oral BID    melatonin tab 3 mg  3 mg Oral Nightly PRN    ondansetron (Zofran) 4 MG/2ML injection 4 mg  4 mg Intravenous Q6H PRN    metoclopramide (Reglan) 5 mg/mL injection 10 mg  10 mg Intravenous Q8H PRN    LORazepam (Ativan) 2 mg/mL injection 1 mg  1 mg Intravenous Once    amLODIPine (Norvasc) tab 10 mg  10 mg Oral Daily    baclofen (Lioresal) tab 10 mg  10 mg Oral BID    isosorbide mononitrate ER (Imdur) 24 hr tab 60 mg  60 mg Oral Daily    potassium chloride (Klor-Con M10) tab 10 mEq  10 mEq Oral Daily    rosuvastatin (Crestor) tab 10 mg  10 mg Oral Daily    venlafaxine (Effexor) tab 37.5 mg  37.5 mg Oral Daily    glucose (Dex4) 15 GM/59ML oral liquid 15 g  15 g Oral Q15 Min PRN    Or    glucose (Glutose) 40% oral gel 15 g  15 g Oral Q15 Min PRN    Or    glucose-vitamin C (Dex-4) chewable tab 4 tablet  4 tablet Oral Q15 Min PRN    Or    dextrose 50% injection 50 mL  50 mL Intravenous Q15 Min PRN    Or    glucose (Dex4) 15 GM/59ML oral liquid 30 g  30 g Oral Q15 Min PRN    Or    glucose (Glutose) 40% oral gel 30 g  30 g Oral Q15 Min PRN    Or    glucose-vitamin C (Dex-4) chewable tab 8 tablet  8 tablet Oral Q15 Min PRN    insulin aspart (NovoLOG) 100 Units/mL FlexPen 1-10 Units  1-10 Units Subcutaneous TID AC and HS

## 2025-04-28 NOTE — PROGRESS NOTES
Cardiology Progress Note    Tariq Nolan Patient Status:  Inpatient    1950 MRN [id number]   Location [facility] 3NE-A Attending Herrera Mariscal MD   Hosp Day # 2 PCP Jacob Hicks MD     Impression:  Permanent atrial fibrillation with slow ventricular response  Previous CAD with history of PCI  ESRD on peritoneal dialysis  Diabetes  Hypertension  Hyperlipidemia      Plan:    No indication for pacemaker at this time as he is asymptomatic.  Hemodynamically stable.  It is possible that his heart rates have been fluctuating since starting on peritoneal dialysis approximately 3 to 6 months ago per wife.  Will continue to monitor  Continue to hold carvedilol for now.  Was appropriate with me today but is having waxing and waning mental status.  Neurology following.  Possible toxic/metabolic versus uremic encephalopathy.  EEG normal  Echocardiogram yesterday with normal LV function, no significant valvular disease and no evidence of PFO by bubble study   Continue telemetry.  Continue to monitor for now        Subjective:  Mentally more awake today     Objective:  /65 (BP Location: Left arm)   Pulse (!) 45   Temp 98 °F (36.7 °C) (Oral)   Resp 18   Ht 5' 5\" (1.651 m)   Wt 207 lb 7.3 oz (94.1 kg)   SpO2 97%   BMI 34.52 kg/m²   Temp (24hrs), Av °F (36.7 °C), Min:97.8 °F (36.6 °C), Max:98.1 °F (36.7 °C)      Intake/Output Summary (Last 24 hours) at 2025 1633  Last data filed at 2025 1337  Gross per 24 hour   Intake --   Output 735 ml   Net -735 ml     Wt Readings from Last 3 Encounters:   25 207 lb 7.3 oz (94.1 kg)   10/23/24 190 lb 0.6 oz (86.2 kg)   10/20/24 190 lb (86.2 kg)       General: Awake and alert; in no acute distress  Cardiac: irregular rhythm   Lungs: Clear to auscultation bilaterally; no accessory muscle use  Abdomen: Soft, non-tender; bowel sounds are normoactive  Extremities: No clubbing/cyanosis; moves all 4 extremities normally    Current Hospital  Medications[1]    Laboratory Data:  Lab Results   Component Value Date    WBC 6.2 04/28/2025    HGB 11.0 04/28/2025    HCT 31.4 04/28/2025    .0 04/28/2025     Lab Results   Component Value Date    INR 1.25 (H) 04/26/2025    INR 1.0 10/16/2020    INR 1.17 (H) 04/12/2019     Lab Results   Component Value Date     04/28/2025    K 4.1 04/28/2025     04/28/2025    CO2 25.0 04/28/2025    BUN 46 04/28/2025    CREATSERUM 11.34 04/28/2025     04/28/2025    CA 9.4 04/28/2025    MG 2.0 04/28/2025       Telemetry: No malignant tachyarrhythmias or bradyarrhythmias      Thank you for allowing our practice to participate in the care of your patient. Please do not hesitate to contact me if you have any questions.    Marcelino Gastelum MD           [1]   Current Facility-Administered Medications   Medication Dose Route Frequency    dextrose 2.5%, calcium 2.5 meq/L peritoneal solution  15,000 mL Intraperitoneal Daily    acetaminophen (Tylenol) tab 650 mg  650 mg Oral Q4H PRN    Or    acetaminophen (Tylenol) rectal suppository 650 mg  650 mg Rectal Q4H PRN    labetalol (Trandate) 5 mg/mL injection 10 mg  10 mg Intravenous Q10 Min PRN    hydrALAzine (Apresoline) 20 mg/mL injection 10 mg  10 mg Intravenous Q2H PRN    ondansetron (Zofran) 4 MG/2ML injection 4 mg  4 mg Intravenous Q6H PRN    metoclopramide (Reglan) 5 mg/mL injection 10 mg  10 mg Intravenous Q8H PRN    clopidogrel (Plavix) tab 75 mg  75 mg Oral Daily    apixaban (Eliquis) tab 5 mg  5 mg Oral BID    melatonin tab 3 mg  3 mg Oral Nightly PRN    ondansetron (Zofran) 4 MG/2ML injection 4 mg  4 mg Intravenous Q6H PRN    metoclopramide (Reglan) 5 mg/mL injection 10 mg  10 mg Intravenous Q8H PRN    LORazepam (Ativan) 2 mg/mL injection 1 mg  1 mg Intravenous Once    amLODIPine (Norvasc) tab 10 mg  10 mg Oral Daily    baclofen (Lioresal) tab 10 mg  10 mg Oral BID    isosorbide mononitrate ER (Imdur) 24 hr tab 60 mg  60 mg Oral Daily    rosuvastatin  (Crestor) tab 10 mg  10 mg Oral Daily    venlafaxine (Effexor) tab 37.5 mg  37.5 mg Oral Daily    glucose (Dex4) 15 GM/59ML oral liquid 15 g  15 g Oral Q15 Min PRN    Or    glucose (Glutose) 40% oral gel 15 g  15 g Oral Q15 Min PRN    Or    glucose-vitamin C (Dex-4) chewable tab 4 tablet  4 tablet Oral Q15 Min PRN    Or    dextrose 50% injection 50 mL  50 mL Intravenous Q15 Min PRN    Or    glucose (Dex4) 15 GM/59ML oral liquid 30 g  30 g Oral Q15 Min PRN    Or    glucose (Glutose) 40% oral gel 30 g  30 g Oral Q15 Min PRN    Or    glucose-vitamin C (Dex-4) chewable tab 8 tablet  8 tablet Oral Q15 Min PRN    insulin aspart (NovoLOG) 100 Units/mL FlexPen 1-10 Units  1-10 Units Subcutaneous TID AC and HS

## 2025-04-28 NOTE — SLP NOTE
SPEECH DAILY NOTE - INPATIENT    ASSESSMENT & PLAN   ASSESSMENT  Pt seen for dysphagia tx to assess tolerance with recommended diet, ensure proper utilization of aspiration precautions and provide pt/family education.  Patient much more alert than during yesterday's SLP per RN. Patient alert and appropriate. Voice is clear. He continues to exhibit myoclonic movements in UE when attempting to self present liquids. He was also observed to exhibit brief pauses in chewing solids accompanied by involuntary partial eyelid closing suspicious for myoclonia. Oral prep and transit were functional. He was able to effectively chew while eating and clear his oral cavity without difficulty. Laryngeal excursion judged to be of adequate strength and timeliness to palpation. There were no overt signs of aspiration noted.     Recommend advance to regular consistency solids and thin liquids with continued 1:1 supervision with all po given myoclonia. Recommend observation of aspiration precautions as well. Discussed with patient and RN. SLP  will continue to follow.     Diet Recommendations - Solids: Regular  Diet Recommendations - Liquids: Thin Liquids    Compensatory Strategies Recommended:  (.)  Aspiration Precautions: 1:1 feeding, Upright position, Slow rate, Small bites, Small sips (feeding assistance due to ongoing myoclonic jerks)  Medication Administration Recommendations: One pill at a time, Whole in puree (or with thin liquids as tolerated)    Patient Experiencing Pain: No                Treatment Plan  Treatment Plan/Recommendations: Aspiration precautions, Dysphagia therapy    Interdisciplinary Communication: Discussed with RN            GOALS  Goal #1 The patient will tolerate regular consistency and thin liquids without overt signs or symptoms of aspiration with 85 % accuracy over 2-3 session(s).  In Progress -updated 4/28   Goal #2 The patient/family/caregiver will demonstrate understanding and implementation of  aspiration precautions and swallow strategies independently over 3 session(s).     In Progress   Goal #3 The patient will tolerate trial upgrade of soft/regular consistency and thin liquids without overt signs or symptoms of aspiration with 90 % accuracy over 3-4 session(s).  DC   Goal #4 Assess need for VFSS within 2-3 visits     Await medical workup      FOLLOW UP  Follow Up Needed (Documentation Required): Yes  SLP Follow-up Date: 04/29/25       Session: 1      If you have any questions, please contact Benito Garcia MS [facility]-SLP  Spectra [phone]

## 2025-04-28 NOTE — PLAN OF CARE
Assumed care at 1930  A&O2, forgetful  NIH daily, neuro Q4  2L Nasal cannula, no CPAP overnight  Afib on tele (30-40s), MD aware, on Eliquis  Peritoneal dialysis overnight  Cardiac nectar puree diet with a spoon  Meds crushed with apple sauce  Incontinent to both  QID Accu checks  PIV flushed, capped, saline locked, dressing C/d/I  Denies pain  BUE twitching noted  Total lift assist with abdullahi lift  UA collected  Peritoneal dialysis fluid occult collected  Needs met at this time, call light within reach, bed lowered and locked, family and pt updated on plan of care, will continue with plan of care     Problem: SAFETY ADULT - FALL  Goal: Free from fall injury  Description: INTERVENTIONS:- Assess pt frequently for physical needs- Identify cognitive and physical deficits and behaviors that affect risk of falls.- [place] fall precautions as indicated by assessment.- Educate pt/family on patient safety including physical limitations- Instruct pt to call for assistance with activity based on assessment- Modify environment to reduce risk of injury- Provide assistive devices as appropriate- Consider OT/PT consult to assist with strengthening/mobility- Encourage toileting schedule  Outcome: Progressing     Problem: CARDIOVASCULAR - ADULT  Goal: Absence of cardiac arrhythmias or at baseline  Description: INTERVENTIONS:- Continuous cardiac monitoring, monitor vital signs, obtain 12 lead EKG if indicated- Evaluate effectiveness of antiarrhythmic and heart rate control medications as ordered- Initiate emergency measures for life threatening arrhythmias- Monitor electrolytes and administer replacement therapy as ordered  Outcome: Progressing     Problem: SKIN/TISSUE INTEGRITY - ADULT  Goal: Skin integrity remains intact  Description: INTERVENTIONS- Assess and document risk factors for pressure ulcer development- Assess and document skin integrity- Monitor for areas of redness and/or skin breakdown- Initiate interventions, skin  care algorithm/standards of care as needed  Outcome: Progressing     Problem: NEUROLOGICAL - ADULT  Goal: Achieves stable or improved neurological status  Description: INTERVENTIONS- Assess for and report changes in neurological status- Initiate measures to prevent increased intracranial pressure- Maintain blood pressure and fluid volume within ordered parameters to optimize cerebral perfusion and minimize risk of hemorrhage- Monitor temperature, glucose, and sodium. Initiate appropriate interventions as ordered  Outcome: Progressing     Problem: Impaired Cognition  Goal: Patient will exhibit improved attention, thought processing and/or memory  Description: Interventions:- Minimize distractions in the room when full attention is required  Outcome: Progressing

## 2025-04-28 NOTE — CONGREGATE LIVING REVIEW
[facility] Living Authorization    The [facility] Review Committee has reviewed this case and the patient IS APPROVED for discharge to a facility for Short Term Skilled once the following procedure is followed:     - The physician discharge instructions (contained within the [facility] note for SNF) must inlcude the below appropriate and approved COVID instructions to the facility    For questions regarding CLRC approval process, please contact the CM assigned to the case.  For questions regarding RN discharge workflow, please contact the unit Clinical Leader.

## 2025-04-28 NOTE — PROGRESS NOTES
[facility]   part of [facility] Hospitalist Progress Note     Tariq Nolan Patient Status:  Inpatient    1950 MRN [id number]   Location [facility] 3NE-A Attending Herrera Mariscal MD   Hosp Day # 2 PCP Jacob Hicks MD     Subjective:     Patient seen and examined.   Wife at bedside  Moving all 4 ext  Per wife pt was much more awake and alert yesterday evening  Interracting with family and appropriate  She states he was hallucinating during the night  Falling asleep easily  HR noted to be in 30s and 40s      Objective:    Review of Systems:   10 point ROS completed and was negative, except for pertinent positive and negatives stated in subjective.    Vital signs:  Temp:  [97.8 °F (36.6 °C)-98.4 °F (36.9 °C)] 97.8 °F (36.6 °C)  Pulse:  [35-56] 49  Resp:  [18] 18  BP: (112-151)/(53-96) 144/59  SpO2:  [90 %-99 %] 99 %  General: No acute distress. Alert and oriented x 2, somewhat confused and slow to answer.  HEENT: Normocephalic atraumatic. Moist mucous membranes. EOM-I. PERRLA. Anicteric.  Neck: No lymphadenopathy. No JVD. No carotid bruits.  Respiratory: Clear to auscultation bilaterally. No wheezes. No rhonchi.  Cardiovascular: S1, S2. Regular rate and rhythm. No murmurs, rubs or gallops. Equal pulses.   Chest and Back: No tenderness or deformity.  Abdomen: Soft, nontender, nondistended.  Positive bowel sounds. No rebound, guarding or organomegaly.  Neurologic: No focal neurological deficits. CNII-XII grossly intact.  Musculoskeletal: Moves all extremities.  Extremities: No edema or cyanosis.  Integument: No rashes or lesions.   Psychiatric: Appropriate mood and affect.      Diagnostic Data:    Labs:  Recent Labs   Lab 25  1548 25  0756 25  0720   WBC 6.1 6.7 6.2   HGB 12.0* 10.5* 11.0*   MCV 95.2 95.2 96.3   .0 200.0 199.0   INR 1.25*  --   --        Recent Labs   Lab 25  1548 25  0756   * 124*   BUN 56* 51*    CREATSERUM 10.83* 11.20*   CA 10.0 9.5   ALB 4.3 3.9    137   K 4.2 4.0    102   CO2 24.0 23.0   ALKPHO 185*  --    AST 23  --    ALT 10  --    BILT 0.2  --    TP 6.9  --        Estimated Creatinine Clearance: 5.1 mL/min (A) (based on SCr of 11.2 mg/dL (H)).    Recent Labs   Lab 04/26/25  1548   PTP 15.8*   INR 1.25*              COVID-19 Lab Results    COVID-19  Lab Results   Component Value Date    COVID19 Not Detected 08/13/2022    COVID19 Not Detected 12/26/2020    COVID19 NEGATIVE 10/01/2020       Pro-Calcitonin  No results for input(s): \"PCT\" in the last 168 hours.    Cardiac  No results for input(s): \"TROP\", \"PBNP\" in the last 168 hours.    Creatinine Kinase  No results for input(s): \"CK\" in the last 168 hours.    Inflammatory Markers  No results for input(s): \"CRP\", \"MADELIN\", \"LDH\", \"DDIMER\" in the last 168 hours.    Imaging: Imaging data reviewed in Epic.    Medications: Scheduled Medications[1]    Assessment & Plan:    Tariq Nolan is a 74 year old male with PMH sig for CAD w/ stents, anemia, DM2, HTN, DL, and ESRD on PD who presents to the ED with acute onset confusion, left sided weakness.     Left sided weakness, transient - 2/2 TIA/CVA  Confusion, ams 2/2 above or infection  -CT imaging reviewed  -MRI brain NEG for stroke - but limited due to pt movement  -supportive measures  -Neuro consulted >> apprec recs >> per Dr Tobar concerned for ineffective PD and consideration for switching to HD; dw Dr Collado >> states pt does not have hx of or lack of evidence for ineffective PD up till now, as outpt >> after dw family they are reconsidering HD and would like to dw nephro about prospect of switching to HD  -PT/OT/ST >> rec rehab  -ECHO reviewed  -no evidence of infection   -EEG ordered per neuro     Bradycardia - likely afib with slow vent response  Hx of afib  -EKG shows afib, with HR 42  -holding BB, on AC  -cards consulted >> apprec recs >> seen by Dr Perez >> no further recs, as he  feels pt asymptomatic  -ECHO reviewed    ESRD on PD  -nephro consulted  -no clear evidence of infection on exam or labs  -UA neg, blood cx NTD     DM2  -hold home meds  -ISS+accuchecks     HTN  -resume home meds     DL  -statin     Anemia  -hgb 12.0  -monitor     CAD sp stents  -resume home meds     Quality:  DVT Prophylaxis: heparin, scds  CODE status: full code   Gavin: no  If COVID testing is negative, may discontinue isolation: yes      Plan of care discussed with patient and all questions answered.            Carine Conner MD  [facility] Hospitalist  Pager [phone]  Answering Service number: [phone]                        [1]    clopidogrel  75 mg Oral Daily    apixaban  5 mg Oral BID    LORazepam  1 mg Intravenous Once    amLODIPine  10 mg Oral Daily    baclofen  10 mg Oral BID    isosorbide mononitrate ER  60 mg Oral Daily    potassium chloride  10 mEq Oral Daily    rosuvastatin  10 mg Oral Daily    venlafaxine  37.5 mg Oral Daily    insulin aspart  1-10 Units Subcutaneous TID AC and HS

## 2025-04-28 NOTE — PROGRESS NOTES
[facility] Nephrology  Inpatient Follow-up    Tariq Nolan Patient Status:  Inpatient    1950 MRN [id number]   Location [facility] 3NE-A Attending Herrera Mariscal MD   Hosp Day # 2 PCP Jacob Hicks MD       SUBJECTIVE:     Patient seen and examined at bedside. No acute complaints today. Considering trial of HD    MEDICATIONS:     Current Hospital Medications[1]    PHYSICAL EXAM:     Vital Signs: /65 (BP Location: Left arm)   Pulse (!) 45   Temp 98 °F (36.7 °C) (Oral)   Resp 18   Ht 5' 5\" (1.651 m)   Wt 207 lb 7.3 oz (94.1 kg)   SpO2 97%   BMI 34.52 kg/m²   Temp (24hrs), Av °F (36.7 °C), Min:97.8 °F (36.6 °C), Max:98.1 °F (36.7 °C)       Intake/Output Summary (Last 24 hours) at 2025 1423  Last data filed at 2025 1337  Gross per 24 hour   Intake --   Output 735 ml   Net -735 ml     Wt Readings from Last 3 Encounters:   25 207 lb 7.3 oz (94.1 kg)   10/23/24 190 lb 0.6 oz (86.2 kg)   10/20/24 190 lb (86.2 kg)       General: no acute distress  HEENT: normocephalic, atraumatic  Respiratory: no distress  Abdomen: soft, non-tender  Extremities: no edema bilaterally  Neuro: awake, alert     LABORATORY DATA:     Lab Results   Component Value Date     (H) 2025    BUN 46 (H) 2025    BUNCREA 26.0 (H) 2022    CREATSERUM 11.34 (H) 2025    ANIONGAP 13 2025    GFR >59 10/05/2010    GFRNAA 28 (L) 2019    GFRAA 33 (L) 2019    CA 9.4 2025    OSMOCALC 305 (H) 2025    ALKPHO 185 (H) 2025    AST 23 2025    ALT 10 2025    BILT 0.2 2025    TP 6.9 2025    ALB 4.1 2025    GLOBULIN 2.6 2025    AGRATIO 2.1 06/10/2015     2025    K 4.1 2025     2025    CO2 25.0 2025     Lab Results   Component Value Date    WBC 6.2 2025    RBC 3.26 (L) 2025    HGB 11.0 (L) 2025    HCT 31.4 (L) 2025    .0 2025    MPV 9.9  09/18/2012    MCV 96.3 04/28/2025    MCH 33.7 04/28/2025    MCHC 35.0 04/28/2025    RDW 12.1 04/28/2025    NEPRELIM 5.16 04/27/2025    NEUTABS 3.98 03/26/2019    LYMPHABS 1.06 03/26/2019    EOSABS 0.06 03/26/2019    BASABS 0.06 03/26/2019    NEUT 41 03/26/2019    LYMPH 19 03/26/2019    MON 8 03/26/2019    EOS 1 03/26/2019    BASO 1 03/26/2019    NEPERCENT 77.1 04/27/2025    LYPERCENT 10.6 04/27/2025    MOPERCENT 10.5 04/27/2025    EOPERCENT 1.2 04/27/2025    BAPERCENT 0.3 04/27/2025    NE 5.16 04/27/2025    LYMABS 0.71 (L) 04/27/2025    MOABSO 0.70 04/27/2025    EOABSO 0.08 04/27/2025    BAABSO 0.02 04/27/2025     Lab Results   Component Value Date    MALBP 5.9 02/28/2022    CREUR 33.38 02/28/2022     Lab Results   Component Value Date    COLORUR Light-Yellow 04/27/2025    CLARITY Clear 04/27/2025    SPECGRAVITY >1.030 (H) 04/27/2025    GLUUR 300 (A) 04/27/2025    BILUR Negative 04/27/2025    KETUR Negative 04/27/2025    BLOODURINE 1+ (A) 04/27/2025    PHURINE 6.0 04/27/2025    PROUR 100 (A) 04/27/2025    UROBILINOGEN Normal 04/27/2025    NITRITE Negative 04/27/2025    LEUUR Negative 04/27/2025    NMIC Microscopic not indicated 12/28/2016    WBCUR 11-20 (A) 04/27/2025    RBCUR 3-5 (A) 04/27/2025    EPIUR Few (A) 04/27/2025    BACUR None Seen 04/27/2025    HYLUR Present (A) 12/18/2018       IMAGING:     Reviewed    ASSESSMENT/PLAN:     Tariq Nolan is a a(n) 74 year old male w ho ESRD on PD, CAD sp stents, DM, HTN, HL who presents with acute onset of confusion and weakness.      ESRD on PD   -- Home Rx = 8.5hrs, 4 exchanges, 3L fill volume, last fill 2L purple   -- Continue nightly PD for now. Ordered.   -- Discussed \"trial\" of HD to see if neurological symptoms improve. Patient and family to discuss and consider trial of HD.   -- Avoid nephrotoxins and renally dose medications for dialysis.     AMS/Weakness  -- Per neurology  -- Uncertain this is from poor dialysis. Peritoneal dialysis adequacy targets met as an  outpatient (performed April 2025).   -- Polypharmacy? Baclofen?   -- Can trial HD as above per family wishes      Hypokalemia  -- Daily potassium supplementation as an outpatient  -- Will give if necessary while inpatient      We will continue to follow.    Thank you for allowing us to participate in the care of this patient.       Rox Tam DO   [facility] - Nephrology           [1]   Current Facility-Administered Medications   Medication Dose Route Frequency    dextrose 2.5%, calcium 2.5 meq/L peritoneal solution  15,000 mL Intraperitoneal Daily    acetaminophen (Tylenol) tab 650 mg  650 mg Oral Q4H PRN    Or    acetaminophen (Tylenol) rectal suppository 650 mg  650 mg Rectal Q4H PRN    labetalol (Trandate) 5 mg/mL injection 10 mg  10 mg Intravenous Q10 Min PRN    hydrALAzine (Apresoline) 20 mg/mL injection 10 mg  10 mg Intravenous Q2H PRN    ondansetron (Zofran) 4 MG/2ML injection 4 mg  4 mg Intravenous Q6H PRN    metoclopramide (Reglan) 5 mg/mL injection 10 mg  10 mg Intravenous Q8H PRN    clopidogrel (Plavix) tab 75 mg  75 mg Oral Daily    apixaban (Eliquis) tab 5 mg  5 mg Oral BID    melatonin tab 3 mg  3 mg Oral Nightly PRN    ondansetron (Zofran) 4 MG/2ML injection 4 mg  4 mg Intravenous Q6H PRN    metoclopramide (Reglan) 5 mg/mL injection 10 mg  10 mg Intravenous Q8H PRN    LORazepam (Ativan) 2 mg/mL injection 1 mg  1 mg Intravenous Once    amLODIPine (Norvasc) tab 10 mg  10 mg Oral Daily    baclofen (Lioresal) tab 10 mg  10 mg Oral BID    isosorbide mononitrate ER (Imdur) 24 hr tab 60 mg  60 mg Oral Daily    rosuvastatin (Crestor) tab 10 mg  10 mg Oral Daily    venlafaxine (Effexor) tab 37.5 mg  37.5 mg Oral Daily    glucose (Dex4) 15 GM/59ML oral liquid 15 g  15 g Oral Q15 Min PRN    Or    glucose (Glutose) 40% oral gel 15 g  15 g Oral Q15 Min PRN    Or    glucose-vitamin C (Dex-4) chewable tab 4 tablet  4 tablet Oral Q15 Min PRN    Or    dextrose 50% injection 50 mL  50 mL Intravenous Q15 Min  PRN    Or    glucose (Dex4) 15 GM/59ML oral liquid 30 g  30 g Oral Q15 Min PRN    Or    glucose (Glutose) 40% oral gel 30 g  30 g Oral Q15 Min PRN    Or    glucose-vitamin C (Dex-4) chewable tab 8 tablet  8 tablet Oral Q15 Min PRN    insulin aspart (NovoLOG) 100 Units/mL FlexPen 1-10 Units  1-10 Units Subcutaneous TID AC and HS

## 2025-04-28 NOTE — CONSULTS
I was asked by Dr. Salas to evaluate for asx bradycardia    74 year old male with MMP as below admitted with confusion and unilateral weakness  Chronic AFib with bradycardia.  Coreg previously stopped.  No syncope, no ROSENBAUM.  With activity on tele HR increases to 80-90s  At baseline HR 40-50's.  Confused but appropriate.  Seen at bedside with two family members.    Denies chest pain, shortness of breath, palpitations, lightheadedness, syncope, orthopnea, paroxysmal nocturnal dyspnea, or edema.    ROS: all other systems were reviewed and were negative    Past Medical History[1]    Allergies:  Allergies[2]    Medications:  Scheduled Medications[3]    Social History     Tobacco Use    Smoking status: Never    Smokeless tobacco: Never   Substance Use Topics    Alcohol use: No       Family history: no sudden cardiac death    Physical:  /65 (BP Location: Left arm)   Pulse (!) 45   Temp 98 °F (36.7 °C) (Oral)   Resp 18   Ht 5' 5\" (1.651 m)   Wt 207 lb 7.3 oz (94.1 kg)   SpO2 97%   BMI 34.52 kg/m²   Physical Exam:    General: NAD  Neck: No JVD  Lungs: CTA bilat  Heart: iRRR, S1, S2  Abdomen: Soft, NT/ND, BS+x4  Back: No CVA tenderness  Extremities: Warm, dry, no LE edema bilat  Pulses: 2+ bilat DP  Skin: no rashes or legions noted  Neurological:  AAOx3, MAEW  Psych: Appropriate affect and response to questions      Labs:    HEM:  Recent Labs   Lab 04/26/25  1548 04/27/25  0756 04/28/25  0720   WBC 6.1 6.7 6.2   HGB 12.0* 10.5* 11.0*   .0 200.0 199.0   PTT 36.8*  --   --    INR 1.25*  --   --        Chem:  Recent Labs   Lab 04/26/25  1548 04/27/25  0756 04/28/25  0720    137 138   K 4.2 4.0 4.1    102 100   CO2 24.0 23.0 25.0   BUN 56* 51* 46*   MG  --  1.9 2.0   ALT 10  --   --    AST 23  --   --        Last Cardiac:  No results for input(s): \"CKMB\" in the last 168 hours.    Invalid input(s): \"CKTOTAL\", \"CKMBINDEX\", \"TROPONINI\"    Data:  ECG: (personally reviewed): Atypical flutter HR 42  narrow QRS  Tele; HR 45-80's.  Avg ~ 45.    Echo:  1. Left ventricle: The cavity size was normal. Wall thickness was mildly      increased. Systolic function was vigorous. The estimated ejection      fraction was 70-75%, by visual assessment. No diagnostic evidence for      regional wall motion abnormalities. Unable to assess LV diastolic      function due to heart rhythm.   2. Left atrium: The left atrial volume was moderately increased.   3. Right atrium: The atrium was dilated.   4. Atrial septum: Agitated saline contrast study showed no atrial level      shunt, at baseline or with provocation.   5. Ascending aorta: The ascending aorta was 3.5cm diameter.   6. Pericardium, extracardiac: There was a left pleural effusion.   Impressions:  No previous study from [facility] was   available for comparison.       Impression:  Permanent AFib  Bradycardia  Confusion      Plan:  Chronic AF - asx, on eliquis. Cont eliquis 5 bid  Bradycardia - asx, HR > 40 on average.  HD stable.  No intervention needed for his asx bradycardia  Confusion - per primary service.      Thank you for this consult.        Flaquito Perez MD  Clinical Cardiac Electrophysiology  [facility]         [1]   Past Medical History:   Anemia due to stage 4 chronic kidney disease (HCC)    sees Dr. Cochran    Asymptomatic PVCs    [place] [facility]    Cardiac LV ejection fraction 55%    Chronic kidney disease due to type 2 diabetes mellitus (HCC)    Constipation    Coronary atherosclerosis    Coronary atherosclerosis of native coronary artery    sees Dr. Redding, last visit 11/11/20, 5 stents total since 2009    Diabetes mellitus ([facility])    Diabetic eye exam ([facility])    [place] vision    Essential hypertension    Gastric lesion    Dr. 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 Orr, [facility]NE LAD    History of  cardiovascular stress test    IPMN (intraductal papillary mucinous neoplasm)    saw Dr. Moura, seeing Dr. Hearn, EUS 12/29/20    Living will in place    OBESITY    JANAY (obstructive sleep apnea)    AHI 6 RDI 6 REM AHI 0 Supine AHI 46 non-supine AHI 1.3 Sao2 Dirk 90%     Osteoarthritis    knees    Pneumonia due to organism    Post covid-19 condition, unspecified    asymptomatic; not hospitalized; no current symptoms    Presence of drug coated stent in LAD coronary artery 1/31/2017 3x12 mm Xience     good Shahid      Presence of drug coated stent in right coronary artery Distal 3/28 mm Xience & Prox 3.5x18mm Xience 1/24/2017    Good Shahid     Presence of DRUG stent in coronary artery 1/31/2017 mid RI 3/38mm Xience     Good Shahid      Pure hypercholesterolemia    TGA (transient global amnesia)    at [facility]. couldn't remember about 12 hr period, had CT, MRI, EEG, labs, carotid dop and echo all nl, will see Dr. Laguna for neuro f/u    Type 1 diabetes mellitus (HCC)    Vertigo    Visual impairment    prescription glasses   [2]   Allergies  Allergen Reactions    Tetanus-Diphtheria Toxoids Td  [Diphteria And Tetanus] OTHER (SEE COMMENTS)    Tetanus Toxoid    [3]    dextrose 2.5%, calcium 2.5 meq/L  15,000 mL Intraperitoneal Daily    clopidogrel  75 mg Oral Daily    apixaban  5 mg Oral BID    LORazepam  1 mg Intravenous Once    amLODIPine  10 mg Oral Daily    baclofen  10 mg Oral BID    isosorbide mononitrate ER  60 mg Oral Daily    rosuvastatin  10 mg Oral Daily    venlafaxine  37.5 mg Oral Daily    insulin aspart  1-10 Units Subcutaneous TID AC and HS

## 2025-04-29 ENCOUNTER — APPOINTMENT (OUTPATIENT)
Dept: GENERAL RADIOLOGY | Facility: HOSPITAL | Age: 75
DRG: 907 | End: 2025-04-29
Attending: RADIOLOGY
Payer: MEDICARE

## 2025-04-29 ENCOUNTER — APPOINTMENT (OUTPATIENT)
Dept: INTERVENTIONAL RADIOLOGY/VASCULAR | Facility: HOSPITAL | Age: 75
DRG: 907 | End: 2025-04-29
Attending: STUDENT IN AN ORGANIZED HEALTH CARE EDUCATION/TRAINING PROGRAM
Payer: MEDICARE

## 2025-04-29 LAB
ALBUMIN SERPL-MCNC: 3.8 G/DL (ref 3.2–4.8)
ANION GAP SERPL CALC-SCNC: 13 MMOL/L (ref 0–18)
ATRIAL RATE: 312 BPM
BUN BLD-MCNC: 39 MG/DL (ref 9–23)
CALCIUM BLD-MCNC: 9.4 MG/DL (ref 8.7–10.6)
CHLORIDE SERPL-SCNC: 102 MMOL/L (ref 98–112)
CO2 SERPL-SCNC: 22 MMOL/L (ref 21–32)
CREAT BLD-MCNC: 11.96 MG/DL (ref 0.7–1.3)
EGFRCR SERPLBLD CKD-EPI 2021: 4 ML/MIN/1.73M2 (ref 60–?)
GLUCOSE BLD-MCNC: 175 MG/DL (ref 70–99)
GLUCOSE BLD-MCNC: 183 MG/DL (ref 70–99)
GLUCOSE BLD-MCNC: 188 MG/DL (ref 70–99)
GLUCOSE BLD-MCNC: 212 MG/DL (ref 70–99)
GLUCOSE BLD-MCNC: 250 MG/DL (ref 70–99)
HBV SURFACE AG SER-ACNC: <0.1 [IU]/L
HBV SURFACE AG SERPL QL IA: NONREACTIVE
MAGNESIUM SERPL-MCNC: 2 MG/DL (ref 1.6–2.6)
OSMOLALITY SERPL CALC.SUM OF ELEC: 300 MOSM/KG (ref 275–295)
PHOSPHATE SERPL-MCNC: 6.8 MG/DL (ref 2.4–5.1)
POTASSIUM SERPL-SCNC: 4 MMOL/L (ref 3.5–5.1)
Q-T INTERVAL: 526 MS
QRS DURATION: 88 MS
QTC CALCULATION (BEZET): 439 MS
R AXIS: 2 DEGREES
SODIUM SERPL-SCNC: 137 MMOL/L (ref 136–145)
T AXIS: 35 DEGREES
VENTRICULAR RATE: 42 BPM

## 2025-04-29 PROCEDURE — 99233 SBSQ HOSP IP/OBS HIGH 50: CPT | Performed by: OTHER

## 2025-04-29 PROCEDURE — 5A1D70Z PERFORMANCE OF URINARY FILTRATION, INTERMITTENT, LESS THAN 6 HOURS PER DAY: ICD-10-PCS | Performed by: STUDENT IN AN ORGANIZED HEALTH CARE EDUCATION/TRAINING PROGRAM

## 2025-04-29 PROCEDURE — 02HV33Z INSERTION OF INFUSION DEVICE INTO SUPERIOR VENA CAVA, PERCUTANEOUS APPROACH: ICD-10-PCS | Performed by: RADIOLOGY

## 2025-04-29 PROCEDURE — 99232 SBSQ HOSP IP/OBS MODERATE 35: CPT

## 2025-04-29 PROCEDURE — 71045 X-RAY EXAM CHEST 1 VIEW: CPT | Performed by: RADIOLOGY

## 2025-04-29 RX ORDER — SEVELAMER CARBONATE 800 MG/1
800 TABLET, FILM COATED ORAL
Status: DISCONTINUED | OUTPATIENT
Start: 2025-04-29 | End: 2025-05-12

## 2025-04-29 RX ORDER — HEPARIN SODIUM 1000 [USP'U]/ML
1.5 INJECTION, SOLUTION INTRAVENOUS; SUBCUTANEOUS
Status: COMPLETED | OUTPATIENT
Start: 2025-04-30 | End: 2025-04-30

## 2025-04-29 RX ORDER — ALBUMIN (HUMAN) 12.5 G/50ML
25 SOLUTION INTRAVENOUS
Status: ACTIVE | OUTPATIENT
Start: 2025-04-29 | End: 2025-05-01

## 2025-04-29 RX ORDER — HEPARIN SODIUM 1000 [USP'U]/ML
INJECTION, SOLUTION INTRAVENOUS; SUBCUTANEOUS
Status: COMPLETED
Start: 2025-04-29 | End: 2025-04-29

## 2025-04-29 RX ORDER — LIDOCAINE HYDROCHLORIDE 10 MG/ML
INJECTION, SOLUTION INFILTRATION; PERINEURAL
Status: COMPLETED
Start: 2025-04-29 | End: 2025-04-29

## 2025-04-29 RX ORDER — HEPARIN SODIUM 5000 [USP'U]/ML
INJECTION, SOLUTION INTRAVENOUS; SUBCUTANEOUS
Status: COMPLETED
Start: 2025-04-29 | End: 2025-04-29

## 2025-04-29 NOTE — PROGRESS NOTES
Cardiology Progress Note    Tariq Nolan Patient Status:  Inpatient    1950 MRN [id number]   Location [facility] 3NE-A Attending Herrera Mariscal MD   Hosp Day # 3 PCP Jacob Hicks MD     Impression:  Permanent atrial fibrillation with slow ventricular response  Previous CAD with history of PCI  ESRD on peritoneal dialysis  Diabetes  Hypertension  Hyperlipidemia      Plan:   No indication for pacemaker at this time as he is asymptomatic.  Hemodynamically stable.  It is possible that his heart rates have been fluctuating since starting on peritoneal dialysis approximately 3 to 6 months ago per wife.  Will continue to monitor  Continue to hold carvedilol for now. Heart rates 30-40's. Please inform cardiology if having pauses or high grade heart block   Neurology following.  Possible toxic/metabolic versus uremic encephalopathy.  EEG normal  Echocardiogram  with normal LV function, no significant valvular disease and no evidence of PFO by bubble study   Continue telemetry.  Continue to monitor for now        Subjective:  Mentally more awake today     Objective:  /72 (BP Location: Left arm)   Pulse (!) 41   Temp 97.8 °F (36.6 °C) (Oral)   Resp 18   Ht 5' 5\" (1.651 m)   Wt 207 lb 7.3 oz (94.1 kg)   SpO2 98%   BMI 34.52 kg/m²   Temp (24hrs), Av.9 °F (36.6 °C), Min:97.7 °F (36.5 °C), Max:98.3 °F (36.8 °C)      Intake/Output Summary (Last 24 hours) at 2025 0848  Last data filed at 2025 1337  Gross per 24 hour   Intake --   Output 635 ml   Net -635 ml     Wt Readings from Last 3 Encounters:   25 207 lb 7.3 oz (94.1 kg)   10/23/24 190 lb 0.6 oz (86.2 kg)   10/20/24 190 lb (86.2 kg)       General: Awake and alert; in no acute distress  Cardiac: irregular rhythm   Lungs: Clear to auscultation bilaterally; no accessory muscle use  Abdomen: Soft, non-tender; bowel sounds are normoactive  Extremities: No clubbing/cyanosis; moves all 4 extremities normally    Current Hospital  Medications[1]    Laboratory Data:       Lab Results   Component Value Date    INR 1.25 (H) 04/26/2025    INR 1.0 10/16/2020    INR 1.17 (H) 04/12/2019     Lab Results   Component Value Date     04/29/2025    K 4.0 04/29/2025     04/29/2025    CO2 22.0 04/29/2025    BUN 39 04/29/2025    CREATSERUM 11.96 04/29/2025     04/29/2025    CA 9.4 04/29/2025    MG 2.0 04/29/2025       Telemetry: No malignant tachyarrhythmias or bradyarrhythmias      Thank you for allowing our practice to participate in the care of your patient. Please do not hesitate to contact me if you have any questions.    Marcelino Gastelum MD           [1]   Current Facility-Administered Medications   Medication Dose Route Frequency    dextrose 2.5%, calcium 2.5 meq/L peritoneal solution  15,000 mL Intraperitoneal Daily    acetaminophen (Tylenol) tab 650 mg  650 mg Oral Q4H PRN    Or    acetaminophen (Tylenol) rectal suppository 650 mg  650 mg Rectal Q4H PRN    labetalol (Trandate) 5 mg/mL injection 10 mg  10 mg Intravenous Q10 Min PRN    hydrALAzine (Apresoline) 20 mg/mL injection 10 mg  10 mg Intravenous Q2H PRN    ondansetron (Zofran) 4 MG/2ML injection 4 mg  4 mg Intravenous Q6H PRN    metoclopramide (Reglan) 5 mg/mL injection 10 mg  10 mg Intravenous Q8H PRN    clopidogrel (Plavix) tab 75 mg  75 mg Oral Daily    apixaban (Eliquis) tab 5 mg  5 mg Oral BID    melatonin tab 3 mg  3 mg Oral Nightly PRN    ondansetron (Zofran) 4 MG/2ML injection 4 mg  4 mg Intravenous Q6H PRN    metoclopramide (Reglan) 5 mg/mL injection 10 mg  10 mg Intravenous Q8H PRN    LORazepam (Ativan) 2 mg/mL injection 1 mg  1 mg Intravenous Once    amLODIPine (Norvasc) tab 10 mg  10 mg Oral Daily    baclofen (Lioresal) tab 10 mg  10 mg Oral BID    isosorbide mononitrate ER (Imdur) 24 hr tab 60 mg  60 mg Oral Daily    rosuvastatin (Crestor) tab 10 mg  10 mg Oral Daily    venlafaxine (Effexor) tab 37.5 mg  37.5 mg Oral Daily    glucose (Dex4) 15 GM/59ML oral  liquid 15 g  15 g Oral Q15 Min PRN    Or    glucose (Glutose) 40% oral gel 15 g  15 g Oral Q15 Min PRN    Or    glucose-vitamin C (Dex-4) chewable tab 4 tablet  4 tablet Oral Q15 Min PRN    Or    dextrose 50% injection 50 mL  50 mL Intravenous Q15 Min PRN    Or    glucose (Dex4) 15 GM/59ML oral liquid 30 g  30 g Oral Q15 Min PRN    Or    glucose (Glutose) 40% oral gel 30 g  30 g Oral Q15 Min PRN    Or    glucose-vitamin C (Dex-4) chewable tab 8 tablet  8 tablet Oral Q15 Min PRN    insulin aspart (NovoLOG) 100 Units/mL FlexPen 1-10 Units  1-10 Units Subcutaneous TID AC and HS

## 2025-04-29 NOTE — PROGRESS NOTES
[facility]  [facility] Neurology Progress Note    Tariq Nolan Patient Status:  Inpatient    1950 MRN [id number]   Location [facility] 3NE-A Attending Matthew Stewart, DO   Hosp Day # 3 PCP Jacob Hicks MD     CC: AMS, myoclonic jerking    Subjective:  Patient seen for a follow up visit today. Currently, sitting up in bed. Awake, alert and oriented x 4. Denies any new pain, reports some visual hallucination last night and this morning. Sees colorful dots on the ceiling and \"a red milk crate\" on top of the garbage can. Aware they are not real. No diplopia, blurry vision or speech difficulties. Myoclonic jerkings have improved.         MEDICATIONS:  Prior to Admission Medications[1]  Current Hospital Medications[2]    REVIEW OF SYSTEMS:  A 10-point system was reviewed.  Pertinent positives and negatives are noted in HPI.      PHYSICAL EXAMINATION:  VITAL SIGNS: /72 (BP Location: Left arm)   Pulse (!) 41   Temp 97.8 °F (36.6 °C) (Oral)   Resp 18   Ht 65\"   Wt 207 lb 7.3 oz (94.1 kg)   SpO2 98%   BMI 34.52 kg/m²   GENERAL:  Patient is a 74 year old male in no acute distress.  HEENT:  Normocephalic, atraumatic  ABD: Soft, non tender  SKIN: Warm, dry, no rashes    NEUROLOGICAL:   Mental status: Oriented to person, place, situation and time   Speech: No obvious aphasia or dysarthria  Memory and comprehension: Intact   Cranial Nerves: VFF, PERRL 3mm brisk, EOMI, no nystagmus, facial sensation intact, face symmetric, tongue midline, shoulder shrug equal, remainder CN intact  Motor: Action myoclonic jerking to BUEs noted, milder than yesterday. Motor exam iss 5 out of 5 in all extremities, PARADA spontaneously.  Tone, bulk, strength are normal in all flexors and extensors   Sensory: Intact to light touch bilaterally  Coordination: FTN intact bilaterally  Gait: Deferred         Imaging/Diagnostics:  XR CHEST AP PORTABLE  (CPT=71045)  Result Date: 2025  CONCLUSION:  Shallow inspiration  accentuates the heart size.  Normal pulmonary vascularity.  Minimal atelectasis in the lung bases.  No focal consolidation, effusion or pneumothorax.   LOCATION:  [facility]      Dictated by (CST): Nancy Brady MD on 4/27/2025 at 7:37 AM     Finalized by (CST): Nancy Brady MD on 4/27/2025 at 7:38 AM       MRI BRAIN (VMC=23954)  Result Date: 4/26/2025  CONCLUSION:  No evidence of acute intracranial process.   LOCATION:  [facility]   Dictated by (CST): Kirk Mccord MD on 4/26/2025 at 7:52 PM     Finalized by (CST): Kirk Mccord MD on 4/26/2025 at 7:53 PM       CT STROKE (YOLA) CTA BRAIN/CTA NECK+PERF(CPT=70496/53818/0042T)  Result Date: 4/26/2025  CONCLUSION:   1. No acute intracranial process.  2. Unremarkable CTA of the vjdvls-zz-Iezuoj.  3. There is 50-60% stenosis secondary smooth calcified plaque involving the proximal right internal carotid artery.  4.  Less than 50% stenosis secondary to smooth calcified plaque involving the left proximal terminal carotid artery.   Findings were discussed with Dr. Negrete at 1700 hours on 4/26/2025 with read back.   LOCATION:  [facility]   Dictated by (CST): Kirk Mccord MD on 4/26/2025 at 4:53 PM     Finalized by (CST): Kirk Mccord MD on 4/26/2025 at 5:02 PM       CT STROKE BRAIN (NO IV)(CPT=70450)  Result Date: 4/26/2025  CONCLUSION:  No acute intracranial process.  Critical results were called to Dr. Negrete at 1635 hours on 4/26/2025.  There was appropriate read back.    LOCATION:  [facility]   Dictated by (CST): Kirk Mccord MD on 4/26/2025 at 4:34 PM     Finalized by (CST): Kirk Mccord MD on 4/26/2025 at 4:36 PM           Labs:  Recent Labs   Lab 04/26/25  1548 04/27/25  0756 04/28/25  0720   RBC 3.52* 3.11* 3.26*   HGB 12.0* 10.5* 11.0*   HCT 33.5* 29.6* 31.4*   MCV 95.2 95.2 96.3   MCH 34.1* 33.8 33.7   MCHC 35.8 35.5 35.0   RDW 12.0 12.3 12.1   NEPRELIM 4.70 5.16  --    WBC 6.1 6.7 6.2   .0 200.0 199.0         Recent Labs   Lab 04/27/25  0756 04/28/25  0720  04/29/25  0754   * 232* 212*   BUN 51* 46* 39*   CREATSERUM 11.20* 11.34* 11.96*   EGFRCR 4* 4* 4*   CA 9.5 9.4 9.4    138 137   K 4.0 4.1 4.0    100 102   CO2 23.0 25.0 22.0         EEG REPORT 4/28/2025;     Reason for Examination: Altered mental status.       Impression:  This is a Abnormal EEG. No focal, lateralized or generalized epileptiform activity seen.   Moderate diffuse slowing into delta range was noted including triphasic waves.  This constellation of findings can be seen in encephalopathy due to metabolic/toxic etiology, medication effects or diffuse cerebral injury.  Clinical correlation is recommended.        Assessment and Plan:     A 74 years old male with:     Encephalopathy and shaking - likely symptomatic renal failure in the setting of hx of hypertension, dyslipidemia, diabetes mellitus, coronary artery disease and end-stage renal disease on peritoneal dialysis and incomplete dialysis, potentially exacerbate by polypharmacy. Concern for possible stroke was ruled out. Pt is improving. Work up:  CT head - no acute pathology  CTA head and neck - No acute intracranial process. Unremarkable CTA of the gjyhyp-th-Uyuqnm. There is 50-60% stenosis secondary smooth calcified plaque involving the proximal right internal carotid artery. Less than 50% stenosis secondary to smooth calcified plaque involving the left proximal terminal carotid artery.   Pt is already on Plavix, statin and Eliquis ( for chronic afib). Continue for stroke prevention  MRI brain - no acute ischemia or other acute IC process  Myoclonus and encephalopathy are hallmarks of renal failure, nephrology following, appreciate recs  Routine EEG - no EFA, moderate slowing with triphasic waves were noted suggestive of metabolic encephalopathy. .  Ammonia level 32, normal   Baclofen may be worsening his symptoms and, particularly if myoclonus persists, would attempt a taper of the medication.  Hx of ESRD - Recommended a  revisitation of dialysis strategy. Would anticipate pt getting better with hemodialysis. Further management per Nephrology  Discussed with pt and wife, all questions answered. Discussed with dr. Calvillo.     No further inpatient neurological intervention indicated at this time. Will sign off. Please feel free to call with any new questions or concerns.       Is this a shared or split note between Advanced Practice Provider and Physician? Yes       Diana ALAS  [facility]  4/29/2025, 10:04 AM  [facility] # [id number]       Impression/plan/MDM:  Patient seen and examined personally.  Investigations reviewed.    Encephalopathy most likely toxic/metabolic/uremic.  Clinically improving.  Further metabolic/toxic abnormality management as per hospitalist.  Increased myoclonic jerks exacerbated by action.  EEG did not show any significant epileptiform activity.  Moderate slowing with triphasic waves were noted suggestive of metabolic encephalopathy.  Symptoms much improved.  No further neurorecommendations at this time.  Please feel free to call with further queries.         [1]   No current outpatient medications on file.   [2]   Current Facility-Administered Medications   Medication Dose Route Frequency    dextrose 2.5%, calcium 2.5 meq/L peritoneal solution  15,000 mL Intraperitoneal Daily    acetaminophen (Tylenol) tab 650 mg  650 mg Oral Q4H PRN    Or    acetaminophen (Tylenol) rectal suppository 650 mg  650 mg Rectal Q4H PRN    labetalol (Trandate) 5 mg/mL injection 10 mg  10 mg Intravenous Q10 Min PRN    hydrALAzine (Apresoline) 20 mg/mL injection 10 mg  10 mg Intravenous Q2H PRN    ondansetron (Zofran) 4 MG/2ML injection 4 mg  4 mg Intravenous Q6H PRN    metoclopramide (Reglan) 5 mg/mL injection 10 mg  10 mg Intravenous Q8H PRN    clopidogrel (Plavix) tab 75 mg  75 mg Oral Daily    apixaban (Eliquis) tab 5 mg  5 mg Oral BID    melatonin tab 3 mg  3 mg Oral Nightly PRN    ondansetron (Zofran) 4  MG/2ML injection 4 mg  4 mg Intravenous Q6H PRN    metoclopramide (Reglan) 5 mg/mL injection 10 mg  10 mg Intravenous Q8H PRN    LORazepam (Ativan) 2 mg/mL injection 1 mg  1 mg Intravenous Once    amLODIPine (Norvasc) tab 10 mg  10 mg Oral Daily    baclofen (Lioresal) tab 10 mg  10 mg Oral BID    isosorbide mononitrate ER (Imdur) 24 hr tab 60 mg  60 mg Oral Daily    rosuvastatin (Crestor) tab 10 mg  10 mg Oral Daily    venlafaxine (Effexor) tab 37.5 mg  37.5 mg Oral Daily    glucose (Dex4) 15 GM/59ML oral liquid 15 g  15 g Oral Q15 Min PRN    Or    glucose (Glutose) 40% oral gel 15 g  15 g Oral Q15 Min PRN    Or    glucose-vitamin C (Dex-4) chewable tab 4 tablet  4 tablet Oral Q15 Min PRN    Or    dextrose 50% injection 50 mL  50 mL Intravenous Q15 Min PRN    Or    glucose (Dex4) 15 GM/59ML oral liquid 30 g  30 g Oral Q15 Min PRN    Or    glucose (Glutose) 40% oral gel 30 g  30 g Oral Q15 Min PRN    Or    glucose-vitamin C (Dex-4) chewable tab 8 tablet  8 tablet Oral Q15 Min PRN    insulin aspart (NovoLOG) 100 Units/mL FlexPen 1-10 Units  1-10 Units Subcutaneous TID AC and HS

## 2025-04-29 NOTE — PROGRESS NOTES
[facility]   part of [facility] Hospitalist Progress Note     Tariq Nolan Patient Status:  Inpatient    1950 MRN [id number]   Location [facility] 3NE-A Attending Herrera Mariscal MD   Hosp Day # 3 PCP Jacob Hicks MD     Subjective:   No acute overnight events. Wife at bedside. Denies acute complaints. Wife states he has continued hallucinations into this morning.     Objective:    Review of Systems:   10 point ROS completed and was negative, except for pertinent positive and negatives stated in subjective.    Vital signs:  Temp:  [97.7 °F (36.5 °C)-98.3 °F (36.8 °C)] 97.8 °F (36.6 °C)  Pulse:  [40-45] 41  Resp:  [16-18] 18  BP: (110-156)/(53-76) 156/72  SpO2:  [96 %-98 %] 98 %  General: No acute distress.  HEENT: Normocephalic atraumatic.  Neck: No lymphadenopathy. No JVD.   Respiratory: Clear to auscultation bilaterally. No wheezes. No rhonchi.  Cardiovascular: S1, S2. Regular rate and rhythm.   Chest and Back: No tenderness or deformity.  Abdomen: Soft, nontender, nondistended.    Neurologic: No focal neurological deficits. Following some commands.   Musculoskeletal: Moves all extremities spontaneously.   Extremities: No edema or cyanosis.  Integument: No rashes or lesions.   Psychiatric: Appropriate mood and affect.      Diagnostic Data:    Labs:  Recent Labs   Lab 25  1548 25  0756 25  0720   WBC 6.1 6.7 6.2   HGB 12.0* 10.5* 11.0*   MCV 95.2 95.2 96.3   .0 200.0 199.0   INR 1.25*  --   --        Recent Labs   Lab 25  1548 25  0756 25  0720 25  0754   * 124* 232* 212*   BUN 56* 51* 46* 39*   CREATSERUM 10.83* 11.20* 11.34* 11.96*   CA 10.0 9.5 9.4 9.4   ALB 4.3 3.9 4.1 3.8    137 138 137   K 4.2 4.0 4.1 4.0    102 100 102   CO2 24.0 23.0 25.0 22.0   ALKPHO 185*  --   --   --    AST 23  --   --   --    ALT 10  --   --   --    BILT 0.2  --   --   --    TP 6.9  --   --   --        Estimated  Creatinine Clearance: 4.8 mL/min (A) (based on SCr of 11.96 mg/dL (H)).    Recent Labs   Lab 04/26/25  1548   PTP 15.8*   INR 1.25*              COVID-19 Lab Results    COVID-19  Lab Results   Component Value Date    COVID19 Not Detected 08/13/2022    COVID19 Not Detected 12/26/2020    COVID19 NEGATIVE 10/01/2020       Pro-Calcitonin  No results for input(s): \"PCT\" in the last 168 hours.    Cardiac  No results for input(s): \"TROP\", \"PBNP\" in the last 168 hours.    Creatinine Kinase  No results for input(s): \"CK\" in the last 168 hours.    Inflammatory Markers  No results for input(s): \"CRP\", \"MADELIN\", \"LDH\", \"DDIMER\" in the last 168 hours.    Imaging: Imaging data reviewed in Epic.    Medications: Scheduled Medications[1]    Assessment & Plan:    Tariq Nolan is a 74 year old male with PMH sig for CAD w/ stents, anemia, DM2, HTN, DL, and ESRD on PD who presents to the ED with acute onset confusion, left sided weakness.     Left sided weakness, transient - 2/2 TIA/CVA  Confusion, ams 2/2 above or infection  -CT imaging reviewed  -MRI brain NEG for stroke - but limited due to pt movement  -supportive measures  -Neuro consulted >> apprec recs >> per Dr Tobar concerned for ineffective PD and consideration for switching to HD; dw Dr Collado >> states pt does not have hx of or lack of evidence for ineffective PD up till now, as outpt >> after dw family they are reconsidering HD and would like to dw nephro about prospect of switching to HD  -PT/OT/ST >> rec rehab  -ECHO reviewed  -no evidence of infection   -EEG ordered per neuro-- results pending      Bradycardia - likely afib with slow vent response  Hx of afib  -EKG shows afib, with HR 42  -holding BB, on AC  -cards consulted >> apprec recs >> seen by Dr Perez >> no further recs, as he feels pt asymptomatic  -ECHO reviewed    ESRD on PD  -nephro consulted  -no clear evidence of infection on exam or labs  -UA neg, blood cx NTD     DM2  -hold home  meds  -ISS+accuchecks     HTN  -resume home meds     DL  -statin     Anemia  -hgb 12.0  -monitor     CAD sp stents  -resume home meds     Quality:  DVT Prophylaxis: heparin, scds  CODE status: full code   Gavin: no  If COVID testing is negative, may discontinue isolation: yes      Plan of care discussed with patient and all questions answered.      DO Faisal Pantoja Hospitalist          [1]    dextrose 2.5%, calcium 2.5 meq/L  15,000 mL Intraperitoneal Daily    clopidogrel  75 mg Oral Daily    apixaban  5 mg Oral BID    LORazepam  1 mg Intravenous Once    amLODIPine  10 mg Oral Daily    baclofen  10 mg Oral BID    isosorbide mononitrate ER  60 mg Oral Daily    rosuvastatin  10 mg Oral Daily    venlafaxine  37.5 mg Oral Daily    insulin aspart  1-10 Units Subcutaneous TID AC and HS

## 2025-04-29 NOTE — PROGRESS NOTES
Assume care @ 0730  AO X3 with occassional forgetfulness, Verbally responsive, RA. Neuro Q4. [facility] daily  Afib on tele on Tele. HR- 40-50s, asymptomatic  Denies pain  Continent. Max assist with care  IR placed today for possible HD.  Jimmieius  called and notified. PD catheter site to L mid ABD  Fair appetite meals. Able to tolerate medications one at a time. QID accu check. Insulin as ordered.  Fall and safety precautions in place   Pt resting in cart comfortably drinking juice. No signs of pain or distress.

## 2025-04-29 NOTE — PROCEDURES
[facility]   part of [facility]  Procedure Note    Tariq Nolan Patient Status:  Inpatient    1950 MRN [id number]   Location [facility] 3NE-A Attending Matthew Stewart, DO   Hosp Day # 3 PCP Jacob Hicks MD     Procedure: Temp cath placement bedside    Pre-Procedure Diagnosis:  Confusion, renal insufficiency    Post-Procedure Diagnosis: Same    Anesthesia:  Local    Findings:  Bedside 15 cm double lumen central line placed to SVC via R int jug vein.  OK to use after CXR resulted    Specimens: None    Blood Loss:  Minimal    Tourniquet Time: None  Complications:  None  Drains:  None    Secondary Diagnosis:  None    Jaret Romo MD  2025

## 2025-04-29 NOTE — PHYSICAL THERAPY NOTE
PHYSICAL THERAPY TREATMENT NOTE - INPATIENT    Room Number: 3618/3618-A     Session: 1     Number of Visits to Meet Established Goals: 6    Presenting Problem: confusion  Co-Morbidities : ESRD on PD, CAD, anemia, DM, tremors    PHYSICAL THERAPY ASSESSMENT   Patient demonstrates fair progress this session, goals  remain in progress.      Patient is requiring moderate assist and maximum assist as a result of the following impairments: decreased functional strength, decreased endurance/aerobic capacity, impaired coordination, impaired motor planning, and decreased muscular endurance.     Patient continues to function below baseline with bed mobility, transfers, and gait.  Next session anticipate patient to progress bed mobility and transfers.  Physical Therapy will continue to follow patient for duration of hospitalization.    Patient continues to benefit from continued skilled PT services: to promote return to prior level of function and safety with continuous assistance and gradual rehabilitative therapy .    PLAN DURING HOSPITALIZATION  Nursing Mobility Recommendation : Lift Equipment  PT Device Recommendation: Mechanical lift  PT Treatment Plan: Bed mobility, Patient education, Family education, Neuromuscular re-educate, Gait training, Balance training, Transfer training  Frequency (Obs): 3x/week     CURRENT GOALS       Goal #1 Patient is able to demonstrate supine - sit EOB @ level: minimum assistance      Goal #2 Patient is able to demonstrate transfers EOB to/from Chair/Wheelchair at assistance level: minimum assistance      Goal #3 Patient is able to ambulate 20 feet with assist device: walker - rolling at assistance level: minimum assistance      Goal #4     Goal #5     Goal #6     Goal Comments: Goals established on 4/27/2025 4/29/2025 all goals ongoing     PHYSICAL THERAPY MEDICAL/SOCIAL HISTORY  History related to current admission: Patient is a 74 year old male admitted on 4/26/2025 from home for  confusion and sudden L sided weakness.  CTH and MRI negative.  Nephology, cardiology, and neuro on consult.  W/u in progress.     HOME SITUATION  Type of Home: [facility]  Home Layout: One level  Stairs to Enter : 0                  Lives With: Spouse                Prior Level of [place]: indep; per wife, pt with gradual decline over the past several weeks requiring supervision and occasional assistance; declines falls       SUBJECTIVE  \"There goes my arm again\"   Pt demos BUE tremors     OBJECTIVE  Precautions: Bed/chair alarm    WEIGHT BEARING RESTRICTION     PAIN ASSESSMENT   Ratin          BALANCE                                                                                                                       Static Sitting: Poor +  Dynamic Sitting: Poor           Static Standing: Poor  Dynamic Standing: Not tested    ACTIVITY TOLERANCE                         O2 WALK       AM-PAC '6-Clicks' INPATIENT SHORT FORM - BASIC MOBILITY  How much difficulty does the patient currently have...  Patient Difficulty: Turning over in bed (including adjusting bedclothes, sheets and blankets)?: A Little   Patient Difficulty: Sitting down on and standing up from a chair with arms (e.g., wheelchair, bedside commode, etc.): A Lot   Patient Difficulty: Moving from lying on back to sitting on the side of the bed?: A Lot   How much help from another person does the patient currently need...   Help from Another: Moving to and from a bed to a chair (including a wheelchair)?: Total   Help from Another: Need to walk in hospital room?: Total   Help from Another: Climbing 3-5 steps with a railing?: Total     AM-PAC Score:  Raw Score: 10   Approx Degree of Impairment: 76.75%   Standardized Score (AM-PAC Scale): 32.29   CMS Modifier (G-Code): CL    FUNCTIONAL ABILITY STATUS  Gait Assessment   Functional Mobility/Gait Assessment  Gait Assistance: Not tested    Skilled Therapy Provided  Pt presents in bed, spouse at    Therapist  educated pt on sequencing, and  hand placement fo rsup>sit t/f   Pt tolerates EOB sitting c varying levels of assist mod/max to close CGA /supervision, with assist provided to place B hands on bed.  Pt reports mild LH, BP WNL  [place] steady utilized to assist pt to stand mod A x 2.   Pt CGA to min A to stand from SS paddles.   Pt with noted BLE myoclonic jerking.   Pt returned to bed per RN request as IR arriving to place temp cath placement for HD trial.     Bed Mobility:  Rolling: min A    Supine<>Sit: mod A    Sit<>Supine: min A      Transfer Mobility:  Sit<>Stand: mod x 2 , bed elevated, [place] to hold on to [place] steady    Stand<>Sit: min to mod A for eccentric control    Gait: nt     Therapist's Comments: max A to don socks in supine           Patient End of Session: In bed, Call light within reach, Needs met, RN aware of session/findings, All patient questions and concerns addressed, Hospital anti-slip socks, Alarm set, With  staff    PT Session Time: 15 minutes  Gait Training:  minutes  Therapeutic Activity: 15 minutes  Therapeutic Exercise:  minutes   Neuromuscular Re-education:  minutes

## 2025-04-29 NOTE — OCCUPATIONAL THERAPY NOTE
OCCUPATIONAL THERAPY TREATMENT NOTE - INPATIENT     Room Number: 3618/3618-A  Session: 1   Number of Visits to Meet Established Goals: 5    Presenting Problem: acute confusion    OCCUPATIONAL PROFILE    HOME SITUATION  Type of Home: Townhouse  Home Layout: One level  Lives With: Spouse    Shower/Tub and Equipment: Shower chair, Grab bar, Walk-in shower    Occupation/Status: retired     Drives: No       Prior Level of Function: Typically mod independent with ADLs and mobility in a townhouse with spouse. Spouse reports that he has had increasing brain fog nad balance difficulty over the last 6 weeks.    ASSESSMENT   Patient demonstrates good  progress this session, goals remain in progress.    Patient continues to function below baseline with toileting, bathing, upper body dressing, lower body dressing, grooming, eating, bed mobility, transfers, static sitting balance, dynamic sitting balance, static standing balance, dynamic standing balance, and functional standing tolerance.   Contributing factors to remaining limitations include decreased functional strength, decreased functional reach, decreased endurance, pain, impaired sitting/standing balance, impaired coordination, impaired motor planning, and decreased muscular endurance.  Next session anticipate patient to progress toileting, lower body dressing, grooming, eating, and bed mobility.  Occupational Therapy will continue to follow patient for duration of hospitalization.    Patient continues to benefit from continued skilled OT services: to promote return to prior level of function and safety with continuous assistance and gradual rehabilitative therapy.     History Related to Current Admission: Patient is a 74 year old male admitted on 4/26/2025 with Presenting Problem: acute confusion. Co-Morbidities : ESRD on PD, CAD, anemia, DM, tremors    Per EMR, patient will likely require transition from PD to HD for ESRD    WEIGHT BEARING RESTRICTION        Recommendations for nursing staff:   Transfers: shagufta-melchor  Toileting location: commode or bed level    TREATMENT SESSION:  Patient Start of Session: supine    FUNCTIONAL TRANSFER ASSESSMENT  Sit to Stand: Edge of Bed  Edge of Bed: Dependent (mod x2 from elevated bed height; CGA from shagufta-stedy)    BED MOBILITY  Rolling: Not Tested  Supine to Sit : Moderate Assist  Sit to Supine (OT): Contact Guard Assist  Scooting: Not Tested    BALANCE ASSESSMENT     FUNCTIONAL ADL ASSESSMENT  Grooming Seated: Not Tested  LB Dressing Seated: Dependent (total for sock management)    ACTIVITY TOLERANCE:                          O2 SATURATIONS       EDUCATION PROVIDED  Patient Education : Role of Occupational Therapy; Functional Transfer Techniques; Fall Prevention; Posture/Positioning  Patient's Response to Education: Verbalized Understanding; Requires Further Education  Family/Caregiver Education : Role of Occupational Therapy; Functional Transfer Techniques; Fall Prevention; Posture/Positioning  Family/Caregiver's Response to Education: Verbalized Understanding; Requires Further Education (wife present)    Equipment used: shagufta-melchor  Demonstrates functional use, Would benefit from additional trial      Therapist comments: Patient motivated and participatory, primarily limited by fatigue this session. Patient reinforced on safety precautions and incorporation into ADLs and transfers, followed with good return demo. Mod assist for supine to sit; sitting EOB several minutes in preparation for seated ADLs with varying levels of assist required for sitting balance, primarily min assist with brief periods of both CGA/close SBA and mod/max assist, holding bedrail as able given BUE myoclonic jerkig. Standing with shagufta-stedy in preparation for standing ADLs/commode transfers with mod assist x2 and bed height elevated; after seated rest break on Shagufta Stedy, standing from Shagufta Stedy with CGA; total assist to control descent to EOB, CGA  return to [facility] per RN request as IR team arriving. See Functional Assessment sections above for additional information on patient's performance on ADLs and functional transfers this session. Will continue to follow.     Patient End of Session: In bed, With  staff, Needs met, Call light within reach, All patient questions and concerns addressed, Hospital anti-slip socks, Alarm set, Family present    SUBJECTIVE  \"I'm not doing too good\"    PAIN ASSESSMENT  Ratin           OBJECTIVE  Precautions: Bed/chair alarm    AM-PAC ‘6-Clicks’ Inpatient Daily Activity Short Form  -   Putting on and taking off regular lower body clothing?: Total  -   Bathing (including washing, rinsing, drying)?: A Lot  -   Toileting, which includes using toilet, bedpan or urinal? : Total  -   Putting on and taking off regular upper body clothing?: A Lot  -   Taking care of personal grooming such as brushing teeth?: A Lot  -   Eating meals?: A Lot    AM-PAC Score:  Score: 10  Approx Degree of Impairment: 74.7%  Standardized Score (AM-PAC Scale): 27.31    PLAN     OT Treatment Plan: Endurance training, Cognitive reorientation, UE strengthening/ROM, Functional transfer training, ADL training, Energy conservation/work simplification techniques, Balance activities, Patient/Family education, Patient/Family training, Equipment eval/education, Compensatory technique education  Rehab Potential : Good  Frequency: 3-5x/week    Goals in progress   ADL Goals   Patient will perform eating: with set up and with supervision  Patient will perform grooming: with supervision and while in chair  Patient will perform lower body dressing:  with mod assist and with adaptive equipment PRN  Patient will perform toileting: with mod assist and with bedside commode    Functional Transfer Goals  Patient will transfer from sit to supine:  with min assist  Patient will transfer from supine to sit:  with min assist    UE Exercise Program Goal  Patient will be  supervision with bilateral AROM HEP (home exercise program).    Therapist Goals  Clock draw test    OT Session Time: 15 minutes  Therapeutic Activity: 10 minutes

## 2025-04-29 NOTE — PROGRESS NOTES
[facility] Nephrology  Inpatient Follow-up    Tariq Nolan Patient Status:  Inpatient    1950 MRN [id number]   Location [facility] 3NE-A Attending Herrera Mariscal MD   Hosp Day # 3 PCP Jacob Hicks MD       SUBJECTIVE:     Patient seen and examined at bedside. No acute complaints today. Agrees to trial HD.    MEDICATIONS:     Current Hospital Medications[1]    PHYSICAL EXAM:     Vital Signs: /72 (BP Location: Left arm)   Pulse 50   Temp 98.1 °F (36.7 °C) (Oral)   Resp 18   Ht 5' 5\" (1.651 m)   Wt 207 lb 7.3 oz (94.1 kg)   SpO2 95%   BMI 34.52 kg/m²   Temp (24hrs), Av.9 °F (36.6 °C), Min:97.7 °F (36.5 °C), Max:98.3 °F (36.8 °C)       Intake/Output Summary (Last 24 hours) at 2025 1304  Last data filed at 2025 1337  Gross per 24 hour   Intake --   Output 585 ml   Net -585 ml     Wt Readings from Last 3 Encounters:   25 207 lb 7.3 oz (94.1 kg)   10/23/24 190 lb 0.6 oz (86.2 kg)   10/20/24 190 lb (86.2 kg)       General: no acute distress  HEENT: normocephalic, atraumatic  Respiratory: no distress  Extremities: no edema bilaterally  Neuro: awake, alert, confused at times     LABORATORY DATA:     Lab Results   Component Value Date     (H) 2025    BUN 39 (H) 2025    BUNCREA 26.0 (H) 2022    CREATSERUM 11.96 (H) 2025    ANIONGAP 13 2025    GFR >59 10/05/2010    GFRNAA 28 (L) 2019    GFRAA 33 (L) 2019    CA 9.4 2025    OSMOCALC 300 (H) 2025    ALKPHO 185 (H) 2025    AST 23 2025    ALT 10 2025    BILT 0.2 2025    TP 6.9 2025    ALB 3.8 2025    GLOBULIN 2.6 2025    AGRATIO 2.1 06/10/2015     2025    K 4.0 2025     2025    CO2 22.0 2025     Lab Results   Component Value Date    WBC 6.2 2025    RBC 3.26 (L) 2025    HGB 11.0 (L) 2025    HCT 31.4 (L) 2025    .0 2025    MPV 9.9 2012     MCV 96.3 04/28/2025    MCH 33.7 04/28/2025    MCHC 35.0 04/28/2025    RDW 12.1 04/28/2025    NEPRELIM 5.16 04/27/2025    NEUTABS 3.98 03/26/2019    LYMPHABS 1.06 03/26/2019    EOSABS 0.06 03/26/2019    BASABS 0.06 03/26/2019    NEUT 41 03/26/2019    LYMPH 19 03/26/2019    MON 8 03/26/2019    EOS 1 03/26/2019    BASO 1 03/26/2019    NEPERCENT 77.1 04/27/2025    LYPERCENT 10.6 04/27/2025    MOPERCENT 10.5 04/27/2025    EOPERCENT 1.2 04/27/2025    BAPERCENT 0.3 04/27/2025    NE 5.16 04/27/2025    LYMABS 0.71 (L) 04/27/2025    MOABSO 0.70 04/27/2025    EOABSO 0.08 04/27/2025    BAABSO 0.02 04/27/2025     Lab Results   Component Value Date    MALBP 5.9 02/28/2022    CREUR 33.38 02/28/2022     Lab Results   Component Value Date    COLORUR Light-Yellow 04/27/2025    CLARITY Clear 04/27/2025    SPECGRAVITY >1.030 (H) 04/27/2025    GLUUR 300 (A) 04/27/2025    BILUR Negative 04/27/2025    KETUR Negative 04/27/2025    BLOODURINE 1+ (A) 04/27/2025    PHURINE 6.0 04/27/2025    PROUR 100 (A) 04/27/2025    UROBILINOGEN Normal 04/27/2025    NITRITE Negative 04/27/2025    LEUUR Negative 04/27/2025    NMIC Microscopic not indicated 12/28/2016    WBCUR 11-20 (A) 04/27/2025    RBCUR 3-5 (A) 04/27/2025    EPIUR Few (A) 04/27/2025    BACUR None Seen 04/27/2025    HYLUR Present (A) 12/18/2018       IMAGING:     Reviewed    ASSESSMENT/PLAN:     Tariq Nolan is a a(n) 74 year old male w ho ESRD on PD, CAD sp stents, DM, HTN, HL who presents with acute onset of confusion and weakness.      ESRD on PD   -- Home Rx = 8.5hrs, 4 exchanges, 3L fill volume, last fill 2L purple   -- HOLD PD tonight  -- Patient agrees to trial HD - plan for temporary catheter placement today 4/29 followed by first run HD today.  -- Avoid nephrotoxins and renally dose medications for dialysis.     AMS/Weakness  -- Per neurology  -- Uncertain this is from poor dialysis. Peritoneal dialysis adequacy targets met as an outpatient (performed April 2025).   --  Polypharmacy? Baclofen?   -- HD trial as above      Hypokalemia  -- Daily potassium supplementation as an outpatient  -- Will give if necessary while inpatient      We will continue to follow.    Thank you for allowing us to participate in the care of this patient.       [facility]           [1]   Current Facility-Administered Medications   Medication Dose Route Frequency    sodium chloride 0.9 % IV bolus 100 mL  100 mL Intravenous Q30 Min PRN    And    albumin human (Albumin) 25% injection 25 g  25 g Intravenous PRN Dialysis    [START ON 4/30/2025] heparin (Porcine) 1000 UNIT/ML injection 1,500 Units  1.5 mL Intracatheter Once    sevelamer carbonate (Renvela) tab 800 mg  800 mg Oral TID CC    acetaminophen (Tylenol) tab 650 mg  650 mg Oral Q4H PRN    Or    acetaminophen (Tylenol) rectal suppository 650 mg  650 mg Rectal Q4H PRN    labetalol (Trandate) 5 mg/mL injection 10 mg  10 mg Intravenous Q10 Min PRN    hydrALAzine (Apresoline) 20 mg/mL injection 10 mg  10 mg Intravenous Q2H PRN    ondansetron (Zofran) 4 MG/2ML injection 4 mg  4 mg Intravenous Q6H PRN    metoclopramide (Reglan) 5 mg/mL injection 10 mg  10 mg Intravenous Q8H PRN    clopidogrel (Plavix) tab 75 mg  75 mg Oral Daily    apixaban (Eliquis) tab 5 mg  5 mg Oral BID    melatonin tab 3 mg  3 mg Oral Nightly PRN    ondansetron (Zofran) 4 MG/2ML injection 4 mg  4 mg Intravenous Q6H PRN    metoclopramide (Reglan) 5 mg/mL injection 10 mg  10 mg Intravenous Q8H PRN    LORazepam (Ativan) 2 mg/mL injection 1 mg  1 mg Intravenous Once    amLODIPine (Norvasc) tab 10 mg  10 mg Oral Daily    baclofen (Lioresal) tab 10 mg  10 mg Oral BID    isosorbide mononitrate ER (Imdur) 24 hr tab 60 mg  60 mg Oral Daily    rosuvastatin (Crestor) tab 10 mg  10 mg Oral Daily    venlafaxine (Effexor) tab 37.5 mg  37.5 mg Oral Daily    glucose (Dex4) 15 GM/59ML oral liquid 15 g  15 g Oral Q15 Min PRN    Or    glucose (Glutose) 40% oral gel 15  g  15 g Oral Q15 Min PRN    Or    glucose-vitamin C (Dex-4) chewable tab 4 tablet  4 tablet Oral Q15 Min PRN    Or    dextrose 50% injection 50 mL  50 mL Intravenous Q15 Min PRN    Or    glucose (Dex4) 15 GM/59ML oral liquid 30 g  30 g Oral Q15 Min PRN    Or    glucose (Glutose) 40% oral gel 30 g  30 g Oral Q15 Min PRN    Or    glucose-vitamin C (Dex-4) chewable tab 8 tablet  8 tablet Oral Q15 Min PRN    insulin aspart (NovoLOG) 100 Units/mL FlexPen 1-10 Units  1-10 Units Subcutaneous TID AC and HS

## 2025-04-29 NOTE — PLAN OF CARE
Assumed care at 1930  A&Ox3, forgetful at times  SB on tele (30-40s), MD aware  2L Nasal cannula  QID Accu checks  NIH daily, Q4 neuro- muscle spasms noted, MD aware  Cardiac diet  Incontinent at times  Peritoneal dialysis, dressing c/d/I  PIV saline locked, flushed, capped, dressing c/d/I  Denies pain  Needs met at this time, call light within reach, wife at bedside, bed lowered and locked, bed alarm on, pt & family updated on plan of care, will continue with plan of care    Problem: SAFETY ADULT - FALL  Goal: Free from fall injury  Description: INTERVENTIONS:- Assess pt frequently for physical needs- Identify cognitive and physical deficits and behaviors that affect risk of falls.- [place] fall precautions as indicated by assessment.- Educate pt/family on patient safety including physical limitations- Instruct pt to call for assistance with activity based on assessment- Modify environment to reduce risk of injury- Provide assistive devices as appropriate- Consider OT/PT consult to assist with strengthening/mobility- Encourage toileting schedule  Outcome: Progressing     Problem: CARDIOVASCULAR - ADULT  Goal: Absence of cardiac arrhythmias or at baseline  Description: INTERVENTIONS:- Continuous cardiac monitoring, monitor vital signs, obtain 12 lead EKG if indicated- Evaluate effectiveness of antiarrhythmic and heart rate control medications as ordered- Initiate emergency measures for life threatening arrhythmias- Monitor electrolytes and administer replacement therapy as ordered  Outcome: Progressing     Problem: SKIN/TISSUE INTEGRITY - ADULT  Goal: Skin integrity remains intact  Description: INTERVENTIONS- Assess and document risk factors for pressure ulcer development- Assess and document skin integrity- Monitor for areas of redness and/or skin breakdown- Initiate interventions, skin care algorithm/standards of care as needed  Outcome: Progressing     Problem: NEUROLOGICAL - ADULT  Goal: Achieves stable or  improved neurological status  Description: INTERVENTIONS- Assess for and report changes in neurological status- Initiate measures to prevent increased intracranial pressure- Maintain blood pressure and fluid volume within ordered parameters to optimize cerebral perfusion and minimize risk of hemorrhage- Monitor temperature, glucose, and sodium. Initiate appropriate interventions as ordered  Outcome: Progressing     Problem: Impaired Cognition  Goal: Patient will exhibit improved attention, thought processing and/or memory  Description: Interventions:- Minimize distractions in the room when full attention is required  Outcome: Progressing

## 2025-04-29 NOTE — SLP NOTE
SPEECH DAILY NOTE - INPATIENT    ASSESSMENT & PLAN   ASSESSMENT  Pt seen for dysphagia tx to assess tolerance with recommended diet, ensure proper utilization of aspiration precautions and provide pt/family education. Spouse present. Patient received awake, alert, and pleasant.  Patient with consistent verbal responses during conversation exchange with some hallucinations reported; flat affect.  Patient with clear vocal quality and 100% speech intelligibility.  Patient tolerated soft textures with thin liquids with 1:1 feeder assist due to UE myoclonus. No overt clinical s/s aspiration observed or suspected.  Educated/counseled patient/spouse on diet texture and to continue to trial regular solids.  Spouse reported hesitation with expanding diet texture however will feed patient regular texture items as able.  SLP will follow.       Diet Recommendations - Solids: Regular  Diet Recommendations - Liquids: Thin Liquids    Aspiration Precautions: 1:1 feeding, Upright position, Slow rate, Small bites, Small sips (feeding assistance due to ongoing myoclonic jerks)    Medication Administration Recommendations: One pill at a time, Whole in puree (or with thin liquids as tolerated)    Patient Experiencing Pain: No                Treatment Plan  Treatment Plan/Recommendations: Aspiration precautions, Dysphagia therapy    Interdisciplinary Communication: Discussed with RN            GOALS  Goal #1 The patient will tolerate regular consistency and thin liquids without overt signs or symptoms of aspiration with 85 % accuracy over 2-3 session(s).  In Progress   Goal #2 The patient/family/caregiver will demonstrate understanding and implementation of aspiration precautions and swallow strategies independently over 3 session(s).     In Progress   Goal #3 The patient will tolerate trial upgrade of soft/regular consistency and thin liquids without overt signs or symptoms of aspiration with 90 % accuracy over 3-4 session(s).  DC goal    Goal #4 Assess need for VFSS within 2-3 visits     No c/o globus sensation thus far however PO textures have been self limited.  Will con't to follow for VFSS need         FOLLOW UP  Follow Up Needed (Documentation Required): Yes  SLP Follow-up Date: 05/01/25       Session: 2    If you have any questions, please contact SHAILESH Loza, MS CCC-SLP/L  Speech-Language Pathologist  [facility]Link [phone]

## 2025-04-30 LAB
ALBUMIN SERPL-MCNC: 3.8 G/DL (ref 3.2–4.8)
ANION GAP SERPL CALC-SCNC: 10 MMOL/L (ref 0–18)
BUN BLD-MCNC: 35 MG/DL (ref 9–23)
CALCIUM BLD-MCNC: 9.4 MG/DL (ref 8.7–10.6)
CHLORIDE SERPL-SCNC: 101 MMOL/L (ref 98–112)
CO2 SERPL-SCNC: 26 MMOL/L (ref 21–32)
CREAT BLD-MCNC: 9.62 MG/DL (ref 0.7–1.3)
EGFRCR SERPLBLD CKD-EPI 2021: 5 ML/MIN/1.73M2 (ref 60–?)
GLUCOSE BLD-MCNC: 134 MG/DL (ref 70–99)
GLUCOSE BLD-MCNC: 160 MG/DL (ref 70–99)
GLUCOSE BLD-MCNC: 166 MG/DL (ref 70–99)
GLUCOSE BLD-MCNC: 189 MG/DL (ref 70–99)
MAGNESIUM SERPL-MCNC: 1.8 MG/DL (ref 1.6–2.6)
OSMOLALITY SERPL CALC.SUM OF ELEC: 294 MOSM/KG (ref 275–295)
PHOSPHATE SERPL-MCNC: 5.9 MG/DL (ref 2.4–5.1)
POTASSIUM SERPL-SCNC: 4.2 MMOL/L (ref 3.5–5.1)
SODIUM SERPL-SCNC: 137 MMOL/L (ref 136–145)

## 2025-04-30 RX ORDER — METOCLOPRAMIDE HYDROCHLORIDE 5 MG/ML
10 INJECTION INTRAMUSCULAR; INTRAVENOUS DAILY PRN
Status: DISCONTINUED | OUTPATIENT
Start: 2025-04-30 | End: 2025-05-06

## 2025-04-30 RX ORDER — HEPARIN SODIUM 1000 [USP'U]/ML
1500 INJECTION, SOLUTION INTRAVENOUS; SUBCUTANEOUS ONCE
Status: COMPLETED | OUTPATIENT
Start: 2025-04-30 | End: 2025-04-30

## 2025-04-30 NOTE — PROGRESS NOTES
[facility]   part of [facility] Hospitalist Progress Note     Tariq Nolan Patient Status:  Inpatient    1950 MRN [id number]   Location [facility] 3NE-A Attending Herrera Mariscal MD   Hosp Day # 4 PCP Jacob Hicks MD     Subjective:   Continued hallucinations overnight. Family at bedside. Tolerated HD yesterday, possible plan for another session today.     Objective:    Review of Systems:   Unable to obtain ROS due to patient mentation at this time.     Vital signs:  Temp:  [97.8 °F (36.6 °C)-98.2 °F (36.8 °C)] 98.1 °F (36.7 °C)  Pulse:  [44-60] 60  Resp:  [18] 18  BP: (121-163)/(72-87) 156/83  SpO2:  [93 %-99 %] 94 %  General: No acute distress.  HEENT: Normocephalic atraumatic.  Neck: No lymphadenopathy. No JVD.   Respiratory: Clear to auscultation bilaterally. No wheezes. No rhonchi.  Cardiovascular: S1, S2. Regular rate and rhythm.   Chest and Back: No tenderness or deformity.  Abdomen: Soft, nontender, nondistended.    Neurologic: No focal neurological deficits. Following some commands.   Musculoskeletal: Moves all extremities spontaneously.   Extremities: No edema or cyanosis.  Integument: No rashes or lesions.   Psychiatric: Appropriate mood and affect.      Diagnostic Data:    Labs:  Recent Labs   Lab 25  1548 25  0756 25  0720   WBC 6.1 6.7 6.2   HGB 12.0* 10.5* 11.0*   MCV 95.2 95.2 96.3   .0 200.0 199.0   INR 1.25*  --   --        Recent Labs   Lab 25  1548 25  0756 25  0720 25  0754 25  0640   *   < > 232* 212* 134*   BUN 56*   < > 46* 39* 35*   CREATSERUM 10.83*   < > 11.34* 11.96* 9.62*   CA 10.0   < > 9.4 9.4 9.4   ALB 4.3   < > 4.1 3.8 3.8      < > 138 137 137   K 4.2   < > 4.1 4.0 4.2      < > 100 102 101   CO2 24.0   < > 25.0 22.0 26.0   ALKPHO 185*  --   --   --   --    AST 23  --   --   --   --    ALT 10  --   --   --   --    BILT 0.2  --   --   --   --    TP 6.9  --    --   --   --     < > = values in this interval not displayed.       Estimated Creatinine Clearance: 5.9 mL/min (A) (based on SCr of 9.62 mg/dL (H)).    Recent Labs   Lab 04/26/25  1548   PTP 15.8*   INR 1.25*              COVID-19 Lab Results    COVID-19  Lab Results   Component Value Date    COVID19 Not Detected 08/13/2022    COVID19 Not Detected 12/26/2020    COVID19 NEGATIVE 10/01/2020       Pro-Calcitonin  No results for input(s): \"PCT\" in the last 168 hours.    Cardiac  No results for input(s): \"TROP\", \"PBNP\" in the last 168 hours.    Creatinine Kinase  No results for input(s): \"CK\" in the last 168 hours.    Inflammatory Markers  No results for input(s): \"CRP\", \"MADELIN\", \"LDH\", \"DDIMER\" in the last 168 hours.    Imaging: Imaging data reviewed in Epic.    Medications: Scheduled Medications[1]    Assessment & Plan:    Tariq Nolan is a 74 year old male with PMH sig for CAD w/ stents, anemia, DM2, HTN, DL, and ESRD on PD who presents to the ED with acute onset confusion, left sided weakness.     Acute metabolic encephalopathy  Visual hallucinations  -CT and MRI brain negative for acute findings, MRI limited.  -No acute infection , ammonia wnl   -Neuro evaluated, EEG suggestive of metabolic etiology, suspect PD ineffective-- temp HD catheter placed 4/29 and one session completed. Further coordination with nephrology as below. Monitor labs.   -baclofen taper per neurology  -- monitor tremors   -pt/ot -- rec rehab at NH    ESRD on PD  -temp catheter placed 4/29   -sessions per nephrology  -trend labs and monitor mentation     Bradycardia, history of Afib  CAD s/p PCI   -cardiology on consult, hold BB, echo completed  -continue home eliquis     DM2  -hold home meds  -ISS+accuchecks     HTN  -resume home meds     DL  -statin     Anemia  -stable, monitor with daily labs      Quality:  DVT Prophylaxis: heparin, scds  CODE status: full code   Gavin: no  If COVID testing is negative, may discontinue isolation: yes       Plan of care discussed with patient and all questions answered.      DO Faisal Pantoja Hospitalist        [1]    heparin  1.5 mL Intracatheter Once    sevelamer carbonate  800 mg Oral TID CC    heparin  1.5 mL Intravenous Once    clopidogrel  75 mg Oral Daily    apixaban  5 mg Oral BID    LORazepam  1 mg Intravenous Once    amLODIPine  10 mg Oral Daily    baclofen  10 mg Oral BID    isosorbide mononitrate ER  60 mg Oral Daily    rosuvastatin  10 mg Oral Daily    venlafaxine  37.5 mg Oral Daily    insulin aspart  1-10 Units Subcutaneous TID AC and HS

## 2025-04-30 NOTE — CM/SW NOTE
Pt discussed in AM rounds and chart reviewed, pt had temporary catheter placed 4/29 and is trial HD. Pt had HD on 4/29 and will have HD again today.     Uncertain if pt will need HD at discharge. If HD is needed at discharge an outpatient HD clinic will need to be arranged for pt. [facility]ICIA referral will also need to be reopened for facilities with onsite HD to review if HD is needed at discharge. PASRR needed. SW will continue to follow.     FLOYD Maya  Discharge Planner

## 2025-04-30 NOTE — PROGRESS NOTES
Cardiology Progress Note    Tariq Nolan Patient Status:  Inpatient    1950 MRN [id number]   Location [facility] 3NE-A Attending Herrera Mariscal MD   Hosp Day # 4 PCP Jacob Hicks MD     Impression:  Permanent atrial fibrillation with slow ventricular response  Previous CAD with history of PCI  ESRD on peritoneal dialysis  Diabetes  Hypertension  Hyperlipidemia      Plan:  No indication for pacemaker at this time as he is asymptomatic.  Hemodynamically stable.  It is possible that his heart rates have been fluctuating since starting on peritoneal dialysis approximately 3 to 6 months ago per wife.  Will continue to monitor  Continue to hold carvedilol for now. Heart rates 30-40's. Please inform cardiology if having pauses or high grade heart block   Neurology following.  Possible toxic/metabolic versus uremic encephalopathy.  EEG normal  Fluid management per nephrology with HD  Echocardiogram  with normal LV function, no significant valvular disease and no evidence of PFO by bubble study   Continue telemetry.  Continue to monitor for now    Subjective:  Resting in bed at the time of my visit. Still appears somewhat confused. No family present. HD today per nephrology    Objective:  /83 (BP Location: Left arm)   Pulse (!) 44   Temp 98.1 °F (36.7 °C) (Oral)   Resp 18   Ht 5' 5\" (1.651 m)   Wt 207 lb 7.3 oz (94.1 kg)   SpO2 94%   BMI 34.52 kg/m²   Temp (24hrs), Av.1 °F (36.7 °C), Min:97.8 °F (36.6 °C), Max:98.2 °F (36.8 °C)      Intake/Output Summary (Last 24 hours) at 2025 0912  Last data filed at 2025 0600  Gross per 24 hour   Intake --   Output 1100 ml   Net -1100 ml     Wt Readings from Last 3 Encounters:   25 207 lb 7.3 oz (94.1 kg)   10/23/24 190 lb 0.6 oz (86.2 kg)   10/20/24 190 lb (86.2 kg)       General: Awake and alert; in no acute distress  Cardiac: irregular rhythm   Lungs: Clear to auscultation bilaterally; no accessory muscle use  Abdomen: Soft,  non-tender; bowel sounds are normoactive  Extremities: No clubbing/cyanosis; moves all 4 extremities normally    Current Hospital Medications[1]    Laboratory Data:       Lab Results   Component Value Date    INR 1.25 (H) 04/26/2025    INR 1.0 10/16/2020    INR 1.17 (H) 04/12/2019     Lab Results   Component Value Date     04/30/2025    K 4.2 04/30/2025     04/30/2025    CO2 26.0 04/30/2025    BUN 35 04/30/2025    CREATSERUM 9.62 04/30/2025     04/30/2025    CA 9.4 04/30/2025    MG 1.8 04/30/2025       Telemetry: No malignant tachyarrhythmias or bradyarrhythmias      Thank you for allowing our practice to participate in the care of your patient. Please do not hesitate to contact me if you have any questions.    BISHOP Robins, 04/30/25, 12:19 PM             [1]   Current Facility-Administered Medications   Medication Dose Route Frequency    sodium chloride 0.9 % IV bolus 100 mL  100 mL Intravenous Q30 Min PRN    And    albumin human (Albumin) 25% injection 25 g  25 g Intravenous PRN Dialysis    heparin (Porcine) 1000 UNIT/ML injection 1,500 Units  1.5 mL Intracatheter Once    sevelamer carbonate (Renvela) tab 800 mg  800 mg Oral TID CC    heparin (Porcine) 100 Units/mL lock flush 150 Units  1.5 mL Intravenous Once    acetaminophen (Tylenol) tab 650 mg  650 mg Oral Q4H PRN    Or    acetaminophen (Tylenol) rectal suppository 650 mg  650 mg Rectal Q4H PRN    labetalol (Trandate) 5 mg/mL injection 10 mg  10 mg Intravenous Q10 Min PRN    hydrALAzine (Apresoline) 20 mg/mL injection 10 mg  10 mg Intravenous Q2H PRN    ondansetron (Zofran) 4 MG/2ML injection 4 mg  4 mg Intravenous Q6H PRN    metoclopramide (Reglan) 5 mg/mL injection 10 mg  10 mg Intravenous Q8H PRN    clopidogrel (Plavix) tab 75 mg  75 mg Oral Daily    apixaban (Eliquis) tab 5 mg  5 mg Oral BID    melatonin tab 3 mg  3 mg Oral Nightly PRN    ondansetron (Zofran) 4 MG/2ML injection 4 mg  4 mg Intravenous Q6H PRN    metoclopramide  (Reglan) 5 mg/mL injection 10 mg  10 mg Intravenous Q8H PRN    LORazepam (Ativan) 2 mg/mL injection 1 mg  1 mg Intravenous Once    amLODIPine (Norvasc) tab 10 mg  10 mg Oral Daily    baclofen (Lioresal) tab 10 mg  10 mg Oral BID    isosorbide mononitrate ER (Imdur) 24 hr tab 60 mg  60 mg Oral Daily    rosuvastatin (Crestor) tab 10 mg  10 mg Oral Daily    venlafaxine (Effexor) tab 37.5 mg  37.5 mg Oral Daily    glucose (Dex4) 15 GM/59ML oral liquid 15 g  15 g Oral Q15 Min PRN    Or    glucose (Glutose) 40% oral gel 15 g  15 g Oral Q15 Min PRN    Or    glucose-vitamin C (Dex-4) chewable tab 4 tablet  4 tablet Oral Q15 Min PRN    Or    dextrose 50% injection 50 mL  50 mL Intravenous Q15 Min PRN    Or    glucose (Dex4) 15 GM/59ML oral liquid 30 g  30 g Oral Q15 Min PRN    Or    glucose (Glutose) 40% oral gel 30 g  30 g Oral Q15 Min PRN    Or    glucose-vitamin C (Dex-4) chewable tab 8 tablet  8 tablet Oral Q15 Min PRN    insulin aspart (NovoLOG) 100 Units/mL FlexPen 1-10 Units  1-10 Units Subcutaneous TID AC and HS

## 2025-04-30 NOTE — PLAN OF CARE
Assumed care @ 0730  A&ox4  RA  Afib on tele  Continent x2  Cardiac diet, accucheck QID  Takes one pill at a time with spoon and water   NeuroQS  NIH daily  SBP , DBP <100  Safety precautions in place  Continuing to meet pt needs  Need further details reference flowsheets    0330: pt receiving HD     Problem: SAFETY ADULT - FALL  Goal: Free from fall injury  Description: INTERVENTIONS:- Assess pt frequently for physical needs- Identify cognitive and physical deficits and behaviors that affect risk of falls.- [place] fall precautions as indicated by assessment.- Educate pt/family on patient safety including physical limitations- Instruct pt to call for assistance with activity based on assessment- Modify environment to reduce risk of injury- Provide assistive devices as appropriate- Consider OT/PT consult to assist with strengthening/mobility- Encourage toileting schedule  4/30/2025 1528 by Julianne Hernandez  Outcome: Progressing  4/30/2025 1035 by Julianne Hernandez  Outcome: Progressing     Problem: CARDIOVASCULAR - ADULT  Goal: Absence of cardiac arrhythmias or at baseline  Description: INTERVENTIONS:- Continuous cardiac monitoring, monitor vital signs, obtain 12 lead EKG if indicated- Evaluate effectiveness of antiarrhythmic and heart rate control medications as ordered- Initiate emergency measures for life threatening arrhythmias- Monitor electrolytes and administer replacement therapy as ordered  4/30/2025 1528 by Julianne Hernandez  Outcome: Progressing  4/30/2025 1035 by Julianne Hernandez  Outcome: Progressing     Problem: SKIN/TISSUE INTEGRITY - ADULT  Goal: Skin integrity remains intact  Description: INTERVENTIONS- Assess and document risk factors for pressure ulcer development- Assess and document skin integrity- Monitor for areas of redness and/or skin breakdown- Initiate interventions, skin care algorithm/standards of care as needed  4/30/2025 1528 by Julianne Hernandez  Outcome: Progressing  4/30/2025  1035 by Julianne Hernandez  Outcome: Progressing     Problem: NEUROLOGICAL - ADULT  Goal: Achieves stable or improved neurological status  Description: INTERVENTIONS- Assess for and report changes in neurological status- Initiate measures to prevent increased intracranial pressure- Maintain blood pressure and fluid volume within ordered parameters to optimize cerebral perfusion and minimize risk of hemorrhage- Monitor temperature, glucose, and sodium. Initiate appropriate interventions as ordered  4/30/2025 1528 by Julianne Hernandez  Outcome: Progressing  4/30/2025 1035 by Julianne Hernandez  Outcome: Progressing     Problem: Impaired Cognition  Goal: Patient will exhibit improved attention, thought processing and/or memory  Description: Interventions:  4/30/2025 1528 by Julianne Hernandez  Outcome: Progressing  4/30/2025 1035 by Julianne Hernandez  Outcome: Progressing

## 2025-04-30 NOTE — PLAN OF CARE
Assumed patient care @1930  A&O x3-4, forgetful at times  VSS, A-fib on tele, HR in 50's  SBP maintained between 180 - 90  DBP maintained below 100  On RA  [facility]shift Neuro checks  NIH daily  Intermittent mild tremors present  L AC PIV, SL  R IJ clamped  Cardiac diet, takes pills whole one at a time  QID Accu checks  L mid abdomen peritoneal dialysis catheter, CDI  Diminished urine output  Incontinent x2, pull up in place  Up total assist  Fall, aspiration, and skin precautions in place  Bed alarm on, in lowest position, call light within reach and all needs met at this time    0410: Pt complaining of 3/10 pain to IJ site, PRN Tylenol given per MAR with relief    Problem: SAFETY ADULT - FALL  Goal: Free from fall injury  Description: INTERVENTIONS:- Assess pt frequently for physical needs- Identify cognitive and physical deficits and behaviors that affect risk of falls.- [place] fall precautions as indicated by assessment.- Educate pt/family on patient safety including physical limitations- Instruct pt to call for assistance with activity based on assessment- Modify environment to reduce risk of injury- Provide assistive devices as appropriate- Consider OT/PT consult to assist with strengthening/mobility- Encourage toileting schedule  Outcome: Progressing     Problem: CARDIOVASCULAR - ADULT  Goal: Absence of cardiac arrhythmias or at baseline  Description: INTERVENTIONS:- Continuous cardiac monitoring, monitor vital signs, obtain 12 lead EKG if indicated- Evaluate effectiveness of antiarrhythmic and heart rate control medications as ordered- Initiate emergency measures for life threatening arrhythmias- Monitor electrolytes and administer replacement therapy as ordered  Outcome: Progressing     Problem: SKIN/TISSUE INTEGRITY - ADULT  Goal: Skin integrity remains intact  Description: INTERVENTIONS- Assess and document risk factors for pressure ulcer development- Assess and document skin integrity- Monitor for areas of  redness and/or skin breakdown- Initiate interventions, skin care algorithm/standards of care as needed  Outcome: Progressing     Problem: NEUROLOGICAL - ADULT  Goal: Achieves stable or improved neurological status  Description: INTERVENTIONS- Assess for and report changes in neurological status- Initiate measures to prevent increased intracranial pressure- Maintain blood pressure and fluid volume within ordered parameters to optimize cerebral perfusion and minimize risk of hemorrhage- Monitor temperature, glucose, and sodium. Initiate appropriate interventions as ordered  Outcome: Progressing

## 2025-04-30 NOTE — PROGRESS NOTES
[facility] Nephrology  Inpatient Follow-up    Tariq Nolan Patient Status:  Inpatient    1950 MRN [id number]   Location [facility] 3NE-A Attending Herrera Mariscal MD   Hosp Day # 4 PCP Jacob Hicks MD       SUBJECTIVE:     Patient seen and examined at bedside. No acute complaints today. Tolerated first run HD yesterday. Plan for HD again today.     MEDICATIONS:     Current Hospital Medications[1]    PHYSICAL EXAM:     Vital Signs: /83 (BP Location: Left arm)   Pulse 60   Temp 98.1 °F (36.7 °C) (Oral)   Resp 18   Ht 5' 5\" (1.651 m)   Wt 207 lb 7.3 oz (94.1 kg)   SpO2 94%   BMI 34.52 kg/m²   Temp (24hrs), Av.1 °F (36.7 °C), Min:97.8 °F (36.6 °C), Max:98.2 °F (36.8 °C)       Intake/Output Summary (Last 24 hours) at 2025 1125  Last data filed at 2025 0937  Gross per 24 hour   Intake --   Output 1150 ml   Net -1150 ml     Wt Readings from Last 3 Encounters:   25 207 lb 7.3 oz (94.1 kg)   10/23/24 190 lb 0.6 oz (86.2 kg)   10/20/24 190 lb (86.2 kg)       General: no acute distress  HEENT: normocephalic, atraumatic  Respiratory: no distress  Extremities: no edema bilaterally  Neuro: sleeping     LABORATORY DATA:     Lab Results   Component Value Date     (H) 2025    BUN 35 (H) 2025    BUNCREA 26.0 (H) 2022    CREATSERUM 9.62 (H) 2025    ANIONGAP 10 2025    GFR >59 10/05/2010    GFRNAA 28 (L) 2019    GFRAA 33 (L) 2019    CA 9.4 2025    OSMOCALC 294 2025    ALKPHO 185 (H) 2025    AST 23 2025    ALT 10 2025    BILT 0.2 2025    TP 6.9 2025    ALB 3.8 2025    GLOBULIN 2.6 2025    AGRATIO 2.1 06/10/2015     2025    K 4.2 2025     2025    CO2 26.0 2025     Lab Results   Component Value Date    WBC 6.2 2025    RBC 3.26 (L) 2025    HGB 11.0 (L) 2025    HCT 31.4 (L) 2025    .0 2025    MPV 9.9  09/18/2012    MCV 96.3 04/28/2025    MCH 33.7 04/28/2025    MCHC 35.0 04/28/2025    RDW 12.1 04/28/2025    NEPRELIM 5.16 04/27/2025    NEUTABS 3.98 03/26/2019    LYMPHABS 1.06 03/26/2019    EOSABS 0.06 03/26/2019    BASABS 0.06 03/26/2019    NEUT 41 03/26/2019    LYMPH 19 03/26/2019    MON 8 03/26/2019    EOS 1 03/26/2019    BASO 1 03/26/2019    NEPERCENT 77.1 04/27/2025    LYPERCENT 10.6 04/27/2025    MOPERCENT 10.5 04/27/2025    EOPERCENT 1.2 04/27/2025    BAPERCENT 0.3 04/27/2025    NE 5.16 04/27/2025    LYMABS 0.71 (L) 04/27/2025    MOABSO 0.70 04/27/2025    EOABSO 0.08 04/27/2025    BAABSO 0.02 04/27/2025     Lab Results   Component Value Date    MALBP 5.9 02/28/2022    CREUR 33.38 02/28/2022     Lab Results   Component Value Date    COLORUR Light-Yellow 04/27/2025    CLARITY Clear 04/27/2025    SPECGRAVITY >1.030 (H) 04/27/2025    GLUUR 300 (A) 04/27/2025    BILUR Negative 04/27/2025    KETUR Negative 04/27/2025    BLOODURINE 1+ (A) 04/27/2025    PHURINE 6.0 04/27/2025    PROUR 100 (A) 04/27/2025    UROBILINOGEN Normal 04/27/2025    NITRITE Negative 04/27/2025    LEUUR Negative 04/27/2025    NMIC Microscopic not indicated 12/28/2016    WBCUR 11-20 (A) 04/27/2025    RBCUR 3-5 (A) 04/27/2025    EPIUR Few (A) 04/27/2025    BACUR None Seen 04/27/2025    HYLUR Present (A) 12/18/2018       IMAGING:     Reviewed    ASSESSMENT/PLAN:     Tariq Nolan is a a(n) 74 year old male w ho ESRD on PD, CAD sp stents, DM, HTN, HL who presents with acute onset of confusion and weakness.      ESRD on PD   -- Home Rx = 8.5hrs, 4 exchanges, 3L fill volume, last fill 2L purple   -- HOLD PD at this time.  -- Patient agrees to trial HD    -- Temporary catheter placed 4/29   -- First run HD 4/29   -- Second run HD 4/30  -- Avoid nephrotoxins and renally dose medications for dialysis.     AMS/Weakness  -- Per neurology  -- Uncertain this is from poor dialysis. Peritoneal dialysis adequacy targets met as an outpatient (performed April  2025).   -- Polypharmacy? Baclofen?   -- HD trial as above      Hypokalemia  -- Daily potassium supplementation as an outpatient  -- Will give if necessary while inpatient      We will continue to follow.    Thank you for allowing us to participate in the care of this patient.       Rox Tam DO   [facility] - Nephrology           [1]   Current Facility-Administered Medications   Medication Dose Route Frequency    sodium chloride 0.9 % IV bolus 100 mL  100 mL Intravenous Q30 Min PRN    And    albumin human (Albumin) 25% injection 25 g  25 g Intravenous PRN Dialysis    heparin (Porcine) 1000 UNIT/ML injection 1,500 Units  1.5 mL Intracatheter Once    sevelamer carbonate (Renvela) tab 800 mg  800 mg Oral TID CC    heparin (Porcine) 100 Units/mL lock flush 150 Units  1.5 mL Intravenous Once    acetaminophen (Tylenol) tab 650 mg  650 mg Oral Q4H PRN    Or    acetaminophen (Tylenol) rectal suppository 650 mg  650 mg Rectal Q4H PRN    labetalol (Trandate) 5 mg/mL injection 10 mg  10 mg Intravenous Q10 Min PRN    hydrALAzine (Apresoline) 20 mg/mL injection 10 mg  10 mg Intravenous Q2H PRN    ondansetron (Zofran) 4 MG/2ML injection 4 mg  4 mg Intravenous Q6H PRN    metoclopramide (Reglan) 5 mg/mL injection 10 mg  10 mg Intravenous Q8H PRN    clopidogrel (Plavix) tab 75 mg  75 mg Oral Daily    apixaban (Eliquis) tab 5 mg  5 mg Oral BID    melatonin tab 3 mg  3 mg Oral Nightly PRN    ondansetron (Zofran) 4 MG/2ML injection 4 mg  4 mg Intravenous Q6H PRN    metoclopramide (Reglan) 5 mg/mL injection 10 mg  10 mg Intravenous Q8H PRN    LORazepam (Ativan) 2 mg/mL injection 1 mg  1 mg Intravenous Once    amLODIPine (Norvasc) tab 10 mg  10 mg Oral Daily    baclofen (Lioresal) tab 10 mg  10 mg Oral BID    isosorbide mononitrate ER (Imdur) 24 hr tab 60 mg  60 mg Oral Daily    rosuvastatin (Crestor) tab 10 mg  10 mg Oral Daily    venlafaxine (Effexor) tab 37.5 mg  37.5 mg Oral Daily    glucose (Dex4) 15 GM/59ML oral liquid  15 g  15 g Oral Q15 Min PRN    Or    glucose (Glutose) 40% oral gel 15 g  15 g Oral Q15 Min PRN    Or    glucose-vitamin C (Dex-4) chewable tab 4 tablet  4 tablet Oral Q15 Min PRN    Or    dextrose 50% injection 50 mL  50 mL Intravenous Q15 Min PRN    Or    glucose (Dex4) 15 GM/59ML oral liquid 30 g  30 g Oral Q15 Min PRN    Or    glucose (Glutose) 40% oral gel 30 g  30 g Oral Q15 Min PRN    Or    glucose-vitamin C (Dex-4) chewable tab 8 tablet  8 tablet Oral Q15 Min PRN    insulin aspart (NovoLOG) 100 Units/mL FlexPen 1-10 Units  1-10 Units Subcutaneous TID AC and HS

## 2025-05-01 LAB
ALBUMIN SERPL-MCNC: 3.7 G/DL (ref 3.2–4.8)
ANION GAP SERPL CALC-SCNC: 12 MMOL/L (ref 0–18)
BUN BLD-MCNC: 26 MG/DL (ref 9–23)
CALCIUM BLD-MCNC: 8.9 MG/DL (ref 8.7–10.6)
CHLORIDE SERPL-SCNC: 100 MMOL/L (ref 98–112)
CO2 SERPL-SCNC: 24 MMOL/L (ref 21–32)
CREAT BLD-MCNC: 6.93 MG/DL (ref 0.7–1.3)
EGFRCR SERPLBLD CKD-EPI 2021: 8 ML/MIN/1.73M2 (ref 60–?)
GLUCOSE BLD-MCNC: 128 MG/DL (ref 70–99)
GLUCOSE BLD-MCNC: 128 MG/DL (ref 70–99)
GLUCOSE BLD-MCNC: 136 MG/DL (ref 70–99)
GLUCOSE BLD-MCNC: 212 MG/DL (ref 70–99)
GLUCOSE BLD-MCNC: 258 MG/DL (ref 70–99)
MAGNESIUM SERPL-MCNC: 1.9 MG/DL (ref 1.6–2.6)
OSMOLALITY SERPL CALC.SUM OF ELEC: 288 MOSM/KG (ref 275–295)
PHOSPHATE SERPL-MCNC: 5.3 MG/DL (ref 2.4–5.1)
POTASSIUM SERPL-SCNC: 4.2 MMOL/L (ref 3.5–5.1)
SODIUM SERPL-SCNC: 136 MMOL/L (ref 136–145)

## 2025-05-01 RX ORDER — ALBUMIN (HUMAN) 12.5 G/50ML
25 SOLUTION INTRAVENOUS
Status: ACTIVE | OUTPATIENT
Start: 2025-05-01 | End: 2025-05-03

## 2025-05-01 RX ORDER — HEPARIN SODIUM 1000 [USP'U]/ML
1.5 INJECTION, SOLUTION INTRAVENOUS; SUBCUTANEOUS
Status: DISCONTINUED | OUTPATIENT
Start: 2025-05-01 | End: 2025-05-12

## 2025-05-01 NOTE — PROGRESS NOTES
Cardiology Progress Note    Tariq Nolan Patient Status:  Inpatient    1950 MRN [id number]   Location [facility] 3NE-A Attending Herrera Mariscal MD   Hosp Day # 5 PCP Jacob Hicks MD     Impression:  Permanent atrial fibrillation with slow ventricular response  Previous CAD with history of PCI  ESRD on peritoneal dialysis  Diabetes  Hypertension  Hyperlipidemia      Plan:  No indication for pacemaker at this time as he is asymptomatic.  Hemodynamically stable.  It is possible that his heart rates have been fluctuating since starting on peritoneal dialysis approximately 3 to 6 months ago per wife.    Continue to hold carvedilol for now. Heart rates 30-40's. Please inform cardiology if having pauses or high grade heart block   Neurology following.  Possible toxic/metabolic versus uremic encephalopathy.  EEG normal  Fluid management per nephrology with HD  Echocardiogram  with normal LV function, no significant valvular disease and no evidence of PFO by bubble study   Continue telemetry.  Continue to monitor for now    Subjective:  Sitting in chair at time of visit. No acute CV complaints or concerns.     Objective:  /65 (BP Location: Left arm)   Pulse 52   Temp 98.1 °F (36.7 °C) (Oral)   Resp 18   Ht 5' 5\" (1.651 m)   Wt 207 lb 7.3 oz (94.1 kg)   SpO2 94%   BMI 34.52 kg/m²   Temp (24hrs), Av.1 °F (36.7 °C), Min:97.8 °F (36.6 °C), Max:98.5 °F (36.9 °C)      Intake/Output Summary (Last 24 hours) at 2025 0923  Last data filed at 2025 1800  Gross per 24 hour   Intake --   Output 2050 ml   Net -2050 ml     Wt Readings from Last 3 Encounters:   25 207 lb 7.3 oz (94.1 kg)   10/23/24 190 lb 0.6 oz (86.2 kg)   10/20/24 190 lb (86.2 kg)       General: Awake and alert; in no acute distress  Cardiac: irregular rhythm   Lungs: Clear to auscultation bilaterally; no accessory muscle use  Abdomen: Soft, non-tender; bowel sounds are normoactive  Extremities: No clubbing/cyanosis;  moves all 4 extremities normally    Current Hospital Medications[1]    Laboratory Data:       Lab Results   Component Value Date    INR 1.25 (H) 04/26/2025    INR 1.0 10/16/2020    INR 1.17 (H) 04/12/2019     Lab Results   Component Value Date     05/01/2025    K 4.2 05/01/2025     05/01/2025    CO2 24.0 05/01/2025    BUN 26 05/01/2025    CREATSERUM 6.93 05/01/2025     05/01/2025    CA 8.9 05/01/2025    MG 1.9 05/01/2025       Telemetry: No malignant tachyarrhythmias or bradyarrhythmias      Thank you for allowing our practice to participate in the care of your patient. Please do not hesitate to contact me if you have any questions.        Kenyatta Rock, BISHOP, 05/01/25, 9:26 AM         [1]   Current Facility-Administered Medications   Medication Dose Route Frequency    metoclopramide (Reglan) 5 mg/mL injection 10 mg  10 mg Intravenous Daily PRN    sodium chloride 0.9 % IV bolus 100 mL  100 mL Intravenous Q30 Min PRN    And    albumin human (Albumin) 25% injection 25 g  25 g Intravenous PRN Dialysis    sevelamer carbonate (Renvela) tab 800 mg  800 mg Oral TID CC    heparin (Porcine) 100 Units/mL lock flush 150 Units  1.5 mL Intravenous Once    acetaminophen (Tylenol) tab 650 mg  650 mg Oral Q4H PRN    Or    acetaminophen (Tylenol) rectal suppository 650 mg  650 mg Rectal Q4H PRN    labetalol (Trandate) 5 mg/mL injection 10 mg  10 mg Intravenous Q10 Min PRN    hydrALAzine (Apresoline) 20 mg/mL injection 10 mg  10 mg Intravenous Q2H PRN    clopidogrel (Plavix) tab 75 mg  75 mg Oral Daily    apixaban (Eliquis) tab 5 mg  5 mg Oral BID    melatonin tab 3 mg  3 mg Oral Nightly PRN    ondansetron (Zofran) 4 MG/2ML injection 4 mg  4 mg Intravenous Q6H PRN    LORazepam (Ativan) 2 mg/mL injection 1 mg  1 mg Intravenous Once    amLODIPine (Norvasc) tab 10 mg  10 mg Oral Daily    baclofen (Lioresal) tab 10 mg  10 mg Oral BID    isosorbide mononitrate ER (Imdur) 24 hr tab 60 mg  60 mg Oral Daily    rosuvastatin  (Crestor) tab 10 mg  10 mg Oral Daily    venlafaxine (Effexor) tab 37.5 mg  37.5 mg Oral Daily    glucose (Dex4) 15 GM/59ML oral liquid 15 g  15 g Oral Q15 Min PRN    Or    glucose (Glutose) 40% oral gel 15 g  15 g Oral Q15 Min PRN    Or    glucose-vitamin C (Dex-4) chewable tab 4 tablet  4 tablet Oral Q15 Min PRN    Or    dextrose 50% injection 50 mL  50 mL Intravenous Q15 Min PRN    Or    glucose (Dex4) 15 GM/59ML oral liquid 30 g  30 g Oral Q15 Min PRN    Or    glucose (Glutose) 40% oral gel 30 g  30 g Oral Q15 Min PRN    Or    glucose-vitamin C (Dex-4) chewable tab 8 tablet  8 tablet Oral Q15 Min PRN    insulin aspart (NovoLOG) 100 Units/mL FlexPen 1-10 Units  1-10 Units Subcutaneous TID AC and HS

## 2025-05-01 NOTE — PROGRESS NOTES
Assumed care at 7:30 am  Patient was able to take his pills whole  Wife is currently at bedside  Currently getting dialysis  All safety precautions in place. Will continue to monitor patient.

## 2025-05-01 NOTE — PROGRESS NOTES
[facility] Nephrology  Inpatient Follow-up    Tariq Nolan Patient Status:  Inpatient    1950 MRN [id number]   Location [facility] 3NE-A Attending Herrera Mariscal MD   Hosp Day # 5 PCP Jacob Hicks MD       SUBJECTIVE:     Patient seen and examined at bedside. No acute complaints today. Feeling better.     MEDICATIONS:     Current Hospital Medications[1]    PHYSICAL EXAM:     Vital Signs: /65 (BP Location: Left arm)   Pulse (!) 47   Temp 97.8 °F (36.6 °C) (Oral)   Resp 18   Ht 5' 5\" (1.651 m)   Wt 207 lb 7.3 oz (94.1 kg)   SpO2 94%   BMI 34.52 kg/m²   Temp (24hrs), Av.1 °F (36.7 °C), Min:97.8 °F (36.6 °C), Max:98.5 °F (36.9 °C)       Intake/Output Summary (Last 24 hours) at 2025 1350  Last data filed at 2025 1800  Gross per 24 hour   Intake --   Output 2000 ml   Net -2000 ml     Wt Readings from Last 3 Encounters:   25 207 lb 7.3 oz (94.1 kg)   10/23/24 190 lb 0.6 oz (86.2 kg)   10/20/24 190 lb (86.2 kg)       General: no acute distress  HEENT: normocephalic, atraumatic  Respiratory: no distress  Extremities: no edema bilaterally  Neuro: awake, alert     LABORATORY DATA:     Lab Results   Component Value Date     (H) 2025    BUN 26 (H) 2025    BUNCREA 26.0 (H) 2022    CREATSERUM 6.93 (H) 2025    ANIONGAP 12 2025    GFR >59 10/05/2010    GFRNAA 28 (L) 2019    GFRAA 33 (L) 2019    CA 8.9 2025    OSMOCALC 288 2025    ALKPHO 185 (H) 2025    AST 23 2025    ALT 10 2025    BILT 0.2 2025    TP 6.9 2025    ALB 3.7 2025    GLOBULIN 2.6 2025    AGRATIO 2.1 06/10/2015     2025    K 4.2 2025     2025    CO2 24.0 2025     Lab Results   Component Value Date    WBC 6.2 2025    RBC 3.26 (L) 2025    HGB 11.0 (L) 2025    HCT 31.4 (L) 2025    .0 2025    MPV 9.9 2012    MCV 96.3 2025     MCH 33.7 04/28/2025    MCHC 35.0 04/28/2025    RDW 12.1 04/28/2025    NEPRELIM 5.16 04/27/2025    NEUTABS 3.98 03/26/2019    LYMPHABS 1.06 03/26/2019    EOSABS 0.06 03/26/2019    BASABS 0.06 03/26/2019    NEUT 41 03/26/2019    LYMPH 19 03/26/2019    MON 8 03/26/2019    EOS 1 03/26/2019    BASO 1 03/26/2019    NEPERCENT 77.1 04/27/2025    LYPERCENT 10.6 04/27/2025    MOPERCENT 10.5 04/27/2025    EOPERCENT 1.2 04/27/2025    BAPERCENT 0.3 04/27/2025    NE 5.16 04/27/2025    LYMABS 0.71 (L) 04/27/2025    MOABSO 0.70 04/27/2025    EOABSO 0.08 04/27/2025    BAABSO 0.02 04/27/2025     Lab Results   Component Value Date    MALBP 5.9 02/28/2022    CREUR 33.38 02/28/2022     Lab Results   Component Value Date    COLORUR Light-Yellow 04/27/2025    CLARITY Clear 04/27/2025    SPECGRAVITY >1.030 (H) 04/27/2025    GLUUR 300 (A) 04/27/2025    BILUR Negative 04/27/2025    KETUR Negative 04/27/2025    BLOODURINE 1+ (A) 04/27/2025    PHURINE 6.0 04/27/2025    PROUR 100 (A) 04/27/2025    UROBILINOGEN Normal 04/27/2025    NITRITE Negative 04/27/2025    LEUUR Negative 04/27/2025    NMIC Microscopic not indicated 12/28/2016    WBCUR 11-20 (A) 04/27/2025    RBCUR 3-5 (A) 04/27/2025    EPIUR Few (A) 04/27/2025    BACUR None Seen 04/27/2025    HYLUR Present (A) 12/18/2018       IMAGING:     Reviewed    ASSESSMENT/PLAN:     Tariq Nolan is a a(n) 74 year old male w ho ESRD on PD, CAD sp stents, DM, HTN, HL who presents with acute onset of confusion and weakness.      ESRD on PD   -- Home Rx = 8.5hrs, 4 exchanges, 3L fill volume, last fill 2L purple   -- HOLD PD at this time.  -- Patient agrees to trial HD    -- Temporary catheter placed 4/29   -- First run HD 4/29   -- Second run HD 4/30   -- Third run HD 5/1  -- Avoid nephrotoxins and renally dose medications for dialysis.     AMS/Weakness  -- Per neurology  -- Uncertain this is from poor dialysis. Peritoneal dialysis adequacy targets met as an outpatient (performed April 2025).   --  Polypharmacy? Baclofen?   -- HD trial as above      Hypokalemia  -- Daily potassium supplementation as an outpatient  -- Will give if necessary while inpatient      We will continue to follow.    Thank you for allowing us to participate in the care of this patient.       DO Valerie [facility]           [1]   Current Facility-Administered Medications   Medication Dose Route Frequency    sodium chloride 0.9 % IV bolus 100 mL  100 mL Intravenous Q30 Min PRN    And    albumin human (Albumin) 25% injection 25 g  25 g Intravenous PRN Dialysis    heparin (Porcine) 1000 UNIT/ML injection 1,500 Units  1.5 mL Intracatheter PRN Dialysis    metoclopramide (Reglan) 5 mg/mL injection 10 mg  10 mg Intravenous Daily PRN    sevelamer carbonate (Renvela) tab 800 mg  800 mg Oral TID CC    heparin (Porcine) 100 Units/mL lock flush 150 Units  1.5 mL Intravenous Once    acetaminophen (Tylenol) tab 650 mg  650 mg Oral Q4H PRN    Or    acetaminophen (Tylenol) rectal suppository 650 mg  650 mg Rectal Q4H PRN    labetalol (Trandate) 5 mg/mL injection 10 mg  10 mg Intravenous Q10 Min PRN    hydrALAzine (Apresoline) 20 mg/mL injection 10 mg  10 mg Intravenous Q2H PRN    clopidogrel (Plavix) tab 75 mg  75 mg Oral Daily    apixaban (Eliquis) tab 5 mg  5 mg Oral BID    melatonin tab 3 mg  3 mg Oral Nightly PRN    ondansetron (Zofran) 4 MG/2ML injection 4 mg  4 mg Intravenous Q6H PRN    LORazepam (Ativan) 2 mg/mL injection 1 mg  1 mg Intravenous Once    amLODIPine (Norvasc) tab 10 mg  10 mg Oral Daily    baclofen (Lioresal) tab 10 mg  10 mg Oral BID    isosorbide mononitrate ER (Imdur) 24 hr tab 60 mg  60 mg Oral Daily    rosuvastatin (Crestor) tab 10 mg  10 mg Oral Daily    venlafaxine (Effexor) tab 37.5 mg  37.5 mg Oral Daily    glucose (Dex4) 15 GM/59ML oral liquid 15 g  15 g Oral Q15 Min PRN    Or    glucose (Glutose) 40% oral gel 15 g  15 g Oral Q15 Min PRN    Or    glucose-vitamin C (Dex-4) chewable tab 4 tablet   4 tablet Oral Q15 Min PRN    Or    dextrose 50% injection 50 mL  50 mL Intravenous Q15 Min PRN    Or    glucose (Dex4) 15 GM/59ML oral liquid 30 g  30 g Oral Q15 Min PRN    Or    glucose (Glutose) 40% oral gel 30 g  30 g Oral Q15 Min PRN    Or    glucose-vitamin C (Dex-4) chewable tab 8 tablet  8 tablet Oral Q15 Min PRN    insulin aspart (NovoLOG) 100 Units/mL FlexPen 1-10 Units  1-10 Units Subcutaneous TID AC and HS

## 2025-05-01 NOTE — PHYSICAL THERAPY NOTE
PHYSICAL THERAPY TREATMENT NOTE - INPATIENT    Room Number: 3618/3618-A     Session: 2     Number of Visits to Meet Established Goals: 6    Presenting Problem: confusion  Co-Morbidities : ESRD on PD, CAD, anemia, DM, tremors    PHYSICAL THERAPY ASSESSMENT   Patient demonstrates good  progress this session, goals  remain in progress.      Patient is requiring minimal assist and moderate assist as a result of the following impairments: decreased functional strength, decreased endurance/aerobic capacity, impaired static standing balance, impaired coordination, and decreased muscular endurance.     Patient continues to function below baseline with bed mobility, transfers, and gait.  Next session anticipate patient to progress bed mobility, transfers, and gait.  Physical Therapy will continue to follow patient for duration of hospitalization.    Patient continues to benefit from continued skilled PT services: to promote return to prior level of function and safety with continuous assistance and gradual rehabilitative therapy .    PLAN DURING HOSPITALIZATION  Nursing Mobility Recommendation : Lift Equipment  PT Device Recommendation: Mechanical lift  PT Treatment Plan: Bed mobility, Patient education, Family education, Neuromuscular re-educate, Gait training, Balance training, Transfer training  Frequency (Obs): 3x/week     CURRENT GOALS     Goal #1 Patient is able to demonstrate supine - sit EOB @ level: minimum assistance      Goal #2 Patient is able to demonstrate transfers EOB to/from Chair/Wheelchair at assistance level: minimum assistance      Goal #3 Patient is able to ambulate 20 feet with assist device: walker - rolling at assistance level: minimum assistance      Goal #4     Goal #5     Goal #6     Goal Comments: Goals established on 4/27/2025 5/1/2025 all goals ongoing    SUBJECTIVE  \"My arms have tremors\"    OBJECTIVE  Precautions: Bed/chair alarm    WEIGHT BEARING RESTRICTION     PAIN ASSESSMENT   Rating:  Unable to rate  Location: B feet with standing  Management Techniques: Activity promotion, Body mechanics, Repositioning    BALANCE                                                                                                                       Static Sitting: Fair -  Dynamic Sitting: Poor +           Static Standing: Poor +  Dynamic Standing: Poor    ACTIVITY TOLERANCE                         O2 WALK       AM-PAC '6-Clicks' INPATIENT SHORT FORM - BASIC MOBILITY  How much difficulty does the patient currently have...  Patient Difficulty: Turning over in bed (including adjusting bedclothes, sheets and blankets)?: A Little   Patient Difficulty: Sitting down on and standing up from a chair with arms (e.g., wheelchair, bedside commode, etc.): A Lot   Patient Difficulty: Moving from lying on back to sitting on the side of the bed?: A Little   How much help from another person does the patient currently need...   Help from Another: Moving to and from a bed to a chair (including a wheelchair)?: A Lot   Help from Another: Need to walk in hospital room?: Total   Help from Another: Climbing 3-5 steps with a railing?: Total     AM-PAC Score:  Raw Score: 12   Approx Degree of Impairment: 68.66%   Standardized Score (AM-PAC Scale): 35.33   CMS Modifier (G-Code): CL    FUNCTIONAL ABILITY STATUS  Gait Assessment   Functional Mobility/Gait Assessment  Gait Assistance: Not tested    Skilled Therapy Provided    Pt received supine in bed with wife bedside. [place] steady utilized for initial sit to stand from EOB with pt requiring min A x2. Noted pt exhibited a posterior lean when standing, VC's given for proper WB through B LE and to bring COG forward to maintain better standing balance.   Utilized shagufta steady for sit to stand from bedside chair with pt progressed to  mod A x1 c cues for anterior weight shift, hand placement and use of BLE to generate force.   Performed sit to stand with RW 2x with pt requiring mod A x1 with VC's  for proper quad activation in R LE required for B LE weight acceptance and to keep COG within the RW. Noted pt B knee had inconsistent quad activation and B knee buckled after standing for approx 2 mins. B UE's demonstrated inconsistent myoclonic jerking throughout session. Pt left sitting up in chair with call light within reach and pt's wife bedside.     Bed Mobility  Rolling: nt   Supine<>Sit: Mod I    Sit<>Supine: nt     Transfer Mobility:  Sit<>Stand: Mod A x1   Stand<>Sit: Mod A x1   Gait: nt    Therapist's Comments: Pt now able to independently don socks in supine, pt reported he has less UE tremors today than previous session.  Pt does well with decreased distractions      Patient End of Session: Up in chair, Needs met, Call light within reach, RN aware of session/findings, All patient questions and concerns addressed, Hospital anti-slip socks, Alarm set    PT Session Time: 30 minutes  Gait Trainin minutes  Therapeutic Activity: 15 minutes  Therapeutic Exercise: 0 minutes   Neuromuscular Re-education: 15 minutes  SUZY Salvador

## 2025-05-01 NOTE — SLP NOTE
SPEECH DAILY NOTE - INPATIENT    ASSESSMENT & PLAN   ASSESSMENT  Pt seen for dysphagia tx to assess tolerance with recommended diet, ensure proper utilization of aspiration precautions and provide pt/family education. Spouse and YUNIEL present.  Patient received awake, alert, and feeding himself lunch meal.  Patient with decrease in myoclonus movements during feeding and tolerated regular texture foods and thin liquids with no overt clinical s/s aspiration observed or reported.  Patient con't to report occasional \"gag\" c/w baseline swallow function however none observed during this meal level analysis.  Dysphagia education completed with patient/spouse.  Patient for HD later today.  SLP to followup x1 more visit.          Diet Recommendations - Solids: Regular  Diet Recommendations - Liquids: Thin Liquids  Aspiration Precautions: 1:1 feeding, Upright position, Slow rate, Small bites, Small sips (feeding assistance due to ongoing myoclonic jerks)  Medication Administration Recommendations: No restrictions (as able to tolerate)    Patient Experiencing Pain: No                Treatment Plan  Treatment Plan/Recommendations: Aspiration precautions, Dysphagia therapy    Interdisciplinary Communication: Discussed with RN            GOALS  Goal #1 The patient will tolerate regular consistency and thin liquids without overt signs or symptoms of aspiration with 85 % accuracy over 2-3 session(s).  In Progress   Goal #2 The patient/family/caregiver will demonstrate understanding and implementation of aspiration precautions and swallow strategies independently over 3 session(s).     In Progress   Goal #3 The patient will tolerate trial upgrade of soft/regular consistency and thin liquids without overt signs or symptoms of aspiration with 90 % accuracy over 3-4 session(s).  DC goal   Goal #4 Assess need for VFSS within 2-3 visits     No c/o globus sensation thus far however PO textures have been self limited.  Will con't to follow  for VFSS need       FOLLOW UP  Follow Up Needed (Documentation Required): Yes  SLP Follow-up Date: 05/02/25       Session: 3; recommend x1 more visit    If you have any questions, please contact SHAILESH Loza MS CCC-SLP/L  Speech-Language Pathologist  [facility] [phone]

## 2025-05-01 NOTE — PLAN OF CARE
Assumed patient care @1930  A&O x3-4, forgetful at times   Pt having hallucinations, seeing things on the ceiling but aware these are not real and knows he is having hallucinations  VSS, A-fib on tele, HR 40 - 50s  SBP maintained between 180 - 90  DBP maintained below 100  On RA  Qshift Neuro checks  NIH daily  Intermittent mild tremors present  L AC PIV, SL  R IJ clamped  Cardiac diet, takes pills whole one at a time  QID Accu checks  L mid abdomen peritoneal dialysis catheter, CDI  Diminished urine output  Continent x2, pull up in place  Up total assist  PRN Melatonin given per MAR for sleep  Fall, aspiration, and skin precautions in place  Bed alarm on, in lowest position, call light within reach and all needs met at this time    Problem: SAFETY ADULT - FALL  Goal: Free from fall injury  Description: INTERVENTIONS:- Assess pt frequently for physical needs- Identify cognitive and physical deficits and behaviors that affect risk of falls.- Selmer fall precautions as indicated by assessment.- Educate pt/family on patient safety including physical limitations- Instruct pt to call for assistance with activity based on assessment- Modify environment to reduce risk of injury- Provide assistive devices as appropriate- Consider OT/PT consult to assist with strengthening/mobility- Encourage toileting schedule  Outcome: Progressing     Problem: CARDIOVASCULAR - ADULT  Goal: Absence of cardiac arrhythmias or at baseline  Description: INTERVENTIONS:- Continuous cardiac monitoring, monitor vital signs, obtain 12 lead EKG if indicated- Evaluate effectiveness of antiarrhythmic and heart rate control medications as ordered- Initiate emergency measures for life threatening arrhythmias- Monitor electrolytes and administer replacement therapy as ordered  Outcome: Progressing     Problem: SKIN/TISSUE INTEGRITY - ADULT  Goal: Skin integrity remains intact  Description: INTERVENTIONS- Assess and document risk factors for pressure  ulcer development- Assess and document skin integrity- Monitor for areas of redness and/or skin breakdown- Initiate interventions, skin care algorithm/standards of care as needed  Outcome: Progressing     Problem: NEUROLOGICAL - ADULT  Goal: Achieves stable or improved neurological status  Description: INTERVENTIONS- Assess for and report changes in neurological status- Initiate measures to prevent increased intracranial pressure- Maintain blood pressure and fluid volume within ordered parameters to optimize cerebral perfusion and minimize risk of hemorrhage- Monitor temperature, glucose, and sodium. Initiate appropriate interventions as ordered  Outcome: Progressing

## 2025-05-01 NOTE — PROGRESS NOTES
[facility]   part of [facility] Hospitalist Progress Note     Tariq Nolan Patient Status:  Inpatient    1950 MRN [id number]   Location [facility] 3NE-A Attending Herrera Mariscal MD   Hosp Day # 5 PCP Jacob Hicks MD     Subjective:   No acute events. Sitting in bedside recliner. No new complaints. Tolerated HD. More awake /alert today.     Objective:    Review of Systems:   Unable to obtain ROS due to patient mentation at this time.     Vital signs:  Temp:  [97.8 °F (36.6 °C)-98.5 °F (36.9 °C)] 98.1 °F (36.7 °C)  Pulse:  [41-52] 52  Resp:  [18] 18  BP: (132-157)/(60-68) 153/65  SpO2:  [93 %-98 %] 94 %  General: No acute distress.  HEENT: Normocephalic atraumatic.  Neck: No lymphadenopathy. No JVD.   Respiratory: Clear to auscultation bilaterally. No wheezes. No rhonchi.  Cardiovascular: S1, S2. Regular rate and rhythm.   Chest and Back: No tenderness or deformity.  Abdomen: Soft, nontender, nondistended.    Neurologic: No focal neurological deficits. Following some commands.   Musculoskeletal: Moves all extremities spontaneously.   Extremities: No edema or cyanosis.  Integument: No rashes or lesions.   Psychiatric: Appropriate mood and affect.      Diagnostic Data:    Labs:  Recent Labs   Lab 25  1548 25  0756 25  0720   WBC 6.1 6.7 6.2   HGB 12.0* 10.5* 11.0*   MCV 95.2 95.2 96.3   .0 200.0 199.0   INR 1.25*  --   --        Recent Labs   Lab 25  1548 25  0756 25  0754 25  0640 25  0700   *   < > 212* 134* 128*   BUN 56*   < > 39* 35* 26*   CREATSERUM 10.83*   < > 11.96* 9.62* 6.93*   CA 10.0   < > 9.4 9.4 8.9   ALB 4.3   < > 3.8 3.8 3.7      < > 137 137 136   K 4.2   < > 4.0 4.2 4.2      < > 102 101 100   CO2 24.0   < > 22.0 26.0 24.0   ALKPHO 185*  --   --   --   --    AST 23  --   --   --   --    ALT 10  --   --   --   --    BILT 0.2  --   --   --   --    TP 6.9  --   --   --   --      < > = values in this interval not displayed.       Estimated Creatinine Clearance: 8.3 mL/min (A) (based on SCr of 6.93 mg/dL (H)).    Recent Labs   Lab 04/26/25  1548   PTP 15.8*   INR 1.25*              COVID-19 Lab Results    COVID-19  Lab Results   Component Value Date    COVID19 Not Detected 08/13/2022    COVID19 Not Detected 12/26/2020    COVID19 NEGATIVE 10/01/2020       Pro-Calcitonin  No results for input(s): \"PCT\" in the last 168 hours.    Cardiac  No results for input(s): \"TROP\", \"PBNP\" in the last 168 hours.    Creatinine Kinase  No results for input(s): \"CK\" in the last 168 hours.    Inflammatory Markers  No results for input(s): \"CRP\", \"MADELIN\", \"LDH\", \"DDIMER\" in the last 168 hours.    Imaging: Imaging data reviewed in Epic.    Medications: Scheduled Medications[1]    Assessment & Plan:    Tariq Nolan is a 74 year old male with PMH sig for CAD w/ stents, anemia, DM2, HTN, DL, and ESRD on PD who presents to the ED with acute onset confusion, left sided weakness.     Acute metabolic encephalopathy  Visual hallucinations  -CT and MRI brain negative for acute findings, MRI limited.  -No acute infection , ammonia wnl   -Neuro evaluated, EEG suggestive of metabolic etiology, suspect PD ineffective-- temp HD catheter placed 4/29 and tolerated 2 HD sessions so far. Further coordination with nephrology as below. Monitor labs.   -baclofen taper per neurology  -- monitor tremors   -pt/ot -- [facility] rehab [facility] [place]    ESRD on PD  -temp catheter placed 4/29   -sessions per nephrology  -trend labs and monitor mentation     Bradycardia, history of Afib  CAD s/p PCI   -cardiology on consult, hold BB, echo completed  -continue home eliquis     DM2  -hold home meds  -ISS+accuchecks     HTN  -resume home meds     DL  -statin     Anemia  -stable, monitor with daily labs      Quality:  DVT Prophylaxis: heparin, scds  CODE status: full code   Gavin: no  If COVID testing is negative, may discontinue isolation: yes      Plan of  care discussed with patient and all questions answered.      DO Faisal Pantoja Hospitalist        [1]    sevelamer carbonate  800 mg Oral TID CC    heparin  1.5 mL Intravenous Once    clopidogrel  75 mg Oral Daily    apixaban  5 mg Oral BID    LORazepam  1 mg Intravenous Once    amLODIPine  10 mg Oral Daily    baclofen  10 mg Oral BID    isosorbide mononitrate ER  60 mg Oral Daily    rosuvastatin  10 mg Oral Daily    venlafaxine  37.5 mg Oral Daily    insulin aspart  1-10 Units Subcutaneous TID AC and HS

## 2025-05-02 LAB
ALBUMIN SERPL-MCNC: 3.8 G/DL (ref 3.2–4.8)
ANION GAP SERPL CALC-SCNC: 11 MMOL/L (ref 0–18)
BASOPHILS # BLD AUTO: 0.04 X10(3) UL (ref 0–0.2)
BASOPHILS NFR BLD AUTO: 0.4 %
BUN BLD-MCNC: 19 MG/DL (ref 9–23)
CALCIUM BLD-MCNC: 9.4 MG/DL (ref 8.7–10.6)
CHLORIDE SERPL-SCNC: 99 MMOL/L (ref 98–112)
CO2 SERPL-SCNC: 24 MMOL/L (ref 21–32)
CREAT BLD-MCNC: 5.07 MG/DL (ref 0.7–1.3)
EGFRCR SERPLBLD CKD-EPI 2021: 11 ML/MIN/1.73M2 (ref 60–?)
EOSINOPHIL # BLD AUTO: 0.17 X10(3) UL (ref 0–0.7)
EOSINOPHIL NFR BLD AUTO: 1.9 %
ERYTHROCYTE [DISTWIDTH] IN BLOOD BY AUTOMATED COUNT: 11.5 %
GLUCOSE BLD-MCNC: 135 MG/DL (ref 70–99)
GLUCOSE BLD-MCNC: 140 MG/DL (ref 70–99)
GLUCOSE BLD-MCNC: 255 MG/DL (ref 70–99)
GLUCOSE BLD-MCNC: 270 MG/DL (ref 70–99)
GLUCOSE BLD-MCNC: 287 MG/DL (ref 70–99)
GLUCOSE BLD-MCNC: 384 MG/DL (ref 70–99)
HCT VFR BLD AUTO: 31.9 % (ref 39–53)
HGB BLD-MCNC: 11.2 G/DL (ref 13–17.5)
IMM GRANULOCYTES # BLD AUTO: 0.05 X10(3) UL (ref 0–1)
IMM GRANULOCYTES NFR BLD: 0.6 %
LYMPHOCYTES # BLD AUTO: 0.75 X10(3) UL (ref 1–4)
LYMPHOCYTES NFR BLD AUTO: 8.4 %
MAGNESIUM SERPL-MCNC: 1.9 MG/DL (ref 1.6–2.6)
MCH RBC QN AUTO: 32.9 PG (ref 26–34)
MCHC RBC AUTO-ENTMCNC: 35.1 G/DL (ref 31–37)
MCV RBC AUTO: 93.8 FL (ref 80–100)
MONOCYTES # BLD AUTO: 1.63 X10(3) UL (ref 0.1–1)
MONOCYTES NFR BLD AUTO: 18.2 %
NEUTROPHILS # BLD AUTO: 6.31 X10 (3) UL (ref 1.5–7.7)
NEUTROPHILS # BLD AUTO: 6.31 X10(3) UL (ref 1.5–7.7)
NEUTROPHILS NFR BLD AUTO: 70.5 %
OSMOLALITY SERPL CALC.SUM OF ELEC: 282 MOSM/KG (ref 275–295)
PHOSPHATE SERPL-MCNC: 4 MG/DL (ref 2.4–5.1)
PLATELET # BLD AUTO: 167 10(3)UL (ref 150–450)
POTASSIUM SERPL-SCNC: 4.2 MMOL/L (ref 3.5–5.1)
RBC # BLD AUTO: 3.4 X10(6)UL (ref 3.8–5.8)
SODIUM SERPL-SCNC: 134 MMOL/L (ref 136–145)
WBC # BLD AUTO: 9 X10(3) UL (ref 4–11)

## 2025-05-02 RX ORDER — HYDRALAZINE HYDROCHLORIDE 25 MG/1
25 TABLET, FILM COATED ORAL EVERY 8 HOURS SCHEDULED
Status: DISCONTINUED | OUTPATIENT
Start: 2025-05-02 | End: 2025-05-12

## 2025-05-02 NOTE — OCCUPATIONAL THERAPY NOTE
OCCUPATIONAL THERAPY TREATMENT NOTE - INPATIENT     Room Number: 3618/3618-A  Session: 2   Number of Visits to Meet Established Goals: 5    Presenting Problem: acute confusion    ASSESSMENT   Patient demonstrates good  progress this session, goals remain in progress.    Patient continues to function below baseline with toileting, bathing, lower body dressing, transfers, static standing balance, dynamic standing balance, and functional standing tolerance.   Contributing factors to remaining limitations include decreased functional strength, decreased functional reach, decreased endurance, pain, impaired standing balance, and decreased muscular endurance.  Next session anticipate patient to progress toileting, transfers, static standing balance, dynamic standing balance, and functional standing tolerance.  Occupational Therapy will continue to follow patient for duration of hospitalization.    Patient continues to benefit from continued skilled OT services: to promote return to prior level of function and safety with continuous assistance and gradual rehabilitative therapy.     History:   Patient is a 74 year old male admitted on 4/26/2025 with Presenting Problem: acute confusion. Co-Morbidities : ESRD on PD, CAD, anemia, DM, tremors  Per EMR, patient will likely require transition from PD to HD for ESRD    WEIGHT BEARING RESTRICTION       Recommendations for nursing staff:   Transfers: shagufta steady  Toileting location: bedside commode    TREATMENT SESSION:  Patient Start of Session: supine; spouse also present    FUNCTIONAL TRANSFER ASSESSMENT  Sit to Stand: Edge of Bed  Edge of Bed: Dependent (mod x2 from elevated bed height; CGA from crystal)    BED MOBILITY  Rolling: Not Tested  Supine to Sit : Stand-by Assist  Sit to Supine (OT): Contact Guard Assist  Scooting: SBA    BALANCE ASSESSMENT     FUNCTIONAL ADL ASSESSMENT  Grooming Seated: Not Tested  LB Dressing Seated: Dependent (total for sock  management)    ACTIVITY TOLERANCE: stable on room air                         O2 SATURATIONS       EDUCATION PROVIDED  Patient Education : Plan of Care; Functional Transfer Techniques; Posture/Positioning; Proper Body Mechanics  Patient's Response to Education: Verbalized Understanding  Family/Caregiver Education : Role of Occupational Therapy; Functional Transfer Techniques; Fall Prevention; Posture/Positioning  Family/Caregiver's Response to Education: Verbalized Understanding; Requires Further Education (wife present)    Equipment used: rw, gait belt, shagufta steady  Demonstrates functional use, Would benefit from additional trial yes     Exercises:    Exercises Repetitions Comments   Scapular elevation     Scapular retraction     Shoulder rolls     Shoulder flexion     Shoulder abduction     Shoulder internal/external rotation     Forward punch     Elbow flexion     Elbow extension     Forearm pronation/supination     Wrist flexion/extension     Gross grasp/fist pumps     Ankle pumps     Knee extension     Marching         Therapist comments:     Patient reporting feeling better and having less tremors. Complains of some R foot pain. OT assisted patient to stand at RW x2 in preparation for toilet/commode transfers . RLE blocked and bed raised. Patient able to stand with MIN A. On first attempt he tolerated standing for about 45-60 seconds. Attempted to march in place but had some loss of balance and was assisted to sitting on bed with MIN A.Patient reported R foot pain. Shagufta Steady used to transfer patient from bed to chair. Patient noted to tolerate standing in Shagufta Steady for about 2 minutes prior to sitting.   OT provided set-up for oral care, patient completed with supervision. Educated patient on plan of care and recommended he sit in chair for all meals . Nurse updated on session.     Patient End of Session: Hospital anti-slip socks, All patient questions and concerns addressed, RN aware of session/findings,  Call light within reach, Needs met, Up in chair, Family present, Alarm set    SUBJECTIVE  Participatory    PAIN ASSESSMENT  Ratin  Location: R foot  Management Techniques: Repositioning, Relaxation, Nurse notified     OBJECTIVE  Precautions: Bed/chair alarm    AM-PAC ‘6-Clicks’ Inpatient Daily Activity Short Form  -   Putting on and taking off regular lower body clothing?: A Lot  -   Bathing (including washing, rinsing, drying)?: A Lot  -   Toileting, which includes using toilet, bedpan or urinal? : A Lot  -   Putting on and taking off regular upper body clothing?: A Little  -   Taking care of personal grooming such as brushing teeth?: None  -   Eating meals?: None    AM-PAC Score:  Score: 17  Approx Degree of Impairment: 50.11%  Standardized Score (AM-PAC Scale): 37.26    PLAN     OT Treatment Plan: Endurance training, Cognitive reorientation, UE strengthening/ROM, Functional transfer training, ADL training, Energy conservation/work simplification techniques, Balance activities, Patient/Family education, Patient/Family training, Equipment eval/education, Compensatory technique education  Rehab Potential : Good  Frequency: 3-5x/week    OT Goals:   Goals in progress 25  ADL Goals   Patient will perform eating: with set up and with supervision  Patient will perform grooming: with supervision and while in chair  Patient will perform lower body dressing:  with mod assist and with adaptive equipment PRN  Patient will perform toileting: with mod assist and with bedside commode     Functional Transfer Goals  Patient will transfer from sit to supine:  with min assist  Patient will transfer from supine to sit:  with min assist     UE Exercise Program Goal  Patient will be supervision with bilateral AROM HEP (home exercise program).     Therapist Goals  Clock draw test    OT Session Time: 30 minutes  Self-Care Home Management: 10 minutes  Therapeutic Activity: 15 minutes  Therapeutic Exercise: 5 minutes

## 2025-05-02 NOTE — PROGRESS NOTES
Cardiology Progress Note    Tariq Nolan Patient Status:  Inpatient    1950 MRN [id number]   Location [facility] 3NE-A Attending Herrera Mariscal MD   Hosp Day # 6 PCP Jacob Hicks MD     Impression:  Permanent atrial fibrillation with slow ventricular response  Previous CAD with history of PCI  ESRD on peritoneal dialysis  Diabetes  Hypertension  Hyperlipidemia      Plan:  No indication for pacemaker at this time as he is asymptomatic.  Hemodynamically stable.  It is possible that his heart rates have been fluctuating since starting on peritoneal dialysis approximately 3 to 6 months ago per wife.    Continue to hold carvedilol. Heart rates 30-40's. Please inform cardiology if having pauses or high grade heart block   Neurology following.  Possible toxic/metabolic versus uremic encephalopathy.  EEG normal  Fluid management per nephrology with HD  Echocardiogram  with normal LV function, no significant valvular disease and no evidence of PFO by bubble study   Continue telemetry.  Continue to monitor for now  Adding TID Hydralazine 25 mg for increased BP.   Cardiology to follow peripherally  please update with any changes or concerns in patient's condition.     Subjective:  Sitting in chair at time of visit. No acute CV complaints or concerns.     Objective:  BP (!) 172/70 (BP Location: Left arm)   Pulse (!) 44   Temp 98.2 °F (36.8 °C) (Oral)   Resp 18   Ht 5' 5\" (1.651 m)   Wt 199 lb 6.4 oz (90.4 kg)   SpO2 96%   BMI 33.18 kg/m²   Temp (24hrs), Av.1 °F (36.7 °C), Min:97.8 °F (36.6 °C), Max:98.4 °F (36.9 °C)      Intake/Output Summary (Last 24 hours) at 2025 0725  Last data filed at 2025 2100  Gross per 24 hour   Intake --   Output 2000 ml   Net -2000 ml     Wt Readings from Last 3 Encounters:   25 199 lb 6.4 oz (90.4 kg)   10/23/24 190 lb 0.6 oz (86.2 kg)   10/20/24 190 lb (86.2 kg)       General: Awake and alert; in no acute distress  Cardiac: irregular rhythm   Lungs:  Clear to auscultation bilaterally; no accessory muscle use  Abdomen: Soft, non-tender; bowel sounds are normoactive  Extremities: No clubbing/cyanosis; moves all 4 extremities normally    Current Hospital Medications[1]    Laboratory Data:  Lab Results   Component Value Date    WBC 9.0 05/02/2025    HGB 11.2 05/02/2025    HCT 31.9 05/02/2025    .0 05/02/2025       Lab Results   Component Value Date    INR 1.25 (H) 04/26/2025    INR 1.0 10/16/2020    INR 1.17 (H) 04/12/2019            Telemetry: No malignant tachyarrhythmias or bradyarrhythmias      Thank you for allowing our practice to participate in the care of your patient. Please do not hesitate to contact me if you have any questions.        BISHOP Hunt, 05/01/25, 9:26 AM         [1]   Current Facility-Administered Medications   Medication Dose Route Frequency    sodium chloride 0.9 % IV bolus 100 mL  100 mL Intravenous Q30 Min PRN    And    albumin human (Albumin) 25% injection 25 g  25 g Intravenous PRN Dialysis    heparin (Porcine) 1000 UNIT/ML injection 1,500 Units  1.5 mL Intracatheter PRN Dialysis    metoclopramide (Reglan) 5 mg/mL injection 10 mg  10 mg Intravenous Daily PRN    sevelamer carbonate (Renvela) tab 800 mg  800 mg Oral TID CC    heparin (Porcine) 100 Units/mL lock flush 150 Units  1.5 mL Intravenous Once    acetaminophen (Tylenol) tab 650 mg  650 mg Oral Q4H PRN    Or    acetaminophen (Tylenol) rectal suppository 650 mg  650 mg Rectal Q4H PRN    labetalol (Trandate) 5 mg/mL injection 10 mg  10 mg Intravenous Q10 Min PRN    hydrALAzine (Apresoline) 20 mg/mL injection 10 mg  10 mg Intravenous Q2H PRN    clopidogrel (Plavix) tab 75 mg  75 mg Oral Daily    apixaban (Eliquis) tab 5 mg  5 mg Oral BID    melatonin tab 3 mg  3 mg Oral Nightly PRN    ondansetron (Zofran) 4 MG/2ML injection 4 mg  4 mg Intravenous Q6H PRN    LORazepam (Ativan) 2 mg/mL injection 1 mg  1 mg Intravenous Once    amLODIPine (Norvasc) tab 10 mg  10 mg Oral  Daily    baclofen (Lioresal) tab 10 mg  10 mg Oral BID    isosorbide mononitrate ER (Imdur) 24 hr tab 60 mg  60 mg Oral Daily    rosuvastatin (Crestor) tab 10 mg  10 mg Oral Daily    venlafaxine (Effexor) tab 37.5 mg  37.5 mg Oral Daily    glucose (Dex4) 15 GM/59ML oral liquid 15 g  15 g Oral Q15 Min PRN    Or    glucose (Glutose) 40% oral gel 15 g  15 g Oral Q15 Min PRN    Or    glucose-vitamin C (Dex-4) chewable tab 4 tablet  4 tablet Oral Q15 Min PRN    Or    dextrose 50% injection 50 mL  50 mL Intravenous Q15 Min PRN    Or    glucose (Dex4) 15 GM/59ML oral liquid 30 g  30 g Oral Q15 Min PRN    Or    glucose (Glutose) 40% oral gel 30 g  30 g Oral Q15 Min PRN    Or    glucose-vitamin C (Dex-4) chewable tab 8 tablet  8 tablet Oral Q15 Min PRN    insulin aspart (NovoLOG) 100 Units/mL FlexPen 1-10 Units  1-10 Units Subcutaneous TID AC and HS

## 2025-05-02 NOTE — PLAN OF CARE
Assumed patient care @1930  A&O x3 - 4, forgetful at times  VSS, A-fib on tele, HR 50 - 60s  SBP maintained between 180 - 90  DBP maintained below 100  On RA  Qshift Neuro checks  NIH daily  Intermittent fine tremors in BUE   L forearm PIV, SL  R IJ clamped  Cardiac diet, takes pills 2 at a time  QID Accu checks  L mid abdomen peritoneal dialysis catheter, CDI  Diminished urine output  Occasional incontinence  Up total assist  PRN Melatonin given per MAR for sleep  PRN Tylenol given per MAR for mild pain at IJ site  Fall, aspiration, and skin precautions in place  Bed alarm on, in lowest position, call light within reach and all needs met at this time    Problem: SAFETY ADULT - FALL  Goal: Free from fall injury  Description: INTERVENTIONS:- Assess pt frequently for physical needs- Identify cognitive and physical deficits and behaviors that affect risk of falls.- [place] fall precautions as indicated by assessment.- Educate pt/family on patient safety including physical limitations- Instruct pt to call for assistance with activity based on assessment- Modify environment to reduce risk of injury- Provide assistive devices as appropriate- Consider OT/PT consult to assist with strengthening/mobility- Encourage toileting schedule  Outcome: Progressing     Problem: CARDIOVASCULAR - ADULT  Goal: Absence of cardiac arrhythmias or at baseline  Description: INTERVENTIONS:- Continuous cardiac monitoring, monitor vital signs, obtain 12 lead EKG if indicated- Evaluate effectiveness of antiarrhythmic and heart rate control medications as ordered- Initiate emergency measures for life threatening arrhythmias- Monitor electrolytes and administer replacement therapy as ordered  Outcome: Progressing     Problem: SKIN/TISSUE INTEGRITY - ADULT  Goal: Skin integrity remains intact  Description: INTERVENTIONS- Assess and document risk factors for pressure ulcer development- Assess and document skin integrity- Monitor for areas of  redness and/or skin breakdown- Initiate interventions, skin care algorithm/standards of care as needed  Outcome: Progressing     Problem: NEUROLOGICAL - ADULT  Goal: Achieves stable or improved neurological status  Description: INTERVENTIONS- Assess for and report changes in neurological status- Initiate measures to prevent increased intracranial pressure- Maintain blood pressure and fluid volume within ordered parameters to optimize cerebral perfusion and minimize risk of hemorrhage- Monitor temperature, glucose, and sodium. Initiate appropriate interventions as ordered  Outcome: Progressing

## 2025-05-02 NOTE — PLAN OF CARE
Assumed care @0730  VSS,   A&Ox4, neuro's q shift with no new deficits.  Still has visual hallucinations.  RA    Afib with HR in the 50's. Hydralazine started, HR in the 40's.  Episode of low b/p and emesis, PRN Zofran given with relief.  B/p increased.  R ankle Pain, PRN Tylenol given with little relief.  Up with 1-2 and sera-steady as tolerated.    Cardiac diet.  Diminished urine.  No BM.  PD catheter in abd.R internal jugular with old drainage.   PT/OT recommend ALICIA  HD notified for tomorrow requested mid morning.    Updated POC with patient and family.

## 2025-05-02 NOTE — PROGRESS NOTES
[facility]   part of [facility] Hospitalist Progress Note     Tariq Nolan Patient Status:  Inpatient    1950 MRN [id number]   Location [facility] 3NE-A Attending Herrera Mariscal MD   Hosp Day # 6 PCP Jacob Hicks MD     Subjective:   No acute events. Awake and conversational. Completed third session of HD yesterday. No new complaints.     Objective:    Review of Systems:   Unable to obtain ROS due to patient mentation at this time.     Vital signs:  Temp:  [97.9 °F (36.6 °C)-98.4 °F (36.9 °C)] 97.9 °F (36.6 °C)  Pulse:  [42-56] 52  Resp:  [15-18] 15  BP: (129-173)/(63-76) 129/63  SpO2:  [95 %-98 %] 97 %  General: No acute distress.  HEENT: Normocephalic atraumatic.  Neck: No lymphadenopathy.   Respiratory: Clear to auscultation bilaterally. No wheezes. No rhonchi.  Cardiovascular: S1, S2. Regular rate and rhythm.   Chest and Back: No tenderness or deformity.  Abdomen: Soft, nontender, nondistended.    Neurologic: No focal neurological deficits. Following some commands.   Musculoskeletal: Moves all extremities spontaneously.   Extremities: No edema or cyanosis.  Integument: No rashes or lesions.   Psychiatric: Appropriate mood and affect.      Diagnostic Data:    Labs:  Recent Labs   Lab 25  1548 25  0756 25  0720 25  0650   WBC 6.1 6.7 6.2 9.0   HGB 12.0* 10.5* 11.0* 11.2*   MCV 95.2 95.2 96.3 93.8   .0 200.0 199.0 167.0   INR 1.25*  --   --   --        Recent Labs   Lab 25  1548 25  0756 25  0640 25  0700 25  0650   *   < > 134* 128* 135*   BUN 56*   < > 35* 26* 19   CREATSERUM 10.83*   < > 9.62* 6.93* 5.07*   CA 10.0   < > 9.4 8.9 9.4   ALB 4.3   < > 3.8 3.7 3.8      < > 137 136 134*   K 4.2   < > 4.2 4.2 4.2      < > 101 100 99   CO2 24.0   < > 26.0 24.0 24.0   ALKPHO 185*  --   --   --   --    AST 23  --   --   --   --    ALT 10  --   --   --   --    BILT 0.2  --   --   --    --    TP 6.9  --   --   --   --     < > = values in this interval not displayed.       Estimated Creatinine Clearance: 11.3 mL/min (A) (based on SCr of 5.07 mg/dL (H)).    Recent Labs   Lab 04/26/25  1548   PTP 15.8*   INR 1.25*              COVID-19 Lab Results    COVID-19  Lab Results   Component Value Date    COVID19 Not Detected 08/13/2022    COVID19 Not Detected 12/26/2020    COVID19 NEGATIVE 10/01/2020       Pro-Calcitonin  No results for input(s): \"PCT\" in the last 168 hours.    Cardiac  No results for input(s): \"TROP\", \"PBNP\" in the last 168 hours.    Creatinine Kinase  No results for input(s): \"CK\" in the last 168 hours.    Inflammatory Markers  No results for input(s): \"CRP\", \"MADELIN\", \"LDH\", \"DDIMER\" in the last 168 hours.    Imaging: Imaging data reviewed in Epic.    Medications: Scheduled Medications[1]    Assessment & Plan:    Tariq Nolan is a 74 year old male with PMH sig for CAD w/ stents, anemia, DM2, HTN, DL, and ESRD on PD who presents to the ED with acute onset confusion, left sided weakness.     Acute metabolic encephalopathy  Visual hallucinations  -CT and MRI brain negative for acute findings, MRI limited.  -No acute infection , ammonia wnl   -Neuro evaluated, EEG suggestive of metabolic etiology, suspect PD ineffective-- temp HD catheter placed 4/29 and tolerated 3 HD sessions so far. Further coordination with nephrology as below. Monitor labs.   -baclofen taper per neurology  -- monitor tremors   -pt/ot -- rec rehab at [facility]    ESRD on PD  -temp catheter placed 4/29   -sessions per nephrology  -trend labs and monitor mentation     Bradycardia, history of Afib  CAD s/p PCI   -cardiology on consult, hold BB, echo completed  -continue home eliquis     DM2  -hold home meds  -ISS+accuchecks     HTN  -resume home meds     DL  -statin     Anemia  -stable, monitor with daily labs      Quality:  DVT Prophylaxis: heparin, scds  CODE status: full code   Gavin: no  If COVID testing is negative, may  discontinue isolation: yes      Plan of care discussed with patient and all questions answered.      DO Faisal Pantoja Hospitalist        [1]    hydrALAZINE  25 mg Oral Q8H INDU    sevelamer carbonate  800 mg Oral TID CC    heparin  1.5 mL Intravenous Once    clopidogrel  75 mg Oral Daily    apixaban  5 mg Oral BID    LORazepam  1 mg Intravenous Once    amLODIPine  10 mg Oral Daily    baclofen  10 mg Oral BID    isosorbide mononitrate ER  60 mg Oral Daily    rosuvastatin  10 mg Oral Daily    venlafaxine  37.5 mg Oral Daily    insulin aspart  1-10 Units Subcutaneous TID AC and HS

## 2025-05-02 NOTE — PROGRESS NOTES
Provider Clarification   Additional clarification of the patient's encephalopathy.  Type: Acute metabolic encephalopathy     This note is part of the patient's medical record.

## 2025-05-02 NOTE — DIETARY NOTE
[facility]   part of [facility]   CLINICAL NUTRITION    Tariq Nolan     Admitting diagnosis:  Acute confusion [R41.0]  ESRD on dialysis (HCC) [N18.6, Z99.2]  Anticoagulated [Z79.01]  Type 2 diabetes mellitus with hyperglycemia, with long-term current use of insulin (HCC) [E11.65, Z79.4]    Ht: 165.1 cm (5' 5\")  Wt: 90.4 kg (199 lb 6.4 oz).   Body mass index is 33.18 kg/m².  IBW: 61.8 kg    Wt Readings from Last 6 Encounters:   05/02/25 90.4 kg (199 lb 6.4 oz)   10/23/24 86.2 kg (190 lb 0.6 oz)   10/20/24 86.2 kg (190 lb)   09/23/24 86.2 kg (190 lb)   09/02/23 86.2 kg (190 lb)   08/16/22 86.2 kg (190 lb)        Labs/Meds reviewed    Diet:       Procedures    Sodium restricted diet Sodium Restriction: 3-4 GM NA; Fluid Consistency: Thin Liquids ; Texture Consistency: Regular; Is Patient on Accuchecks? Yes     Percent Meals Eaten (last 3 days)       None              Pt chart reviewed d/t LOS.  Patient reports fair appetite at this time.  Pt eating >75% of lunch at time of visit.  Tolerating po diet without diarrhea, emesis, or constipation.   No significant weight changes noted.     PMH includes ESRD on PD, DM, HTN.  Pt p/w acute confusion, hallucinations.  Pt transitioned to HD during admission - pt/wife trying to decide if they should continue with PD or switch to HD long term.  Pt endorses no big appetite, but is eating most of grilled cheese and tomato soup at time of visit.  No n/v/d. + Constipation, but having flatus.  Not in need of stool softeners at this time, but may want in the future.   NO wt changes. Pt appears well nourished. Offered Protein shakes for pt to try, pt agreeable.  .   Patient is at low nutrition risk at this time.    Please consult if patient status changes or nutrition issues arise.    Matilde Coats RD, LDN, [facility]  Clinical Dietitian  Phone [phone]

## 2025-05-02 NOTE — PROGRESS NOTES
[facility] Nephrology  Inpatient Follow-up    Tariq Nolan Patient Status:  Inpatient    1950 MRN [id number]   Location [facility] 3NE-A Attending Herrera Mariscal MD   Hosp Day # 6 PCP Jacob Hicks MD       SUBJECTIVE:     Patient seen and examined at bedside. Feeling better, more awake.     MEDICATIONS:     Current Hospital Medications[1]    PHYSICAL EXAM:     Vital Signs: /63 (BP Location: Left arm)   Pulse 52   Temp 97.9 °F (36.6 °C) (Oral)   Resp 15   Ht 5' 5\" (1.651 m)   Wt 199 lb 6.4 oz (90.4 kg)   SpO2 97%   BMI 33.18 kg/m²   Temp (24hrs), Av.2 °F (36.8 °C), Min:97.9 °F (36.6 °C), Max:98.4 °F (36.9 °C)       Intake/Output Summary (Last 24 hours) at 2025 1212  Last data filed at 2025 2100  Gross per 24 hour   Intake --   Output 2000 ml   Net -2000 ml     Wt Readings from Last 3 Encounters:   25 199 lb 6.4 oz (90.4 kg)   10/23/24 190 lb 0.6 oz (86.2 kg)   10/20/24 190 lb (86.2 kg)       General: no acute distress  HEENT: normocephalic, atraumatic  Respiratory: no distress  Extremities: no edema bilaterally  Neuro: awake, alert    LABORATORY DATA:     Lab Results   Component Value Date     (H) 2025    BUN 19 2025    BUNCREA 26.0 (H) 2022    CREATSERUM 5.07 (H) 2025    ANIONGAP 11 2025    GFR >59 10/05/2010    GFRNAA 28 (L) 2019    GFRAA 33 (L) 2019    CA 9.4 2025    OSMOCALC 282 2025    ALKPHO 185 (H) 2025    AST 23 2025    ALT 10 2025    BILT 0.2 2025    TP 6.9 2025    ALB 3.8 2025    GLOBULIN 2.6 2025    AGRATIO 2.1 06/10/2015     (L) 2025    K 4.2 2025    CL 99 2025    CO2 24.0 2025     Lab Results   Component Value Date    WBC 9.0 2025    RBC 3.40 (L) 2025    HGB 11.2 (L) 2025    HCT 31.9 (L) 2025    .0 2025    MPV 9.9 2012    MCV 93.8 2025    MCH 32.9 2025     MCHC 35.1 05/02/2025    RDW 11.5 05/02/2025    NEPRELIM 6.31 05/02/2025    NEUTABS 3.98 03/26/2019    LYMPHABS 1.06 03/26/2019    EOSABS 0.06 03/26/2019    BASABS 0.06 03/26/2019    NEUT 41 03/26/2019    LYMPH 19 03/26/2019    MON 8 03/26/2019    EOS 1 03/26/2019    BASO 1 03/26/2019    NEPERCENT 70.5 05/02/2025    LYPERCENT 8.4 05/02/2025    MOPERCENT 18.2 05/02/2025    EOPERCENT 1.9 05/02/2025    BAPERCENT 0.4 05/02/2025    NE 6.31 05/02/2025    LYMABS 0.75 (L) 05/02/2025    MOABSO 1.63 (H) 05/02/2025    EOABSO 0.17 05/02/2025    BAABSO 0.04 05/02/2025     Lab Results   Component Value Date    MALBP 5.9 02/28/2022    CREUR 33.38 02/28/2022     Lab Results   Component Value Date    COLORUR Light-Yellow 04/27/2025    CLARITY Clear 04/27/2025    SPECGRAVITY >1.030 (H) 04/27/2025    GLUUR 300 (A) 04/27/2025    BILUR Negative 04/27/2025    KETUR Negative 04/27/2025    BLOODURINE 1+ (A) 04/27/2025    PHURINE 6.0 04/27/2025    PROUR 100 (A) 04/27/2025    UROBILINOGEN Normal 04/27/2025    NITRITE Negative 04/27/2025    LEUUR Negative 04/27/2025    NMIC Microscopic not indicated 12/28/2016    WBCUR 11-20 (A) 04/27/2025    RBCUR 3-5 (A) 04/27/2025    EPIUR Few (A) 04/27/2025    BACUR None Seen 04/27/2025    HYLUR Present (A) 12/18/2018       IMAGING:     Reviewed    ASSESSMENT/PLAN:     Tariq Nolan is a a(n) 74 year old male w ho ESRD on PD, CAD sp stents, DM, HTN, HL who presents with acute onset of confusion and weakness.      ESRD on PD   -- Home Rx = 8.5hrs, 4 exchanges, 3L fill volume, last fill 2L purple   -- HOLD PD at this time.  -- Patient agrees to trial HD    -- Temporary catheter placed 4/29   -- First run HD 4/29   -- Second run HD 4/30   -- Third run HD 5/1   -- Plan for next HD 5/3. Ordered placed.   --If plan is to transition to hemodialysis, then will have to remove PD catheter and replace temporary HD catheter with tunneled line. Discussed with family.   -- Avoid nephrotoxins and renally dose  medications for dialysis.     AMS/Weakness  -- Per neurology  -- Uncertain this is from poor dialysis. Peritoneal dialysis adequacy targets met as an outpatient (performed April 2025).   -- Polypharmacy? Baclofen?   -- HD trial as above      Hypokalemia  -- Daily potassium supplementation as an outpatient  -- Will give if necessary while inpatient      We will continue to follow.    Thank you for allowing us to participate in the care of this patient.       DO Valerie [facility] - [facility]           [1]   Current Facility-Administered Medications   Medication Dose Route Frequency    hydrALAZINE (Apresoline) tab 25 mg  25 mg Oral Q8H INDU    sodium chloride 0.9 % IV bolus 100 mL  100 mL Intravenous Q30 Min PRN    And    albumin human (Albumin) 25% injection 25 g  25 g Intravenous PRN Dialysis    heparin (Porcine) 1000 UNIT/ML injection 1,500 Units  1.5 mL Intracatheter PRN Dialysis    metoclopramide (Reglan) 5 mg/mL injection 10 mg  10 mg Intravenous Daily PRN    sevelamer carbonate (Renvela) tab 800 mg  800 mg Oral TID CC    heparin (Porcine) 100 Units/mL lock flush 150 Units  1.5 mL Intravenous Once    acetaminophen (Tylenol) tab 650 mg  650 mg Oral Q4H PRN    Or    acetaminophen (Tylenol) rectal suppository 650 mg  650 mg Rectal Q4H PRN    labetalol (Trandate) 5 mg/mL injection 10 mg  10 mg Intravenous Q10 Min PRN    hydrALAzine (Apresoline) 20 mg/mL injection 10 mg  10 mg Intravenous Q2H PRN    clopidogrel (Plavix) tab 75 mg  75 mg Oral Daily    apixaban (Eliquis) tab 5 mg  5 mg Oral BID    melatonin tab 3 mg  3 mg Oral Nightly PRN    ondansetron (Zofran) 4 MG/2ML injection 4 mg  4 mg Intravenous Q6H PRN    LORazepam (Ativan) 2 mg/mL injection 1 mg  1 mg Intravenous Once    amLODIPine (Norvasc) tab 10 mg  10 mg Oral Daily    baclofen (Lioresal) tab 10 mg  10 mg Oral BID    isosorbide mononitrate ER (Imdur) 24 hr tab 60 mg  60 mg Oral Daily    rosuvastatin (Crestor) tab 10 mg  10 mg Oral Daily     venlafaxine (Effexor) tab 37.5 mg  37.5 mg Oral Daily    glucose (Dex4) 15 GM/59ML oral liquid 15 g  15 g Oral Q15 Min PRN    Or    glucose (Glutose) 40% oral gel 15 g  15 g Oral Q15 Min PRN    Or    glucose-vitamin C (Dex-4) chewable tab 4 tablet  4 tablet Oral Q15 Min PRN    Or    dextrose 50% injection 50 mL  50 mL Intravenous Q15 Min PRN    Or    glucose (Dex4) 15 GM/59ML oral liquid 30 g  30 g Oral Q15 Min PRN    Or    glucose (Glutose) 40% oral gel 30 g  30 g Oral Q15 Min PRN    Or    glucose-vitamin C (Dex-4) chewable tab 8 tablet  8 tablet Oral Q15 Min PRN    insulin aspart (NovoLOG) 100 Units/mL FlexPen 1-10 Units  1-10 Units Subcutaneous TID AC and HS

## 2025-05-03 LAB
ALBUMIN SERPL-MCNC: 3.9 G/DL (ref 3.2–4.8)
ANION GAP SERPL CALC-SCNC: 14 MMOL/L (ref 0–18)
BUN BLD-MCNC: 30 MG/DL (ref 9–23)
CALCIUM BLD-MCNC: 9.4 MG/DL (ref 8.7–10.6)
CHLORIDE SERPL-SCNC: 93 MMOL/L (ref 98–112)
CO2 SERPL-SCNC: 26 MMOL/L (ref 21–32)
CREAT BLD-MCNC: 6.35 MG/DL (ref 0.7–1.3)
EGFRCR SERPLBLD CKD-EPI 2021: 9 ML/MIN/1.73M2 (ref 60–?)
GLUCOSE BLD-MCNC: 140 MG/DL (ref 70–99)
GLUCOSE BLD-MCNC: 151 MG/DL (ref 70–99)
GLUCOSE BLD-MCNC: 159 MG/DL (ref 70–99)
GLUCOSE BLD-MCNC: 319 MG/DL (ref 70–99)
GLUCOSE BLD-MCNC: 348 MG/DL (ref 70–99)
MAGNESIUM SERPL-MCNC: 2 MG/DL (ref 1.6–2.6)
OSMOLALITY SERPL CALC.SUM OF ELEC: 284 MOSM/KG (ref 275–295)
PHOSPHATE SERPL-MCNC: 5.4 MG/DL (ref 2.4–5.1)
POTASSIUM SERPL-SCNC: 4.4 MMOL/L (ref 3.5–5.1)
SODIUM SERPL-SCNC: 133 MMOL/L (ref 136–145)

## 2025-05-03 RX ORDER — ASPIRIN 81 MG/1
81 TABLET, CHEWABLE ORAL DAILY
Status: DISCONTINUED | OUTPATIENT
Start: 2025-05-04 | End: 2025-05-12

## 2025-05-03 NOTE — PLAN OF CARE
Assumed care of patient at 0730. Pt oriented x 4 , can be confused and forgetgul at times, Pt had hemodialysis this afternoon, tolerated procedure well. Family at bedside, plan of care discussed. Pt has plavix & eliquis on hold for procedure on Monday.   Problem: CARDIOVASCULAR - ADULT  Goal: Absence of cardiac arrhythmias or at baseline  Description: INTERVENTIONS:- Continuous cardiac monitoring, monitor vital signs, obtain 12 lead EKG if indicated- Evaluate effectiveness of antiarrhythmic and heart rate control medications as ordered- Initiate emergency measures for life threatening arrhythmias- Monitor electrolytes and administer replacement therapy as ordered  Outcome: Progressing     Problem: NEUROLOGICAL - ADULT  Goal: Achieves stable or improved neurological status  Description: INTERVENTIONS- Assess for and report changes in neurological status- Initiate measures to prevent increased intracranial pressure- Maintain blood pressure and fluid volume within ordered parameters to optimize cerebral perfusion and minimize risk of hemorrhage- Monitor temperature, glucose, and sodium. Initiate appropriate interventions as ordered  Outcome: Progressing

## 2025-05-03 NOTE — SLP NOTE
SPEECH DAILY NOTE - INPATIENT    ASSESSMENT & PLAN   ASSESSMENT  Pt seen for dysphagia tx to assess tolerance with recommended diet, ensure proper utilization of aspiration precautions, and provide pt/family education. Pt received upright in bed with wife present. Wife reported that her  had difficulties with melted cheese with grilled cheese during dinner last night, however, there was no apparent coughing or choking. Pt reported that he tolerated his breakfast well.   Pt observed with regular solids and thin liquids. Pt accurately utilizing aspiration precautions independently. No overt s/s of aspiration were observed.   Continue with recommended diet and aspiration precautions. SLP will follow up to monitor diet tolerance.       Diet Recommendations - Solids: Regular  Diet Recommendations - Liquids: Thin Liquids    Compensatory Strategies Recommended:  (.)  Aspiration Precautions: Upright position, Slow rate, Small bites, Small sips  Medication Administration Recommendations: No restrictions (as able to tolerate)    Patient Experiencing Pain: No                Treatment Plan  Treatment Plan/Recommendations: Aspiration precautions    Interdisciplinary Communication: Discussed with RN            GOALS  Goal #1 The patient will tolerate regular consistency and thin liquids without overt signs or symptoms of aspiration with 85 % accuracy over 2-3 session(s).  In Progress   Goal #2 The patient/family/caregiver will demonstrate understanding and implementation of aspiration precautions and swallow strategies independently over 3 session(s).     In Progress   Goal #3 The patient will tolerate trial upgrade of soft/regular consistency and thin liquids without overt signs or symptoms of aspiration with 90 % accuracy over 3-4 session(s).  DC goal   Goal #4 Assess need for VFSS within 2-3 visits     In Progress      FOLLOW UP  Follow Up Needed (Documentation Required): Yes  SLP Follow-up Date: 05/05/25       Session:  4    If you have any questions, please contact SHAILESH Mei

## 2025-05-03 NOTE — PROGRESS NOTES
[facility]   part of [facility]    Nephrology Progress Note    Tariq Nolan Attending:  Makayla Guajardo MD       SUBJECTIVE:     Seen on HD.   Family at bedside.  Overall feels HD is helping him.  He has no leg swelling or sob.  Discussed long term dialysis planning - they would like to transition to HD.    PHYSICAL EXAM:     Vital Signs: /69   Pulse 54   Temp 98.3 °F (36.8 °C) (Oral)   Resp 17   Ht 165.1 cm (5' 5\")   Wt 199 lb 6.4 oz (90.4 kg)   SpO2 93%   BMI 33.18 kg/m²   Temp (24hrs), Av.4 °F (36.9 °C), Min:97.3 °F (36.3 °C), Max:99.1 °F (37.3 °C)     No intake or output data in the 24 hours ending 25 1513  Wt Readings from Last 3 Encounters:   25 199 lb 6.4 oz (90.4 kg)   10/23/24 190 lb 0.6 oz (86.2 kg)   10/20/24 190 lb (86.2 kg)       General: pleasant, well appearing  Skin: no visible rashes  HEENT: NCAT  Cardiac: Regular rate and rhythm, no murmur/gallop or rub  Lungs: CTAB  Abdomen: Soft, NTND  Extremities: warm, well perfused, no leg edema  Neurologic/Psych: mentating well  RIJ temporary HD catheter    LABORATORY DATA:     Lab Results   Component Value Date     (H) 2025    BUN 30 (H) 2025    BUNCREA 26.0 (H) 2022    CREATSERUM 6.35 (H) 2025    ANIONGAP 14 2025    GFR >59 10/05/2010    GFRNAA 28 (L) 2019    GFRAA 33 (L) 2019    CA 9.4 2025    OSMOCALC 284 2025    ALKPHO 185 (H) 2025    AST 23 2025    ALT 10 2025    BILT 0.2 2025    TP 6.9 2025    ALB 3.9 2025    GLOBULIN 2.6 2025    AGRATIO 2.1 06/10/2015     (L) 2025    K 4.4 2025    CL 93 (L) 2025    CO2 26.0 2025     Lab Results   Component Value Date    WBC 9.0 2025    RBC 3.40 (L) 2025    HGB 11.2 (L) 2025    HCT 31.9 (L) 2025    .0 2025    MPV 9.9 2012    MCV 93.8 2025    MCH 32.9 2025    MCHC 35.1 2025    RDW 11.5  05/02/2025    NEPRELIM 6.31 05/02/2025    NEUTABS 3.98 03/26/2019    LYMPHABS 1.06 03/26/2019    EOSABS 0.06 03/26/2019    BASABS 0.06 03/26/2019    NEUT 41 03/26/2019    LYMPH 19 03/26/2019    MON 8 03/26/2019    EOS 1 03/26/2019    BASO 1 03/26/2019    NEPERCENT 70.5 05/02/2025    LYPERCENT 8.4 05/02/2025    MOPERCENT 18.2 05/02/2025    EOPERCENT 1.9 05/02/2025    BAPERCENT 0.4 05/02/2025    NE 6.31 05/02/2025    LYMABS 0.75 (L) 05/02/2025    MOABSO 1.63 (H) 05/02/2025    EOABSO 0.17 05/02/2025    BAABSO 0.04 05/02/2025     Lab Results   Component Value Date    MALBP 5.9 02/28/2022    CREUR 33.38 02/28/2022     Lab Results   Component Value Date    COLORUR Light-Yellow 04/27/2025    CLARITY Clear 04/27/2025    SPECGRAVITY >1.030 (H) 04/27/2025    GLUUR 300 (A) 04/27/2025    BILUR Negative 04/27/2025    KETUR Negative 04/27/2025    BLOODURINE 1+ (A) 04/27/2025    PHURINE 6.0 04/27/2025    PROUR 100 (A) 04/27/2025    UROBILINOGEN Normal 04/27/2025    NITRITE Negative 04/27/2025    LEUUR Negative 04/27/2025    NMIC Microscopic not indicated 12/28/2016    WBCUR 11-20 (A) 04/27/2025    RBCUR 3-5 (A) 04/27/2025    EPIUR Few (A) 04/27/2025    BACUR None Seen 04/27/2025    HYLUR Present (A) 12/18/2018         IMAGING:     Reviewed.    MEDICATIONS:       Current Hospital Medications[1]    ASSESSMENT/PLAN:       75 yo M with history of ESRD on PD, CAD, DM, HTN, HL, presented with AMS, tremors/jerking movements and weakness.    ESRD:  -- PD on hold  -- started iHD; first treatment on 4/29  -- tunnelled HD catheter placement on 5/5 if ok with IR: he is on eliquis and plavix - will discuss with cardiology if ok to hold  -- SW consult for outpatient HD placement  -- follow up with PD surgeon outpatient regarding removal of PD catheter; or could consider removal while inpatient if they are definitive about not wanting to return to PD; will discuss again with them tomorrow    Anemia:  -- ANTONIO for Hb < 10    MBD:  -- trend phos  --  continue sevelamer    HTN:  -- UF 2L  -- amlodipine, hydralazine, imdur      Thank you for allowing me to participate in the care of this patient. Please do not hesitate to call with questions or concerns.        Mireya Stone MD  [facility]         [1]   Current Facility-Administered Medications   Medication Dose Route Frequency    hydrALAZINE (Apresoline) tab 25 mg  25 mg Oral Q8H INDU    heparin (Porcine) 1000 UNIT/ML injection 1,500 Units  1.5 mL Intracatheter PRN Dialysis    metoclopramide (Reglan) 5 mg/mL injection 10 mg  10 mg Intravenous Daily PRN    sevelamer carbonate (Renvela) tab 800 mg  800 mg Oral TID CC    heparin (Porcine) 100 Units/mL lock flush 150 Units  1.5 mL Intravenous Once    acetaminophen (Tylenol) tab 650 mg  650 mg Oral Q4H PRN    Or    acetaminophen (Tylenol) rectal suppository 650 mg  650 mg Rectal Q4H PRN    labetalol (Trandate) 5 mg/mL injection 10 mg  10 mg Intravenous Q10 Min PRN    hydrALAzine (Apresoline) 20 mg/mL injection 10 mg  10 mg Intravenous Q2H PRN    clopidogrel (Plavix) tab 75 mg  75 mg Oral Daily    apixaban (Eliquis) tab 5 mg  5 mg Oral BID    melatonin tab 3 mg  3 mg Oral Nightly PRN    ondansetron (Zofran) 4 MG/2ML injection 4 mg  4 mg Intravenous Q6H PRN    LORazepam (Ativan) 2 mg/mL injection 1 mg  1 mg Intravenous Once    amLODIPine (Norvasc) tab 10 mg  10 mg Oral Daily    baclofen (Lioresal) tab 10 mg  10 mg Oral BID    isosorbide mononitrate ER (Imdur) 24 hr tab 60 mg  60 mg Oral Daily    rosuvastatin (Crestor) tab 10 mg  10 mg Oral Daily    venlafaxine (Effexor) tab 37.5 mg  37.5 mg Oral Daily    glucose (Dex4) 15 GM/59ML oral liquid 15 g  15 g Oral Q15 Min PRN    Or    glucose (Glutose) 40% oral gel 15 g  15 g Oral Q15 Min PRN    Or    glucose-vitamin C (Dex-4) chewable tab 4 tablet  4 tablet Oral Q15 Min PRN    Or    dextrose 50% injection 50 mL  50 mL Intravenous Q15 Min PRN    Or    glucose (Dex4) 15 GM/59ML oral liquid 30 g  30 g Oral Q15 Min PRN    Or     glucose (Glutose) 40% oral gel 30 g  30 g Oral Q15 Min PRN    Or    glucose-vitamin C (Dex-4) chewable tab 8 tablet  8 tablet Oral Q15 Min PRN    insulin aspart (NovoLOG) 100 Units/mL FlexPen 1-10 Units  1-10 Units Subcutaneous TID AC and HS

## 2025-05-03 NOTE — PROGRESS NOTES
Asked re: hold antiplatelet/AC for tunnel HD catheter placement. History reviewed. Prior PCI. Indication for AC is Afib without prior CVA. Can hold apixaban with minimal day to day risk without bridge. Can hold plavix but must take at least baby aspirin 81mg daily while plavix held. Restart both when able from procedural standpoint and can stop aspirin when plavix restarted.

## 2025-05-03 NOTE — PROGRESS NOTES
[facility]   part of [facility] Hospitalist Progress Note     Tariq Nolan Patient Status:  Inpatient    1950 MRN [id number]   Location [facility] 3NE-A Attending Herrera Mariscal MD   Hosp Day # 7 PCP Jacob Hicks MD     Subjective:   Right foot had been uncomfortable and weaker yesterday, better today  Less hallucinations and confusion per wife.    Objective:    Review of Systems:   Unable to obtain ROS due to patient mentation at this time.     Vital signs:  Temp:  [97.3 °F (36.3 °C)-99.1 °F (37.3 °C)] 98.3 °F (36.8 °C)  Pulse:  [44-54] 54  Resp:  [16-18] 17  BP: (122-159)/(55-72) 140/69  SpO2:  [92 %-99 %] 93 %  General: No acute distress.  HEENT: Normocephalic atraumatic.  Neck: No lymphadenopathy.   Respiratory: Clear to auscultation bilaterally. No wheezes. No rhonchi.  Cardiovascular: S1, S2. Regular rate and rhythm.   Chest and Back: No tenderness or deformity.  Abdomen: Soft, nontender, nondistended.    Neurologic: No focal neurological deficits. Following some commands.   Musculoskeletal: Moves all extremities spontaneously.   Extremities: No edema or cyanosis.  Integument: No rashes or lesions.   Psychiatric: Appropriate mood and affect.      Diagnostic Data:    Labs:  Recent Labs   Lab 25  1548 25  0756 25  0720 25  0650   WBC 6.1 6.7 6.2 9.0   HGB 12.0* 10.5* 11.0* 11.2*   MCV 95.2 95.2 96.3 93.8   .0 200.0 199.0 167.0   INR 1.25*  --   --   --        Recent Labs   Lab 25  1548 25  0756 25  0700 25  0650 25  0711   *   < > 128* 135* 140*   BUN 56*   < > 26* 19 30*   CREATSERUM 10.83*   < > 6.93* 5.07* 6.35*   CA 10.0   < > 8.9 9.4 9.4   ALB 4.3   < > 3.7 3.8 3.9      < > 136 134* 133*   K 4.2   < > 4.2 4.2 4.4      < > 100 99 93*   CO2 24.0   < > 24.0 24.0 26.0   ALKPHO 185*  --   --   --   --    AST 23  --   --   --   --    ALT 10  --   --   --   --    BILT 0.2  --    --   --   --    TP 6.9  --   --   --   --     < > = values in this interval not displayed.       Estimated Creatinine Clearance: 9 mL/min (A) (based on SCr of 6.35 mg/dL (H)).    Recent Labs   Lab 04/26/25  1548   PTP 15.8*   INR 1.25*              COVID-19 Lab Results    COVID-19  Lab Results   Component Value Date    COVID19 Not Detected 08/13/2022    COVID19 Not Detected 12/26/2020    COVID19 NEGATIVE 10/01/2020       Pro-Calcitonin  No results for input(s): \"PCT\" in the last 168 hours.    Cardiac  No results for input(s): \"TROP\", \"PBNP\" in the last 168 hours.    Creatinine Kinase  No results for input(s): \"CK\" in the last 168 hours.    Inflammatory Markers  No results for input(s): \"CRP\", \"MADELIN\", \"LDH\", \"DDIMER\" in the last 168 hours.    Imaging: Imaging data reviewed in Epic.    Medications: Scheduled Medications[1]    Assessment & Plan:    Tariq Nolan is a 74 year old male with PMH sig for CAD w/ stents, anemia, DM2, HTN, DL, and ESRD on PD who presents to the ED with acute onset confusion, left sided weakness.     Acute metabolic encephalopathy  Visual hallucinations  -CT and MRI brain negative for acute findings, MRI limited.  -No acute infection , ammonia wnl   -Neuro evaluated, EEG suggestive of metabolic etiology, suspect PD ineffective-- temp HD catheter placed 4/29 and tolerated 3 HD sessions so far. Next session today. Nephrology consider placement of tunneled HD cath if blood thinning meds can be held.   -baclofen taper per neurology  -- monitor tremors   -pt/ot -- rec rehab at [place]    ESRD on PD  -temp catheter placed 4/29   -sessions per nephrology  -trend labs and monitor mentation     Bradycardia, history of Afib  CAD s/p PCI   -cardiology on consult, hold BB, echo completed  -on plavix, okay to hold but needs to be on bASA while plavix held  -okay to hold eliquis without bridge given minimal day to day risk of CVA.    DM2  -hold home meds  -ISS+accuchecks     HTN  -resume home meds      DL  -statin     Anemia  -stable, monitor with daily labs      Quality:  DVT Prophylaxis: heparin, scds  CODE status: full code   Gavin: no  If COVID testing is negative, may discontinue isolation: yes      Plan of care discussed with patient and all questions answered.      Makayla Guajardo MD  [facility] Hospitalist          [1]    hydrALAZINE  25 mg Oral Q8H INDU    sevelamer carbonate  800 mg Oral TID CC    heparin  1.5 mL Intravenous Once    clopidogrel  75 mg Oral Daily    apixaban  5 mg Oral BID    LORazepam  1 mg Intravenous Once    amLODIPine  10 mg Oral Daily    baclofen  10 mg Oral BID    isosorbide mononitrate ER  60 mg Oral Daily    rosuvastatin  10 mg Oral Daily    venlafaxine  37.5 mg Oral Daily    insulin aspart  1-10 Units Subcutaneous TID AC and HS

## 2025-05-03 NOTE — PLAN OF CARE
A/O x4  VSS. RA  Q shift neuros, see flowsheets  R HD cath clamped, CDI  PD cat in place, plan to remove on Mon  Cardiac diet  Up w/ shagufta roche x1  Anuric  Denies pain  Denies nausea  Safety measures in place, call light w/in reach  Discussed plan of care w/ pt and family at bedside    Plan: HD today

## 2025-05-04 LAB
ALBUMIN SERPL-MCNC: 3.7 G/DL (ref 3.2–4.8)
ANION GAP SERPL CALC-SCNC: 10 MMOL/L (ref 0–18)
BUN BLD-MCNC: 21 MG/DL (ref 9–23)
CALCIUM BLD-MCNC: 9.2 MG/DL (ref 8.7–10.6)
CHLORIDE SERPL-SCNC: 96 MMOL/L (ref 98–112)
CO2 SERPL-SCNC: 26 MMOL/L (ref 21–32)
CREAT BLD-MCNC: 4.56 MG/DL (ref 0.7–1.3)
EGFRCR SERPLBLD CKD-EPI 2021: 13 ML/MIN/1.73M2 (ref 60–?)
GLUCOSE BLD-MCNC: 189 MG/DL (ref 70–99)
GLUCOSE BLD-MCNC: 189 MG/DL (ref 70–99)
GLUCOSE BLD-MCNC: 254 MG/DL (ref 70–99)
GLUCOSE BLD-MCNC: 318 MG/DL (ref 70–99)
GLUCOSE BLD-MCNC: 329 MG/DL (ref 70–99)
MAGNESIUM SERPL-MCNC: 1.9 MG/DL (ref 1.6–2.6)
OSMOLALITY SERPL CALC.SUM OF ELEC: 282 MOSM/KG (ref 275–295)
PHOSPHATE SERPL-MCNC: 3.8 MG/DL (ref 2.4–5.1)
POTASSIUM SERPL-SCNC: 4.4 MMOL/L (ref 3.5–5.1)
SODIUM SERPL-SCNC: 132 MMOL/L (ref 136–145)

## 2025-05-04 RX ORDER — INSULIN DEGLUDEC 100 U/ML
8 INJECTION, SOLUTION SUBCUTANEOUS NIGHTLY
Status: DISCONTINUED | OUTPATIENT
Start: 2025-05-04 | End: 2025-05-08

## 2025-05-04 NOTE — PROGRESS NOTES
[place]   part of [facility] Hospitalist Progress Note     Tariq Nolan Patient Status:  Inpatient    1950 MRN [id number]   Location [facility] 3NE-A Attending Herrera Mariscal MD   Hosp Day # 8 PCP Jacob Hicks MD     Subjective:   Continues to feel improved  R foot more comfortable/moving better  Neck muscles starting to improve as well  Remains less confused    Objective:    Review of Systems:   Unable to obtain ROS due to patient mentation at this time.     Vital signs:  Temp:  [98 °F (36.7 °C)-99.1 °F (37.3 °C)] 98.4 °F (36.9 °C)  Pulse:  [48-55] 54  Resp:  [18-22] 18  BP: (140-157)/(59-69) 147/59  SpO2:  [93 %-98 %] 93 %  General: No acute distress.  HEENT: Normocephalic atraumatic.  Neck: No lymphadenopathy.   Respiratory: Clear to auscultation bilaterally. No wheezes. No rhonchi.  Cardiovascular: S1, S2. Regular rate and rhythm.   Chest and Back: No tenderness or deformity.  Abdomen: Soft, nontender, nondistended.    Neurologic: No focal neurological deficits. Following some commands.   Musculoskeletal: Moves all extremities spontaneously.   Extremities: No edema or cyanosis.  Integument: No rashes or lesions.   Psychiatric: Appropriate mood and affect.      Diagnostic Data:    Labs:  Recent Labs   Lab 25  0720 25  0650   WBC 6.2 9.0   HGB 11.0* 11.2*   MCV 96.3 93.8   .0 167.0       Recent Labs   Lab 25  0650 25  0711 25  0705   * 140* 189*   BUN 19 30* 21   CREATSERUM 5.07* 6.35* 4.56*   CA 9.4 9.4 9.2   ALB 3.8 3.9 3.7   * 133* 132*   K 4.2 4.4 4.4   CL 99 93* 96*   CO2 24.0 26.0 26.0       Estimated Creatinine Clearance: 12.6 mL/min (A) (based on SCr of 4.56 mg/dL (H)).    No results for input(s): \"PTP\", \"INR\" in the last 168 hours.             COVID-19 Lab Results    COVID-19  Lab Results   Component Value Date    COVID19 Not Detected 2022    COVID19 Not Detected 2020    COVID19  NEGATIVE 10/01/2020       Pro-Calcitonin  No results for input(s): \"PCT\" in the last 168 hours.    Cardiac  No results for input(s): \"TROP\", \"PBNP\" in the last 168 hours.    Creatinine Kinase  No results for input(s): \"CK\" in the last 168 hours.    Inflammatory Markers  No results for input(s): \"CRP\", \"MADELIN\", \"LDH\", \"DDIMER\" in the last 168 hours.    Imaging: Imaging data reviewed in Epic.    Medications: Scheduled Medications[1]    Assessment & Plan:    Tariq Nolan is a 74 year old male with PMH sig for CAD w/ stents, anemia, DM2, HTN, DL, and ESRD on PD who presents to the ED with acute onset confusion, left sided weakness.     Acute metabolic encephalopathy  Visual hallucinations  -CT and MRI brain negative for acute findings, MRI limited.  -No acute infection , ammonia wnl   -Neuro evaluated, EEG suggestive of metabolic etiology, suspect PD ineffective-- temp HD catheter placed 4/29 and tolerated 4 HD sessions. Tunneled HD catheter placement ordered for tomorrow.  -baclofen taper per neurology  -- monitor tremors   -pt/ot -- rec rehab at [place]    ESRD on PD  -temp catheter placed 4/29, planning tunneled placement tomorrow.  -sessions per nephrology  -trend labs and monitor mentation     Bradycardia, history of Afib  CAD s/p PCI   -cardiology on consult, hold BB, echo completed  -on plavix, okay to hold but needs to be on bASA while plavix held  -okay to hold eliquis without bridge given minimal day to day risk of CVA.    DM2  -glc have been elevated even after switching to glucerna nutritional shakes, suspect appetite has been improving  -normally on lantus at home. Will restart some longacting and increase ssi to high dose.      HTN  -resume home meds     DL  -statin     Anemia  -stable, monitor with daily labs      Quality:  DVT Prophylaxis: heparin, scds  CODE status: full code   Gavin: no  If COVID testing is negative, may discontinue isolation: yes      Plan of care discussed with patient and all questions  answered.      Makayla Guajardo MD  [facility] hospital Hospitalist          [1]    aspirin  81 mg Oral Daily    hydrALAZINE  25 mg Oral Q8H INDU    sevelamer carbonate  800 mg Oral TID CC    heparin  1.5 mL Intravenous Once    [Held by provider] clopidogrel  75 mg Oral Daily    [Held by provider] apixaban  5 mg Oral BID    LORazepam  1 mg Intravenous Once    amLODIPine  10 mg Oral Daily    baclofen  10 mg Oral BID    isosorbide mononitrate ER  60 mg Oral Daily    rosuvastatin  10 mg Oral Daily    venlafaxine  37.5 mg Oral Daily    insulin aspart  1-10 Units Subcutaneous TID AC and HS

## 2025-05-04 NOTE — PROGRESS NOTES
[facility]   part of [facility]    Nephrology Progress Note    Tariq Nolan Attending:  Makayla Guajardo MD       SUBJECTIVE:     Tolerated HD yesterday  Got 2L UF.  Cardiology adjusted blood thinners in prep for tunnelled HD catheter placement.  Discussed with patient since he is off blood thinners for above procedure if they would want consideration for PD catheter removal in hospital. They would like to see if this would be possible.    PHYSICAL EXAM:     Vital Signs: /59 (BP Location: Left arm)   Pulse 52   Temp 99.3 °F (37.4 °C) (Oral)   Resp 18   Ht 165.1 cm (5' 5\")   Wt 199 lb 6.4 oz (90.4 kg)   SpO2 93%   BMI 33.18 kg/m²   Temp (24hrs), Av.6 °F (37 °C), Min:98 °F (36.7 °C), Max:99.3 °F (37.4 °C)       Intake/Output Summary (Last 24 hours) at 2025 1500  Last data filed at 2025 1000  Gross per 24 hour   Intake 240 ml   Output 2050 ml   Net -1810 ml     Wt Readings from Last 3 Encounters:   25 199 lb 6.4 oz (90.4 kg)   10/23/24 190 lb 0.6 oz (86.2 kg)   10/20/24 190 lb (86.2 kg)       General: pleasant, well appearing  Skin: no visible rashes  HEENT: NCAT  Cardiac: Regular rate and rhythm, no murmur/gallop or rub  Lungs: CTAB  Abdomen: Soft, NTND  Extremities: warm, well perfused, no leg edema  Neurologic/Psych: mentating well  RIJ temporary HD catheter     LABORATORY DATA:     Lab Results   Component Value Date     (H) 2025    BUN 21 2025    BUNCREA 26.0 (H) 2022    CREATSERUM 4.56 (H) 2025    ANIONGAP 10 2025    GFR >59 10/05/2010    GFRNAA 28 (L) 2019    GFRAA 33 (L) 2019    CA 9.2 2025    OSMOCALC 282 2025    ALKPHO 185 (H) 2025    AST 23 2025    ALT 10 2025    BILT 0.2 2025    TP 6.9 2025    ALB 3.7 2025    GLOBULIN 2.6 2025    AGRATIO 2.1 06/10/2015     (L) 2025    K 4.4 2025    CL 96 (L) 2025    CO2 26.0 2025     Lab Results    Component Value Date    WBC 9.0 05/02/2025    RBC 3.40 (L) 05/02/2025    HGB 11.2 (L) 05/02/2025    HCT 31.9 (L) 05/02/2025    .0 05/02/2025    MPV 9.9 09/18/2012    MCV 93.8 05/02/2025    MCH 32.9 05/02/2025    MCHC 35.1 05/02/2025    RDW 11.5 05/02/2025    NEPRELIM 6.31 05/02/2025    NEUTABS 3.98 03/26/2019    LYMPHABS 1.06 03/26/2019    EOSABS 0.06 03/26/2019    BASABS 0.06 03/26/2019    NEUT 41 03/26/2019    LYMPH 19 03/26/2019    MON 8 03/26/2019    EOS 1 03/26/2019    BASO 1 03/26/2019    NEPERCENT 70.5 05/02/2025    LYPERCENT 8.4 05/02/2025    MOPERCENT 18.2 05/02/2025    EOPERCENT 1.9 05/02/2025    BAPERCENT 0.4 05/02/2025    NE 6.31 05/02/2025    LYMABS 0.75 (L) 05/02/2025    MOABSO 1.63 (H) 05/02/2025    EOABSO 0.17 05/02/2025    BAABSO 0.04 05/02/2025     Lab Results   Component Value Date    MALBP 5.9 02/28/2022    CREUR 33.38 02/28/2022     Lab Results   Component Value Date    COLORUR Light-Yellow 04/27/2025    CLARITY Clear 04/27/2025    SPECGRAVITY >1.030 (H) 04/27/2025    GLUUR 300 (A) 04/27/2025    BILUR Negative 04/27/2025    KETUR Negative 04/27/2025    BLOODURINE 1+ (A) 04/27/2025    PHURINE 6.0 04/27/2025    PROUR 100 (A) 04/27/2025    UROBILINOGEN Normal 04/27/2025    NITRITE Negative 04/27/2025    LEUUR Negative 04/27/2025    NMIC Microscopic not indicated 12/28/2016    WBCUR 11-20 (A) 04/27/2025    RBCUR 3-5 (A) 04/27/2025    EPIUR Few (A) 04/27/2025    BACUR None Seen 04/27/2025    HYLUR Present (A) 12/18/2018         IMAGING:     Reviewed.    MEDICATIONS:       Current Hospital Medications[1]    ASSESSMENT/PLAN:     75 yo M with history of ESRD on PD, CAD, DM, HTN, HL, presented with AMS, tremors/jerking movements and weakness.     ESRD:  -- PD on hold  -- started iHD; first treatment on 4/29  -- tunnelled HD catheter placement on 5/5 if ok with IR: he was on eliquis and plavix and these were adjusted per cardiology recommendations  -- SW consult for outpatient HD placement  --  gen surg consultation for PD catheter removal since his blood thinners are already being adjusted for tunnelled HD catheter placement  -- tentative HD tomorrow or  Tuesday pending lab trend/volume status     Anemia:  -- ANTONIO for Hb < 10     MBD:  -- trend phos  -- continue sevelamer     HTN:  -- UF with HD  -- amlodipine, hydralazine, imdur     D/w RN and family at bedside. Will continue to follow.    Thank you for allowing me to participate in the care of this patient. Please do not hesitate to call with questions or concerns.        MD Faisal Reveles Nephrology         [1]   Current Facility-Administered Medications   Medication Dose Route Frequency    aspirin chewable tab 81 mg  81 mg Oral Daily    hydrALAZINE (Apresoline) tab 25 mg  25 mg Oral Q8H INDU    heparin (Porcine) 1000 UNIT/ML injection 1,500 Units  1.5 mL Intracatheter PRN Dialysis    metoclopramide (Reglan) 5 mg/mL injection 10 mg  10 mg Intravenous Daily PRN    sevelamer carbonate (Renvela) tab 800 mg  800 mg Oral TID CC    heparin (Porcine) 100 Units/mL lock flush 150 Units  1.5 mL Intravenous Once    acetaminophen (Tylenol) tab 650 mg  650 mg Oral Q4H PRN    Or    acetaminophen (Tylenol) rectal suppository 650 mg  650 mg Rectal Q4H PRN    labetalol (Trandate) 5 mg/mL injection 10 mg  10 mg Intravenous Q10 Min PRN    hydrALAzine (Apresoline) 20 mg/mL injection 10 mg  10 mg Intravenous Q2H PRN    [Held by provider] clopidogrel (Plavix) tab 75 mg  75 mg Oral Daily    [Held by provider] apixaban (Eliquis) tab 5 mg  5 mg Oral BID    melatonin tab 3 mg  3 mg Oral Nightly PRN    ondansetron (Zofran) 4 MG/2ML injection 4 mg  4 mg Intravenous Q6H PRN    LORazepam (Ativan) 2 mg/mL injection 1 mg  1 mg Intravenous Once    amLODIPine (Norvasc) tab 10 mg  10 mg Oral Daily    baclofen (Lioresal) tab 10 mg  10 mg Oral BID    isosorbide mononitrate ER (Imdur) 24 hr tab 60 mg  60 mg Oral Daily    rosuvastatin (Crestor) tab 10 mg  10 mg Oral Daily    venlafaxine  (Effexor) tab 37.5 mg  37.5 mg Oral Daily    glucose (Dex4) 15 GM/59ML oral liquid 15 g  15 g Oral Q15 Min PRN    Or    glucose (Glutose) 40% oral gel 15 g  15 g Oral Q15 Min PRN    Or    glucose-vitamin C (Dex-4) chewable tab 4 tablet  4 tablet Oral Q15 Min PRN    Or    dextrose 50% injection 50 mL  50 mL Intravenous Q15 Min PRN    Or    glucose (Dex4) 15 GM/59ML oral liquid 30 g  30 g Oral Q15 Min PRN    Or    glucose (Glutose) 40% oral gel 30 g  30 g Oral Q15 Min PRN    Or    glucose-vitamin C (Dex-4) chewable tab 8 tablet  8 tablet Oral Q15 Min PRN    insulin aspart (NovoLOG) 100 Units/mL FlexPen 1-10 Units  1-10 Units Subcutaneous TID AC and HS

## 2025-05-04 NOTE — PLAN OF CARE
Assumed patient care at 0730.  Vital signs stable.  Patient alert and oriented x 4.  Patient neurologically intact.  Up to chair with negar jenn.  NPO after midnight for permacath placement tomorrow and Dr Muller notified of surgery consult for PD catheter removal.  Problem: SAFETY ADULT - FALL  Goal: Free from fall injury  Description: INTERVENTIONS:- Assess pt frequently for physical needs- Identify cognitive and physical deficits and behaviors that affect risk of falls.- [place] fall precautions as indicated by assessment.- Educate pt/family on patient safety including physical limitations- Instruct pt to call for assistance with activity based on assessment- Modify environment to reduce risk of injury- Provide assistive devices as appropriate- Consider OT/PT consult to assist with strengthening/mobility- Encourage toileting schedule  Outcome: Progressing     Problem: CARDIOVASCULAR - ADULT  Goal: Absence of cardiac arrhythmias or at baseline  Description: INTERVENTIONS:- Continuous cardiac monitoring, monitor vital signs, obtain 12 lead EKG if indicated- Evaluate effectiveness of antiarrhythmic and heart rate control medications as ordered- Initiate emergency measures for life threatening arrhythmias- Monitor electrolytes and administer replacement therapy as ordered  Outcome: Progressing     Problem: SKIN/TISSUE INTEGRITY - ADULT  Goal: Skin integrity remains intact  Description: INTERVENTIONS- Assess and document risk factors for pressure ulcer development- Assess and document skin integrity- Monitor for areas of redness and/or skin breakdown- Initiate interventions, skin care algorithm/standards of care as needed  Outcome: Progressing     Problem: NEUROLOGICAL - ADULT  Goal: Achieves stable or improved neurological status  Description: INTERVENTIONS- Assess for and report changes in neurological status- Initiate measures to prevent increased intracranial pressure- Maintain blood pressure and fluid volume  within ordered parameters to optimize cerebral perfusion and minimize risk of hemorrhage- Monitor temperature, glucose, and sodium. Initiate appropriate interventions as ordered  Outcome: Progressing

## 2025-05-04 NOTE — PLAN OF CARE
Assumed care at 1930  C/o neck pain, Tylenol given.Warm pack applied.  Afib - HR controlled.  Plan:Permanent catheter on Monday.Eliquis and Plavix on hold.On Aspirin.  Vital signs stable.  Problem: Patient/Family Goals  Goal: Patient/Family Long Term Goal  Description: Patient's Long Term Goal: go home  Interventions:--cardiac monitoring  -Tunneled HD cath placement  -Hemodialysis  -Labs  -monitor vital signs   See additional Care Plan goals for specific interventions  Outcome: Progressing  Goal: Patient/Family Short Term Goal  Description: Patient's Short Term Goal: pain controlled  Interventions: -pain medicine   See additional Care Plan goals for specific interventions  Outcome: Progressing     Problem: SAFETY ADULT - FALL  Goal: Free from fall injury  Description: INTERVENTIONS:- Assess pt frequently for physical needs- Identify cognitive and physical deficits and behaviors that affect risk of falls.- [place] fall precautions as indicated by assessment.- Educate pt/family on patient safety including physical limitations- Instruct pt to call for assistance with activity based on assessment- Modify environment to reduce risk of injury- Provide assistive devices as appropriate- Consider OT/PT consult to assist with strengthening/mobility- Encourage toileting schedule  Outcome: Progressing     Problem: CARDIOVASCULAR - ADULT  Goal: Absence of cardiac arrhythmias or at baseline  Description: INTERVENTIONS:- Continuous cardiac monitoring, monitor vital signs, obtain 12 lead EKG if indicated- Evaluate effectiveness of antiarrhythmic and heart rate control medications as ordered- Initiate emergency measures for life threatening arrhythmias- Monitor electrolytes and administer replacement therapy as ordered  Outcome: Progressing     Problem: NEUROLOGICAL - ADULT  Goal: Achieves stable or improved neurological status  Description: INTERVENTIONS- Assess for and report changes in neurological status- Initiate measures to  prevent increased intracranial pressure- Maintain blood pressure and fluid volume within ordered parameters to optimize cerebral perfusion and minimize risk of hemorrhage- Monitor temperature, glucose, and sodium. Initiate appropriate interventions as ordered  Outcome: Progressing     Problem: SKIN/TISSUE INTEGRITY - ADULT  Goal: Skin integrity remains intact  Description: INTERVENTIONS- Assess and document risk factors for pressure ulcer development- Assess and document skin integrity- Monitor for areas of redness and/or skin breakdown- Initiate interventions, skin care algorithm/standards of care as needed  Outcome: Progressing

## 2025-05-05 ENCOUNTER — ANESTHESIA EVENT (OUTPATIENT)
Dept: SURGERY | Facility: HOSPITAL | Age: 75
DRG: 907 | End: 2025-05-05
Payer: MEDICARE

## 2025-05-05 ENCOUNTER — APPOINTMENT (OUTPATIENT)
Dept: INTERVENTIONAL RADIOLOGY/VASCULAR | Facility: HOSPITAL | Age: 75
DRG: 907 | End: 2025-05-05
Attending: INTERNAL MEDICINE
Payer: MEDICARE

## 2025-05-05 LAB
ALBUMIN SERPL-MCNC: 3.6 G/DL (ref 3.2–4.8)
ANION GAP SERPL CALC-SCNC: 10 MMOL/L (ref 0–18)
BUN BLD-MCNC: 35 MG/DL (ref 9–23)
CALCIUM BLD-MCNC: 9.4 MG/DL (ref 8.7–10.6)
CHLORIDE SERPL-SCNC: 97 MMOL/L (ref 98–112)
CO2 SERPL-SCNC: 22 MMOL/L (ref 21–32)
CREAT BLD-MCNC: 5.72 MG/DL (ref 0.7–1.3)
EGFRCR SERPLBLD CKD-EPI 2021: 10 ML/MIN/1.73M2 (ref 60–?)
GLUCOSE BLD-MCNC: 109 MG/DL (ref 70–99)
GLUCOSE BLD-MCNC: 122 MG/DL (ref 70–99)
GLUCOSE BLD-MCNC: 132 MG/DL (ref 70–99)
GLUCOSE BLD-MCNC: 161 MG/DL (ref 70–99)
GLUCOSE BLD-MCNC: 167 MG/DL (ref 70–99)
MAGNESIUM SERPL-MCNC: 2 MG/DL (ref 1.6–2.6)
OSMOLALITY SERPL CALC.SUM OF ELEC: 277 MOSM/KG (ref 275–295)
PHOSPHATE SERPL-MCNC: 4.3 MG/DL (ref 2.4–5.1)
POTASSIUM SERPL-SCNC: 4.7 MMOL/L (ref 3.5–5.1)
SODIUM SERPL-SCNC: 129 MMOL/L (ref 136–145)

## 2025-05-05 PROCEDURE — B519ZZA FLUOROSCOPY OF INFERIOR VENA CAVA, GUIDANCE: ICD-10-PCS | Performed by: RADIOLOGY

## 2025-05-05 PROCEDURE — 02PY33Z REMOVAL OF INFUSION DEVICE FROM GREAT VESSEL, PERCUTANEOUS APPROACH: ICD-10-PCS | Performed by: RADIOLOGY

## 2025-05-05 PROCEDURE — 0JH63XZ INSERTION OF TUNNELED VASCULAR ACCESS DEVICE INTO CHEST SUBCUTANEOUS TISSUE AND FASCIA, PERCUTANEOUS APPROACH: ICD-10-PCS | Performed by: RADIOLOGY

## 2025-05-05 PROCEDURE — 06H033Z INSERTION OF INFUSION DEVICE INTO INFERIOR VENA CAVA, PERCUTANEOUS APPROACH: ICD-10-PCS | Performed by: RADIOLOGY

## 2025-05-05 RX ORDER — NALOXONE HYDROCHLORIDE 0.4 MG/ML
0.08 INJECTION, SOLUTION INTRAMUSCULAR; INTRAVENOUS; SUBCUTANEOUS AS NEEDED
Status: DISCONTINUED | OUTPATIENT
Start: 2025-05-05 | End: 2025-05-12

## 2025-05-05 RX ORDER — LIDOCAINE HYDROCHLORIDE 10 MG/ML
INJECTION, SOLUTION INFILTRATION; PERINEURAL
Status: COMPLETED
Start: 2025-05-05 | End: 2025-05-05

## 2025-05-05 RX ORDER — MIDAZOLAM HYDROCHLORIDE 1 MG/ML
INJECTION INTRAMUSCULAR; INTRAVENOUS
Status: COMPLETED
Start: 2025-05-05 | End: 2025-05-05

## 2025-05-05 RX ORDER — CEFAZOLIN SODIUM 1 G/3ML
INJECTION, POWDER, FOR SOLUTION INTRAMUSCULAR; INTRAVENOUS
Status: COMPLETED
Start: 2025-05-05 | End: 2025-05-05

## 2025-05-05 RX ORDER — HEPARIN SODIUM 5000 [USP'U]/ML
INJECTION, SOLUTION INTRAVENOUS; SUBCUTANEOUS
Status: COMPLETED
Start: 2025-05-05 | End: 2025-05-05

## 2025-05-05 RX ORDER — ALBUMIN (HUMAN) 12.5 G/50ML
25 SOLUTION INTRAVENOUS
Status: DISCONTINUED | OUTPATIENT
Start: 2025-05-05 | End: 2025-05-06

## 2025-05-05 RX ORDER — LIDOCAINE HYDROCHLORIDE AND EPINEPHRINE BITARTRATE 20; .01 MG/ML; MG/ML
INJECTION, SOLUTION SUBCUTANEOUS
Status: COMPLETED
Start: 2025-05-05 | End: 2025-05-05

## 2025-05-05 NOTE — PROCEDURES
[facility]  Pre-Procedure Note    Name: Tariq Nolan  MRN#: [id number]  : 1950    Procedure:   Fluoro Guided Tunneled Permanent Dialysis Catheter Placement    Indication: ESRD requiring HD    Allergies:    Allergies[1]    Pertinent Medications:    Is patient on any Aspirin, Coumadin, or any other Anticoagulations/Antiplatelet medications?  no      Mental Status:  Alert and Oriented      Health Status: Acceptable for Procedure    Impression and Plans:    Patient requires HD for ESRD and tunneled Permanent dialysis catheter placement requested by Nephrology.    I have reviewed the above information prior to procedure.    I have discussed the risks and benefits and alternatives with the patient.  The patient understands and agrees to proceed with plan of care.    Pedro Donato MD                 [1]   Allergies  Allergen Reactions    Tetanus-Diphtheria Toxoids Td  [Diphteria And Tetanus] OTHER (SEE COMMENTS)    Tetanus Toxoid

## 2025-05-05 NOTE — CONSULTS
[facility], [place]  Report of Consultation    Tariq Nolan Patient Status:  Inpatient    1950 MRN [id number]   Location [facility] 3NE-A Attending Makayla Guajardo MD   Hosp Day # 9 PCP Jacob Hicks MD     [id number]    Reason for Consultation:    Surgical Consultation     CC:   Chief Complaint   Patient presents with    Altered Mental Status        History of Present Illness:    Tariq Nolan is a a(n) 74 year old male. Patient with ESRD on PD was admitted with confusion. Imaging was negative for CVA, EEG suggested metabolic encephalopathy.   Renal following patient had started HD, feels improved, tolerating HD  Surgical consultation was obtain to remove PD catheter.   Patients plavix and eliquis is currently on hold.     Past Medical History:    Past Medical History[1]    Past Surgical History:    Past Surgical History[2]    Family History:    Family History[3]    Social History:     reports that he has never smoked. He has never used smokeless tobacco. He reports that he does not drink alcohol and does not use drugs.    Allergies:    Allergies[4]    Current Medications:    Current Hospital Medications[5]    Home Medications:    Medications Ordered Prior to Encounter[6]    Review of Systems:    10 point review performed pertinent positives and negatives per history of present illness.    Physical Exam:    Blood pressure 138/61, pulse (!) 41, temperature 98.2 °F (36.8 °C), temperature source Oral, resp. rate 18, height 5' 5\" (1.651 m), weight 199 lb 6.4 oz (90.4 kg), SpO2 97%.    GENERAL: Alert and oriented x 3, well developed, well nourished male, in no apparent distress    SKIN: anicteric    RESPIRATORY: non labored breathing    CARDIOVASCULAR: RRR    ABDOMEN: PD catheter in place. Soft, non tender     LYMPHATIC: no lymphadenopathy    EXTREMITIES: no cyanosis, clubbing or edema    .    Laboratory Data:  Recent Labs   Lab 25  0650   WBC 9.0   HGB 11.2*   MCV 93.8   .0       Recent Labs    Lab 05/01/25  0700 05/02/25  0650 05/03/25  0711 05/04/25  0705 05/05/25  0635    134* 133* 132* 129*   K 4.2 4.2 4.4 4.4 4.7    99 93* 96* 97*   CO2 24.0 24.0 26.0 26.0 22.0   BUN 26* 19 30* 21 35*   CREATSERUM 6.93* 5.07* 6.35* 4.56* 5.72*   * 135* 140* 189* 122*   CA 8.9 9.4 9.4 9.2 9.4   MG 1.9 1.9 2.0 1.9 2.0   PHOS 5.3* 4.0 5.4* 3.8 4.3       Recent Labs   Lab 05/01/25  0700 05/02/25  0650 05/03/25  0711 05/04/25  0705 05/05/25  0635   ALB 3.7 3.8 3.9 3.7 3.6       No results for input(s): \"TROP\" in the last 168 hours.          Radiology:    XR CHEST AP PORTABLE  (CPT=71045)  Result Date: 4/29/2025  PROCEDURE:  XR CHEST AP PORTABLE  (CPT=71045)  TECHNIQUE:  AP chest radiograph was obtained.  COMPARISON:  [facility] , XR, XR CHEST AP PORTABLE  (CPT=71045), 4/27/2025, 5:27 AM.  INDICATIONS:  Temp cath insrtion  PATIENT STATED HISTORY: (As transcribed by Technologist)  Patient offered no additional history at this time.    FINDINGS:  There is a new right IJ approach temporary HD catheter.  Tip central SVC.  No pneumothorax.  Other findings appear stable.               CONCLUSION:  As above.   LOCATION:  [facility]      Dictated by (CST): Daniel Khan MD on 4/29/2025 at 2:56 PM     Finalized by (CST): Daniel Khan MD on 4/29/2025 at 2:56 PM       IR CENTRAL VENOUS ACCESS  Result Date: 4/29/2025  PROCEDURE:  IR CENTRAL VENOUS CATHETER INSERTION  COMPARISON:  None.  INDICATIONS:  HD trial (currently on PD and felt to be inadequate form of dialysis), renal insufficiency  DESCRIPTION OF PROCEDURE:  Witnessed verbal and written informed consent was obtained.  Time out was performed by the staff.   Diagnostic ultrasound was performed of the neck to assess the patency and size of both internal jugular veins, and the relationship of the jugular veins to the carotid arteries.  Permanent images were stored in the PACS system.  An ultrasound was perform with sterile gel & sterile probe covers.  After the  ultrasound exam, all operators present for the case performed standard pre-procedural prep including hand washing, sterile gloves, gown, mask, and cap. The skin over the neck and chest was prepped and draped in the usual sterile manner using maximal barrier technique, and 1% Lidocaine local anesthetic was applied.  The internal jugular vein was punctured with a micro-access needle using direct real-time ultrasound guidance.  A guidewire was positioned in the superior vena cava.   Over a guidewire, a series of fascial dilators were used to dilate the tract.  The catheter advanced and secured to the skin with nonabsorbable suture a sterile dressing. The lumen flushed satisfactorily.  A stat chest x-ray was then ordered.  FINDINGS:  No remarkable sonographic or angiographic findings. VEIN ACCESSED:    Right internal jugular CATHETER:  16 cm double lumen MEDICATIONS:  Local, 5 cc 1% lidocaine COMPLICATIONS:  None. FLUORO TIME:  None.  The procedure was performed at the bedside due to the clinical status of the patient.            CONCLUSION:  Successful non-tunneled dialysis catheter placement.  Please note that the procedure was performed at the bedside due to the clinical status of the patient. A stat chest radiograph was ordered.   LOCATION:  [facility]    Dictated by (CST): Jaret Romo MD on 2025 at 2:14 PM     Finalized by (CST): Jaret Romo MD on 2025 at 2:23 PM       CARD ECHO 2D DOPPLER CONTRAST (CPT=93306)  Result Date: 2025  Transthoracic Echocardiogram Name:Tariq Nolan Date: 2025 :  1950 Ht:  (65in)  BP: 140 / 96 MRN:  [id number]     Age:  74years    Wt:  (207lb) HR: 50bpm Loc:  [facility]       Gndr: M          BSA: 2.01m^2 Sonographer: Miesha GAINES Ordering:    Prabhu Tobar Consulting:  Arnoldo Funez ---------------------------------------------------------------------------- History/Indications:   Coronary artery disease.  Cerebrovascular accident. Transient ischemic attack.  Risk factors:   Hypertension. Diabetes mellitus. Dyslipidemia.  PTCA.    Performed prior admission. ---------------------------------------------------------------------------- Procedure information:  A transthoracic complete 2D study was performed. Additional evaluation included M-mode, complete spectral Doppler, and color Doppler.  Patient status:  Inpatient.  Location:  Bedside.    This was a routine study. Transthoracic echocardiography for ventricular function evaluation and assessment of valvular function. Image quality was adequate. Intravenous contrast (agitated saline and Definity and agitated saline) was administered to identify shunting and opacify the LV. ECG rhythm:   Atrial fibrillation ---------------------------------------------------------------------------- Conclusions: 1. Left ventricle: The cavity size was normal. Wall thickness was mildly    increased. Systolic function was vigorous. The estimated ejection    fraction was 70-75%, by visual assessment. No diagnostic evidence for    regional wall motion abnormalities. Unable to assess LV diastolic    function due to heart rhythm. 2. Left atrium: The left atrial volume was moderately increased. 3. Right atrium: The atrium was dilated. 4. Atrial septum: Agitated saline contrast study showed no atrial level    shunt, at baseline or with provocation. 5. Ascending aorta: The ascending aorta was 3.5cm diameter. 6. Pericardium, extracardiac: There was a left pleural effusion. Impressions:  No previous study from [employer] [facility] [employer] was available for comparison. * ---------------------------------------------------------------------------- * Findings: Left ventricle:  The cavity size was normal. Wall thickness was mildly increased. Systolic function was vigorous. The estimated ejection fraction was 70-75%, by visual assessment. No diagnostic evidence for regional wall motion abnormalities. Unable to assess LV diastolic function due to heart rhythm. Left atrium:   The left atrial volume was moderately increased. Right ventricle:  The cavity size was normal. Systolic function was normal. Right atrium:  The atrium was dilated. Mitral valve:  The annulus was mildly calcified. The leaflets were mildly thickened and mildly calcified. Leaflet separation was normal.  Doppler: Transvalvular velocity was within the normal range. There was no evidence for stenosis. There was no significant regurgitation. Aortic valve:   The valve was trileaflet. The leaflets were mildly calcified. Cusp separation was normal.  Doppler:  Transvalvular velocity was within the normal range. There was no evidence for stenosis. There was no significant regurgitation. Tricuspid valve:  The valve is structurally normal. Leaflet separation was normal.  Doppler:  Transvalvular velocity was within the normal range. There was no evidence for stenosis. There was no significant regurgitation. Pulmonic valve:   The valve is structurally normal. Cusp separation was normal.  Doppler:  Transvalvular velocity was within the normal range. There was no evidence for stenosis. There was no significant regurgitation. Pericardium:  A prominent pericardial fat pad was present.  No significant pericardial effusion was identified. Pleura:  There was a left pleural effusion. Aorta: Aorta: The aorta was normal. Ascending aorta: The ascending aorta was 3.5cm diameter. Pulmonary arteries: The main pulmonary artery was normal-sized.  Systolic pressure could not be accurately estimated. Systemic veins:  Central venous respirophasic diameter changes are in the normal range (>50%). Inferior vena cava: The IVC was normal-sized. Atrial septum:   Agitated saline contrast study showed no atrial level shunt, at baseline or with provocation. ---------------------------------------------------------------------------- Measurements  Left ventricle                  Value        Ref  IVS thickness, ED, PLAX     (H) 1.2   cm     0.6 - 1.0  LV ID,  ED, PLAX                 5.0   cm     4.2 - 5.8  LV ID, ES, PLAX                 3.0   cm     2.5 - 4.0  LV PW thickness, ED, PLAX   (H) 1.1   cm     0.6 - 1.0  IVS/LV PW ratio, ED, PLAX       1.10         ---------  LV PW/LV ID ratio, ED, PLAX     0.22         ---------  LV ejection fraction            70    %      52 - 72  LV e', lateral                  10.4  cm/sec >=10.0  LV e', medial                   8.1   cm/sec >=7.0  LV e', average                  9.2   cm/sec ---------  Aortic root                     Value        Ref  Aortic root ID, ED              3.6   cm     2.7 - 4.1  Ascending aorta                 Value        Ref  Ascending aorta ID, A-P, ED     3.5   cm     2.2 - 3.8  Left atrium                     Value        Ref  LA ID, A-P, ES              (H) 4.5   cm     3.0 - 4.0  LA volume, S                (H) 91    ml     18 - 58  LA volume/bsa, S            (H) 45    ml/m^2 16 - 34  LA volume, ES, 1-p A4C      (H) 100   ml     18 - 58  LA volume, ES, 1-p A2C      (H) 82    ml     18 - 58  LA volume, ES, A/L              97    ml     ---------  LA volume/bsa, ES, A/L      (H) 48    ml/m^2 16 - 34  LA/aortic root ratio            1.25         ---------  Systemic veins                  Value        Ref  Estimated CVP                   3     mm Hg  ---------  Right ventricle                 Value        Ref  TAPSE, 2D                       2.28  cm     >=1.70 Legend: (L)  and  (H)  eric values outside specified reference range. ---------------------------------------------------------------------------- Prepared and electronically signed by Murphy Salas 04/27/2025 16:27     XR CHEST AP PORTABLE  (CPT=71045)  Result Date: 4/27/2025  PROCEDURE:  XR CHEST AP PORTABLE  (CPT=71045)  TECHNIQUE:  AP chest radiograph was obtained.  COMPARISON:  [facility] , XR, XR CHEST AP PORTABLE  (CPT=71045), 11/20/2019, 8:57 AM.  INDICATIONS:  vomiting r/o aspiration  PATIENT STATED HISTORY: (As transcribed by Technologist)                CONCLUSION:  Shallow inspiration accentuates the heart size.  Normal pulmonary vascularity.  Minimal atelectasis in the lung bases.  No focal consolidation, effusion or pneumothorax.   LOCATION:  [facility]      Dictated by (CST): Nancy Brady MD on 4/27/2025 at 7:37 AM     Finalized by (CST): Nancy Brady MD on 4/27/2025 at 7:38 AM       MRI BRAIN (GYG=37754)  Result Date: 4/26/2025  PROCEDURE:  MRI BRAIN (CPT=70551)  COMPARISON:  [facility] , CT, CT STROKE ([facility]) CTA BRAIN/CTA NECK+PERF(CPT=70496/90326/0042T), 4/26/2025, 4:36 PM.  [facility] , MR, MRI BRAIN W O CONT, 9/19/2012, 8:03 AM.  INDICATIONS:  altered mental Status  TECHNIQUE:  MRI of the brain was performed with multi-planar T1, T2-weighted images with FLAIR sequences and diffusion weighted images without infusion.  PATIENT STATED HISTORY: (As transcribed by Technologist)     FINDINGS:  INTRACRANIAL:  Few foci T2/FLAIR hyperintensity in the periventricular white matter consistent small vessel ischemic disease.  Diffusion weighted imaging was performed and is unremarkable.  There is no evidence for acute infarction.  There is no evidence  of hemorrhage or mass lesion.  VENTRICLES/SULCI:   Ventricles and sulci are normal in caliber.  There are no extra-axial fluid collections. There is no midline shift. SINUSES/ORBITS:       The visualized paranasal sinuses are clear.  The orbits are unremarkable. MASTOIDS:      The mastoids are clear.            CONCLUSION:  No evidence of acute intracranial process.   LOCATION:  [facility]   Dictated by (CST): Kirk Mccord MD on 4/26/2025 at 7:52 PM     Finalized by (CST): Kirk Mccord MD on 4/26/2025 at 7:53 PM       CT STROKE ([facility]) CTA BRAIN/CTA NECK+PERF(CPT=70496/81788/0042T)  Result Date: 4/26/2025  PROCEDURE:  CT STROKE([facility] BRAIN)CTA BRAIN/CTA NECK+PERF(CPT=70496/19885/0042T)  COMPARISON:  [facility] , CT, CT STROKE BRAIN (NO IV)(CPT=70450), 4/26/2025, 4:25 PM.  [facility] , CT, BRAIN W/O CONTRAST, 9/18/2012, 9:26 PM.   INDICATIONS:  altered mental Status  TECHNIQUE:  Unenhanced followed by contrast enhanced multi-slice CT angiography of the brain and neck vasculature using non-ionic contrast was performed.  3D volume renderings are performed by the radiologist on an independent workstation and interpreted  to optimize visualization of vascular anatomy.  PERFUSION:  Axial sections were obtained per stroke protocol. Arterial and venous structures were selected for purposes of brain perfusion mapping. All measurements obtained in this exam were performed using NASCET criteria.  Dose reduction techniques were used. Dose information is transmitted to the ACR (American College of Radiology) NRDR (National Radiology Data Registry) which includes the Dose Index Registry.  PATIENT STATED HISTORY:(As transcribed by Technologist)  Patient with slurred speech.   CONTRAST USED:  115cc of Isovue 370  FINDINGS:  VASCULATURE:  No significant stenosis.  No visible aneurysm or vascular malformation.  The perfusion maps demonstrates no evidence of ischemia, penumbra, or core infarct.  The cerebral blood flow map and the T-max maps are grossly unremarkable. VENTRICLES:  Normal for age.  No enlargement or displacement. CEREBRUM:  Normal for age.  No excessive atrophy, mass, or hemorrhage, or abnormal enhancement. CEREBELLUM:  Normal for age.  No excessive atrophy, mass, or hemorrhage, or abnormal enhancement. BRAINSTEM:  Normal for age.  No excessive atrophy, mass, or hemorrhage, or abnormal enhancement. BASAL CISTERNS:  No subarachnoid hemorrhage or effacement.  SKULL:  Negative.   LEFT INTERNAL CAROTID:  There is heavily calcified plaque causing just under 50% carotid stenosis of the left proximal internal carotid artery. EXTERNAL CAROTID:  No hemodynamically significant stenosis or dissection COMMON CAROTID:  Calcified plaque at the bulb causing less than 20% luminal narrowing. VERTEBRAL:  No hemodynamically significant stenosis or dissection   RIGHT INTERNAL CAROTID:   There is heavy smooth calcified plaque causing between 50 and 60% stenosis of the proximal internal carotid artery. EXTERNAL CAROTID:    No hemodynamically significant stenosis or dissection COMMON CAROTID:   Calcified plaque at the bulb causing less than 20% luminal narrowing. VERTEBRAL:   No hemodynamically significant stenosis or dissection  OTHER:  The visualized soft tissues of the neck are also unremarkable.              CONCLUSION:   1. No acute intracranial process.  2. Unremarkable CTA of the tedbaa-td-Ikjvat.  3. There is 50-60% stenosis secondary smooth calcified plaque involving the proximal right internal carotid artery.  4.  Less than 50% stenosis secondary to smooth calcified plaque involving the left proximal terminal carotid artery.   Findings were discussed with Dr. Negrete at 1700 hours on 4/26/2025 with read back.   LOCATION:  [facility]   Dictated by (CST): Kirk Mccord MD on 4/26/2025 at 4:53 PM     Finalized by (CST): Kirk Mccord MD on 4/26/2025 at 5:02 PM       CT STROKE BRAIN (NO IV)(CPT=70450)  Result Date: 4/26/2025  PROCEDURE:  CT STROKE BRAIN (NO IV)(CPT=70450)  COMPARISON:  [facility] , CT, BRAIN W/O CONTRAST, 9/18/2012, 9:26 PM.  INDICATIONS:  altered mental Status  TECHNIQUE:  Noncontrast CT scanning is performed through the brain.  Dose reduction techniques were used. Dose information is transmitted to the ACR (American College of Radiology) NRDR (National Radiology Data Registry) which includes the Dose Index Registry.  PATIENT STATED HISTORY: (As transcribed by Technologist)  Slurred speech with confusion.    FINDINGS:  VENTRICLES/SULCI:  Ventricles and sulci are mildly prominent consistent with atrophy. INTRACRANIAL:  Mild low-attenuation within the periventricular white matter consistent small vessel ischemic disease.  There are no abnormal extraaxial fluid collections.  There is no midline shift.  There are no intraparenchymal brain abnormalities.  There is  nothing specific for acute infarct.  There is no hemorrhage or mass lesion.  SINUSES:           No sign of acute sinusitis.  MASTOIDS:          No sign of acute inflammation. SKULL:             No evidence for fracture or osseous abnormality. OTHER:             None.            CONCLUSION:  No acute intracranial process.  Critical results were called to Dr. Negrete at 1635 hours on 4/26/2025.  There was appropriate read back.    LOCATION:  [facility]   Dictated by (CST): Kirk Mccord MD on 4/26/2025 at 4:34 PM     Finalized by (CST): Kirk Mccord MD on 4/26/2025 at 4:36 PM          Problem List:    Problem List[7]    Impression:    75 y/o with ESRD on PD- transitioning to HD    Plan:    Reviewed with patient surgical management  NPO at midnight  Obtain informed consent  Reviewed restrictions postop   Continue to hold eliquis and plavix  All questions answered  DW HENNY Orellana  [facility]  General Surgery  5/5/2025         [1]   Past Medical History:   Anemia due to stage 4 chronic kidney disease (HCC)    sees Dr. 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 Redding, last visit 11/11/20, 5 stents total since 2009    Diabetes mellitus (HCC)    Diabetic eye exam ([facility])    [place] [facility]    Essential hypertension    Gastric lesion    Dr. 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 Orr, [facility]    History of cardiovascular stress test    IPMN (intraductal papillary mucinous neoplasm)    saw Dr. Moura, seeing Dr. Hearn, EUS 12/29/20    Living will in place    OBESITY    JANAY (obstructive sleep apnea)    AHI 6 RDI 6 REM AHI 0 Supine AHI 46 non-supine  AHI 1.3 Sao2 Dirk 90%     Osteoarthritis    knees    Pneumonia due to organism    Post covid-19 condition, unspecified    asymptomatic; not hospitalized; no current symptoms    Presence of drug coated stent in LAD coronary artery 1/31/2017 3x12 mm Xience     good Shahid      Presence of drug coated stent in right coronary artery Distal 3/28 mm Xience & Prox 3.5x18mm Xience 1/24/2017    Good Shahid     Presence of DRUG stent in coronary artery 1/31/2017 mid RI 3/38mm Xience     Good Shahid      Pure hypercholesterolemia    TGA (transient global amnesia)    at [facility]. couldn't remember about 12 hr period, had CT, MRI, EEG, labs, carotid dop and echo all nl, will see Dr. Laguna for neuro f/u    Type 1 diabetes mellitus (HCC)    Vertigo    Visual impairment    prescription glasses   [2]   Past Surgical History:  Procedure Laterality Date    Cath bare metal stent (bms)      x5 ?    Cath percutaneous  transluminal coronary angioplasty      Colonoscopy  12/15/2008    tics, rhoids, polyps, repeat in 2013    Colonoscopy  02/14/2020    mult tubulovillous adenomas, Dr. Moura. repeat 2023    Colonoscopy N/A 2/11/2020    Procedure: COLONOSCOPY, POSSIBLE BIOPSY, POSSIBLE POLYPECTOMY 60550;  Surgeon: Tay Moura MD;  Location: [facility]    E[facility]  12/2020    gastric cardia lesion- intestinal metaplasia    Hemorrhoidectomy      Tonsillectomy  age 6   [3]   Family History  Adopted: Yes   Problem Relation Age of Onset    No Known Problems Daughter     No Known Problems Daughter     No Known Problems Daughter    [4]   Allergies  Allergen Reactions    Tetanus-Diphtheria Toxoids Td  [Diphteria And Tetanus] OTHER (SEE COMMENTS)    Tetanus Toxoid    [5]   Current Facility-Administered Medications:     sodium chloride 0.9 % IV bolus 100 mL, 100 mL, Intravenous, Q30 Min PRN **AND** albumin human (Albumin) 25% injection 25 g, 25 g, Intravenous, PRN Dialysis    insulin aspart (NovoLOG) 100 Units/mL FlexPen 4-20 Units, 4-20 Units,  Subcutaneous, TID AC and HS    insulin degludec (Tresiba) 100 units/mL flextouch 8 Units, 8 Units, Subcutaneous, Nightly    aspirin chewable tab 81 mg, 81 mg, Oral, Daily    hydrALAZINE (Apresoline) tab 25 mg, 25 mg, Oral, Q8H INDU    heparin (Porcine) 1000 UNIT/ML injection 1,500 Units, 1.5 mL, Intracatheter, PRN Dialysis    metoclopramide (Reglan) 5 mg/mL injection 10 mg, 10 mg, Intravenous, Daily PRN    sevelamer carbonate (Renvela) tab 800 mg, 800 mg, Oral, TID CC    heparin (Porcine) 100 Units/mL lock flush 150 Units, 1.5 mL, Intravenous, Once    acetaminophen (Tylenol) tab 650 mg, 650 mg, Oral, Q4H PRN **OR** acetaminophen (Tylenol) rectal suppository 650 mg, 650 mg, Rectal, Q4H PRN    labetalol (Trandate) 5 mg/mL injection 10 mg, 10 mg, Intravenous, Q10 Min PRN    hydrALAzine (Apresoline) 20 mg/mL injection 10 mg, 10 mg, Intravenous, Q2H PRN    [Held by provider] clopidogrel (Plavix) tab 75 mg, 75 mg, Oral, Daily    [Held by provider] apixaban (Eliquis) tab 5 mg, 5 mg, Oral, BID    melatonin tab 3 mg, 3 mg, Oral, Nightly PRN    ondansetron (Zofran) 4 MG/2ML injection 4 mg, 4 mg, Intravenous, Q6H PRN    LORazepam (Ativan) 2 mg/mL injection 1 mg, 1 mg, Intravenous, Once    amLODIPine (Norvasc) tab 10 mg, 10 mg, Oral, Daily    baclofen (Lioresal) tab 10 mg, 10 mg, Oral, BID    isosorbide mononitrate ER (Imdur) 24 hr tab 60 mg, 60 mg, Oral, Daily    rosuvastatin (Crestor) tab 10 mg, 10 mg, Oral, Daily    venlafaxine (Effexor) tab 37.5 mg, 37.5 mg, Oral, Daily    glucose (Dex4) 15 GM/59ML oral liquid 15 g, 15 g, Oral, Q15 Min PRN **OR** glucose (Glutose) 40% oral gel 15 g, 15 g, Oral, Q15 Min PRN **OR** glucose-vitamin C (Dex-4) chewable tab 4 tablet, 4 tablet, Oral, Q15 Min PRN **OR** dextrose 50% injection 50 mL, 50 mL, Intravenous, Q15 Min PRN **OR** glucose (Dex4) 15 GM/59ML oral liquid 30 g, 30 g, Oral, Q15 Min PRN **OR** glucose (Glutose) 40% oral gel 30 g, 30 g, Oral, Q15 Min PRN **OR** glucose-vitamin C  (Dex-4) chewable tab 8 tablet, 8 tablet, Oral, Q15 Min PRN  [6]   No current facility-administered medications on file prior to encounter.     Current Outpatient Medications on File Prior to Encounter   Medication Sig Dispense Refill    baclofen 10 MG Oral Tab Take 1 tablet (10 mg total) by mouth 2 (two) times daily.      venlafaxine 37.5 MG Oral Tab Take 1 tablet (37.5 mg total) by mouth daily.      furosemide 40 MG Oral Tab Take 2 tablets (80 mg total) by mouth in the morning.      calcitriol 0.25 MCG Oral Cap Take 1 capsule (0.25 mcg total) by mouth See Admin Instructions. 0.25 mg only on Mondays and Thursday.      carvedilol 6.25 MG Oral Tab Take 1 tablet (6.25 mg total) by mouth in the morning and 1 tablet (6.25 mg total) in the evening. Take with meals.      rosuvastatin 20 MG Oral Tab Take 1 tablet (20 mg total) by mouth in the morning.      apixaban 5 MG Oral Tab Take 1 tablet (5 mg total) by mouth in the morning and 1 tablet (5 mg total) before bedtime.      CALCIUM CITRATE OR Take 667 mg by mouth in the morning, at noon, and at bedtime.      clopidogrel 75 MG Oral Tab Take 1 tablet (75 mg total) by mouth in the morning.      isosorbide mononitrate ER 60 MG Oral Tablet 24 Hr Take 1 tablet (60 mg total) by mouth in the morning.      AMLODIPINE 10 MG Oral Tab TAKE 1 TABLET(10 MG) BY MOUTH DAILY (Patient taking differently: Take 1 tablet (10 mg total) by mouth in the morning.) 90 tablet 0    Cholecalciferol (VITAMIN D) 2000 UNITS Oral Tab Take 2,000 Units by mouth daily. 30 tablet 11    insulin glargine (LANTUS SOLOSTAR) 100 UNIT/ML Subcutaneous Solution Pen-injector Inject 25 Units into the skin nightly.      ondansetron 4 MG Oral Tablet Dispersible Take 1 tablet (4 mg total) by mouth every 8 (eight) hours as needed for Nausea. 20 tablet 0    Potassium Chloride ER 10 MEQ Oral Cap CR Take 2 capsules (20 mEq total) by mouth 2 (two) times daily. 30 capsule 0    Magnesium Cl-Calcium Carbonate (SLOW-MAG) 71.5-119  MG Oral Tab EC Take 1 tablet by mouth daily. 30 tablet 0    Tenapanor HCl, CKD, (XPHOZAH) 30 MG Oral Tab Take 30 mg by mouth in the morning and 30 mg before bedtime.      Azelastine HCl 0.1 % Nasal Solution 2 sprays by Nasal route 2 (two) times daily. 30 mL 5    Glucose Blood (ONETOUCH VERIO) In Vitro Strip USE TO CHECK BLOOD GLUCOSE FOUR TIMES DAILY AS DIRECTED. 150 strip 3    OneTouch Delica Lancets 33G Does not apply Misc Check BG 4 times daily 150 each 6    Insulin Pen Needle (BD PEN NEEDLE JAVIER U/F) 32G X 4 MM Does not apply Misc Inject 1 each into the skin daily. 100 each 4    Glucose Blood (BLOOD GLUCOSE TEST) In Vitro Strip Check blood sugars 4x daily. 400 strip 3    ONETOUCH DELICA LANCETS FINE Does not apply Misc 1 lancet by Finger stick route 2 (two) times daily. 300 each 5    Blood Glucose Monitoring Suppl (ONETOUCH ULTRA 2) W/DEVICE Does not apply Kit One Touch Ultra 2 device for testing 1 kit 0   [7]   Patient Active Problem List  Diagnosis    Essential hypertension    Pure hypercholesterolemia    Obesity (BMI 30.0-34.9)    Plantar fasciitis    Hallux rigidus, unspecified laterality    Degeneration of lumbar intervertebral disc    Non-ST elevation myocardial infarction (NSTEMI), subsequent episode of care ([facility]) 1/23/2017    Presence of drug coated stent in right coronary artery Distal 3/28 mm Xience & Prox 3.5x18mm Xience 1/24/2017    Coronary artery disease involving native coronary artery of native heart without angina pectoris    CKD stage 4 due to type 1 diabetes mellitus (HCC)    Primary osteoarthritis of both knees    Impingement syndrome of shoulder, left    Mixed hyperlipidemia    Diabetes mellitus ([facility])    Anemia due to stage 4 chronic kidney disease (HCC)    Peritonitis associated with peritoneal dialysis, initial encounter    ESRD on peritoneal dialysis ([facility])    Hypokalemia    Acute confusion    Anticoagulated    ESRD on dialysis ([facility])    Type 2 diabetes mellitus with hyperglycemia, with  long-term current use of insulin (HCC)    Toxic metabolic encephalopathy

## 2025-05-05 NOTE — PROGRESS NOTES
[facility]   part of [facility] Hospitalist Progress Note     Tariq Nolan Patient Status:  Inpatient    1950 MRN [id number]   Location [facility] 3NE-A Attending Herrera Mariscal MD   Hosp Day # 9 PCP Jacob Hicks MD     Subjective:   Went for tunneled dialysis catheter placement    Objective:      Vital signs:  Temp:  [97.6 °F (36.4 °C)-98.3 °F (36.8 °C)] 98.2 °F (36.8 °C)  Pulse:  [41-62] 41  Resp:  [16-18] 18  BP: (138-172)/(61-77) 138/61  SpO2:  [95 %-97 %] 97 %  General: No acute distress.  HEENT: Normocephalic atraumatic.  Neck: No lymphadenopathy.   Respiratory: Clear to auscultation bilaterally. No wheezes. No rhonchi.  Cardiovascular: S1, S2. Regular rate and rhythm.   Chest and Back: No tenderness or deformity.  Abdomen: Soft, nontender, nondistended.    Neurologic: No focal neurological deficits. Following some commands.   Musculoskeletal: Moves all extremities spontaneously.   Extremities: No edema or cyanosis.  Integument: No rashes or lesions.   Psychiatric: Appropriate mood and affect.      Diagnostic Data:    Labs:  Recent Labs   Lab 25  0650   WBC 9.0   HGB 11.2*   MCV 93.8   .0       Recent Labs   Lab 25  0711 25  0705 25  0635   * 189* 122*   BUN 30* 21 35*   CREATSERUM 6.35* 4.56* 5.72*   CA 9.4 9.2 9.4   ALB 3.9 3.7 3.6   * 132* 129*   K 4.4 4.4 4.7   CL 93* 96* 97*   CO2 26.0 26.0 22.0       Estimated Creatinine Clearance: 10 mL/min (A) (based on SCr of 5.72 mg/dL (H)).    No results for input(s): \"PTP\", \"INR\" in the last 168 hours.             COVID-19 Lab Results    COVID-19  Lab Results   Component Value Date    COVID19 Not Detected 2022    COVID19 Not Detected 2020    COVID19 NEGATIVE 10/01/2020       Pro-Calcitonin  No results for input(s): \"PCT\" in the last 168 hours.    Cardiac  No results for input(s): \"TROP\", \"PBNP\" in the last 168 hours.    Creatinine Kinase  No results for  input(s): \"CK\" in the last 168 hours.    Inflammatory Markers  No results for input(s): \"CRP\", \"MADELIN\", \"LDH\", \"DDIMER\" in the last 168 hours.    Imaging: Imaging data reviewed in Epic.    Medications:    insulin aspart  4-20 Units Subcutaneous TID AC and HS    insulin degludec  8 Units Subcutaneous Nightly    aspirin  81 mg Oral Daily    hydrALAZINE  25 mg Oral Q8H INDU    sevelamer carbonate  800 mg Oral TID CC    heparin  1.5 mL Intravenous Once    [Held by provider] clopidogrel  75 mg Oral Daily    [Held by provider] apixaban  5 mg Oral BID    LORazepam  1 mg Intravenous Once    amLODIPine  10 mg Oral Daily    baclofen  10 mg Oral BID    isosorbide mononitrate ER  60 mg Oral Daily    rosuvastatin  10 mg Oral Daily    venlafaxine  37.5 mg Oral Daily       Assessment & Plan:    Tariq Nolan is a 74 year old male with PMH sig for CAD w/ stents, anemia, DM2, HTN, DL, and ESRD on PD who presents to the ED with acute onset confusion, left sided weakness.     Acute metabolic encephalopathy  Visual hallucinations  -CT and MRI brain negative for acute findings, MRI limited.  -No acute infection , ammonia wnl   -Neuro evaluated, EEG suggestive of metabolic etiology, suspect PD ineffective-- temp HD catheter placed 4/29 and tolerated 4 HD sessions. Tunneled HD catheter placement done 5/5. Gen surg consulted for possible removal of pd catheter since blood thinners already held right now  -baclofen taper could be considered but pt seems to be improving so have left dose at 10 bid  -pt/ot -- rec rehab at [place]    ESRD on PD  -temp catheter placed 4/29, planning tunneled placement 5/5. Plan subsequent removal of PD  -sessions per nephrology  -trend labs and monitor mentation     Bradycardia, history of Afib  CAD s/p PCI   -cardiology on consult, hold BB, echo completed  -on plavix, okay to hold but needs to be on bASA while plavix held  -okay to hold eliquis without bridge given minimal day to day risk of CVA.    DM2  -glc have  been elevated even after switching to glucerna nutritional shakes, suspect appetite has been improving  -on long acting + high dose ssi     HTN  -resume home meds     DL  -statin     Anemia  -stable, monitor with daily labs      Quality:  DVT Prophylaxis:eliquis, scds  CODE status: full code   Gavin: no  If COVID testing is negative, may discontinue isolation: yes      Plan of care discussed with patient and all questions answered.      MD Valerie Yorky Hospitalist

## 2025-05-05 NOTE — CM/SW NOTE
NEERU received order for outpatient dialysis stating '[facility].' NEERU spoke with RN who stated pt will have permcath placed today and PD cath removed tomorrow.   NEERU met with pt and pt's spouse at bedside to discuss discharge plan.    Pt stated he would prefer a MWF schedule for HD. Pt's spouse stated she is able to transport pt to HD and her sister can transport pt if needed. Pt stated he would prefer a second or third shift. NEERU informed pt and pt's spouse the HD clinic will assign pt with chair time the clinic has available, and after pt begins HD at the clinic he will be able to change his chair time if able.     NEERU inquired on discharge plan and if pt is agreeable with [facility]. Pt and pt's spouse agreeable with [facility]. NEERU informed pt and pt's spouse this writer will provide [facility] choice list for review once available. Pt and pt's spouse denied any further needs at this time.     NEERU reopened [facility] referral in [facility] and messaged facilities regarding transition to HD for re-review. PASRR needed.     NEERU faxed HD referral to [facility] at fax: [phone]. NEERU will continue to follow.     Addendum (4:15pm) - NEERU received call from Kaitlynn at [facility] admissions confirming she received the referral for pt. Kaitlynn stated [facility] has a MWF second shift schedule available. Kaitlynn stated she will reach out to the clinic to request the available schedule.     NEERU met with pt's spouse outside pt's room and provided [facility] choice list. NEERU will f/u with pt/spouse regarding [facility] choice.     Addendum (6:10m) - PASRR level 1 screen completed and uploaded to [facility] referral    FLOYD Maya  Discharge Planner

## 2025-05-05 NOTE — PROCEDURES
[facility]    Tariq Nolan Patient Status:  Inpatient    1950 MRN [id number]   Location [facility] 3NE-A Attending Makayla Guajardo MD   Hosp Day # 9 PCP Jacob Hicks MD         Brief Procedure Report    Pre-Operative Diagnosis: Chronic Kidney Disease    Post-Operative Diagnosis: Same as above.    Procedure Performed: Fluoro Guided Permanent Dialysis Catheter Placement    Anesthesia: 1% lidocaine, Moderate sedation    EBL: 1cc    Complications: None    Summary of Case:    Right internal jugular Bard Hemosplit temporary tunneled dialysis catheter placed with fluoroscopic guidance. Catheter in good position and may be used.  Patient tolerated procedure well without immediate complication. Full report to follow in PACS.    Pedro Donato

## 2025-05-05 NOTE — SLP NOTE
Attempted to see for follow up however patient currently out of room for procedure. Will re-attempt as available and appropriate.    Dorinda Adams MA, CCC-SLP  Pager [phone]

## 2025-05-05 NOTE — PROGRESS NOTES
[facility]   part of [facility]    Nephrology Progress Note    Tariq Nolan Attending:  Makayla Guajardo MD       SUBJECTIVE:     No complaints today.    PHYSICAL EXAM:     Vital Signs: /61 (BP Location: Left arm)   Pulse (!) 41   Temp 98.2 °F (36.8 °C) (Oral)   Resp 18   Ht 165.1 cm (5' 5\")   Wt 199 lb 6.4 oz (90.4 kg)   SpO2 97%   BMI 33.18 kg/m²   Temp (24hrs), Av °F (36.7 °C), Min:97.6 °F (36.4 °C), Max:98.3 °F (36.8 °C)       Intake/Output Summary (Last 24 hours) at 2025 1140  Last data filed at 2025 1400  Gross per 24 hour   Intake 360 ml   Output --   Net 360 ml     Wt Readings from Last 3 Encounters:   25 199 lb 6.4 oz (90.4 kg)   10/23/24 190 lb 0.6 oz (86.2 kg)   10/20/24 190 lb (86.2 kg)         General: pleasant, well appearing  Skin: no visible rashes  HEENT: NCAT  Cardiac: Regular rate and rhythm, no murmur/gallop or rub  Lungs: CTAB  Abdomen: Soft, NTND  Extremities: warm, well perfused, no leg edema  Neurologic/Psych: mentating well  RIJ temporary HD catheter    LABORATORY DATA:     Lab Results   Component Value Date     (H) 2025    BUN 35 (H) 2025    BUNCREA 26.0 (H) 2022    CREATSERUM 5.72 (H) 2025    ANIONGAP 10 2025    GFR >59 10/05/2010    GFRNAA 28 (L) 2019    GFRAA 33 (L) 2019    CA 9.4 2025    OSMOCALC 277 2025    ALKPHO 185 (H) 2025    AST 23 2025    ALT 10 2025    BILT 0.2 2025    TP 6.9 2025    ALB 3.6 2025    GLOBULIN 2.6 2025    AGRATIO 2.1 06/10/2015     (L) 2025    K 4.7 2025    CL 97 (L) 2025    CO2 22.0 2025     Lab Results   Component Value Date    WBC 9.0 2025    RBC 3.40 (L) 2025    HGB 11.2 (L) 2025    HCT 31.9 (L) 2025    .0 2025    MPV 9.9 2012    MCV 93.8 2025    MCH 32.9 2025    MCHC 35.1 2025    RDW 11.5 2025    NEPRELIM 6.31 2025     NEUTABS 3.98 03/26/2019    LYMPHABS 1.06 03/26/2019    EOSABS 0.06 03/26/2019    BASABS 0.06 03/26/2019    NEUT 41 03/26/2019    LYMPH 19 03/26/2019    MON 8 03/26/2019    EOS 1 03/26/2019    BASO 1 03/26/2019    NEPERCENT 70.5 05/02/2025    LYPERCENT 8.4 05/02/2025    MOPERCENT 18.2 05/02/2025    EOPERCENT 1.9 05/02/2025    BAPERCENT 0.4 05/02/2025    NE 6.31 05/02/2025    LYMABS 0.75 (L) 05/02/2025    MOABSO 1.63 (H) 05/02/2025    EOABSO 0.17 05/02/2025    BAABSO 0.04 05/02/2025     Lab Results   Component Value Date    MALBP 5.9 02/28/2022    CREUR 33.38 02/28/2022     Lab Results   Component Value Date    COLORUR Light-Yellow 04/27/2025    CLARITY Clear 04/27/2025    SPECGRAVITY >1.030 (H) 04/27/2025    GLUUR 300 (A) 04/27/2025    BILUR Negative 04/27/2025    KETUR Negative 04/27/2025    BLOODURINE 1+ (A) 04/27/2025    PHURINE 6.0 04/27/2025    PROUR 100 (A) 04/27/2025    UROBILINOGEN Normal 04/27/2025    NITRITE Negative 04/27/2025    LEUUR Negative 04/27/2025    NMIC Microscopic not indicated 12/28/2016    WBCUR 11-20 (A) 04/27/2025    RBCUR 3-5 (A) 04/27/2025    EPIUR Few (A) 04/27/2025    BACUR None Seen 04/27/2025    HYLUR Present (A) 12/18/2018         IMAGING:     Reviewed.    MEDICATIONS:       Current Hospital Medications[1]    ASSESSMENT/PLAN:   73 yo M with history of ESRD on PD, CAD, DM, HTN, HL, presented with AMS, tremors/jerking movements and weakness.     ESRD:  -- PD on hold  -- started iHD; first treatment on 4/29  -- tunnelled HD catheter placement on 5/5  -- HD after tunnelled catheter placement 5/5  -- SW consult for outpatient HD placement appreciated  -- gen surg consultation for PD catheter removal since his blood thinners are already being adjusted for tunnelled HD catheter placement     Anemia:  -- ANTONIO for Hb < 10     MBD:  -- trend phos  -- continue sevelamer     HTN:  -- UF with HD  -- amlodipine, hydralazine, imdur    Hyponatremia:  -- fluid restriction <1.5/d when he is able to eat  after procedures  -- UF with HD     D/w family at bedside. Will continue to follow.      Thank you for allowing me to participate in the care of this patient. Please do not hesitate to call with questions or concerns.        Mireya Stone MD  [facility]         [1]   Current Facility-Administered Medications   Medication Dose Route Frequency    sodium chloride 0.9 % IV bolus 100 mL  100 mL Intravenous Q30 Min PRN    And    albumin human (Albumin) 25% injection 25 g  25 g Intravenous PRN Dialysis    insulin aspart (NovoLOG) 100 Units/mL FlexPen 4-20 Units  4-20 Units Subcutaneous TID AC and HS    insulin degludec (Tresiba) 100 units/mL flextouch 8 Units  8 Units Subcutaneous Nightly    aspirin chewable tab 81 mg  81 mg Oral Daily    hydrALAZINE (Apresoline) tab 25 mg  25 mg Oral Q8H INDU    heparin (Porcine) 1000 UNIT/ML injection 1,500 Units  1.5 mL Intracatheter PRN Dialysis    metoclopramide (Reglan) 5 mg/mL injection 10 mg  10 mg Intravenous Daily PRN    sevelamer carbonate (Renvela) tab 800 mg  800 mg Oral TID CC    heparin (Porcine) 100 Units/mL lock flush 150 Units  1.5 mL Intravenous Once    acetaminophen (Tylenol) tab 650 mg  650 mg Oral Q4H PRN    Or    acetaminophen (Tylenol) rectal suppository 650 mg  650 mg Rectal Q4H PRN    labetalol (Trandate) 5 mg/mL injection 10 mg  10 mg Intravenous Q10 Min PRN    hydrALAzine (Apresoline) 20 mg/mL injection 10 mg  10 mg Intravenous Q2H PRN    [Held by provider] clopidogrel (Plavix) tab 75 mg  75 mg Oral Daily    [Held by provider] apixaban (Eliquis) tab 5 mg  5 mg Oral BID    melatonin tab 3 mg  3 mg Oral Nightly PRN    ondansetron (Zofran) 4 MG/2ML injection 4 mg  4 mg Intravenous Q6H PRN    LORazepam (Ativan) 2 mg/mL injection 1 mg  1 mg Intravenous Once    amLODIPine (Norvasc) tab 10 mg  10 mg Oral Daily    baclofen (Lioresal) tab 10 mg  10 mg Oral BID    isosorbide mononitrate ER (Imdur) 24 hr tab 60 mg  60 mg Oral Daily    rosuvastatin (Crestor) tab 10 mg   10 mg Oral Daily    venlafaxine (Effexor) tab 37.5 mg  37.5 mg Oral Daily    glucose (Dex4) 15 GM/59ML oral liquid 15 g  15 g Oral Q15 Min PRN    Or    glucose (Glutose) 40% oral gel 15 g  15 g Oral Q15 Min PRN    Or    glucose-vitamin C (Dex-4) chewable tab 4 tablet  4 tablet Oral Q15 Min PRN    Or    dextrose 50% injection 50 mL  50 mL Intravenous Q15 Min PRN    Or    glucose (Dex4) 15 GM/59ML oral liquid 30 g  30 g Oral Q15 Min PRN    Or    glucose (Glutose) 40% oral gel 30 g  30 g Oral Q15 Min PRN    Or    glucose-vitamin C (Dex-4) chewable tab 8 tablet  8 tablet Oral Q15 Min PRN

## 2025-05-05 NOTE — PLAN OF CARE
Received pt at 2200, pt is alert and oriented x4, no c/o of pain, SB on tele, room air, NPO at midnight for permacath placement, Qshift for neuro, NIH for stroke. Amberly steady for transfer,  Accucheck Q6, PIV on LFA with SL, take pills with water. All safety precautions are in place. Plan of care on going    Problem: SAFETY ADULT - FALL  Goal: Free from fall injury  Description: INTERVENTIONS:- Assess pt frequently for physical needs- Identify cognitive and physical deficits and behaviors that affect risk of falls.- Fiatt fall precautions as indicated by assessment.- Educate pt/family on patient safety including physical limitations- Instruct pt to call for assistance with activity based on assessment- Modify environment to reduce risk of injury- Provide assistive devices as appropriate- Consider OT/PT consult to assist with strengthening/mobility- Encourage toileting schedule  Outcome: Progressing     Problem: CARDIOVASCULAR - ADULT  Goal: Absence of cardiac arrhythmias or at baseline  Description: INTERVENTIONS:- Continuous cardiac monitoring, monitor vital signs, obtain 12 lead EKG if indicated- Evaluate effectiveness of antiarrhythmic and heart rate control medications as ordered- Initiate emergency measures for life threatening arrhythmias- Monitor electrolytes and administer replacement therapy as ordered  Outcome: Progressing

## 2025-05-05 NOTE — PLAN OF CARE
Assumed care at 0730. A&O X3, RA, VSS, afib on tele, hilario rates mostly in 40s. At times dips to high 30s. Lab draw. PIV in L FA flusheed and capped. Dressing changed, CDI. RIJ temp cath with occlusive intact. PD cath in LLQ of abdomen dressing is CDI. NPO for permacath procedure this afternoon. Continent x2, diminished urine production. PRN tylenol. Up SS to commode. Neuro checks Q shift, NIH daily, accu checks.     Called [facility] to schedule dialysis for today. [facility] requested a courtesy call once HD cath procedure is complete.     Pt returned from permacath procedure. Dressing from RIJ is clean and intact with small amount of drainage.     Problem: Patient/Family Goals  Goal: Patient/Family Long Term Goal  Description: Patient's Long Term Goal: go home  Interventions:--cardiac monitoring  -Tunneled HD cath placement  -Hemodialysis  -Labs  -monitor vital signs   See additional Care Plan goals for specific interventions  Outcome: Progressing  Goal: Patient/Family Short Term Goal  Description: Patient's Short Term Goal: pain controlled  Interventions: -pain medicine   See additional Care Plan goals for specific interventions  Outcome: Progressing     Problem: SAFETY ADULT - FALL  Goal: Free from fall injury  Description: INTERVENTIONS:- Assess pt frequently for physical needs- Identify cognitive and physical deficits and behaviors that affect risk of falls.- Adin fall precautions as indicated by assessment.- Educate pt/family on patient safety including physical limitations- Instruct pt to call for assistance with activity based on assessment- Modify environment to reduce risk of injury- Provide assistive devices as appropriate- Consider OT/PT consult to assist with strengthening/mobility- Encourage toileting schedule  Outcome: Progressing     Problem: CARDIOVASCULAR - ADULT  Goal: Absence of cardiac arrhythmias or at baseline  Description: INTERVENTIONS:- Continuous cardiac monitoring, monitor vital  signs, obtain 12 lead EKG if indicated- Evaluate effectiveness of antiarrhythmic and heart rate control medications as ordered- Initiate emergency measures for life threatening arrhythmias- Monitor electrolytes and administer replacement therapy as ordered  Outcome: Progressing     Problem: SKIN/TISSUE INTEGRITY - ADULT  Goal: Skin integrity remains intact  Description: INTERVENTIONS- Assess and document risk factors for pressure ulcer development- Assess and document skin integrity- Monitor for areas of redness and/or skin breakdown- Initiate interventions, skin care algorithm/standards of care as needed  Outcome: Progressing     Problem: NEUROLOGICAL - ADULT  Goal: Achieves stable or improved neurological status  Description: INTERVENTIONS- Assess for and report changes in neurological status- Initiate measures to prevent increased intracranial pressure- Maintain blood pressure and fluid volume within ordered parameters to optimize cerebral perfusion and minimize risk of hemorrhage- Monitor temperature, glucose, and sodium. Initiate appropriate interventions as ordered  Outcome: Progressing     Problem: Impaired Cognition  Goal: Patient will exhibit improved attention, thought processing and/or memory  Description: Interventions:- Minimize distractions in the room when full attention is required  Outcome: Progressing

## 2025-05-05 NOTE — ANESTHESIA PREPROCEDURE EVALUATION
PRE-OP EVALUATION    Patient Name: Tariq Nolan    Admit Diagnosis: Acute confusion [R41.0]  ESRD on dialysis (HCC) [N18.6, Z99.2]  Anticoagulated [Z79.01]  Type 2 diabetes mellitus with hyperglycemia, with long-term current use of insulin (HCC) [E11.65, Z79.4]    Pre-op Diagnosis: End stage renal disease (HCC) [N18.6]    REMOVAL OF PERITONEAL DIALYSIS CATHETER    Anesthesia Procedure: REMOVAL OF PERITONEAL DIALYSIS CATHETER    Surgeons and Role:     * Stephen Lombardi MD - Primary    Pre-op vitals reviewed.  Temp: 98.2 °F (36.8 °C)  Pulse: 41  Resp: 18  BP: 138/61  SpO2: 97 %  Body mass index is 33.18 kg/m².    Current medications reviewed.  Hospital Medications:  Current Medications[1]    Outpatient Medications:   Prescriptions Prior to Admission[2]    Allergies: Tetanus-diphtheria toxoids td  [diphteria and tetanus] and Tetanus toxoid      Anesthesia Evaluation    Patient summary reviewed.    Anesthetic Complications  (-) history of anesthetic complications         GI/Hepatic/Renal             (+) chronic renal disease (last HD 5/5) and ESRD and hemodialysis                   Cardiovascular        Exercise tolerance: good     MET: >4    (+) obesity  (+) hypertension   (+) hyperlipidemia  (+) CAD  (+) past MI  (+) CABG/stent                            Endo/Other      (+) diabetes  type 2, using insulin      (+) anemia            (+) arthritis       Pulmonary                    (+) sleep apnea       Neuro/Psych                                    Past Surgical History[3]  Social Hx on file[4]  History   Drug Use No     Available pre-op labs reviewed.  Lab Results   Component Value Date    WBC 9.0 05/02/2025    RBC 3.40 (L) 05/02/2025    HGB 11.2 (L) 05/02/2025    HCT 31.9 (L) 05/02/2025    MCV 93.8 05/02/2025    MCH 32.9 05/02/2025    MCHC 35.1 05/02/2025    RDW 11.5 05/02/2025    .0 05/02/2025     Lab Results   Component Value Date     (L) 05/05/2025    K 4.7 05/05/2025    CL 97 (L) 05/05/2025    CO2  22.0 2025    BUN 35 (H) 2025    CREATSERUM 5.72 (H) 2025     (H) 2025    CA 9.4 2025     Lab Results   Component Value Date    INR 1.25 (H) 2025         Airway      Mallampati: II  Mouth opening: 3 FB  TM distance: > 6 cm  Neck ROM: full Cardiovascular    Cardiovascular exam normal.  Rhythm: regular  Rate: normal  (-) murmur   Dental    Dentition appears grossly intact         Pulmonary    Pulmonary exam normal.  Breath sounds clear to auscultation bilaterally.               Other findings  L eye swelling and discharge noted.  Patient states has been bothering him for past day.    Dialysis catheter dressing soaked in blood and oozing into axilla      ASA: 3   Plan: general  NPO status verified and patient meets guidelines.        Comment: GA discussed.  Risk of PONV, cough, sore throat explained.  All questions answered.    Plan/risks discussed with: patient            Present on Admission:  **None**               [1]  • sodium chloride 0.9 % IV bolus 100 mL  100 mL Intravenous Q30 Min PRN    And   • albumin human (Albumin) 25% injection 25 g  25 g Intravenous PRN Dialysis   • [COMPLETED] lidocaine-EPINEPHrine (Xylocaine-Epinephrine) 2 %-1:[id number] injection       • [COMPLETED] lidocaine (Xylocaine) 1 % injection       • [COMPLETED] fentaNYL (Sublimaze) 50 mcg/mL injection       • [COMPLETED] midazolam (Versed) 2 MG/2ML injection       • [COMPLETED] ceFAZolin (Ancef) 1 g injection       • [COMPLETED] heparin (Porcine) 5000 UNIT/ML injection       • naloxone (Narcan) 0.4 MG/ML injection 0.08 mg  0.08 mg Intravenous PRN   • insulin aspart (NovoLOG) 100 Units/mL FlexPen 4-20 Units  4-20 Units Subcutaneous TID AC and HS   • insulin degludec (Tresiba) 100 units/mL flextouch 8 Units  8 Units Subcutaneous Nightly   • aspirin chewable tab 81 mg  81 mg Oral Daily   • hydrALAZINE (Apresoline) tab 25 mg  25 mg Oral Q8H INDU   • [] sodium chloride 0.9 % IV bolus 100 mL  100 mL  Intravenous Q30 Min PRN    And   • [] albumin human (Albumin) 25% injection 25 g  25 g Intravenous PRN Dialysis   • heparin (Porcine) 1000 UNIT/ML injection 1,500 Units  1.5 mL Intracatheter PRN Dialysis   • metoclopramide (Reglan) 5 mg/mL injection 10 mg  10 mg Intravenous Daily PRN   • [COMPLETED] heparin (Porcine) 1000 UNIT/ML injection 1,500 Units  1,500 Units Intravenous Once   • [] sodium chloride 0.9 % IV bolus 100 mL  100 mL Intravenous Q30 Min PRN    And   • [] albumin human (Albumin) 25% injection 25 g  25 g Intravenous PRN Dialysis   • [COMPLETED] heparin (Porcine) 1000 UNIT/ML injection 1,500 Units  1.5 mL Intracatheter Once   • sevelamer carbonate (Renvela) tab 800 mg  800 mg Oral TID CC   • [COMPLETED] lidocaine (Xylocaine) 1 % injection       • [COMPLETED] heparin (Porcine) 5000 UNIT/ML injection       • heparin (Porcine) 100 Units/mL lock flush 150 Units  1.5 mL Intravenous Once   • [COMPLETED] heparin (Porcine) 1000 UNIT/ML injection       • [COMPLETED] Perflutren Lipid Microsphere (DEFINITY) 6.52 MG/ML injection 1.5 mL  1.5 mL Intravenous ONCE PRN   • [COMPLETED] dextrose 2.5%, calcium 2.5 meq/L peritoneal solution  5,000 mL Intraperitoneal Once in dialysis   • [COMPLETED] iopamidol 76% (ISOVUE-370) injection for power injector  115 mL Intravenous ONCE PRN   • [COMPLETED] LORazepam (Ativan) 2 mg/mL injection 0.5 mg  0.5 mg Intravenous Once   • acetaminophen (Tylenol) tab 650 mg  650 mg Oral Q4H PRN    Or   • acetaminophen (Tylenol) rectal suppository 650 mg  650 mg Rectal Q4H PRN   • labetalol (Trandate) 5 mg/mL injection 10 mg  10 mg Intravenous Q10 Min PRN   • hydrALAzine (Apresoline) 20 mg/mL injection 10 mg  10 mg Intravenous Q2H PRN   • [Held by provider] clopidogrel (Plavix) tab 75 mg  75 mg Oral Daily   • [Held by provider] apixaban (Eliquis) tab 5 mg  5 mg Oral BID   • melatonin tab 3 mg  3 mg Oral Nightly PRN   • ondansetron (Zofran) 4 MG/2ML injection 4 mg  4 mg  Intravenous Q6H PRN   • LORazepam (Ativan) 2 mg/mL injection 1 mg  1 mg Intravenous Once   • [COMPLETED] dextrose 1.5%-calcium 2.5 mEq/L peritoneal solution  5,000 mL Intraperitoneal Once in dialysis   • amLODIPine (Norvasc) tab 10 mg  10 mg Oral Daily   • baclofen (Lioresal) tab 10 mg  10 mg Oral BID   • isosorbide mononitrate ER (Imdur) 24 hr tab 60 mg  60 mg Oral Daily   • rosuvastatin (Crestor) tab 10 mg  10 mg Oral Daily   • venlafaxine (Effexor) tab 37.5 mg  37.5 mg Oral Daily   • glucose (Dex4) 15 GM/59ML oral liquid 15 g  15 g Oral Q15 Min PRN    Or   • glucose (Glutose) 40% oral gel 15 g  15 g Oral Q15 Min PRN    Or   • glucose-vitamin C (Dex-4) chewable tab 4 tablet  4 tablet Oral Q15 Min PRN    Or   • dextrose 50% injection 50 mL  50 mL Intravenous Q15 Min PRN    Or   • glucose (Dex4) 15 GM/59ML oral liquid 30 g  30 g Oral Q15 Min PRN    Or   • glucose (Glutose) 40% oral gel 30 g  30 g Oral Q15 Min PRN    Or   • glucose-vitamin C (Dex-4) chewable tab 8 tablet  8 tablet Oral Q15 Min PRN   [2]  Medications Prior to Admission   Medication Sig Dispense Refill Last Dose/Taking   • baclofen 10 MG Oral Tab Take 1 tablet (10 mg total) by mouth 2 (two) times daily.   Taking   • venlafaxine 37.5 MG Oral Tab Take 1 tablet (37.5 mg total) by mouth daily.   Taking   • furosemide 40 MG Oral Tab Take 2 tablets (80 mg total) by mouth in the morning.   4/25/2025   • calcitriol 0.25 MCG Oral Cap Take 1 capsule (0.25 mcg total) by mouth See Admin Instructions. 0.25 mg only on Mondays and Thursday.   Past Week   • carvedilol 6.25 MG Oral Tab Take 1 tablet (6.25 mg total) by mouth in the morning and 1 tablet (6.25 mg total) in the evening. Take with meals.   Taking   • rosuvastatin 20 MG Oral Tab Take 1 tablet (20 mg total) by mouth in the morning.   4/25/2025   • apixaban 5 MG Oral Tab Take 1 tablet (5 mg total) by mouth in the morning and 1 tablet (5 mg total) before bedtime.   4/25/2025   • CALCIUM CITRATE OR Take 667 mg  by mouth in the morning, at noon, and at bedtime.   4/25/2025   • clopidogrel 75 MG Oral Tab Take 1 tablet (75 mg total) by mouth in the morning.   4/25/2025   • isosorbide mononitrate ER 60 MG Oral Tablet 24 Hr Take 1 tablet (60 mg total) by mouth in the morning.   4/25/2025   • AMLODIPINE 10 MG Oral Tab TAKE 1 TABLET(10 MG) BY MOUTH DAILY (Patient taking differently: Take 1 tablet (10 mg total) by mouth in the morning.) 90 tablet 0 4/25/2025   • Cholecalciferol (VITAMIN D) 2000 UNITS Oral Tab Take 2,000 Units by mouth daily. 30 tablet 11 4/25/2025   • insulin glargine (LANTUS SOLOSTAR) 100 UNIT/ML Subcutaneous Solution Pen-injector Inject 25 Units into the skin nightly.      • ondansetron 4 MG Oral Tablet Dispersible Take 1 tablet (4 mg total) by mouth every 8 (eight) hours as needed for Nausea. 20 tablet 0    • Potassium Chloride ER 10 MEQ Oral Cap CR Take 2 capsules (20 mEq total) by mouth 2 (two) times daily. 30 capsule 0    • Magnesium Cl-Calcium Carbonate (SLOW-MAG) 71.5-119 MG Oral Tab EC Take 1 tablet by mouth daily. 30 tablet 0    • Tenapanor HCl, CKD, (XPHOZAH) 30 MG Oral Tab Take 30 mg by mouth in the morning and 30 mg before bedtime.      • Azelastine HCl 0.1 % Nasal Solution 2 sprays by Nasal route 2 (two) times daily. 30 mL 5    • Glucose Blood (ONETOUCH VERIO) In Vitro Strip USE TO CHECK BLOOD GLUCOSE FOUR TIMES DAILY AS DIRECTED. 150 strip 3    • OneTouch Delica Lancets 33G Does not apply Misc Check BG 4 times daily 150 each 6    • Insulin Pen Needle (BD PEN NEEDLE JAVIER U/F) 32G X 4 MM Does not apply Misc Inject 1 each into the skin daily. 100 each 4    • Glucose Blood (BLOOD GLUCOSE TEST) In Vitro Strip Check blood sugars 4x daily. 400 strip 3    • ONETOUCH DELICA LANCETS FINE Does not apply Misc 1 lancet by Finger stick route 2 (two) times daily. 300 each 5    • Blood Glucose Monitoring Suppl (ONETOUCH ULTRA 2) W/DEVICE Does not apply Kit One Touch Ultra 2 device for testing 1 kit 0    [3]  Past  Surgical History:  Procedure Laterality Date   • Cath bare metal stent (bms)      x5 ?   • Cath percutaneous  transluminal coronary angioplasty     • Colonoscopy  12/15/2008    tics, rhoids, polyps, repeat in 2013   • Colonoscopy  02/14/2020    mult tubulovillous adenomas, Dr. Moura. repeat 2023   • Colonoscopy N/A 2/11/2020    Procedure: COLONOSCOPY, POSSIBLE BIOPSY, POSSIBLE POLYPECTOMY 37928;  Surgeon: Tay Moura MD;  Location: [facility]   • Egd  12/2020    gastric cardia lesion- intestinal metaplasia   • Hemorrhoidectomy     • Tonsillectomy  age 6   [4]  Social History  Socioeconomic History   • Marital status:    • Number of children: 3   Occupational History   • Occupation: retail, part time   Tobacco Use   • Smoking status: Never   • Smokeless tobacco: Never   Vaping Use   • Vaping status: Never Used   Substance and Sexual Activity   • Alcohol use: No   • Drug use: No   • Sexual activity: Yes     Partners: Female   Other Topics Concern   •  Service No   • Blood Transfusions No   • Exercise No     Comment: discussed, \" little to none.\" 12/19/19, 12/23/20   • Seat Belt Yes   • Self-Exams Yes

## 2025-05-06 ENCOUNTER — ANESTHESIA (OUTPATIENT)
Dept: SURGERY | Facility: HOSPITAL | Age: 75
DRG: 907 | End: 2025-05-06
Payer: MEDICARE

## 2025-05-06 LAB
ALBUMIN SERPL-MCNC: 3.8 G/DL (ref 3.2–4.8)
ANION GAP SERPL CALC-SCNC: 13 MMOL/L (ref 0–18)
BUN BLD-MCNC: 19 MG/DL (ref 9–23)
CALCIUM BLD-MCNC: 9.3 MG/DL (ref 8.7–10.6)
CHLORIDE SERPL-SCNC: 92 MMOL/L (ref 98–112)
CO2 SERPL-SCNC: 25 MMOL/L (ref 21–32)
CREAT BLD-MCNC: 3.95 MG/DL (ref 0.7–1.3)
EGFRCR SERPLBLD CKD-EPI 2021: 15 ML/MIN/1.73M2 (ref 60–?)
GLUCOSE BLD-MCNC: 102 MG/DL (ref 70–99)
GLUCOSE BLD-MCNC: 110 MG/DL (ref 70–99)
GLUCOSE BLD-MCNC: 132 MG/DL (ref 70–99)
GLUCOSE BLD-MCNC: 144 MG/DL (ref 70–99)
GLUCOSE BLD-MCNC: 263 MG/DL (ref 70–99)
HBV CORE AB SERPL QL IA: NONREACTIVE
HBV SURFACE AB SER QL: REACTIVE
HBV SURFACE AB SERPL IA-ACNC: 353.33 MIU/ML
HCT VFR BLD AUTO: 26.1 % (ref 39–53)
HGB BLD-MCNC: 9.2 G/DL (ref 13–17.5)
MAGNESIUM SERPL-MCNC: 2 MG/DL (ref 1.6–2.6)
OSMOLALITY SERPL CALC.SUM OF ELEC: 273 MOSM/KG (ref 275–295)
PHOSPHATE SERPL-MCNC: 4 MG/DL (ref 2.4–5.1)
POTASSIUM SERPL-SCNC: 4.2 MMOL/L (ref 3.5–5.1)
SODIUM SERPL-SCNC: 130 MMOL/L (ref 136–145)

## 2025-05-06 PROCEDURE — 0WPG03Z REMOVAL OF INFUSION DEVICE FROM PERITONEAL CAVITY, OPEN APPROACH: ICD-10-PCS | Performed by: SURGERY

## 2025-05-06 RX ORDER — SODIUM CHLORIDE, SODIUM LACTATE, POTASSIUM CHLORIDE, CALCIUM CHLORIDE 600; 310; 30; 20 MG/100ML; MG/100ML; MG/100ML; MG/100ML
INJECTION, SOLUTION INTRAVENOUS CONTINUOUS PRN
Status: DISCONTINUED | OUTPATIENT
Start: 2025-05-06 | End: 2025-05-06 | Stop reason: SURG

## 2025-05-06 RX ORDER — HYDROMORPHONE HYDROCHLORIDE 1 MG/ML
0.4 INJECTION, SOLUTION INTRAMUSCULAR; INTRAVENOUS; SUBCUTANEOUS EVERY 5 MIN PRN
Status: DISCONTINUED | OUTPATIENT
Start: 2025-05-06 | End: 2025-05-06 | Stop reason: HOSPADM

## 2025-05-06 RX ORDER — NALOXONE HYDROCHLORIDE 0.4 MG/ML
0.08 INJECTION, SOLUTION INTRAMUSCULAR; INTRAVENOUS; SUBCUTANEOUS AS NEEDED
Status: DISCONTINUED | OUTPATIENT
Start: 2025-05-06 | End: 2025-05-06 | Stop reason: HOSPADM

## 2025-05-06 RX ORDER — HYDROCODONE BITARTRATE AND ACETAMINOPHEN 5; 325 MG/1; MG/1
1 TABLET ORAL ONCE AS NEEDED
Status: DISCONTINUED | OUTPATIENT
Start: 2025-05-06 | End: 2025-05-06 | Stop reason: HOSPADM

## 2025-05-06 RX ORDER — HYDROCODONE BITARTRATE AND ACETAMINOPHEN 5; 325 MG/1; MG/1
2 TABLET ORAL ONCE AS NEEDED
Status: DISCONTINUED | OUTPATIENT
Start: 2025-05-06 | End: 2025-05-06 | Stop reason: HOSPADM

## 2025-05-06 RX ORDER — METOCLOPRAMIDE HYDROCHLORIDE 5 MG/ML
5 INJECTION INTRAMUSCULAR; INTRAVENOUS EVERY 8 HOURS PRN
Status: DISCONTINUED | OUTPATIENT
Start: 2025-05-06 | End: 2025-05-06 | Stop reason: HOSPADM

## 2025-05-06 RX ORDER — METOCLOPRAMIDE HYDROCHLORIDE 5 MG/ML
5 INJECTION INTRAMUSCULAR; INTRAVENOUS EVERY 8 HOURS PRN
Status: DISCONTINUED | OUTPATIENT
Start: 2025-05-06 | End: 2025-05-11

## 2025-05-06 RX ORDER — ONDANSETRON 2 MG/ML
INJECTION INTRAMUSCULAR; INTRAVENOUS AS NEEDED
Status: DISCONTINUED | OUTPATIENT
Start: 2025-05-06 | End: 2025-05-06 | Stop reason: SURG

## 2025-05-06 RX ORDER — HYDROMORPHONE HYDROCHLORIDE 1 MG/ML
0.6 INJECTION, SOLUTION INTRAMUSCULAR; INTRAVENOUS; SUBCUTANEOUS EVERY 5 MIN PRN
Status: DISCONTINUED | OUTPATIENT
Start: 2025-05-06 | End: 2025-05-06 | Stop reason: HOSPADM

## 2025-05-06 RX ORDER — LIDOCAINE HYDROCHLORIDE 10 MG/ML
INJECTION, SOLUTION EPIDURAL; INFILTRATION; INTRACAUDAL; PERINEURAL AS NEEDED
Status: DISCONTINUED | OUTPATIENT
Start: 2025-05-06 | End: 2025-05-06 | Stop reason: SURG

## 2025-05-06 RX ORDER — HYDROMORPHONE HYDROCHLORIDE 1 MG/ML
0.2 INJECTION, SOLUTION INTRAMUSCULAR; INTRAVENOUS; SUBCUTANEOUS EVERY 5 MIN PRN
Status: DISCONTINUED | OUTPATIENT
Start: 2025-05-06 | End: 2025-05-06 | Stop reason: HOSPADM

## 2025-05-06 RX ORDER — SODIUM CHLORIDE, SODIUM LACTATE, POTASSIUM CHLORIDE, CALCIUM CHLORIDE 600; 310; 30; 20 MG/100ML; MG/100ML; MG/100ML; MG/100ML
INJECTION, SOLUTION INTRAVENOUS CONTINUOUS
Status: DISCONTINUED | OUTPATIENT
Start: 2025-05-06 | End: 2025-05-06 | Stop reason: HOSPADM

## 2025-05-06 RX ORDER — LIDOCAINE HYDROCHLORIDE AND EPINEPHRINE 10; 10 MG/ML; UG/ML
INJECTION, SOLUTION INFILTRATION; PERINEURAL AS NEEDED
Status: DISCONTINUED | OUTPATIENT
Start: 2025-05-06 | End: 2025-05-06 | Stop reason: HOSPADM

## 2025-05-06 RX ORDER — INSULIN ASPART 100 [IU]/ML
INJECTION, SOLUTION INTRAVENOUS; SUBCUTANEOUS ONCE
Status: DISCONTINUED | OUTPATIENT
Start: 2025-05-06 | End: 2025-05-06 | Stop reason: HOSPADM

## 2025-05-06 RX ORDER — ONDANSETRON 2 MG/ML
4 INJECTION INTRAMUSCULAR; INTRAVENOUS EVERY 6 HOURS PRN
Status: DISCONTINUED | OUTPATIENT
Start: 2025-05-06 | End: 2025-05-06 | Stop reason: HOSPADM

## 2025-05-06 RX ORDER — ACETAMINOPHEN 500 MG
1000 TABLET ORAL ONCE AS NEEDED
Status: DISCONTINUED | OUTPATIENT
Start: 2025-05-06 | End: 2025-05-06 | Stop reason: HOSPADM

## 2025-05-06 RX ORDER — BUPIVACAINE HYDROCHLORIDE 5 MG/ML
INJECTION, SOLUTION EPIDURAL; INTRACAUDAL; PERINEURAL AS NEEDED
Status: DISCONTINUED | OUTPATIENT
Start: 2025-05-06 | End: 2025-05-06 | Stop reason: HOSPADM

## 2025-05-06 RX ORDER — ALBUMIN (HUMAN) 12.5 G/50ML
25 SOLUTION INTRAVENOUS
Status: ACTIVE | OUTPATIENT
Start: 2025-05-06 | End: 2025-05-08

## 2025-05-06 RX ORDER — LIDOCAINE HYDROCHLORIDE 10 MG/ML
INJECTION, SOLUTION INFILTRATION; PERINEURAL
Status: COMPLETED
Start: 2025-05-06 | End: 2025-05-06

## 2025-05-06 RX ADMIN — LIDOCAINE HYDROCHLORIDE 50 MG: 10 INJECTION, SOLUTION EPIDURAL; INFILTRATION; INTRACAUDAL; PERINEURAL at 15:48:00

## 2025-05-06 RX ADMIN — ONDANSETRON 4 MG: 2 INJECTION INTRAMUSCULAR; INTRAVENOUS at 16:04:00

## 2025-05-06 RX ADMIN — SODIUM CHLORIDE, SODIUM LACTATE, POTASSIUM CHLORIDE, CALCIUM CHLORIDE: 600; 310; 30; 20 INJECTION, SOLUTION INTRAVENOUS at 15:43:00

## 2025-05-06 NOTE — ANESTHESIA POSTPROCEDURE EVALUATION
[facility]    Tariq Nolan Patient Status:  Inpatient   Age/Gender 74 year old male MRN [id number]   Location [facility] SURGERY Attending Makayla Guajardo MD   Hosp Day # 10 PCP Jacob Hicks MD       Anesthesia Post-op Note    REMOVAL OF PERITONEAL DIALYSIS CATHETER    Procedure Summary       Date: 05/06/25 Room / Location:  MAIN OR 02 / [facility] MAIN OR    Anesthesia Start: 1542 Anesthesia Stop: 1625    Procedure: REMOVAL OF PERITONEAL DIALYSIS CATHETER (Abdomen) Diagnosis:       End stage renal disease (HCC)      (End stage renal disease (HCC) [N18.6])    Surgeons: Stephen Lombardi MD Anesthesiologist: Gonzalez Cornejo MD    Anesthesia Type: general ASA Status: 3            Anesthesia Type: general    Vitals Value Taken Time   /52 05/06/25 16:26   Temp 98 05/06/25 16:27   Pulse 50 05/06/25 16:27   Resp 18 05/06/25 16:27   SpO2 98 % 05/06/25 16:27   Vitals shown include unfiled device data.        Patient Location: PACU    Anesthesia Type: general    Airway Patency: patent    Postop Pain Control: adequate    Mental Status: mildly sedated but able to meaningfully participate in the post-anesthesia evaluation    Nausea/Vomiting: none    Cardiopulmonary/Hydration status: stable euvolemic    Complications: no apparent anesthesia related complications    Postop vital signs: stable    Dental Exam: Unchanged from Preop    Patient to be discharged from PACU when criteria met.

## 2025-05-06 NOTE — PROGRESS NOTES
[facility]   part of [facility] Hospitalist Progress Note     Tariq Nolan Patient Status:  Inpatient    1950 MRN [id number]   Location [facility] 3NE-A Attending Herrera Mariscal MD   Hosp Day # 10 PCP Jacob Hicks MD     Subjective:   Tunneled dialysis catheter has been oozing today.  Required multiple dressing changes and had a pressure dressing placed by IR today    Objective:      Vital signs:  Temp:  [97.5 °F (36.4 °C)-98.4 °F (36.9 °C)] 98 °F (36.7 °C)  Pulse:  [41-53] 53  Resp:  [18] 18  BP: (123-165)/(60-74) 139/74  SpO2:  [90 %-97 %] 97 %  General: No acute distress.  HEENT: Normocephalic atraumatic.  Neck: No lymphadenopathy.   Respiratory: Clear to auscultation bilaterally. No wheezes. No rhonchi.  Cardiovascular: S1, S2. Regular rate and rhythm.   Chest and Back: No tenderness or deformity.  Abdomen: Soft, nontender, nondistended.    Neurologic: No focal neurological deficits. Following some commands.   Musculoskeletal: Moves all extremities spontaneously.   Extremities: No edema or cyanosis.  Integument: Persistent oozing from tunneled dialysis catheter site in the right upper chest  Psychiatric: Appropriate mood and affect.      Diagnostic Data:    Labs:  Recent Labs   Lab 25  0650   WBC 9.0   HGB 11.2*   MCV 93.8   .0       Recent Labs   Lab 25  0705 25  0635 25  0748   * 122* 110*   BUN 21 35* 19   CREATSERUM 4.56* 5.72* 3.95*   CA 9.2 9.4 9.3   ALB 3.7 3.6 3.8   * 129* 130*   K 4.4 4.7 4.2   CL 96* 97* 92*   CO2 26.0 22.0 25.0       Estimated Creatinine Clearance: 14.5 mL/min (A) (based on SCr of 3.95 mg/dL (H)).    No results for input(s): \"PTP\", \"INR\" in the last 168 hours.             COVID-19 Lab Results    COVID-19  Lab Results   Component Value Date    COVID19 Not Detected 2022    COVID19 Not Detected 2020    COVID19 NEGATIVE 10/01/2020       Pro-Calcitonin  No results for input(s):  \"PCT\" in the last 168 hours.    Cardiac  No results for input(s): \"TROP\", \"PBNP\" in the last 168 hours.    Creatinine Kinase  No results for input(s): \"CK\" in the last 168 hours.    Inflammatory Markers  No results for input(s): \"CRP\", \"MADELIN\", \"LDH\", \"DDIMER\" in the last 168 hours.    Imaging: Imaging data reviewed in Epic.    Medications:    insulin aspart  4-20 Units Subcutaneous TID AC and HS    insulin degludec  8 Units Subcutaneous Nightly    aspirin  81 mg Oral Daily    hydrALAZINE  25 mg Oral Q8H INDU    sevelamer carbonate  800 mg Oral TID CC    heparin  1.5 mL Intravenous Once    [Held by provider] clopidogrel  75 mg Oral Daily    [Held by provider] apixaban  5 mg Oral BID    LORazepam  1 mg Intravenous Once    amLODIPine  10 mg Oral Daily    baclofen  10 mg Oral BID    isosorbide mononitrate ER  60 mg Oral Daily    rosuvastatin  10 mg Oral Daily    venlafaxine  37.5 mg Oral Daily       Assessment & Plan:    Tariq Nolan is a 74 year old male with PMH sig for CAD w/ stents, anemia, DM2, HTN, DL, and ESRD on PD who presents to the ED with acute onset confusion, left sided weakness.     Acute metabolic encephalopathy  Visual hallucinations  -CT and MRI brain negative for acute findings, MRI limited.  -No acute infection , ammonia wnl   -Neuro evaluated, EEG suggestive of metabolic etiology, suspect PD ineffective-- temp HD catheter placed 4/29 and tolerated HD with significant improvement in sx.  -baclofen taper could be considered but pt seems to be improving so have left dose at 10 bid  -pt/ot -- rec rehab at [facility]    ESRD on PD, now on HD  -temp catheter placed 4/29, planning tunneled placement 5/5. PD catheter to be removed today  -sessions per nephrology, next HD tomorrow  -monitor tunneled catheter site for bleeding/dressing placed by IR    Bradycardia, history of Afib  CAD s/p PCI   -cardiology on consult, hold BB, echo completed  -on plavix, okay to hold but needs to be on bASA while plavix held  -okay  to hold eliquis without bridge given minimal day to day risk of CVA.    DM2  -on long acting + high dose ssi. Glc improved now     HTN  -resume home meds     DL  -statin     Anemia  -stable, monitor with daily labs      Quality:  DVT Prophylaxis:eliquis, scds  CODE status: full code   Gavin: no  If COVID testing is negative, may discontinue isolation: yes      Plan of care discussed with patient and all questions answered.      Makayla Guajardo MD  [facility] Hospitalist

## 2025-05-06 NOTE — ANESTHESIA PROCEDURE NOTES
Airway  Date/Time: 5/6/2025 3:49 PM  Reason: elective      General Information and Staff   Patient location during procedure: OR  Anesthesiologist: Gonzalez Cornejo MD  Resident/CRNA: Dorinda Mike CRNA  Performed: CRNA   Performed by: Dorinda Mike CRNA  Authorized by: Gonzalez Cornejo MD        Indications and Patient Condition  Indications for airway management: anesthesia  Sedation level: deep      Preoxygenated: yesPatient position: sniffing    Mask difficulty assessment: 0 - not attempted    Final Airway Details    Final airway type: supraglottic airway      Successful airway: classic  Size: 4     Number of attempts at approach: 1

## 2025-05-06 NOTE — CM/SW NOTE
NEERU received email from Fan Payne at [facility] admissions requesting additional clinicals for review.     Fan requested a chest xray, Hep B total core antibody, Hep B surface antibody. Relayed lab request to RN.  RN stated pt will have PD cath removal today.     NEERU faxed chest xray to [facility] admissions at [phone]. NEERU will continue to follow.     Addendum - NEERU faxed Hep B labs to [facility] admissions at fax: [phone].     Notified by RN pt's spouse has selected [facility].     NEERU reserved [facility] [place] in [facility]. NEERU will continue to follow.     FLOYD Maya  Discharge Planner

## 2025-05-06 NOTE — PLAN OF CARE
Assumed care at 0730. A&O X3, RA, VSS, afib on tele, rates in 40s. Lab draw. PIV in L FA flushed and capped. Dressing CDI. PD catheter in LLQ intact. NPO sips with meds prior to PD cath removal today. Continent x2, up SS to commode. Mild L hand pain- ice pack given. Stroke protocol- neuro checks Q shift, NIH daily. Accu checks QID.     Upon arrival to shift, pt permacath site and gown saturated with blood. Quick clot applied and dressing changed. Permacath continued to bleed- IR called. Interim pressure dressing applied per nephro orders. IR placed new pressure dressing. Permacath continued to bleed. Extra gauze and chux placed underneath site. IR, hospitalist, and gen surg notified. Paged hospitalist for H&H- see results.     Problem: Patient/Family Goals  Goal: Patient/Family Long Term Goal  Description: Patient's Long Term Goal: go home  Interventions:--cardiac monitoring  -Tunneled HD cath placement  -Hemodialysis  -Labs  -monitor vital signs   See additional Care Plan goals for specific interventions  Outcome: Progressing  Goal: Patient/Family Short Term Goal  Description: Patient's Short Term Goal: pain controlled  Interventions: -pain medicine   See additional Care Plan goals for specific interventions  Outcome: Progressing     Problem: SAFETY ADULT - FALL  Goal: Free from fall injury  Description: INTERVENTIONS:- Assess pt frequently for physical needs- Identify cognitive and physical deficits and behaviors that affect risk of falls.- [place] fall precautions as indicated by assessment.- Educate pt/family on patient safety including physical limitations- Instruct pt to call for assistance with activity based on assessment- Modify environment to reduce risk of injury- Provide assistive devices as appropriate- Consider OT/PT consult to assist with strengthening/mobility- Encourage toileting schedule  Outcome: Progressing     Problem: CARDIOVASCULAR - ADULT  Goal: Absence of cardiac arrhythmias or at  baseline  Description: INTERVENTIONS:- Continuous cardiac monitoring, monitor vital signs, obtain 12 lead EKG if indicated- Evaluate effectiveness of antiarrhythmic and heart rate control medications as ordered- Initiate emergency measures for life threatening arrhythmias- Monitor electrolytes and administer replacement therapy as ordered  Outcome: Progressing     Problem: SKIN/TISSUE INTEGRITY - ADULT  Goal: Skin integrity remains intact  Description: INTERVENTIONS- Assess and document risk factors for pressure ulcer development- Assess and document skin integrity- Monitor for areas of redness and/or skin breakdown- Initiate interventions, skin care algorithm/standards of care as needed  Outcome: Progressing     Problem: NEUROLOGICAL - ADULT  Goal: Achieves stable or improved neurological status  Description: INTERVENTIONS- Assess for and report changes in neurological status- Initiate measures to prevent increased intracranial pressure- Maintain blood pressure and fluid volume within ordered parameters to optimize cerebral perfusion and minimize risk of hemorrhage- Monitor temperature, glucose, and sodium. Initiate appropriate interventions as ordered  Outcome: Progressing     Problem: Impaired Cognition  Goal: Patient will exhibit improved attention, thought processing and/or memory  Description: Interventions:- Minimize distractions in the room when full attention is required  Outcome: Progressing

## 2025-05-06 NOTE — OCCUPATIONAL THERAPY NOTE
OT treatment attempted. Per RN, patient is having bleeding at permacath site and is NPO for PD cath removal later today. Will hold and resume treatment at a later date as appropriate

## 2025-05-06 NOTE — PHYSICAL THERAPY NOTE
IPPT following, per RN, pt currently with procedure scheduled today. HD site c bleeding. Will hold and continue to follow.

## 2025-05-06 NOTE — PROGRESS NOTES
[facility]   part of [facility]    Nephrology Progress Note    Tariq Nolan Attending:  Makayla Guajardo MD       SUBJECTIVE:     Underwent tunnelled HD catheter placement yesterday.  Has some bleeding around the exit site.  Underwent HD yesterday without issue - tolerated 2L UF.  NPO now for PD catheter removal.    PHYSICAL EXAM:     Vital Signs: /54 (BP Location: Left arm)   Pulse (!) 46   Temp 98 °F (36.7 °C) (Oral)   Resp 18   Ht 165.1 cm (5' 5\")   Wt 199 lb 6.4 oz (90.4 kg)   SpO2 96%   BMI 33.18 kg/m²   Temp (24hrs), Av.1 °F (36.7 °C), Min:97.5 °F (36.4 °C), Max:98.4 °F (36.9 °C)       Intake/Output Summary (Last 24 hours) at 2025 1218  Last data filed at 2025 2000  Gross per 24 hour   Intake --   Output 2000 ml   Net -2000 ml     Wt Readings from Last 3 Encounters:   25 199 lb 6.4 oz (90.4 kg)   10/23/24 190 lb 0.6 oz (86.2 kg)   10/20/24 190 lb (86.2 kg)       General: pleasant, well appearing  Skin: no visible rashes  HEENT: NCAT  Cardiac: Regular rate and rhythm, no murmur/gallop or rub  Lungs: CTAB  Abdomen: Soft, NTND  Extremities: warm, well perfused, no leg edema  Neurologic/Psych: mentating well  RIJ  tunnelled HD catheter with oozing    LABORATORY DATA:     Lab Results   Component Value Date     (H) 2025    BUN 19 2025    BUNCREA 26.0 (H) 2022    CREATSERUM 3.95 (H) 2025    ANIONGAP 13 2025    GFR >59 10/05/2010    GFRNAA 28 (L) 2019    GFRAA 33 (L) 2019    CA 9.3 2025    OSMOCALC 273 (L) 2025    ALKPHO 185 (H) 2025    AST 23 2025    ALT 10 2025    BILT 0.2 2025    TP 6.9 2025    ALB 3.8 2025    GLOBULIN 2.6 2025    AGRATIO 2.1 06/10/2015     (L) 2025    K 4.2 2025    CL 92 (L) 2025    CO2 25.0 2025     Lab Results   Component Value Date    WBC 9.0 2025    RBC 3.40 (L) 2025    HGB 11.2 (L) 2025    HCT 31.9 (L)  05/02/2025    .0 05/02/2025    MPV 9.9 09/18/2012    MCV 93.8 05/02/2025    MCH 32.9 05/02/2025    MCHC 35.1 05/02/2025    RDW 11.5 05/02/2025    NEPRELIM 6.31 05/02/2025    NEUTABS 3.98 03/26/2019    LYMPHABS 1.06 03/26/2019    EOSABS 0.06 03/26/2019    BASABS 0.06 03/26/2019    NEUT 41 03/26/2019    LYMPH 19 03/26/2019    MON 8 03/26/2019    EOS 1 03/26/2019    BASO 1 03/26/2019    NEPERCENT 70.5 05/02/2025    LYPERCENT 8.4 05/02/2025    MOPERCENT 18.2 05/02/2025    EOPERCENT 1.9 05/02/2025    BAPERCENT 0.4 05/02/2025    NE 6.31 05/02/2025    LYMABS 0.75 (L) 05/02/2025    MOABSO 1.63 (H) 05/02/2025    EOABSO 0.17 05/02/2025    BAABSO 0.04 05/02/2025     Lab Results   Component Value Date    MALBP 5.9 02/28/2022    CREUR 33.38 02/28/2022     Lab Results   Component Value Date    COLORUR Light-Yellow 04/27/2025    CLARITY Clear 04/27/2025    SPECGRAVITY >1.030 (H) 04/27/2025    GLUUR 300 (A) 04/27/2025    BILUR Negative 04/27/2025    KETUR Negative 04/27/2025    BLOODURINE 1+ (A) 04/27/2025    PHURINE 6.0 04/27/2025    PROUR 100 (A) 04/27/2025    UROBILINOGEN Normal 04/27/2025    NITRITE Negative 04/27/2025    LEUUR Negative 04/27/2025    NMIC Microscopic not indicated 12/28/2016    WBCUR 11-20 (A) 04/27/2025    RBCUR 3-5 (A) 04/27/2025    EPIUR Few (A) 04/27/2025    BACUR None Seen 04/27/2025    HYLUR Present (A) 12/18/2018         IMAGING:     Reviewed.    MEDICATIONS:       Current Hospital Medications[1]    ASSESSMENT/PLAN:     75 yo M with history of ESRD on PD, CAD, DM, HTN, HL, presented with AMS, tremors/jerking movements and weakness.     ESRD:  -- PD on hold  -- started iHD; first treatment on 4/29  -- tunnelled HD catheter placement on 5/5  -- HD after tunnelled catheter placement 5/5 completed  -- next HD tomorrow  -- SW consult for outpatient HD placement appreciated  -- gen surg consultation for PD catheter removal appreciated     Anemia:  -- ANTONIO for Hb < 10     MBD:  -- trend phos  -- continue  sevelamer     HTN:  -- UF with HD  -- amlodipine, hydralazine, imdur     Hyponatremia:  -- fluid restriction <1.5/d when he is able to eat after procedures  -- UF with HD     Bleeding around CVC:  -- pressure  -- RN to d/w IR    WIll follow. Discussed with RN.      Thank you for allowing me to participate in the care of this patient. Please do not hesitate to call with questions or concerns.        MD Faisal Revel[facility]         [1]   Current Facility-Administered Medications   Medication Dose Route Frequency    metoclopramide (Reglan) 5 mg/mL injection 5 mg  5 mg Intravenous Q8H PRN    sodium chloride 0.9 % IV bolus 100 mL  100 mL Intravenous Q30 Min PRN    And    albumin human (Albumin) 25% injection 25 g  25 g Intravenous PRN Dialysis    naloxone (Narcan) 0.4 MG/ML injection 0.08 mg  0.08 mg Intravenous PRN    insulin aspart (NovoLOG) 100 Units/mL FlexPen 4-20 Units  4-20 Units Subcutaneous TID AC and HS    insulin degludec (Tresiba) 100 units/mL flextouch 8 Units  8 Units Subcutaneous Nightly    aspirin chewable tab 81 mg  81 mg Oral Daily    hydrALAZINE (Apresoline) tab 25 mg  25 mg Oral Q8H INDU    heparin (Porcine) 1000 UNIT/ML injection 1,500 Units  1.5 mL Intracatheter PRN Dialysis    sevelamer carbonate (Renvela) tab 800 mg  800 mg Oral TID CC    heparin (Porcine) 100 Units/mL lock flush 150 Units  1.5 mL Intravenous Once    acetaminophen (Tylenol) tab 650 mg  650 mg Oral Q4H PRN    Or    acetaminophen (Tylenol) rectal suppository 650 mg  650 mg Rectal Q4H PRN    labetalol (Trandate) 5 mg/mL injection 10 mg  10 mg Intravenous Q10 Min PRN    hydrALAzine (Apresoline) 20 mg/mL injection 10 mg  10 mg Intravenous Q2H PRN    [Held by provider] clopidogrel (Plavix) tab 75 mg  75 mg Oral Daily    [Held by provider] apixaban (Eliquis) tab 5 mg  5 mg Oral BID    melatonin tab 3 mg  3 mg Oral Nightly PRN    ondansetron (Zofran) 4 MG/2ML injection 4 mg  4 mg Intravenous Q6H PRN    LORazepam (Ativan) 2 mg/mL  injection 1 mg  1 mg Intravenous Once    amLODIPine (Norvasc) tab 10 mg  10 mg Oral Daily    baclofen (Lioresal) tab 10 mg  10 mg Oral BID    isosorbide mononitrate ER (Imdur) 24 hr tab 60 mg  60 mg Oral Daily    rosuvastatin (Crestor) tab 10 mg  10 mg Oral Daily    venlafaxine (Effexor) tab 37.5 mg  37.5 mg Oral Daily    glucose (Dex4) 15 GM/59ML oral liquid 15 g  15 g Oral Q15 Min PRN    Or    glucose (Glutose) 40% oral gel 15 g  15 g Oral Q15 Min PRN    Or    glucose-vitamin C (Dex-4) chewable tab 4 tablet  4 tablet Oral Q15 Min PRN    Or    dextrose 50% injection 50 mL  50 mL Intravenous Q15 Min PRN    Or    glucose (Dex4) 15 GM/59ML oral liquid 30 g  30 g Oral Q15 Min PRN    Or    glucose (Glutose) 40% oral gel 30 g  30 g Oral Q15 Min PRN    Or    glucose-vitamin C (Dex-4) chewable tab 8 tablet  8 tablet Oral Q15 Min PRN

## 2025-05-06 NOTE — PROGRESS NOTES
[facility]  Progress Note    Tariq Nolan Patient Status:  Inpatient    1950 MRN [id number]   Location [facility] 3NE-A Attending Makayla Guajardo MD   Hosp Day # 10 PCP Jacob Hicks MD     Subjective:    Patient noted to have bleeding at permacath site, quick clot placed  NPO for planned procedure     Objective/Physical Exam:    Vital Signs:  Blood pressure 136/54, pulse (!) 46, temperature 98 °F (36.7 °C), temperature source Oral, resp. rate 18, height 5' 5\" (1.651 m), weight 199 lb 6.4 oz (90.4 kg), SpO2 96%.    General:  Alert, orientated x3.  Cooperative.  No apparent distress.    Lungs:  Non labored breathing    Cardiac:  Regular rate and rhythm.     Abdomen:  PD catheter in place    Extremities:  No lower extremity edema noted.  Without clubbing or cyanosis.        Labs:  Reviewed    Lab Results   Component Value Date    HGB 9.2 2025    HCT 26.1 2025    CREATSERUM 3.95 2025    BUN 19 2025     2025    K 4.2 2025    CL 92 2025    CO2 25.0 2025     2025    CA 9.3 2025    ALB 3.8 2025    MG 2.0 2025    PHOS 4.0 2025    PGLU 144 2025       Problem List:  Problem List[1]        Impression:     75 y/o with ESRD on PD transitioning to HD    Plan:    NPO for planned surgery today  Consent signed   All questions answered    HENNY Saeed  [facility]  General Surgery  2025         [1]   Patient Active Problem List  Diagnosis    Essential hypertension    Pure hypercholesterolemia    Obesity (BMI 30.0-34.9)    Plantar fasciitis    Hallux rigidus, unspecified laterality    Degeneration of lumbar intervertebral disc    Non-ST elevation myocardial infarction (NSTEMI), subsequent episode of care (HCC) 2017    Presence of drug coated stent in right coronary artery Distal 3/28 mm Xience & Prox 3.5x18mm Xience 2017    Coronary artery disease involving native coronary artery of  native heart without angina pectoris    CKD stage 4 due to type 1 diabetes mellitus (HCC)    Primary osteoarthritis of both knees    Impingement syndrome of shoulder, left    Mixed hyperlipidemia    Diabetes mellitus (HCC)    Anemia due to stage 4 chronic kidney disease (HCC)    Peritonitis associated with peritoneal dialysis, initial encounter    ESRD on peritoneal dialysis (HCC)    Hypokalemia    Acute confusion    Anticoagulated    ESRD on dialysis (HCC)    Type 2 diabetes mellitus with hyperglycemia, with long-term current use of insulin (HCC)    Toxic metabolic encephalopathy

## 2025-05-06 NOTE — BRIEF OP NOTE
Pre-Operative Diagnosis: End stage renal disease (HCC) [N18.6]     Post-Operative Diagnosis: End stage renal disease (HCC) [N18.6]      Procedure Performed:   REMOVAL OF PERITONEAL DIALYSIS CATHETER    Surgeons and Role:     * Stephen Lombardi MD - Primary    Assistant(s):  Surgical Assistant.: Radha Alcantar PA        Specimen: pd catheter discarded     Estimated Blood Loss: Blood Output: 3 mL (5/6/2025  4:12 PM)        Stephen Lombardi MD  5/6/2025  4:14 PM

## 2025-05-06 NOTE — SLP NOTE
Attempted to see for follow up however patient currently NPO for procedure. Will re-attempt as available and appropriate.    Dorinda Adams MA, CCC-SLP  Pager [phone]

## 2025-05-06 NOTE — PROGRESS NOTES
HD cath site noted to be bleeding 2x by RN  Quick clot added to the site and redressed twice  MD notified  NPO for PD dialysus catth removal

## 2025-05-06 NOTE — OPERATIVE REPORT
[facility]    PATIENT'S NAME: ALBERTINA CAREY   ATTENDING PHYSICIAN: Makayla Guajardo M.D.   OPERATING PHYSICIAN: Stephen Lombardi M.D.   PATIENT ACCOUNT#:   [id number]    LOCATION:  [address] [place] [address]  MEDICAL RECORD #:   [id number]       YOB: 1950  ADMISSION DATE:       04/26/2025      OPERATION DATE:  05/06/2025    OPERATIVE REPORT      PREOPERATIVE DIAGNOSIS:  Nonfunctional peritoneal dialysis catheter.  POSTOPERATIVE DIAGNOSIS:  Nonfunctional peritoneal dialysis catheter.  PROCEDURE:  Removal of peritoneal dialysis catheter.    ASSISTANT:  Radha Alcantar PA-C.  She assisted by prepping, draping, retracting, and suturing.    ANESTHESIA:  General.    OPERATIVE TECHNIQUE:  Patient was brought into the operating room, placed on the operating table in supine position.  Inhalational anesthesia provided by the attending anesthesiologist.  The abdomen was prepped in the usual sterile fashion.  Lidocaine 1% and 0.5% Marcaine was used as a local anesthetic.  Patient had this catheter placed approximately 2 years ago at [facility].  I had the opportunity to review the operative note.  I made an initial skin incision adjacent to the exit site and there was a cuff right underneath the skin.  This was dissected free with electrocautery.  I then was able to identify where the second point of attachment was.  I made a skin incision directly over this area, dissected down to the second cuff, and freed that cuff also.  I removed the entire catheter, inspected it, and discarded it.  Hemostasis was good.  I did a layered wound closure with absorbable sutures.  Sterile dressing was provided.  Patient tolerated the procedure well.  He was taken to the recovery room for observation.    Dictated By Stephen Lombardi M.D.  d: 05/06/2025 18:21:44  t: 05/06/2025 18:47:33  Job [id number]  [facility]/

## 2025-05-07 LAB
ALBUMIN SERPL-MCNC: 3.5 G/DL (ref 3.2–4.8)
ANION GAP SERPL CALC-SCNC: 12 MMOL/L (ref 0–18)
APTT PPP: 39.5 SECONDS (ref 23–36)
BUN BLD-MCNC: 35 MG/DL (ref 9–23)
CALCIUM BLD-MCNC: 9.2 MG/DL (ref 8.7–10.6)
CHLORIDE SERPL-SCNC: 90 MMOL/L (ref 98–112)
CO2 SERPL-SCNC: 25 MMOL/L (ref 21–32)
CREAT BLD-MCNC: 5.08 MG/DL (ref 0.7–1.3)
EGFRCR SERPLBLD CKD-EPI 2021: 11 ML/MIN/1.73M2 (ref 60–?)
GLUCOSE BLD-MCNC: 152 MG/DL (ref 70–99)
GLUCOSE BLD-MCNC: 157 MG/DL (ref 70–99)
GLUCOSE BLD-MCNC: 162 MG/DL (ref 70–99)
GLUCOSE BLD-MCNC: 192 MG/DL (ref 70–99)
GLUCOSE BLD-MCNC: 207 MG/DL (ref 70–99)
HGB BLD-MCNC: 8.3 G/DL (ref 13–17.5)
INR BLD: 1.12 (ref 0.8–1.2)
MAGNESIUM SERPL-MCNC: 2.2 MG/DL (ref 1.6–2.6)
OSMOLALITY SERPL CALC.SUM OF ELEC: 275 MOSM/KG (ref 275–295)
PHOSPHATE SERPL-MCNC: 5.6 MG/DL (ref 2.4–5.1)
POTASSIUM SERPL-SCNC: 4.6 MMOL/L (ref 3.5–5.1)
PROTHROMBIN TIME: 14.5 SECONDS (ref 11.6–14.8)
SODIUM SERPL-SCNC: 127 MMOL/L (ref 136–145)

## 2025-05-07 NOTE — CM/SW NOTE
NEERU called [facility] admissions ([phone]) and spoke with Johnna. Johnna confirmed [facility] received the chest xray and Hep B labs. Johnna stated the clinicals have been sent to the [facility] for review. Johnna stated [facility] admissions will reach out to this writer for HD chair time/schedule. NEERU will continue to follow.    Addendum (12:30pm) - NEERU received call from Alden at [facility]. Alden stated pt has been accepted at [facility] for HD. Alden stated pt will have a MWF schedule and 10:45am chair time. Usman stated pt is scheduled to begin HD on Friday 5/9/25 and pt will need to arrive 30 minutes early for his first treatment. NEERU informed Alden pt will be going to rehab at discharge. Alden stated the facility will need to update [facility] on discharge date to coordinate start date of outpatient HD. Alden stated the clinic will hold the chair time for two weeks, and after two weeks a new referral for outpatient HD will be needed.   Updated [facility] in [facility].     Outpatient Hemodialysis.     [facility]  [address] [place] [id number]  [phone]  Schedule: Monday, Wednesday, Friday  Christal time: 10:45am  Please arrive 30 minutes early for your first treatment.     Updated pt and pt's spouse at bedside of HD schedule/chair time. Pt currently receiving HD.     Outpatient HD clinic/schedule placed on AVS. NEERU will continue to follow.     FLOYD Maya  Discharge Planner

## 2025-05-07 NOTE — PROGRESS NOTES
[facility]   part of [facility] Hospitalist Progress Note     Tariq Nolan Patient Status:  Inpatient    1950 MRN [id number]   Location [facility] 3NE-A Attending Herrera Mariscal MD   Hosp Day # 11 PCP Jacob Hicks MD     Subjective:   Seen and examined at bedside.  Wife is at bedside.  Patient still had some oozing last night and this morning from his PD catheter site.  He denies any chest pain shortness of breath nausea vomiting fever chills or abdominal pain at this time.  He is eating.  Complaining of some upper extremity swelling.    Objective:      Vital signs:  Temp:  [97.4 °F (36.3 °C)-98 °F (36.7 °C)] 98 °F (36.7 °C)  Pulse:  [40-56] 43  Resp:  [0-18] 15  BP: (110-161)/(45-70) 114/52  SpO2:  [93 %-100 %] 100 %  General: No acute distress.  HEENT: Normocephalic atraumatic.  Neck: No lymphadenopathy.   Respiratory: Clear to auscultation bilaterally. No wheezes. No rhonchi.  Cardiovascular: S1, S2. Regular rate and rhythm.   Chest and Back: No tenderness or deformity.  Abdomen: Soft, nontender, nondistended.    Neurologic: No focal neurological deficits. Following some commands.   Musculoskeletal: Moves all extremities spontaneously.   Extremities: No edema or cyanosis.  Integument: Persistent oozing from tunneled dialysis catheter site in the right upper chest  Psychiatric: Appropriate mood and affect.      Diagnostic Data:    Labs:  Recent Labs   Lab 25  0650 25  1300 25  0512 25  1441   WBC 9.0  --   --   --    HGB 11.2* 9.2* 8.3*  --    MCV 93.8  --   --   --    .0  --   --   --    INR  --   --   --  1.12       Recent Labs   Lab 25  0635 25  0748 25  0512   * 110* 152*   BUN 35* 19 35*   CREATSERUM 5.72* 3.95* 5.08*   CA 9.4 9.3 9.2   ALB 3.6 3.8 3.5   * 130* 127*   K 4.7 4.2 4.6   CL 97* 92* 90*   CO2 22.0 25.0 25.0       Estimated Creatinine Clearance: 11.3 mL/min (A) (based on SCr of  5.08 mg/dL (H)).    Recent Labs   Lab 05/07/25  1441   PTP 14.5   INR 1.12                COVID-19 Lab Results    COVID-19  Lab Results   Component Value Date    COVID19 Not Detected 08/13/2022    COVID19 Not Detected 12/26/2020    COVID19 NEGATIVE 10/01/2020       Pro-Calcitonin  No results for input(s): \"PCT\" in the last 168 hours.    Cardiac  No results for input(s): \"TROP\", \"PBNP\" in the last 168 hours.    Creatinine Kinase  No results for input(s): \"CK\" in the last 168 hours.    Inflammatory Markers  No results for input(s): \"CRP\", \"MADELIN\", \"LDH\", \"DDIMER\" in the last 168 hours.    Imaging: Imaging data reviewed in Epic.    Medications:    insulin aspart  4-20 Units Subcutaneous TID AC and HS    insulin degludec  8 Units Subcutaneous Nightly    aspirin  81 mg Oral Daily    hydrALAZINE  25 mg Oral Q8H INDU    sevelamer carbonate  800 mg Oral TID CC    heparin  1.5 mL Intravenous Once    [Held by provider] clopidogrel  75 mg Oral Daily    [Held by provider] apixaban  5 mg Oral BID    LORazepam  1 mg Intravenous Once    amLODIPine  10 mg Oral Daily    baclofen  10 mg Oral BID    isosorbide mononitrate ER  60 mg Oral Daily    rosuvastatin  10 mg Oral Daily    venlafaxine  37.5 mg Oral Daily       Assessment & Plan:    Tariq Nolan is a 74 year old male with PMH sig for CAD w/ stents, anemia, DM2, HTN, DL, and ESRD on PD who presents to the ED with acute onset confusion, left sided weakness.  AMS was thought to be secondary to inefficient PD, now switched to HD.  Overall course complicated by bleeding PD site.     Acute metabolic encephalopathy  Visual hallucinations  -CT and MRI brain negative for acute findings, MRI limited.  -No acute infection , ammonia wnl   -Neuro evaluated, EEG suggestive of metabolic etiology, suspect PD ineffective-- temp HD catheter placed 4/29 and tolerated HD with significant improvement in sx.  -baclofen taper could be considered but pt seems to be improving so have left dose at 10  bid  -pt/ot -- rec rehab at [place]    ESRD on PD, now on HD  -temp catheter placed 4/29, planning tunneled placement 5/5. PD catheter to be removed   -sessions per nephrology, next HD per renal  -monitor tunneled catheter site for bleeding/dressing placed by IR  -General Surgery following for PD catheter removal-May need additional stitch given bleeding    Acute blood loss anemia  Secondary to bleeding from PD site  - Follow H&H    Bradycardia, history of Afib  CAD s/p PCI   -cardiology on consult, hold BB, echo completed  -on plavix, okay to hold but needs to be on bASA while plavix held  -okay to hold eliquis without bridge given minimal day to day risk of CVA.    DM2 with hyperglycemia  -on long acting + high dose ssi. Glc improved now     HTN  -resume home meds     DL  -statin    Quality:  DVT Prophylaxis:eliquis, scds  CODE status: full code   Gavin: no  If COVID testing is negative, may discontinue isolation: yes      Plan of care discussed with patient and all questions answered.      Rosalia Ulloa DO  Hospitalist  [facility]

## 2025-05-07 NOTE — PHYSICAL THERAPY NOTE
IP PT following, pt is occupied c dialysis .     To Non Clinical  Please see below    If you receive a fax about this appeal for [employer] please fax to the [facility] Auth pool at [phone]    She does state could take up to 30 days    Thank you

## 2025-05-07 NOTE — DISCHARGE INSTRUCTIONS
Outpatient Hemodialysis.     [facility]  [address] [place] [address]  [phone]  Schedule: Monday, Wednesday, Friday  Christal time: 10:45am  Please arrive 30 minutes early for your first treatment.

## 2025-05-07 NOTE — PLAN OF CARE
Assumed patient care at 0730. A/Ox4, forgetful at times. Room air. Normal sinus on tele. Carb control diet, QID Accucheck. Wife at bedside. Dialysis completed, 2L removed. Bilateral swelling to hands, hospitalist aware. All safety precautions are in place and will continue with plan of care      Wound where pd cath was removed continues to bleed although redressed 3 times this shift with pressure tape and gauze, surgery aware, will reassess tomorrow.    Problem: SKIN/TISSUE INTEGRITY - ADULT  Goal: Skin integrity remains intact  Description: INTERVENTIONS- Assess and document risk factors for pressure ulcer development- Assess and document skin integrity- Monitor for areas of redness and/or skin breakdown- Initiate interventions, skin care algorithm/standards of care as needed  Outcome: Progressing     Problem: Impaired Cognition  Goal: Patient will exhibit improved attention, thought processing and/or memory  Description: Interventions:- Minimize distractions in the room when full attention is required  Outcome: Progressing

## 2025-05-07 NOTE — PLAN OF CARE
Assumed patient care at 1930.  AxOx4  RA  VSS  Afib on tele - rates in the 40s  Denies pain  Denies nausea  Received patient on low fiber/soft diet. Patient tolerated dinner well, advanced to general diet  3-4GM Na restriction  LFA PIV saline locked  Right permacath site c/d/I  LLQ catheter removal site c/d/I  Sequential compression devices on  Bed in lowest position  Call light within reach  Bed alarm on  Needs met at this time      0330 - this RN entered pt's room during rounds to assess needs, and found pt's LLQ catheter removal site to be heavily saturated, bleeding with sanguineous drainage and dressing falling of of patient. Pt alert, VSS. This RN and charge nurse RN, cleansed the drainage and changed the dressing site. MD notified. Hgb lab drawn resulting at 8.3. no further signs of bleeding noted this shift.    Problem: Patient/Family Goals  Goal: Patient/Family Long Term Goal  Description: Patient's Long Term Goal: go home  Interventions:--cardiac monitoring  -Tunneled HD cath placement  -Hemodialysis  -Labs  -monitor vital signs   See additional Care Plan goals for specific interventions  Outcome: Progressing  Goal: Patient/Family Short Term Goal  Description: Patient's Short Term Goal: pain controlled  Interventions: -pain medicine   See additional Care Plan goals for specific interventions  Outcome: Progressing     Problem: SAFETY ADULT - FALL  Goal: Free from fall injury  Description: INTERVENTIONS:- Assess pt frequently for physical needs- Identify cognitive and physical deficits and behaviors that affect risk of falls.- [place] fall precautions as indicated by assessment.- Educate pt/family on patient safety including physical limitations- Instruct pt to call for assistance with activity based on assessment- Modify environment to reduce risk of injury- Provide assistive devices as appropriate- Consider OT/PT consult to assist with strengthening/mobility- Encourage toileting schedule  Outcome:  Progressing     Problem: CARDIOVASCULAR - ADULT  Goal: Absence of cardiac arrhythmias or at baseline  Description: INTERVENTIONS:- Continuous cardiac monitoring, monitor vital signs, obtain 12 lead EKG if indicated- Evaluate effectiveness of antiarrhythmic and heart rate control medications as ordered- Initiate emergency measures for life threatening arrhythmias- Monitor electrolytes and administer replacement therapy as ordered  Outcome: Progressing     Problem: SKIN/TISSUE INTEGRITY - ADULT  Goal: Skin integrity remains intact  Description: INTERVENTIONS- Assess and document risk factors for pressure ulcer development- Assess and document skin integrity- Monitor for areas of redness and/or skin breakdown- Initiate interventions, skin care algorithm/standards of care as needed  Outcome: Progressing     Problem: NEUROLOGICAL - ADULT  Goal: Achieves stable or improved neurological status  Description: INTERVENTIONS- Assess for and report changes in neurological status- Initiate measures to prevent increased intracranial pressure- Maintain blood pressure and fluid volume within ordered parameters to optimize cerebral perfusion and minimize risk of hemorrhage- Monitor temperature, glucose, and sodium. Initiate appropriate interventions as ordered  Outcome: Progressing     Problem: Impaired Cognition  Goal: Patient will exhibit improved attention, thought processing and/or memory  Description: Interventions:- Minimize distractions in the room when full attention is required  Outcome: Progressing

## 2025-05-07 NOTE — PROGRESS NOTES
[facility]  Progress Note    Tariq Nolan Patient Status:  Inpatient    1950 MRN [id number]   Location [facility] 3NE-A Attending Makayla Guajardo MD   Hosp Day # 11 PCP Jacob Hicks MD     Subjective:  Patient sleeping  HD in progress  Wife at bedside     Objective/Physical Exam:    Vital Signs:  Blood pressure 114/52, pulse (!) 43, temperature 98 °F (36.7 °C), temperature source Oral, resp. rate 15, height 5' 5\" (1.651 m), weight 199 lb 6.4 oz (90.4 kg), SpO2 100%.    Abdomen:  dressings removed, saturated, incision medial with oozing blood - pressure dressing placed    Labs:  Reviewed    Lab Results   Component Value Date    HGB 8.3 2025    HCT 26.1 2025    CREATSERUM 5.08 2025    BUN 35 2025     2025    K 4.6 2025    CL 90 2025    CO2 25.0 2025     2025    CA 9.2 2025    ALB 3.5 2025    MG 2.2 2025    PHOS 5.6 2025    PGLU 162 2025       Problem List:  Problem List[1]        Impression:     73 y/o S/P removal of PD catheter  -bleeding from medial incision  -hgb 9.2-->8.3  Plan:    Labs drawn  Hold anticoagulation  Pressure dressing- if saturated can change  Will continue to follow up tomorrow  All questions answered  DW DR Harjit Alcantar, PA  [facility] Surgery  2025         [1]   Patient Active Problem List  Diagnosis    Essential hypertension    Pure hypercholesterolemia    Obesity (BMI 30.0-34.9)    Plantar fasciitis    Hallux rigidus, unspecified laterality    Degeneration of lumbar intervertebral disc    Non-ST elevation myocardial infarction (NSTEMI), subsequent episode of care (HCC) 2017    Presence of drug coated stent in right coronary artery Distal 3/28 mm Xience & Prox 3.5x18mm Xience 2017    Coronary artery disease involving native coronary artery of native heart without angina pectoris    CKD stage 4 due to type 1 diabetes mellitus  (HCC)    Primary osteoarthritis of both knees    Impingement syndrome of shoulder, left    Mixed hyperlipidemia    Diabetes mellitus (HCC)    Anemia due to stage 4 chronic kidney disease (HCC)    Peritonitis associated with peritoneal dialysis, initial encounter    ESRD on peritoneal dialysis (HCC)    Hypokalemia    Acute confusion    Anticoagulated    ESRD on dialysis (HCC)    Type 2 diabetes mellitus with hyperglycemia, with long-term current use of insulin (HCC)    Toxic metabolic encephalopathy

## 2025-05-07 NOTE — PROGRESS NOTES
[facility]   part of [facility]    Nephrology Progress Note    Tariq Nolan Attending:  Makayla Guajardo MD       SUBJECTIVE:     Underwent PD catheter removal yesterday.  Had some bleeding from exit site but now has pressure dressing.  No further bleeding from CVC.  Feels a bit \"off\" and jittery - thinks it started after not being able to have BM.    PHYSICAL EXAM:     Vital Signs: /52 (BP Location: Left arm)   Pulse (!) 43   Temp 98 °F (36.7 °C) (Oral)   Resp 15   Ht 165.1 cm (5' 5\")   Wt 199 lb 6.4 oz (90.4 kg)   SpO2 100%   BMI 33.18 kg/m²   Temp (24hrs), Av.8 °F (36.6 °C), Min:97.4 °F (36.3 °C), Max:98 °F (36.7 °C)       Intake/Output Summary (Last 24 hours) at 2025 1301  Last data filed at 2025 1615  Gross per 24 hour   Intake 100 ml   Output 3 ml   Net 97 ml     Wt Readings from Last 3 Encounters:   25 199 lb 6.4 oz (90.4 kg)   10/23/24 190 lb 0.6 oz (86.2 kg)   10/20/24 190 lb (86.2 kg)     General: pleasant, well appearing  Skin: no visible rashes  HEENT: NCAT  Cardiac: bradycardic  Abdomen: Soft, covered in surgical dressing  Extremities: warm, well perfused, no leg edema  Neurologic/Psych: awake, alert but has some jitteriness  RIJ  tunnelled HD catheter      LABORATORY DATA:     Lab Results   Component Value Date     (H) 2025    BUN 35 (H) 2025    BUNCREA 26.0 (H) 2022    CREATSERUM 5.08 (H) 2025    ANIONGAP 12 2025    GFR >59 10/05/2010    GFRNAA 28 (L) 2019    GFRAA 33 (L) 2019    CA 9.2 2025    OSMOCALC 275 2025    ALKPHO 185 (H) 2025    AST 23 2025    ALT 10 2025    BILT 0.2 2025    TP 6.9 2025    ALB 3.5 2025    GLOBULIN 2.6 2025    AGRATIO 2.1 06/10/2015     (L) 2025    K 4.6 2025    CL 90 (L) 2025    CO2 25.0 2025     Lab Results   Component Value Date    WBC 9.0 2025    RBC 3.40 (L) 2025    HGB 8.3 (L) 2025     HCT 26.1 (L) 05/06/2025    .0 05/02/2025    MPV 9.9 09/18/2012    MCV 93.8 05/02/2025    MCH 32.9 05/02/2025    MCHC 35.1 05/02/2025    RDW 11.5 05/02/2025    NEPRELIM 6.31 05/02/2025    NEUTABS 3.98 03/26/2019    LYMPHABS 1.06 03/26/2019    EOSABS 0.06 03/26/2019    BASABS 0.06 03/26/2019    NEUT 41 03/26/2019    LYMPH 19 03/26/2019    MON 8 03/26/2019    EOS 1 03/26/2019    BASO 1 03/26/2019    NEPERCENT 70.5 05/02/2025    LYPERCENT 8.4 05/02/2025    MOPERCENT 18.2 05/02/2025    EOPERCENT 1.9 05/02/2025    BAPERCENT 0.4 05/02/2025    NE 6.31 05/02/2025    LYMABS 0.75 (L) 05/02/2025    MOABSO 1.63 (H) 05/02/2025    EOABSO 0.17 05/02/2025    BAABSO 0.04 05/02/2025     Lab Results   Component Value Date    MALBP 5.9 02/28/2022    CREUR 33.38 02/28/2022     Lab Results   Component Value Date    COLORUR Light-Yellow 04/27/2025    CLARITY Clear 04/27/2025    SPECGRAVITY >1.030 (H) 04/27/2025    GLUUR 300 (A) 04/27/2025    BILUR Negative 04/27/2025    KETUR Negative 04/27/2025    BLOODURINE 1+ (A) 04/27/2025    PHURINE 6.0 04/27/2025    PROUR 100 (A) 04/27/2025    UROBILINOGEN Normal 04/27/2025    NITRITE Negative 04/27/2025    LEUUR Negative 04/27/2025    NMIC Microscopic not indicated 12/28/2016    WBCUR 11-20 (A) 04/27/2025    RBCUR 3-5 (A) 04/27/2025    EPIUR Few (A) 04/27/2025    BACUR None Seen 04/27/2025    HYLUR Present (A) 12/18/2018         IMAGING:     Reviewed.    MEDICATIONS:       Current Hospital Medications[1]    ASSESSMENT/PLAN:     73 yo M with history of ESRD on PD, CAD, DM, HTN, HL, presented with AMS, tremors/jerking movements and weakness.     ESRD:  -- PD on hold, PD catheter removed on 5/6  -- started iHD; first treatment on 4/29  -- tunnelled HD catheter placement on 5/5  -- HD after tunnelled catheter placement 5/5 completed  -- next HD today, extend to 4 h given jitteriness  -- SW consult for outpatient HD placement appreciated     Anemia:  -- ANTONIO for Hb < 10     MBD:  -- trend  phos  -- continue sevelamer     HTN:  -- UF with HD  -- amlodipine, hydralazine, imdur     Hyponatremia:  -- fluid restriction <1.5/d when he is able to eat after procedures  -- UF with HD  -- Na 138 bath with HD    Bradycardia despite hydralazine:  -- further management per primary/cardiology    WIll follow. Discussed with RN.    Thank you for allowing me to participate in the care of this patient. Please do not hesitate to call with questions or concerns.        MD Faisal Reveles [facility]         [1]   Current Facility-Administered Medications   Medication Dose Route Frequency    epoetin deyanira (Epogen, Procrit) 73866 UNIT/ML injection 10,000 Units  10,000 Units Intravenous Once    metoclopramide (Reglan) 5 mg/mL injection 5 mg  5 mg Intravenous Q8H PRN    sodium chloride 0.9 % IV bolus 100 mL  100 mL Intravenous Q30 Min PRN    And    albumin human (Albumin) 25% injection 25 g  25 g Intravenous PRN Dialysis    naloxone (Narcan) 0.4 MG/ML injection 0.08 mg  0.08 mg Intravenous PRN    insulin aspart (NovoLOG) 100 Units/mL FlexPen 4-20 Units  4-20 Units Subcutaneous TID AC and HS    insulin degludec (Tresiba) 100 units/mL flextouch 8 Units  8 Units Subcutaneous Nightly    aspirin chewable tab 81 mg  81 mg Oral Daily    hydrALAZINE (Apresoline) tab 25 mg  25 mg Oral Q8H INDU    heparin (Porcine) 1000 UNIT/ML injection 1,500 Units  1.5 mL Intracatheter PRN Dialysis    sevelamer carbonate (Renvela) tab 800 mg  800 mg Oral TID CC    heparin (Porcine) 100 Units/mL lock flush 150 Units  1.5 mL Intravenous Once    acetaminophen (Tylenol) tab 650 mg  650 mg Oral Q4H PRN    Or    acetaminophen (Tylenol) rectal suppository 650 mg  650 mg Rectal Q4H PRN    labetalol (Trandate) 5 mg/mL injection 10 mg  10 mg Intravenous Q10 Min PRN    hydrALAzine (Apresoline) 20 mg/mL injection 10 mg  10 mg Intravenous Q2H PRN    [Held by provider] clopidogrel (Plavix) tab 75 mg  75 mg Oral Daily    [Held by provider] apixaban (Eliquis) tab  5 mg  5 mg Oral BID    melatonin tab 3 mg  3 mg Oral Nightly PRN    ondansetron (Zofran) 4 MG/2ML injection 4 mg  4 mg Intravenous Q6H PRN    LORazepam (Ativan) 2 mg/mL injection 1 mg  1 mg Intravenous Once    amLODIPine (Norvasc) tab 10 mg  10 mg Oral Daily    baclofen (Lioresal) tab 10 mg  10 mg Oral BID    isosorbide mononitrate ER (Imdur) 24 hr tab 60 mg  60 mg Oral Daily    rosuvastatin (Crestor) tab 10 mg  10 mg Oral Daily    venlafaxine (Effexor) tab 37.5 mg  37.5 mg Oral Daily    glucose (Dex4) 15 GM/59ML oral liquid 15 g  15 g Oral Q15 Min PRN    Or    glucose (Glutose) 40% oral gel 15 g  15 g Oral Q15 Min PRN    Or    glucose-vitamin C (Dex-4) chewable tab 4 tablet  4 tablet Oral Q15 Min PRN    Or    dextrose 50% injection 50 mL  50 mL Intravenous Q15 Min PRN    Or    glucose (Dex4) 15 GM/59ML oral liquid 30 g  30 g Oral Q15 Min PRN    Or    glucose (Glutose) 40% oral gel 30 g  30 g Oral Q15 Min PRN    Or    glucose-vitamin C (Dex-4) chewable tab 8 tablet  8 tablet Oral Q15 Min PRN

## 2025-05-08 ENCOUNTER — APPOINTMENT (OUTPATIENT)
Dept: CT IMAGING | Facility: HOSPITAL | Age: 75
DRG: 907 | End: 2025-05-08
Attending: STUDENT IN AN ORGANIZED HEALTH CARE EDUCATION/TRAINING PROGRAM
Payer: MEDICARE

## 2025-05-08 LAB
ALBUMIN SERPL-MCNC: 3.6 G/DL (ref 3.2–4.8)
AMMONIA PLAS-MCNC: <10 UMOL/L (ref 11–32)
ANION GAP SERPL CALC-SCNC: 12 MMOL/L (ref 0–18)
ANION GAP SERPL CALC-SCNC: 6 MMOL/L (ref 0–18)
APTT PPP: 40.9 SECONDS (ref 23–36)
ARTERIAL PATENCY WRIST A: POSITIVE
BASE EXCESS BLDA CALC-SCNC: 3.8 MMOL/L (ref ?–2)
BASOPHILS # BLD AUTO: 0.05 X10(3) UL (ref 0–0.2)
BASOPHILS # BLD AUTO: 0.05 X10(3) UL (ref 0–0.2)
BASOPHILS NFR BLD AUTO: 0.8 %
BASOPHILS NFR BLD AUTO: 0.9 %
BODY TEMPERATURE: 98.6 F
BUN BLD-MCNC: 24 MG/DL (ref 9–23)
BUN BLD-MCNC: 26 MG/DL (ref 9–23)
CALCIUM BLD-MCNC: 9.3 MG/DL (ref 8.7–10.6)
CALCIUM BLD-MCNC: 9.4 MG/DL (ref 8.7–10.6)
CHLORIDE SERPL-SCNC: 94 MMOL/L (ref 98–112)
CHLORIDE SERPL-SCNC: 95 MMOL/L (ref 98–112)
CO2 SERPL-SCNC: 26 MMOL/L (ref 21–32)
CO2 SERPL-SCNC: 28 MMOL/L (ref 21–32)
COHGB MFR BLD: 1.5 % SAT (ref 0–3)
CREAT BLD-MCNC: 3.67 MG/DL (ref 0.7–1.3)
CREAT BLD-MCNC: 4.12 MG/DL (ref 0.7–1.3)
EGFRCR SERPLBLD CKD-EPI 2021: 15 ML/MIN/1.73M2 (ref 60–?)
EGFRCR SERPLBLD CKD-EPI 2021: 17 ML/MIN/1.73M2 (ref 60–?)
EOSINOPHIL # BLD AUTO: 0.14 X10(3) UL (ref 0–0.7)
EOSINOPHIL # BLD AUTO: 0.19 X10(3) UL (ref 0–0.7)
EOSINOPHIL NFR BLD AUTO: 2.6 %
EOSINOPHIL NFR BLD AUTO: 3.1 %
ERYTHROCYTE [DISTWIDTH] IN BLOOD BY AUTOMATED COUNT: 11.7 %
ERYTHROCYTE [DISTWIDTH] IN BLOOD BY AUTOMATED COUNT: 11.8 %
GLUCOSE BLD-MCNC: 142 MG/DL (ref 70–99)
GLUCOSE BLD-MCNC: 148 MG/DL (ref 70–99)
GLUCOSE BLD-MCNC: 218 MG/DL (ref 70–99)
GLUCOSE BLD-MCNC: 220 MG/DL (ref 70–99)
GLUCOSE BLD-MCNC: 256 MG/DL (ref 70–99)
GLUCOSE BLD-MCNC: 270 MG/DL (ref 70–99)
GLUCOSE BLD-MCNC: 298 MG/DL (ref 70–99)
GLUCOSE BLD-MCNC: 307 MG/DL (ref 70–99)
HCO3 BLDA-SCNC: 27.9 MEQ/L (ref 21–27)
HCT VFR BLD AUTO: 24.8 % (ref 39–53)
HCT VFR BLD AUTO: 25 % (ref 39–53)
HGB BLD-MCNC: 8.3 G/DL (ref 13–17.5)
HGB BLD-MCNC: 8.5 G/DL (ref 13–17.5)
HGB BLD-MCNC: 9.6 G/DL (ref 13–17.5)
IMM GRANULOCYTES # BLD AUTO: 0.05 X10(3) UL (ref 0–1)
IMM GRANULOCYTES # BLD AUTO: 0.05 X10(3) UL (ref 0–1)
IMM GRANULOCYTES NFR BLD: 0.8 %
IMM GRANULOCYTES NFR BLD: 0.9 %
INR BLD: 1.11 (ref 0.8–1.2)
LYMPHOCYTES # BLD AUTO: 0.62 X10(3) UL (ref 1–4)
LYMPHOCYTES # BLD AUTO: 0.83 X10(3) UL (ref 1–4)
LYMPHOCYTES NFR BLD AUTO: 11.7 %
LYMPHOCYTES NFR BLD AUTO: 13.5 %
MAGNESIUM SERPL-MCNC: 2 MG/DL (ref 1.6–2.6)
MCH RBC QN AUTO: 33.1 PG (ref 26–34)
MCH RBC QN AUTO: 33.3 PG (ref 26–34)
MCHC RBC AUTO-ENTMCNC: 33.5 G/DL (ref 31–37)
MCHC RBC AUTO-ENTMCNC: 34 G/DL (ref 31–37)
MCV RBC AUTO: 97.3 FL (ref 80–100)
MCV RBC AUTO: 99.6 FL (ref 80–100)
METHGB MFR BLD: 1 % SAT (ref 0.4–1.5)
MONOCYTES # BLD AUTO: 0.64 X10(3) UL (ref 0.1–1)
MONOCYTES # BLD AUTO: 0.91 X10(3) UL (ref 0.1–1)
MONOCYTES NFR BLD AUTO: 12.1 %
MONOCYTES NFR BLD AUTO: 14.8 %
NEUTROPHILS # BLD AUTO: 3.81 X10 (3) UL (ref 1.5–7.7)
NEUTROPHILS # BLD AUTO: 3.81 X10(3) UL (ref 1.5–7.7)
NEUTROPHILS # BLD AUTO: 4.13 X10 (3) UL (ref 1.5–7.7)
NEUTROPHILS # BLD AUTO: 4.13 X10(3) UL (ref 1.5–7.7)
NEUTROPHILS NFR BLD AUTO: 67 %
NEUTROPHILS NFR BLD AUTO: 71.8 %
OSMOLALITY SERPL CALC.SUM OF ELEC: 280 MOSM/KG (ref 275–295)
OSMOLALITY SERPL CALC.SUM OF ELEC: 280 MOSM/KG (ref 275–295)
OXYHGB MFR BLDA: 95.6 % (ref 92–100)
PCO2 BLDA: 43 MM HG (ref 35–45)
PH BLDA: 7.43 [PH] (ref 7.35–7.45)
PHOSPHATE SERPL-MCNC: 4.1 MG/DL (ref 2.4–5.1)
PLATELET # BLD AUTO: 223 10(3)UL (ref 150–450)
PLATELET # BLD AUTO: 229 10(3)UL (ref 150–450)
PO2 BLDA: 82 MM HG (ref 80–100)
POTASSIUM SERPL-SCNC: 4.7 MMOL/L (ref 3.5–5.1)
POTASSIUM SERPL-SCNC: 4.7 MMOL/L (ref 3.5–5.1)
PROTHROMBIN TIME: 14.5 SECONDS (ref 11.6–14.8)
RBC # BLD AUTO: 2.49 X10(6)UL (ref 3.8–5.8)
RBC # BLD AUTO: 2.57 X10(6)UL (ref 3.8–5.8)
SODIUM SERPL-SCNC: 129 MMOL/L (ref 136–145)
SODIUM SERPL-SCNC: 132 MMOL/L (ref 136–145)
WBC # BLD AUTO: 5.3 X10(3) UL (ref 4–11)
WBC # BLD AUTO: 6.2 X10(3) UL (ref 4–11)

## 2025-05-08 PROCEDURE — 70450 CT HEAD/BRAIN W/O DYE: CPT | Performed by: STUDENT IN AN ORGANIZED HEALTH CARE EDUCATION/TRAINING PROGRAM

## 2025-05-08 RX ORDER — BACLOFEN 5 MG/1
5 TABLET ORAL 2 TIMES DAILY
Status: DISCONTINUED | OUTPATIENT
Start: 2025-05-08 | End: 2025-05-12

## 2025-05-08 RX ORDER — INSULIN DEGLUDEC 100 U/ML
12 INJECTION, SOLUTION SUBCUTANEOUS NIGHTLY
Status: DISCONTINUED | OUTPATIENT
Start: 2025-05-08 | End: 2025-05-11

## 2025-05-08 RX ORDER — ALBUMIN (HUMAN) 12.5 G/50ML
25 SOLUTION INTRAVENOUS
Status: ACTIVE | OUTPATIENT
Start: 2025-05-08 | End: 2025-05-10

## 2025-05-08 NOTE — PHYSICAL THERAPY NOTE
PHYSICAL THERAPY TREATMENT NOTE - INPATIENT    Room Number: 3618/3618-A     Session: 3     Number of Visits to Meet Established Goals: 6    Presenting Problem: confusion  Co-Morbidities : ESRD on PD, CAD, anemia, DM, tremors    PHYSICAL THERAPY ASSESSMENT   Patient demonstrates good  progress this session, goals  remain in progress.      Patient is requiring moderate assist as a result of the following impairments: decreased functional strength, decreased endurance/aerobic capacity, impaired static and dynamic balance, decreased muscular endurance, and medical status.     Patient continues to function below baseline with bed mobility, transfers, and gait.  Next session anticipate patient to progress bed mobility, transfers, and gait.  Physical Therapy will continue to follow patient for duration of hospitalization.    Patient continues to benefit from continued skilled PT services: to promote return to prior level of function and safety with continuous assistance and gradual rehabilitative therapy     PLAN DURING HOSPITALIZATION  Nursing Mobility Recommendation : 1 Assist (B LE intermittent buckling)  PT Device Recommendation: Mechanical lift  PT Treatment Plan: Bed mobility, Patient education, Family education, Neuromuscular re-educate, Gait training, Balance training, Transfer training  Frequency (Obs): 3x/week     CURRENT GOALS     Goal #1 Patient is able to demonstrate supine - sit EOB @ level: minimum assistance      Goal #2 Patient is able to demonstrate transfers EOB to/from Chair/Wheelchair at assistance level: minimum assistance      Goal #3 Patient is able to ambulate 20 feet with assist device: walker - rolling at assistance level: minimum assistance      Goal #4     Goal #5     Goal #6     Goal Comments: Goals established on 4/27/2025 5/8/2025 all goals ongoing    SUBJECTIVE  \"I can try walking a little\"    OBJECTIVE  Precautions: Bed/chair alarm    WEIGHT BEARING RESTRICTION     PAIN ASSESSMENT    Rating: Unable to rate  Location: L great toe  Management Techniques: Activity promotion, Body mechanics, Repositioning    BALANCE                                                                                                                       Static Sitting: Fair  Dynamic Sitting: Fair -           Static Standing: Poor +  Dynamic Standing: Poor    ACTIVITY TOLERANCE                         O2 WALK       AM-PAC '6-Clicks' INPATIENT SHORT FORM - BASIC MOBILITY  How much difficulty does the patient currently have...  Patient Difficulty: Turning over in bed (including adjusting bedclothes, sheets and blankets)?: None   Patient Difficulty: Sitting down on and standing up from a chair with arms (e.g., wheelchair, bedside commode, etc.): A Lot   Patient Difficulty: Moving from lying on back to sitting on the side of the bed?: A Little   How much help from another person does the patient currently need...   Help from Another: Moving to and from a bed to a chair (including a wheelchair)?: A Lot   Help from Another: Need to walk in hospital room?: A Lot   Help from Another: Climbing 3-5 steps with a railing?: Total     AM-PAC Score:  Raw Score: 14   Approx Degree of Impairment: 61.29%   Standardized Score (AM-PAC Scale): 38.1   CMS Modifier (G-Code): CL    FUNCTIONAL ABILITY STATUS  Gait Assessment   Functional Mobility/Gait Assessment  Gait Assistance: Moderate assistance  Distance (ft): 7  Assistive Device: Rolling walker  Pattern: Shuffle    Skilled Therapy Provided    Pt received supine in bed with pt's wife bedside. Educated pt on and had pt perform seated there ex. Amberly steady utilized for initial sit to stand from EOB with pt requiring Min A. Utilized RW for sit to stand from bedside chair with VC's for proper B UE management. Educated pt on and had pt perform standing leaning/weight acceptance on B LE's with RW. Gait trained pt 7 ft with extensive VC's for RW sequencing and maintaining neutral gaze. Noted pt  exhibited intermittent B LE knee buckling with gait training, VC's given for proper quad activation for both LE's. No myoclonic B UE jerking noted this session. Pt left sitting up in chair with call light within reach and pt's wife bedside.     Bed Mobility:  Rolling: nt   Supine<>Sit: sup   Sit<>Supine: sup     Transfer Mobility:  Sit<>Stand: Mod A   Stand<>Sit: Mod A   Gait: Mod A    Therapist's Comments: Noted pt exhibited intermittent B LE knee buckling with gait training, VC's given for proper quad activation for both LE's      THERAPEUTIC EXERCISES  Lower Extremity Ankle pumps  LAQ  B LE leaning while standing       Upper Extremity      Position Sitting and Standing     Repetitions   10   Sets   1     Patient End of Session: Up in chair, Needs met, Call light within reach, RN aware of session/findings, Hospital anti-slip socks, All patient questions and concerns addressed, Alarm set    PT Session Time: 30 minutes  Gait Training: 15 minutes  Therapeutic Activity: 10 minutes  Therapeutic Exercise: 5 minutes   Neuromuscular Re-education: 0 minutes  SUZY Salvador  Cosigned Sabiha Arriola

## 2025-05-08 NOTE — PLAN OF CARE
Assumed care of 75 y/o male @1930  A/Ox4, RA  A fib-Tele  Regular diet w/Na restrictions  Thin liquids  QID accucheck  Incontinent, briefed  Denies pain  Up w/shagufta stedy x 1/2  R IJ permacath for HD M/W/F  LFA PIV-SL  Safety precautions maintained and all needs met    **incision where PD cath was removed continues to bleed, changed dressing x2 with abdominal pad and pressure tape, Gen Sx will reassess 5/8            Problem: NEUROLOGICAL - ADULT  Goal: Achieves stable or improved neurological status  Description: INTERVENTIONS- Assess for and report changes in neurological status- Initiate measures to prevent increased intracranial pressure- Maintain blood pressure and fluid volume within ordered parameters to optimize cerebral perfusion and minimize risk of hemorrhage- Monitor temperature, glucose, and sodium. Initiate appropriate interventions as ordered  Outcome: Progressing

## 2025-05-08 NOTE — SLP NOTE
SPEECH DAILY NOTE - INPATIENT    ASSESSMENT & PLAN   ASSESSMENT  Pt seen for dysphagia tx to assess tolerance with recommended diet, ensure proper utilization of aspiration precautions and provide pt/family education. Patient s/p RRT earlier.  Clinical d/w RN completed.  Patient received awake, alert, and pleasant.  Hallucinations persist.  Patient tolerated lunch meal earlier with no overt clinical s/s aspiration observed or reported.  Patient on room air.  Aspiration precautions reviewed and they expressed good understanding and agreement.       Diet Recommendations - Solids: Regular  Diet Recommendations - Liquids: Thin Liquids  Aspiration Precautions: Upright position, Slow rate, Small bites, Small sips  Medication Administration Recommendations: No restrictions (as able to tolerate)    Patient Experiencing Pain: No        Treatment Plan  Treatment Plan/Recommendations: No further inpatient SLP service warranted    Interdisciplinary Communication: Discussed with RN            GOALS  Goal #1 The patient will tolerate regular consistency and thin liquids without overt signs or symptoms of aspiration with 85 % accuracy over 2-3 session(s). Met   Goal #2 The patient/family/caregiver will demonstrate understanding and implementation of aspiration precautions and swallow strategies independently over 3 session(s).    Met   Goal #3 The patient will tolerate trial upgrade of soft/regular consistency and thin liquids without overt signs or symptoms of aspiration with 90 % accuracy over 3-4 session(s).  DC goal   Goal #4 Assess need for VFSS within 2-3 visits    DC goal 2/2 not indicated       FOLLOW UP  Follow Up Needed (Documentation Required): No       Session: 5    If you have any questions, please contact SHAILESH Loza MS CCC-SLP/L  Speech-Language Pathologist  [facility] [phone]

## 2025-05-08 NOTE — PROGRESS NOTES
Cardiology Progress Note    Tariq Nolan Patient Status:  Inpatient    1950 MRN [id number]   Location [facility] 3NE-A Attending Herrera Mariscal MD   Hosp Day # 12 PCP Jacob Hicks MD     Impression:  Permanent atrial fibrillation with slow ventricular response  Previous CAD with history of PCI  ESRD on peritoneal dialysis  Diabetes  Hypertension  Hyperlipidemia  Syncope  Mental status changes    Plan:  HR chronically 30-40s while sleeping. When I woke him for interview, up to 50-60 range. Had an episode of unresponsiveness today, states maybe felt a little dizzy earlier. But not consistent, states he has not felt any dizziness over entire the last week, and does not have recurrent syncope even with his HR in the 30-40s. Has been evaluated by EP Dr. Perez. No indication for pacemaker at present.    RR 5/8 for unresponsiveness. Chart states had low BP but nothing documented. BP has been normal to high. Amlodipine is on hold. Remains on hydral/nitrate. No advanced block or pause > 3 sec on tele.   Carvedilol stopped previously.   Neurology following.  Possible toxic/metabolic versus uremic encephalopathy.  EEG normal. He is lucid today with me.   Fluid management per nephrology with HD  Echocardiogram  with normal LV function, no significant valvular disease and no evidence of PFO by bubble study   Continue telemetry.    AC on hold due to recurrent bleeding from PD site.     Subjective:  Asleep. Was difficult to wake, but once awake, fully lucid and appropriate. No CP or SOB.     Objective:  /52 (BP Location: Left arm)   Pulse 51   Temp 98.1 °F (36.7 °C) (Oral)   Resp 17   Ht 65\"   Wt 199 lb 6.4 oz (90.4 kg)   SpO2 96%   BMI 33.18 kg/m²   Temp (24hrs), Av.3 °F (36.8 °C), Min:98 °F (36.7 °C), Max:98.8 °F (37.1 °C)      Intake/Output Summary (Last 24 hours) at 2025 1432  Last data filed at 2025 1115  Gross per 24 hour   Intake 240 ml   Output --   Net 240 ml     Wt  Readings from Last 3 Encounters:   05/02/25 199 lb 6.4 oz (90.4 kg)   10/23/24 190 lb 0.6 oz (86.2 kg)   10/20/24 190 lb (86.2 kg)       General: Awake and alert; in no acute distress  Cardiac: irregular rhythm   Lungs: Clear to auscultation bilaterally; no accessory muscle use  Abdomen: Soft, non-tender; bowel sounds are normoactive  Extremities: No clubbing/cyanosis; moves all 4 extremities normally, no sig edema    Current Hospital Medications[1]    Laboratory Data:  Lab Results   Component Value Date    WBC 5.3 05/08/2025    HGB 8.3 05/08/2025    HCT 24.8 05/08/2025    .0 05/08/2025       Lab Results   Component Value Date    INR 1.11 05/08/2025    INR 1.12 05/07/2025    INR 1.25 (H) 04/26/2025     Lab Results   Component Value Date     05/08/2025    K 4.7 05/08/2025    CL 95 05/08/2025    CO2 28.0 05/08/2025    BUN 26 05/08/2025    CREATSERUM 4.12 05/08/2025     05/08/2025    CA 9.3 05/08/2025    MG 2.0 05/08/2025         Telemetry: AF 30-40s asleep, 50-60s awake      Thank you for allowing our practice to participate in the care of your patient. Please do not hesitate to contact me if you have any questions.           [1]   Current Facility-Administered Medications   Medication Dose Route Frequency    insulin degludec (Tresiba) 100 units/mL flextouch 12 Units  12 Units Subcutaneous Nightly    sodium chloride 0.9 % IV bolus 100 mL  100 mL Intravenous Q30 Min PRN    And    albumin human (Albumin) 25% injection 25 g  25 g Intravenous PRN Dialysis    metoclopramide (Reglan) 5 mg/mL injection 5 mg  5 mg Intravenous Q8H PRN    naloxone (Narcan) 0.4 MG/ML injection 0.08 mg  0.08 mg Intravenous PRN    insulin aspart (NovoLOG) 100 Units/mL FlexPen 4-20 Units  4-20 Units Subcutaneous TID AC and HS    aspirin chewable tab 81 mg  81 mg Oral Daily    hydrALAZINE (Apresoline) tab 25 mg  25 mg Oral Q8H INDU    heparin (Porcine) 1000 UNIT/ML injection 1,500 Units  1.5 mL Intracatheter PRN Dialysis     sevelamer carbonate (Renvela) tab 800 mg  800 mg Oral TID CC    heparin (Porcine) 100 Units/mL lock flush 150 Units  1.5 mL Intravenous Once    acetaminophen (Tylenol) tab 650 mg  650 mg Oral Q4H PRN    Or    acetaminophen (Tylenol) rectal suppository 650 mg  650 mg Rectal Q4H PRN    labetalol (Trandate) 5 mg/mL injection 10 mg  10 mg Intravenous Q10 Min PRN    hydrALAzine (Apresoline) 20 mg/mL injection 10 mg  10 mg Intravenous Q2H PRN    [Held by provider] clopidogrel (Plavix) tab 75 mg  75 mg Oral Daily    [Held by provider] apixaban (Eliquis) tab 5 mg  5 mg Oral BID    melatonin tab 3 mg  3 mg Oral Nightly PRN    ondansetron (Zofran) 4 MG/2ML injection 4 mg  4 mg Intravenous Q6H PRN    LORazepam (Ativan) 2 mg/mL injection 1 mg  1 mg Intravenous Once    [Held by provider] amLODIPine (Norvasc) tab 10 mg  10 mg Oral Daily    baclofen (Lioresal) tab 10 mg  10 mg Oral BID    isosorbide mononitrate ER (Imdur) 24 hr tab 60 mg  60 mg Oral Daily    rosuvastatin (Crestor) tab 10 mg  10 mg Oral Daily    venlafaxine (Effexor) tab 37.5 mg  37.5 mg Oral Daily    glucose (Dex4) 15 GM/59ML oral liquid 15 g  15 g Oral Q15 Min PRN    Or    glucose (Glutose) 40% oral gel 15 g  15 g Oral Q15 Min PRN    Or    glucose-vitamin C (Dex-4) chewable tab 4 tablet  4 tablet Oral Q15 Min PRN    Or    dextrose 50% injection 50 mL  50 mL Intravenous Q15 Min PRN    Or    glucose (Dex4) 15 GM/59ML oral liquid 30 g  30 g Oral Q15 Min PRN    Or    glucose (Glutose) 40% oral gel 30 g  30 g Oral Q15 Min PRN    Or    glucose-vitamin C (Dex-4) chewable tab 8 tablet  8 tablet Oral Q15 Min PRN

## 2025-05-08 NOTE — PROGRESS NOTES
Called to see patient for bleeding at old pd site    Wife and nurse at beside    I removed dressing      No bleeding observed for over 20 minutes    I replaced his steri strips  and covered with dressing    Plan observation

## 2025-05-08 NOTE — PROGRESS NOTES
[facility]   part of [facility] Hospitalist Progress Note     Tariq Nolan Patient Status:  Inpatient    1950 MRN [id number]   Location [facility] 3NE-A Attending Herrera Mariscal MD   Hosp Day # 12 PCP Jacob Hicks MD     Subjective:   Seen and examined patient during RRT that was called for syncopal episode.  Patient was sitting in the chair and appeared to be blacking out with low blood pressure.  Patient was placed back in his bed, he bounced back on his own.     I was later paged to inform that patient was having hallucinations-seeing a red color in his room and then was \"not responding\" however he woke up and was answering questions.      Objective:      Vital signs:  Temp:  [98 °F (36.7 °C)-98.8 °F (37.1 °C)] 98.1 °F (36.7 °C)  Pulse:  [43-52] 51  Resp:  [13-20] 17  BP: (110-146)/(52-69) 110/52  SpO2:  [92 %-99 %] 96 %  General: No acute distress.  HEENT: Normocephalic atraumatic.  Neck: No lymphadenopathy.   Respiratory: Clear to auscultation bilaterally. No wheezes. No rhonchi.  Cardiovascular: S1, S2. Regular rate and rhythm.   Chest and Back: No tenderness or deformity.  Abdomen: Soft, nontender, nondistended.  Wound/pressure dressing in place; does not appear to be saturated,  Neurologic: No focal neurological deficits. Following some commands.  Cranial nerves II to XII are grossly intact, patient verbalizing processing information to his baseline  Musculoskeletal: Moves all extremities spontaneously.   Extremities: No edema or cyanosis.  Integument: Persistent oozing from tunneled dialysis catheter site in the right upper chest  Psychiatric: Appropriate mood and affect.      Diagnostic Data:    Labs:  Recent Labs   Lab 25  0650 25  1300 25  0512 25  1441 25  0626 25  1350   WBC 9.0  --   --   --  6.2 5.3   HGB 11.2* 9.2* 8.3*  --  8.5* 8.3*   MCV 93.8  --   --   --  97.3 99.6   .0  --   --   --  223.0  229.0   INR  --   --   --  1.12 1.11  --        Recent Labs   Lab 05/06/25  0748 05/07/25  0512 05/08/25  0626 05/08/25  1350   * 152* 142* 220*   BUN 19 35* 24* 26*   CREATSERUM 3.95* 5.08* 3.67* 4.12*   CA 9.3 9.2 9.4 9.3   ALB 3.8 3.5 3.6  --    * 127* 132* 129*   K 4.2 4.6 4.7 4.7   CL 92* 90* 94* 95*   CO2 25.0 25.0 26.0 28.0       Estimated Creatinine Clearance: 13.9 mL/min (A) (based on SCr of 4.12 mg/dL (H)).    Recent Labs   Lab 05/07/25  1441 05/08/25  0626   PTP 14.5 14.5   INR 1.12 1.11                COVID-19 Lab Results    COVID-19  Lab Results   Component Value Date    COVID19 Not Detected 08/13/2022    COVID19 Not Detected 12/26/2020    COVID19 NEGATIVE 10/01/2020       Pro-Calcitonin  No results for input(s): \"PCT\" in the last 168 hours.    Cardiac  No results for input(s): \"TROP\", \"PBNP\" in the last 168 hours.    Creatinine Kinase  No results for input(s): \"CK\" in the last 168 hours.    Inflammatory Markers  No results for input(s): \"CRP\", \"MADELIN\", \"LDH\", \"DDIMER\" in the last 168 hours.    Imaging: Imaging data reviewed in Epic.    Medications:    insulin degludec  12 Units Subcutaneous Nightly    insulin aspart  4-20 Units Subcutaneous TID AC and HS    aspirin  81 mg Oral Daily    hydrALAZINE  25 mg Oral Q8H INDU    sevelamer carbonate  800 mg Oral TID CC    heparin  1.5 mL Intravenous Once    [Held by provider] clopidogrel  75 mg Oral Daily    [Held by provider] apixaban  5 mg Oral BID    LORazepam  1 mg Intravenous Once    [Held by provider] amLODIPine  10 mg Oral Daily    baclofen  10 mg Oral BID    isosorbide mononitrate ER  60 mg Oral Daily    rosuvastatin  10 mg Oral Daily    venlafaxine  37.5 mg Oral Daily       Assessment & Plan:    Tariq Nolan is a 74 year old male with PMH sig for CAD w/ stents, anemia, DM2, HTN, DL, and ESRD on PD who presents to the ED with acute onset confusion, left sided weakness.  AMS was thought to be secondary to inefficient PD, now switched to HD.   Overall course complicated by bleeding PD site.     Acute metabolic encephalopathy  Visual hallucinations  -CT and MRI brain negative for acute findings, MRI limited.  -No acute infection , ammonia wnl   -Neuro evaluated, EEG suggestive of metabolic etiology, suspect PD ineffective-- temp HD catheter placed 4/29 and tolerated HD with significant improvement in sx.  -Visual hallucinations had resolved and patient appeared to be back to baseline however on 5/8 reporting visual hallucinations again  - Stat CT head 5/8  -will decrease baclofen to 5 mg twice daily  -pt/ot -- rec rehab at [place]    Altered mental status  Rapid response 5/8  - Patient was described to have had a vasovagal episode with low blood pressure and eyes rolling back  - Repeat BP after putting the patient supine appear to be elevated  - 1 dose of Zofran was given  - Stat CBC, CMP, ammonia obtained-reviewed  - 250 cc bolus 0.9 given-discussed with nephrology  - Will reconsult cardiology given bradycardia during vagal episode   -Neurology exam was benign during rapid however later was paged to inform about hallucinations-will obtain stat CT head  - will obtain ABG    ESRD on PD, now on HD  -temp catheter placed 4/29, planning tunneled placement 5/5. PD catheter to be removed   -sessions per nephrology, next HD per renal  -monitor tunneled catheter site for bleeding/dressing placed by IR  -General Surgery following for PD catheter removal-May need additional stitch given bleeding    Acute blood loss anemia  Secondary to bleeding from PD site  - Follow H&H    Bradycardia, history of Afib  CAD s/p PCI   -cardiology on consult, hold BB, echo completed  -on plavix, okay to hold but needs to be on bASA while plavix held  -okay to hold eliquis without bridge given minimal day to day risk of CVA.  -Reconsult cardiology    DM2 with hyperglycemia  -on long acting + high dose ssi. Glc improved now     HTN  -resume home meds     DL  -statin    Quality:  DVT  Prophylaxis:eliquis, scds  CODE status: full code   Gavin: no  If COVID testing is negative, may discontinue isolation: yes     Critical care time taken to coordinate care for this patient, discussed case with consultants, family at bedside RN  55 minutes.    Plan of care discussed with patient and all questions answered.      Rosalia Ulloa DO  [facility]

## 2025-05-08 NOTE — PROGRESS NOTES
[facility]   part of [facility]    Nephrology Progress Note    Tariq Nolan Attending:  Rosalia Ulloa DO       SUBJECTIVE:     Underwent 4 h HD treatment yesterday.  Today, complaining of lightheadedness, not feeling well.  Wife at bedside reports bleeding from PD exit site.  Soon after developed slumping and jerking movements.  HR dropped to 30s on monitor.  Notified RN and called RRT.      PHYSICAL EXAM:     Vital Signs: /52 (BP Location: Left arm)   Pulse 51   Temp 98.1 °F (36.7 °C) (Oral)   Resp 17   Ht 165.1 cm (5' 5\")   Wt 199 lb 6.4 oz (90.4 kg)   SpO2 96%   BMI 33.18 kg/m²   Temp (24hrs), Av.3 °F (36.8 °C), Min:98 °F (36.7 °C), Max:98.8 °F (37.1 °C)       Intake/Output Summary (Last 24 hours) at 2025 1328  Last data filed at 2025 1115  Gross per 24 hour   Intake 240 ml   Output --   Net 240 ml     Wt Readings from Last 3 Encounters:   25 199 lb 6.4 oz (90.4 kg)   10/23/24 190 lb 0.6 oz (86.2 kg)   10/20/24 190 lb (86.2 kg)     Gen - sitting in chair, appears dazed  HEENT - NCAT  CV - bradycardic  Resp - CTAB  Abd - soft, abd dressing noted  Ext - warm, 1-2+ hand and leg edema  MS - slow to answer questions  Neuro - per HPI    LABORATORY DATA:     Lab Results   Component Value Date     (H) 2025    BUN 24 (H) 2025    BUNCREA 26.0 (H) 2022    CREATSERUM 3.67 (H) 2025    ANIONGAP 12 2025    GFR >59 10/05/2010    GFRNAA 28 (L) 2019    GFRAA 33 (L) 2019    CA 9.4 2025    OSMOCALC 280 2025    ALKPHO 185 (H) 2025    AST 23 2025    ALT 10 2025    BILT 0.2 2025    TP 6.9 2025    ALB 3.6 2025    GLOBULIN 2.6 2025    AGRATIO 2.1 06/10/2015     (L) 2025    K 4.7 2025    CL 94 (L) 2025    CO2 26.0 2025     Lab Results   Component Value Date    WBC 6.2 2025    RBC 2.57 (L) 2025    HGB 8.5 (L) 2025    HCT 25.0 (L) 2025     .0 05/08/2025    MPV 9.9 09/18/2012    MCV 97.3 05/08/2025    MCH 33.1 05/08/2025    MCHC 34.0 05/08/2025    RDW 11.8 05/08/2025    NEPRELIM 4.13 05/08/2025    NEUTABS 3.98 03/26/2019    LYMPHABS 1.06 03/26/2019    EOSABS 0.06 03/26/2019    BASABS 0.06 03/26/2019    NEUT 41 03/26/2019    LYMPH 19 03/26/2019    MON 8 03/26/2019    EOS 1 03/26/2019    BASO 1 03/26/2019    NEPERCENT 67.0 05/08/2025    LYPERCENT 13.5 05/08/2025    MOPERCENT 14.8 05/08/2025    EOPERCENT 3.1 05/08/2025    BAPERCENT 0.8 05/08/2025    NE 4.13 05/08/2025    LYMABS 0.83 (L) 05/08/2025    MOABSO 0.91 05/08/2025    EOABSO 0.19 05/08/2025    BAABSO 0.05 05/08/2025     Lab Results   Component Value Date    MALBP 5.9 02/28/2022    CREUR 33.38 02/28/2022     Lab Results   Component Value Date    COLORUR Light-Yellow 04/27/2025    CLARITY Clear 04/27/2025    SPECGRAVITY >1.030 (H) 04/27/2025    GLUUR 300 (A) 04/27/2025    BILUR Negative 04/27/2025    KETUR Negative 04/27/2025    BLOODURINE 1+ (A) 04/27/2025    PHURINE 6.0 04/27/2025    PROUR 100 (A) 04/27/2025    UROBILINOGEN Normal 04/27/2025    NITRITE Negative 04/27/2025    LEUUR Negative 04/27/2025    NMIC Microscopic not indicated 12/28/2016    WBCUR 11-20 (A) 04/27/2025    RBCUR 3-5 (A) 04/27/2025    EPIUR Few (A) 04/27/2025    BACUR None Seen 04/27/2025    HYLUR Present (A) 12/18/2018         IMAGING:     Reviewed.    MEDICATIONS:       Current Hospital Medications[1]    ASSESSMENT/PLAN:     75 yo M with history of ESRD on PD, CAD, DM, HTN, HL, presented with AMS, tremors/jerking movements and weakness.     Now with bradycardia and hypotension.    Bradycardia, hypotension: ? Vagal , other?  -- RRT  -- d/w cardiology and hospitalist services  -- 500 ml saline bolus    ESRD:  -- PD on hold, PD catheter removed on 5/6  -- started iHD; first treatment on 4/29  -- tunnelled HD catheter placement on 5/5  -- HD MWF now  -- SW consult for outpatient HD placement appreciated     Anemia:  -- ANTONIO  for Hb < 10, given on 5/7  -- check Hb now due to bleeding from abd site, RRT called     MBD:  -- trend phos  -- continue sevelamer     HTN:  -- UF with HD  -- amlodipine, hydralazine, imdur --> hold amlodipine at least now; defer imdur/hydralazine to cardiology services     Hyponatremia:  -- fluid restriction <1.5/d when he is able to eat after procedures  -- UF with HD  -- Na 138 bath with HD    CCT 35 min        Thank you for allowing me to participate in the care of this patient. Please do not hesitate to call with questions or concerns.        Mireya Stone MD  [facility]         [1]   Current Facility-Administered Medications   Medication Dose Route Frequency    insulin degludec (Tresiba) 100 units/mL flextouch 12 Units  12 Units Subcutaneous Nightly    sodium chloride 0.9 % IV bolus 500 mL  500 mL Intravenous Once    metoclopramide (Reglan) 5 mg/mL injection 5 mg  5 mg Intravenous Q8H PRN    naloxone (Narcan) 0.4 MG/ML injection 0.08 mg  0.08 mg Intravenous PRN    insulin aspart (NovoLOG) 100 Units/mL FlexPen 4-20 Units  4-20 Units Subcutaneous TID AC and HS    aspirin chewable tab 81 mg  81 mg Oral Daily    hydrALAZINE (Apresoline) tab 25 mg  25 mg Oral Q8H INDU    heparin (Porcine) 1000 UNIT/ML injection 1,500 Units  1.5 mL Intracatheter PRN Dialysis    sevelamer carbonate (Renvela) tab 800 mg  800 mg Oral TID CC    heparin (Porcine) 100 Units/mL lock flush 150 Units  1.5 mL Intravenous Once    acetaminophen (Tylenol) tab 650 mg  650 mg Oral Q4H PRN    Or    acetaminophen (Tylenol) rectal suppository 650 mg  650 mg Rectal Q4H PRN    labetalol (Trandate) 5 mg/mL injection 10 mg  10 mg Intravenous Q10 Min PRN    hydrALAzine (Apresoline) 20 mg/mL injection 10 mg  10 mg Intravenous Q2H PRN    [Held by provider] clopidogrel (Plavix) tab 75 mg  75 mg Oral Daily    [Held by provider] apixaban (Eliquis) tab 5 mg  5 mg Oral BID    melatonin tab 3 mg  3 mg Oral Nightly PRN    ondansetron (Zofran) 4 MG/2ML  injection 4 mg  4 mg Intravenous Q6H PRN    LORazepam (Ativan) 2 mg/mL injection 1 mg  1 mg Intravenous Once    amLODIPine (Norvasc) tab 10 mg  10 mg Oral Daily    baclofen (Lioresal) tab 10 mg  10 mg Oral BID    isosorbide mononitrate ER (Imdur) 24 hr tab 60 mg  60 mg Oral Daily    rosuvastatin (Crestor) tab 10 mg  10 mg Oral Daily    venlafaxine (Effexor) tab 37.5 mg  37.5 mg Oral Daily    glucose (Dex4) 15 GM/59ML oral liquid 15 g  15 g Oral Q15 Min PRN    Or    glucose (Glutose) 40% oral gel 15 g  15 g Oral Q15 Min PRN    Or    glucose-vitamin C (Dex-4) chewable tab 4 tablet  4 tablet Oral Q15 Min PRN    Or    dextrose 50% injection 50 mL  50 mL Intravenous Q15 Min PRN    Or    glucose (Dex4) 15 GM/59ML oral liquid 30 g  30 g Oral Q15 Min PRN    Or    glucose (Glutose) 40% oral gel 30 g  30 g Oral Q15 Min PRN    Or    glucose-vitamin C (Dex-4) chewable tab 8 tablet  8 tablet Oral Q15 Min PRN

## 2025-05-08 NOTE — PLAN OF CARE
Assumed patient care at 0730. Alert and oriented x4. Room air. A Fib on tele. Incontinent x2. Regular diet. Accu check QID. Patient denies any pain at this time. Up with one assist and sero steady. Right Perma cath in place, clean, dry and intact. Left forearm saline locked. Neuro checks Q shift. NIH daily. Aspiration precautions are in place. All safety precautions are in place at this time.     1330: RRT called. Patient became unresponsive and hunched over. Patients blood pressure 89/56. Heart rate in the low 40s. See new orders.      1515: Patient began having hallucinations. MD aware.     Problem: Patient/Family Goals  Goal: Patient/Family Long Term Goal  Description: Patient's Long Term Goal: go home  Interventions:--cardiac monitoring  -Tunneled HD cath placement  -Hemodialysis  -Labs  -monitor vital signs   See additional Care Plan goals for specific interventions  Outcome: Progressing  Goal: Patient/Family Short Term Goal  Description: Patient's Short Term Goal: pain controlled  Interventions: -pain medicine   See additional Care Plan goals for specific interventions  Outcome: Progressing     Problem: SAFETY ADULT - FALL  Goal: Free from fall injury  Description: INTERVENTIONS:- Assess pt frequently for physical needs- Identify cognitive and physical deficits and behaviors that affect risk of falls.- [place] fall precautions as indicated by assessment.- Educate pt/family on patient safety including physical limitations- Instruct pt to call for assistance with activity based on assessment- Modify environment to reduce risk of injury- Provide assistive devices as appropriate- Consider OT/PT consult to assist with strengthening/mobility- Encourage toileting schedule  Outcome: Progressing     Problem: CARDIOVASCULAR - ADULT  Goal: Absence of cardiac arrhythmias or at baseline  Description: INTERVENTIONS:- Continuous cardiac monitoring, monitor vital signs, obtain 12 lead EKG if indicated- Evaluate  effectiveness of antiarrhythmic and heart rate control medications as ordered- Initiate emergency measures for life threatening arrhythmias- Monitor electrolytes and administer replacement therapy as ordered  Outcome: Progressing     Problem: SKIN/TISSUE INTEGRITY - ADULT  Goal: Skin integrity remains intact  Description: INTERVENTIONS- Assess and document risk factors for pressure ulcer development- Assess and document skin integrity- Monitor for areas of redness and/or skin breakdown- Initiate interventions, skin care algorithm/standards of care as needed  Outcome: Progressing     Problem: NEUROLOGICAL - ADULT  Goal: Achieves stable or improved neurological status  Description: INTERVENTIONS- Assess for and report changes in neurological status- Initiate measures to prevent increased intracranial pressure- Maintain blood pressure and fluid volume within ordered parameters to optimize cerebral perfusion and minimize risk of hemorrhage- Monitor temperature, glucose, and sodium. Initiate appropriate interventions as ordered  Outcome: Progressing     Problem: Impaired Cognition  Goal: Patient will exhibit improved attention, thought processing and/or memory  Description: Interventions:- Minimize distractions in the room when full attention is required  Outcome: Progressing     Problem: Patient/Family Goals  Goal: Patient/Family Long Term Goal  Description: Patient's Long Term Goal: go home  Interventions:--cardiac monitoring  -Tunneled HD cath placement  -Hemodialysis  -Labs  -monitor vital signs   See additional Care Plan goals for specific interventions  Outcome: Progressing  Goal: Patient/Family Short Term Goal  Description: Patient's Short Term Goal: pain controlled  Interventions: -pain medicine   See additional Care Plan goals for specific interventions  Outcome: Progressing     Problem: SAFETY ADULT - FALL  Goal: Free from fall injury  Description: INTERVENTIONS:- Assess pt frequently for physical needs-  Identify cognitive and physical deficits and behaviors that affect risk of falls.- [place] fall precautions as indicated by assessment.- Educate pt/family on patient safety including physical limitations- Instruct pt to call for assistance with activity based on assessment- Modify environment to reduce risk of injury- Provide assistive devices as appropriate- Consider OT/PT consult to assist with strengthening/mobility- Encourage toileting schedule  Outcome: Progressing     Problem: CARDIOVASCULAR - ADULT  Goal: Absence of cardiac arrhythmias or at baseline  Description: INTERVENTIONS:- Continuous cardiac monitoring, monitor vital signs, obtain 12 lead EKG if indicated- Evaluate effectiveness of antiarrhythmic and heart rate control medications as ordered- Initiate emergency measures for life threatening arrhythmias- Monitor electrolytes and administer replacement therapy as ordered  Outcome: Progressing     Problem: SKIN/TISSUE INTEGRITY - ADULT  Goal: Skin integrity remains intact  Description: INTERVENTIONS- Assess and document risk factors for pressure ulcer development- Assess and document skin integrity- Monitor for areas of redness and/or skin breakdown- Initiate interventions, skin care algorithm/standards of care as needed  Outcome: Progressing     Problem: NEUROLOGICAL - ADULT  Goal: Achieves stable or improved neurological status  Description: INTERVENTIONS- Assess for and report changes in neurological status- Initiate measures to prevent increased intracranial pressure- Maintain blood pressure and fluid volume within ordered parameters to optimize cerebral perfusion and minimize risk of hemorrhage- Monitor temperature, glucose, and sodium. Initiate appropriate interventions as ordered  Outcome: Progressing     Problem: Impaired Cognition  Goal: Patient will exhibit improved attention, thought processing and/or memory  Description: Interventions:- Minimize distractions in the room when full attention  is required  Outcome: Progressing     Problem: Patient/Family Goals  Goal: Patient/Family Long Term Goal  Description: Patient's Long Term Goal: go home  Interventions:--cardiac monitoring  -Tunneled HD cath placement  -Hemodialysis  -Labs  -monitor vital signs   See additional Care Plan goals for specific interventions  Outcome: Progressing  Goal: Patient/Family Short Term Goal  Description: Patient's Short Term Goal: pain controlled  Interventions: -pain medicine   See additional Care Plan goals for specific interventions  Outcome: Progressing     Problem: SAFETY ADULT - FALL  Goal: Free from fall injury  Description: INTERVENTIONS:- Assess pt frequently for physical needs- Identify cognitive and physical deficits and behaviors that affect risk of falls.- [place] fall precautions as indicated by assessment.- Educate pt/family on patient safety including physical limitations- Instruct pt to call for assistance with activity based on assessment- Modify environment to reduce risk of injury- Provide assistive devices as appropriate- Consider OT/PT consult to assist with strengthening/mobility- Encourage toileting schedule  Outcome: Progressing     Problem: CARDIOVASCULAR - ADULT  Goal: Absence of cardiac arrhythmias or at baseline  Description: INTERVENTIONS:- Continuous cardiac monitoring, monitor vital signs, obtain 12 lead EKG if indicated- Evaluate effectiveness of antiarrhythmic and heart rate control medications as ordered- Initiate emergency measures for life threatening arrhythmias- Monitor electrolytes and administer replacement therapy as ordered  Outcome: Progressing     Problem: SKIN/TISSUE INTEGRITY - ADULT  Goal: Skin integrity remains intact  Description: INTERVENTIONS- Assess and document risk factors for pressure ulcer development- Assess and document skin integrity- Monitor for areas of redness and/or skin breakdown- Initiate interventions, skin care algorithm/standards of care as needed  Outcome:  Progressing     Problem: NEUROLOGICAL - ADULT  Goal: Achieves stable or improved neurological status  Description: INTERVENTIONS- Assess for and report changes in neurological status- Initiate measures to prevent increased intracranial pressure- Maintain blood pressure and fluid volume within ordered parameters to optimize cerebral perfusion and minimize risk of hemorrhage- Monitor temperature, glucose, and sodium. Initiate appropriate interventions as ordered  Outcome: Progressing     Problem: Impaired Cognition  Goal: Patient will exhibit improved attention, thought processing and/or memory  Description: Interventions:- Minimize distractions in the room when full attention is required  Outcome: Progressing     Problem: Patient/Family Goals  Goal: Patient/Family Long Term Goal  Description: Patient's Long Term Goal: go home  Interventions:--cardiac monitoring  -Tunneled HD cath placement  -Hemodialysis  -Labs  -monitor vital signs   See additional Care Plan goals for specific interventions  Outcome: Progressing  Goal: Patient/Family Short Term Goal  Description: Patient's Short Term Goal: pain controlled  Interventions: -pain medicine   See additional Care Plan goals for specific interventions  Outcome: Progressing     Problem: SAFETY ADULT - FALL  Goal: Free from fall injury  Description: INTERVENTIONS:- Assess pt frequently for physical needs- Identify cognitive and physical deficits and behaviors that affect risk of falls.- [place] fall precautions as indicated by assessment.- Educate pt/family on patient safety including physical limitations- Instruct pt to call for assistance with activity based on assessment- Modify environment to reduce risk of injury- Provide assistive devices as appropriate- Consider OT/PT consult to assist with strengthening/mobility- Encourage toileting schedule  Outcome: Progressing     Problem: CARDIOVASCULAR - ADULT  Goal: Absence of cardiac arrhythmias or at baseline  Description:  INTERVENTIONS:- Continuous cardiac monitoring, monitor vital signs, obtain 12 lead EKG if indicated- Evaluate effectiveness of antiarrhythmic and heart rate control medications as ordered- Initiate emergency measures for life threatening arrhythmias- Monitor electrolytes and administer replacement therapy as ordered  Outcome: Progressing     Problem: SKIN/TISSUE INTEGRITY - ADULT  Goal: Skin integrity remains intact  Description: INTERVENTIONS- Assess and document risk factors for pressure ulcer development- Assess and document skin integrity- Monitor for areas of redness and/or skin breakdown- Initiate interventions, skin care algorithm/standards of care as needed  Outcome: Progressing     Problem: NEUROLOGICAL - ADULT  Goal: Achieves stable or improved neurological status  Description: INTERVENTIONS- Assess for and report changes in neurological status- Initiate measures to prevent increased intracranial pressure- Maintain blood pressure and fluid volume within ordered parameters to optimize cerebral perfusion and minimize risk of hemorrhage- Monitor temperature, glucose, and sodium. Initiate appropriate interventions as ordered  Outcome: Progressing     Problem: Impaired Cognition  Goal: Patient will exhibit improved attention, thought processing and/or memory  Description: Interventions:- Minimize distractions in the room when full attention is required  Outcome: Progressing

## 2025-05-09 LAB
ALBUMIN SERPL-MCNC: 3.6 G/DL (ref 3.2–4.8)
ANION GAP SERPL CALC-SCNC: 11 MMOL/L (ref 0–18)
BUN BLD-MCNC: 34 MG/DL (ref 9–23)
CALCIUM BLD-MCNC: 9.5 MG/DL (ref 8.7–10.6)
CHLORIDE SERPL-SCNC: 93 MMOL/L (ref 98–112)
CO2 SERPL-SCNC: 27 MMOL/L (ref 21–32)
CREAT BLD-MCNC: 5.14 MG/DL (ref 0.7–1.3)
EGFRCR SERPLBLD CKD-EPI 2021: 11 ML/MIN/1.73M2 (ref 60–?)
GLUCOSE BLD-MCNC: 135 MG/DL (ref 70–99)
GLUCOSE BLD-MCNC: 165 MG/DL (ref 70–99)
GLUCOSE BLD-MCNC: 173 MG/DL (ref 70–99)
GLUCOSE BLD-MCNC: 206 MG/DL (ref 70–99)
GLUCOSE BLD-MCNC: 216 MG/DL (ref 70–99)
MAGNESIUM SERPL-MCNC: 2.2 MG/DL (ref 1.6–2.6)
OSMOLALITY SERPL CALC.SUM OF ELEC: 284 MOSM/KG (ref 275–295)
PHOSPHATE SERPL-MCNC: 4.5 MG/DL (ref 2.4–5.1)
POTASSIUM SERPL-SCNC: 5.2 MMOL/L (ref 3.5–5.1)
SODIUM SERPL-SCNC: 131 MMOL/L (ref 136–145)

## 2025-05-09 RX ORDER — DOCUSATE SODIUM 100 MG/1
100 CAPSULE, LIQUID FILLED ORAL 2 TIMES DAILY
Status: DISCONTINUED | OUTPATIENT
Start: 2025-05-09 | End: 2025-05-12

## 2025-05-09 RX ORDER — MIDODRINE HYDROCHLORIDE 5 MG/1
10 TABLET ORAL ONCE
Status: COMPLETED | OUTPATIENT
Start: 2025-05-09 | End: 2025-05-09

## 2025-05-09 RX ORDER — SENNOSIDES 8.6 MG
17.2 TABLET ORAL DAILY
Status: DISCONTINUED | OUTPATIENT
Start: 2025-05-09 | End: 2025-05-12

## 2025-05-09 NOTE — PLAN OF CARE
Assumed care of patient at 1930  A&Ox4, RA, afib on tele  Denies pain, n/v  Neuro Qshift, NIH daily  Carb Controlled diet, QID accuchecks  Up w/ shagufta steady   HD MWF  Safety precautions in place  All needs met at this time    Problem: Patient/Family Goals  Goal: Patient/Family Long Term Goal  Description: Patient's Long Term Goal: go home  Interventions:--cardiac monitoring  -Tunneled HD cath placement  -Hemodialysis  -Labs  -monitor vital signs   See additional Care Plan goals for specific interventions  Outcome: Progressing  Goal: Patient/Family Short Term Goal  Description: Patient's Short Term Goal: pain controlled  Interventions: -pain medicine   See additional Care Plan goals for specific interventions  Outcome: Progressing     Problem: SAFETY ADULT - FALL  Goal: Free from fall injury  Description: INTERVENTIONS:- Assess pt frequently for physical needs- Identify cognitive and physical deficits and behaviors that affect risk of falls.- [place] fall precautions as indicated by assessment.- Educate pt/family on patient safety including physical limitations- Instruct pt to call for assistance with activity based on assessment- Modify environment to reduce risk of injury- Provide assistive devices as appropriate- Consider OT/PT consult to assist with strengthening/mobility- Encourage toileting schedule  Outcome: Progressing     Problem: CARDIOVASCULAR - ADULT  Goal: Absence of cardiac arrhythmias or at baseline  Description: INTERVENTIONS:- Continuous cardiac monitoring, monitor vital signs, obtain 12 lead EKG if indicated- Evaluate effectiveness of antiarrhythmic and heart rate control medications as ordered- Initiate emergency measures for life threatening arrhythmias- Monitor electrolytes and administer replacement therapy as ordered  Outcome: Progressing     Problem: SKIN/TISSUE INTEGRITY - ADULT  Goal: Skin integrity remains intact  Description: INTERVENTIONS- Assess and document risk factors for pressure  ulcer development- Assess and document skin integrity- Monitor for areas of redness and/or skin breakdown- Initiate interventions, skin care algorithm/standards of care as needed  Outcome: Progressing     Problem: NEUROLOGICAL - ADULT  Goal: Achieves stable or improved neurological status  Description: INTERVENTIONS- Assess for and report changes in neurological status- Initiate measures to prevent increased intracranial pressure- Maintain blood pressure and fluid volume within ordered parameters to optimize cerebral perfusion and minimize risk of hemorrhage- Monitor temperature, glucose, and sodium. Initiate appropriate interventions as ordered  Outcome: Progressing     Problem: Impaired Cognition  Goal: Patient will exhibit improved attention, thought processing and/or memory  Description: Interventions:- Minimize distractions in the room when full attention is required  Outcome: Progressing

## 2025-05-09 NOTE — PROGRESS NOTES
[facility]   part of [facility] Hospitalist Progress Note     Tariq Nolan Patient Status:  Inpatient    1950 MRN [id number]   Location [facility] 3NE-A Attending Herrera Mariscal MD   Hosp Day # 13 PCP Jacob Hicks MD     Subjective:   Seen and examined at bedside.  Getting HD.  BP slightly low during HD.  Per wife, patient appears to be falling into a deep sleep more frequently but arousable.  He has been getting poor sleep this whole time in the hospital.  Wife also complains that patient has been having visual hallucinations when he is awake.  Seeing things in the room that are not there.  It is unclear if this is happening during his \"dream\" while he sleeps or during awake cycles.    Objective:      Vital signs:  Temp:  [97.6 °F (36.4 °C)-98 °F (36.7 °C)] 97.8 °F (36.6 °C)  Pulse:  [42-52] 50  Resp:  [11-22] 22  BP: (120-157)/(41-78) 120/41  SpO2:  [92 %-99 %] 99 %  General: No acute distress.  Sleeping/snoring but arousable  HEENT: Normocephalic atraumatic.  Neck: No lymphadenopathy.   Respiratory: Clear to auscultation bilaterally. No wheezes. No rhonchi.  Cardiovascular: S1, S2. Regular rate and rhythm.   Chest and Back: No tenderness or deformity.  Abdomen: Soft, nontender, nondistended.  Wound/pressure dressing in place; does not appear to be saturated,  Neurologic: No focal neurological deficits. Following some commands.  Cranial nerves II to XII are grossly intact, patient verbalizing processing information to his baseline  Musculoskeletal: Moves all extremities spontaneously.   Ext edema bilaterally has improved        Diagnostic Data:    Labs:  Recent Labs   Lab 25  1300 25  0512 25  1441 25  0626 25  1350   WBC  --   --   --  6.2 5.3   HGB 9.2* 8.3*  --  8.5* 8.3*   MCV  --   --   --  97.3 99.6   PLT  --   --   --  223.0 229.0   INR  --   --  1.12 1.11  --        Recent Labs   Lab 25  0512 25  0626  05/08/25  1350 05/09/25  0643   * 142* 220* 173*   BUN 35* 24* 26* 34*   CREATSERUM 5.08* 3.67* 4.12* 5.14*   CA 9.2 9.4 9.3 9.5   ALB 3.5 3.6  --  3.6   * 132* 129* 131*   K 4.6 4.7 4.7 5.2*   CL 90* 94* 95* 93*   CO2 25.0 26.0 28.0 27.0       Estimated Creatinine Clearance: 11.1 mL/min (A) (based on SCr of 5.14 mg/dL (H)).    Recent Labs   Lab 05/07/25  1441 05/08/25  0626   PTP 14.5 14.5   INR 1.12 1.11                COVID-19 Lab Results    COVID-19  Lab Results   Component Value Date    COVID19 Not Detected 08/13/2022    COVID19 Not Detected 12/26/2020    COVID19 NEGATIVE 10/01/2020       Pro-Calcitonin  No results for input(s): \"PCT\" in the last 168 hours.    Cardiac  No results for input(s): \"TROP\", \"PBNP\" in the last 168 hours.    Creatinine Kinase  No results for input(s): \"CK\" in the last 168 hours.    Inflammatory Markers  No results for input(s): \"CRP\", \"MADELIN\", \"LDH\", \"DDIMER\" in the last 168 hours.    Imaging: Imaging data reviewed in Epic.    Medications:    docusate sodium  100 mg Oral BID    sennosides  17.2 mg Oral Daily    insulin degludec  12 Units Subcutaneous Nightly    baclofen  5 mg Oral BID    insulin aspart  4-20 Units Subcutaneous TID AC and HS    aspirin  81 mg Oral Daily    hydrALAZINE  25 mg Oral Q8H INDU    sevelamer carbonate  800 mg Oral TID CC    heparin  1.5 mL Intravenous Once    [Held by provider] clopidogrel  75 mg Oral Daily    [Held by provider] apixaban  5 mg Oral BID    LORazepam  1 mg Intravenous Once    [Held by provider] amLODIPine  10 mg Oral Daily    isosorbide mononitrate ER  60 mg Oral Daily    rosuvastatin  10 mg Oral Daily    venlafaxine  37.5 mg Oral Daily       Assessment & Plan:    Tariq T An is a 74 year old male with PMH sig for CAD w/ stents, anemia, DM2, HTN, DL, and ESRD on PD who presents to the ED with acute onset confusion, left sided weakness.  AMS was thought to be secondary to inefficient PD, now switched to HD.  Overall course  complicated by bleeding PD site.     Acute metabolic encephalopathy  Visual hallucinations  -CT and MRI brain negative for acute findings, MRI limited.  -No acute infection , ammonia wnl   -Neuro evaluated, EEG suggestive of metabolic etiology, suspect PD ineffective-- temp HD catheter placed 4/29 and tolerated HD with significant improvement in sx.  -Visual hallucinations had resolved and patient appeared to be back to baseline however on 5/8 reporting visual hallucinations again  - Stat CT head 5/8  -will decrease baclofen to 5 mg twice daily  -pt/ot [facility]    Altered mental status- resolved  Rapid response 5/8  - Patient was described to have had a vasovagal episode with low blood pressure and eyes rolling back  - Repeat BP after putting the patient supine appear to be elevated  - 1 dose of Zofran was given  - Stat CBC, CMP, ammonia obtained-reviewed  - 250 cc bolus 0.9 given-discussed with nephrology  - Will reconsult cardiology given bradycardia during vagal episode   -Neurology exam was benign during rapid however later was paged to inform about hallucinations  - CT head neg    Intermittent vasovagal episode  - Discussed with cardiology, unlikely to be related to bradycardia as bradycardia is chronic  - Likely related to volume/neurogenic/orthostatic syncope    Visual hallucinations  - Unclear etiology, has had extensive neurologic workup that has been negative  - Could potentially be secondary to encephalopathy in the setting of ESRD however getting routine dialysis  - Unclear if this is related to sleep disturbances/hospital psychosis versus psychiatric phenomena  -Recommend observation and follow-up with psychiatry in the outpatient if still does not resolve    ESRD on PD, now on HD  -temp catheter placed 4/29, planning tunneled placement 5/5. PD catheter to be removed   -sessions per nephrology, next HD per renal  -monitor tunneled catheter site for bleeding/dressing placed by IR  -General  Surgery following for PD catheter removal-     Acute blood loss anemia  Secondary to bleeding from PD site  - Follow H&H  -General Surgery recommends holding Eliquis until 5/11    Bradycardia, history of Afib  CAD s/p PCI   -cardiology on consult, hold BB, echo completed  -on plavix, okay to hold but needs to be on bASA while plavix held  -okay to hold eliquis without bridge given minimal day to day risk of CVA.  -Reconsult cardiology    DM2 with hyperglycemia  -on long acting + high dose ssi. Glc improved now     HTN  -resume home meds     DL  -statin    Quality:  DVT Prophylaxis:eliquis, scds  CODE status: full code   Gavin: no  If COVID testing is negative, may discontinue isolation: yes     Plan of care discussed with patient and all questions answered.      Rosalia Ulloa DO  Hospitalist  [facility]

## 2025-05-09 NOTE — DIETARY NOTE
[facility]   part of [facility]   CLINICAL NUTRITION    Tariq Nolan     Admitting diagnosis:  Acute confusion [R41.0]  ESRD on dialysis (HCC) [N18.6, Z99.2]  Anticoagulated [Z79.01]  Type 2 diabetes mellitus with hyperglycemia, with long-term current use of insulin (HCC) [E11.65, Z79.4]    Ht: 165.1 cm (5' 5\")  Wt: 90.4 kg (199 lb 6.4 oz).   Body mass index is 33.18 kg/m².  IBW: 61.8 kg    Wt Readings from Last 6 Encounters:   05/02/25 90.4 kg (199 lb 6.4 oz)   10/23/24 86.2 kg (190 lb 0.6 oz)   10/20/24 86.2 kg (190 lb)   09/23/24 86.2 kg (190 lb)   09/02/23 86.2 kg (190 lb)   08/16/22 86.2 kg (190 lb)        Labs/Meds reviewed    Diet:       Procedures    Regular/General diet Calorie Restriction/Carb Controlled: 1800 kcal/60 grams; Sodium Restriction: 3-4 GM NA; Fluid Consistency: Thin Liquids ; Texture Consistency: Regular; Is Patient on Accuchecks? Yes     Percent Meals Eaten (last 3 days)       Date/Time Percent Meals Eaten (%)    05/08/25 1115 100 %          5/09 - Per chart review, RRT called yesterday for syncopal episode. Pt visited, receiving HD at time of visit. Pt continues to eat well, 100% most meals. Had 100% of breakfast this AM- Hebrew toast, eggs, fruit cup, Glucerna. Good appetite. No new weight since last RD visit. Answered all questions at this time. Will continue to monitor and follow up as appropriate.    5/02 - Pt chart reviewed d/t LOS.  Patient reports fair appetite at this time.  Pt eating >75% of lunch at time of visit.  Tolerating po diet without diarrhea, emesis, or constipation.   No significant weight changes noted.     PMH includes ESRD on PD, DM, HTN.  Pt p/w acute confusion, hallucinations.  Pt transitioned to HD during admission - pt/wife trying to decide if they should continue with PD or switch to HD long term.  Pt endorses no big appetite, but is eating most of grilled cheese and tomato soup at time of visit.  No n/v/d. + Constipation, but having flatus.  Not in  need of stool softeners at this time, but may want in the future.   NO wt changes. Pt appears well nourished. Offered Protein shakes for pt to try, pt agreeable.  .   Patient is at low nutrition risk at this time.    Please consult if patient status changes or nutrition issues arise.    Paris Carr, MS, RDN, LDN  Clinical Dietitian

## 2025-05-09 NOTE — PROGRESS NOTES
[facility]  Progress Note    Tariq Nolan Patient Status:  Inpatient    1950 MRN [id number]   Location [place] 3NE-A Attending Rosalia Ulloa, DO   Hosp Day # 13 PCP Jacob Hicks MD     Subjective:    Patient denies any new complaints    Objective/Physical Exam:    Vital Signs:  Blood pressure 146/68, pulse (!) 43, temperature 97.6 °F (36.4 °C), temperature source Oral, resp. rate 18, height 5' 5\" (1.651 m), weight 199 lb 6.4 oz (90.4 kg), SpO2 92%.    General:  Alert.  Cooperative.  No apparent distress.  Abdomen:  dressing removed, scant blood drainage on dressing, no active bleeding, steri strips intact    Labs:  Reviewed    Lab Results   Component Value Date    WBC 5.3 2025    HGB 8.3 2025    HCT 24.8 2025    .0 2025    CREATSERUM 5.14 2025    BUN 34 2025     2025    K 5.2 2025    CL 93 2025    CO2 27.0 2025     2025    CA 9.5 2025    ALB 3.6 2025    MG 2.2 2025    PHOS 4.5 2025    PGLU 206 2025       Problem List:  Problem List[1]        Impression:     73 y/o S/P removal of PD catheter  -dressings removed, no active bleeding at site    Plan:    No further surgical management  Continue medical management  Will sign off  DOREEN Crowell, RN      HENNY Saeed  [facility]  2025         [1]   Patient Active Problem List  Diagnosis    Essential hypertension    Pure hypercholesterolemia    Obesity (BMI 30.0-34.9)    Plantar fasciitis    Hallux rigidus, unspecified laterality    Degeneration of lumbar intervertebral disc    Non-ST elevation myocardial infarction (NSTEMI), subsequent episode of care (HCC) 2017    Presence of drug coated stent in right coronary artery Distal 3/28 mm Xience & Prox 3.5x18mm Xience 2017    Coronary artery disease involving native coronary artery of native heart without angina pectoris     CKD stage 4 due to type 1 diabetes mellitus (HCC)    Primary osteoarthritis of both knees    Impingement syndrome of shoulder, left    Mixed hyperlipidemia    Diabetes mellitus (HCC)    Anemia due to stage 4 chronic kidney disease (HCC)    Peritonitis associated with peritoneal dialysis, initial encounter    ESRD on peritoneal dialysis (HCC)    Hypokalemia    Acute confusion    Anticoagulated    ESRD on dialysis (HCC)    Type 2 diabetes mellitus with hyperglycemia, with long-term current use of insulin (HCC)    Toxic metabolic encephalopathy

## 2025-05-09 NOTE — PROGRESS NOTES
[facility]   part of [facility]    Nephrology Progress Note    Tariq Nolan Attending:  Rosalia Ulloa DO       SUBJECTIVE:     States he had some hallucinations.  + swelling.  Dialysis RN messaged with low BP on HD.  Midodrine ordered.    PHYSICAL EXAM:     Vital Signs: /78 (BP Location: Left arm)   Pulse (!) 42   Temp 97.8 °F (36.6 °C) (Oral)   Resp 14   Ht 165.1 cm (5' 5\")   Wt 199 lb 6.4 oz (90.4 kg)   SpO2 98%   BMI 33.18 kg/m²   Temp (24hrs), Av.8 °F (36.6 °C), Min:97.6 °F (36.4 °C), Max:98 °F (36.7 °C)       Intake/Output Summary (Last 24 hours) at 2025 1229  Last data filed at 2025 1730  Gross per 24 hour   Intake --   Output 0 ml   Net 0 ml     Wt Readings from Last 3 Encounters:   25 199 lb 6.4 oz (90.4 kg)   10/23/24 190 lb 0.6 oz (86.2 kg)   10/20/24 190 lb (86.2 kg)       Gen - laying in bed, nad  HEENT - NCAT  CV - bradycardic  Resp - CTAB  Abd - soft, abd dressing noted  Ext - warm, 2+ hand and leg edema  MS - slow to answer questions       LABORATORY DATA:     Lab Results   Component Value Date     (H) 2025    BUN 34 (H) 2025    BUNCREA 26.0 (H) 2022    CREATSERUM 5.14 (H) 2025    ANIONGAP 11 2025    GFR >59 10/05/2010    GFRNAA 28 (L) 2019    GFRAA 33 (L) 2019    CA 9.5 2025    OSMOCALC 284 2025    ALKPHO 185 (H) 2025    AST 23 2025    ALT 10 2025    BILT 0.2 2025    TP 6.9 2025    ALB 3.6 2025    GLOBULIN 2.6 2025    AGRATIO 2.1 06/10/2015     (L) 2025    K 5.2 (H) 2025    CL 93 (L) 2025    CO2 27.0 2025     Lab Results   Component Value Date    WBC 5.3 2025    RBC 2.49 (L) 2025    HGB 8.3 (L) 2025    HCT 24.8 (L) 2025    .0 2025    MPV 9.9 2012    MCV 99.6 2025    MCH 33.3 2025    MCHC 33.5 2025    RDW 11.7 2025    NEPRELIM 3.81 2025    NEUTABS 3.98  03/26/2019    LYMPHABS 1.06 03/26/2019    EOSABS 0.06 03/26/2019    BASABS 0.06 03/26/2019    NEUT 41 03/26/2019    LYMPH 19 03/26/2019    MON 8 03/26/2019    EOS 1 03/26/2019    BASO 1 03/26/2019    NEPERCENT 71.8 05/08/2025    LYPERCENT 11.7 05/08/2025    MOPERCENT 12.1 05/08/2025    EOPERCENT 2.6 05/08/2025    BAPERCENT 0.9 05/08/2025    NE 3.81 05/08/2025    LYMABS 0.62 (L) 05/08/2025    MOABSO 0.64 05/08/2025    EOABSO 0.14 05/08/2025    BAABSO 0.05 05/08/2025     Lab Results   Component Value Date    MALBP 5.9 02/28/2022    CREUR 33.38 02/28/2022     Lab Results   Component Value Date    COLORUR Light-Yellow 04/27/2025    CLARITY Clear 04/27/2025    SPECGRAVITY >1.030 (H) 04/27/2025    GLUUR 300 (A) 04/27/2025    BILUR Negative 04/27/2025    KETUR Negative 04/27/2025    BLOODURINE 1+ (A) 04/27/2025    PHURINE 6.0 04/27/2025    PROUR 100 (A) 04/27/2025    UROBILINOGEN Normal 04/27/2025    NITRITE Negative 04/27/2025    LEUUR Negative 04/27/2025    NMIC Microscopic not indicated 12/28/2016    WBCUR 11-20 (A) 04/27/2025    RBCUR 3-5 (A) 04/27/2025    EPIUR Few (A) 04/27/2025    BACUR None Seen 04/27/2025    HYLUR Present (A) 12/18/2018         IMAGING:     Reviewed.    MEDICATIONS:       Current Hospital Medications[1]    ASSESSMENT/PLAN:     75 yo M with history of ESRD on PD, CAD, DM, HTN, HL, presented with AMS, tremors/jerking movements and weakness.     Vasovagal episode 5/8.     ESRD:  -- PD on hold, PD catheter removed on 5/6  -- started iHD; first treatment on 4/29  -- tunnelled HD catheter placement on 5/5  -- HD MWF now  -- SW consult for outpatient HD placement appreciated    Hypotension on HD:  -- cool dialysate 35.5  -- midodrine 10 mg x1  -- hold hydralazine preHD     Anemia:  -- ANTONIO for Hb < 10, given on 5/7     MBD:  -- trend phos  -- continue sevelamer     HTN:  -- UF with HD  -- amlodipine, hydralazine, imdur --> hold amlodipine at least now; hold hydralazine pre HD; imdur per cardiology      Hyponatremia:  -- fluid restriction <1.5/d when taking PO  -- UF with HD  -- Na 138 bath with HD     Next HD due on Monday. Nephrology will follow peripherally over the weekend.    Thank you for allowing me to participate in the care of this patient. Please do not hesitate to call with questions or concerns.        Mireya Stone MD  [facility]         [1]   Current Facility-Administered Medications   Medication Dose Route Frequency    docusate sodium (Colace) cap 100 mg  100 mg Oral BID    sennosides (Senokot) tab 17.2 mg  17.2 mg Oral Daily    insulin degludec (Tresiba) 100 units/mL flextouch 12 Units  12 Units Subcutaneous Nightly    sodium chloride 0.9 % IV bolus 100 mL  100 mL Intravenous Q30 Min PRN    And    albumin human (Albumin) 25% injection 25 g  25 g Intravenous PRN Dialysis    baclofen (Lioresal) tab 5 mg  5 mg Oral BID    metoclopramide (Reglan) 5 mg/mL injection 5 mg  5 mg Intravenous Q8H PRN    naloxone (Narcan) 0.4 MG/ML injection 0.08 mg  0.08 mg Intravenous PRN    insulin aspart (NovoLOG) 100 Units/mL FlexPen 4-20 Units  4-20 Units Subcutaneous TID AC and HS    aspirin chewable tab 81 mg  81 mg Oral Daily    hydrALAZINE (Apresoline) tab 25 mg  25 mg Oral Q8H INDU    heparin (Porcine) 1000 UNIT/ML injection 1,500 Units  1.5 mL Intracatheter PRN Dialysis    sevelamer carbonate (Renvela) tab 800 mg  800 mg Oral TID CC    heparin (Porcine) 100 Units/mL lock flush 150 Units  1.5 mL Intravenous Once    acetaminophen (Tylenol) tab 650 mg  650 mg Oral Q4H PRN    Or    acetaminophen (Tylenol) rectal suppository 650 mg  650 mg Rectal Q4H PRN    labetalol (Trandate) 5 mg/mL injection 10 mg  10 mg Intravenous Q10 Min PRN    hydrALAzine (Apresoline) 20 mg/mL injection 10 mg  10 mg Intravenous Q2H PRN    [Held by provider] clopidogrel (Plavix) tab 75 mg  75 mg Oral Daily    [Held by provider] apixaban (Eliquis) tab 5 mg  5 mg Oral BID    melatonin tab 3 mg  3 mg Oral Nightly PRN    ondansetron (Zofran) 4  MG/2ML injection 4 mg  4 mg Intravenous Q6H PRN    LORazepam (Ativan) 2 mg/mL injection 1 mg  1 mg Intravenous Once    [Held by provider] amLODIPine (Norvasc) tab 10 mg  10 mg Oral Daily    isosorbide mononitrate ER (Imdur) 24 hr tab 60 mg  60 mg Oral Daily    rosuvastatin (Crestor) tab 10 mg  10 mg Oral Daily    venlafaxine (Effexor) tab 37.5 mg  37.5 mg Oral Daily    glucose (Dex4) 15 GM/59ML oral liquid 15 g  15 g Oral Q15 Min PRN    Or    glucose (Glutose) 40% oral gel 15 g  15 g Oral Q15 Min PRN    Or    glucose-vitamin C (Dex-4) chewable tab 4 tablet  4 tablet Oral Q15 Min PRN    Or    dextrose 50% injection 50 mL  50 mL Intravenous Q15 Min PRN    Or    glucose (Dex4) 15 GM/59ML oral liquid 30 g  30 g Oral Q15 Min PRN    Or    glucose (Glutose) 40% oral gel 30 g  30 g Oral Q15 Min PRN    Or    glucose-vitamin C (Dex-4) chewable tab 8 tablet  8 tablet Oral Q15 Min PRN

## 2025-05-09 NOTE — PLAN OF CARE
Assumed care of patient @ 0730  Denies pain or discomfort  A&Ox3, forgetful times  VSS on RA  Afib on tele  Rt permacath,C/D/I  Dressing to LLQ, c/d/I.   Lt FA PIV, SL  Nqshift/ [facility] daily  QID accucheck  Safety precautions in place  All needs met at this time  All safety precautions in place  Problem: Patient/Family Goals  Goal: Patient/Family Long Term Goal  Description: Patient's Long Term Goal: go home  Interventions:--cardiac monitoring  -Tunneled HD cath placement  -Hemodialysis  -Labs  -monitor vital signs   See additional Care Plan goals for specific interventions  Outcome: Progressing  Goal: Patient/Family Short Term Goal  Description: Patient's Short Term Goal: pain controlled  Interventions: -pain medicine   See additional Care Plan goals for specific interventions  Outcome: Progressing  Problem: SAFETY ADULT - FALL  Goal: Free from fall injury  Description: INTERVENTIONS:- Assess pt frequently for physical needs- Identify cognitive and physical deficits and behaviors that affect risk of falls.- [place] fall precautions as indicated by assessment.- Educate pt/family on patient safety including physical limitations- Instruct pt to call for assistance with activity based on assessment- Modify environment to reduce risk of injury- Provide assistive devices as appropriate- Consider OT/PT consult to assist with strengthening/mobility- Encourage toileting schedule  Outcome: Progressing  Problem: CARDIOVASCULAR - ADULT  Goal: Absence of cardiac arrhythmias or at baseline  Description: INTERVENTIONS:- Continuous cardiac monitoring, monitor vital signs, obtain 12 lead EKG if indicated- Evaluate effectiveness of antiarrhythmic and heart rate control medications as ordered- Initiate emergency measures for life threatening arrhythmias- Monitor electrolytes and administer replacement therapy as ordered  Outcome: Progressing     Problem: SKIN/TISSUE INTEGRITY - ADULT  Goal: Skin integrity remains intact  Description:  INTERVENTIONS- Assess and document risk factors for pressure ulcer development- Assess and document skin integrity- Monitor for areas of redness and/or skin breakdown- Initiate interventions, skin care algorithm/standards of care as needed  Outcome: Progressing  Problem: NEUROLOGICAL - ADULT  Goal: Achieves stable or improved neurological status  Description: INTERVENTIONS- Assess for and report changes in neurological status- Initiate measures to prevent increased intracranial pressure- Maintain blood pressure and fluid volume within ordered parameters to optimize cerebral perfusion and minimize risk of hemorrhage- Monitor temperature, glucose, and sodium. Initiate appropriate interventions as ordered  Outcome: Progressing  Problem: Impaired Cognition  Goal: Patient will exhibit improved attention, thought processing and/or memory  Description: Interventions:- Minimize distractions in the room when full attention is required  Outcome: Progressing

## 2025-05-09 NOTE — PROGRESS NOTES
Cardiology Progress Note    Tariq Nolan Patient Status:  Inpatient    1950 MRN [id number]   Location [facility] 3NE-A Attending Herrera Mariscal MD   Hosp Day # 13 PCP Jcaob Hicks MD     Impression:  Permanent atrial fibrillation with slow ventricular response  Previous CAD with history of PCI  ESRD on peritoneal dialysis  Diabetes  Hypertension  Hyperlipidemia  Syncope  Mental status changes    Plan:  HR chronically 30-40s while sleeping. When I wake him, up to 50-60 range. Had an episode of unresponsiveness , states maybe felt a little dizzy earlier. But not consistent, states he has not felt any dizziness over entire the last week, and does not have recurrent syncope even with his HR in the 30-40s. Has been evaluated by EP Dr. Perez. No indication for pacemaker at present.    RR 5/8 for unresponsiveness. Chart states had low BP but nothing documented. BP has been normal to high. Amlodipine is on hold. Remains on hydral/nitrate. No advanced block or pause > 3 sec on tele.   Carvedilol stopped previously.   Neurology following.  Possible toxic/metabolic versus uremic encephalopathy.  EEG normal. He is lucid today with me.   Fluid management per nephrology with HD  Echocardiogram  with normal LV function, no significant valvular disease and no evidence of PFO by bubble study   Continue telemetry.    AC on hold due to recurrent bleeding from PD site. Resume when able.     Subjective:  Asleep. Was difficult to wake, but once awake, fully lucid and appropriate. No CP or SOB.     Objective:  /41 (BP Location: Left arm)   Pulse 50   Temp 97.8 °F (36.6 °C) (Oral)   Resp 22   Ht 65\"   Wt 199 lb 6.4 oz (90.4 kg)   SpO2 99%   BMI 33.18 kg/m²   Temp (24hrs), Av.8 °F (36.6 °C), Min:97.6 °F (36.4 °C), Max:98 °F (36.7 °C)      Intake/Output Summary (Last 24 hours) at 2025 1545  Last data filed at 2025 1500  Gross per 24 hour   Intake --   Output 3000 ml   Net -3000 ml      Wt Readings from Last 3 Encounters:   05/02/25 199 lb 6.4 oz (90.4 kg)   10/23/24 190 lb 0.6 oz (86.2 kg)   10/20/24 190 lb (86.2 kg)       General: Awake and alert; in no acute distress  Cardiac: irregular rhythm   Lungs: Clear to auscultation bilaterally; no accessory muscle use  Abdomen: Soft, non-tender; bowel sounds are normoactive  Extremities: No clubbing/cyanosis; moves all 4 extremities normally, no sig edema    Current Hospital Medications[1]    Laboratory Data:         Lab Results   Component Value Date    INR 1.11 05/08/2025    INR 1.12 05/07/2025    INR 1.25 (H) 04/26/2025     Lab Results   Component Value Date     05/09/2025    K 5.2 05/09/2025    CL 93 05/09/2025    CO2 27.0 05/09/2025    BUN 34 05/09/2025    CREATSERUM 5.14 05/09/2025     05/09/2025    CA 9.5 05/09/2025    MG 2.2 05/09/2025         Telemetry: AF 30-40s asleep, 50-60s awake      Thank you for allowing our practice to participate in the care of your patient. Please do not hesitate to contact me if you have any questions.           [1]   Current Facility-Administered Medications   Medication Dose Route Frequency    docusate sodium (Colace) cap 100 mg  100 mg Oral BID    sennosides (Senokot) tab 17.2 mg  17.2 mg Oral Daily    insulin degludec (Tresiba) 100 units/mL flextouch 12 Units  12 Units Subcutaneous Nightly    sodium chloride 0.9 % IV bolus 100 mL  100 mL Intravenous Q30 Min PRN    And    albumin human (Albumin) 25% injection 25 g  25 g Intravenous PRN Dialysis    baclofen (Lioresal) tab 5 mg  5 mg Oral BID    metoclopramide (Reglan) 5 mg/mL injection 5 mg  5 mg Intravenous Q8H PRN    naloxone (Narcan) 0.4 MG/ML injection 0.08 mg  0.08 mg Intravenous PRN    insulin aspart (NovoLOG) 100 Units/mL FlexPen 4-20 Units  4-20 Units Subcutaneous TID AC and HS    aspirin chewable tab 81 mg  81 mg Oral Daily    hydrALAZINE (Apresoline) tab 25 mg  25 mg Oral Q8H [facility]    heparin (Porcine) 1000 UNIT/ML injection 1,500 Units   1.5 mL Intracatheter PRN Dialysis    sevelamer carbonate (Renvela) tab 800 mg  800 mg Oral TID CC    heparin (Porcine) 100 Units/mL lock flush 150 Units  1.5 mL Intravenous Once    acetaminophen (Tylenol) tab 650 mg  650 mg Oral Q4H PRN    Or    acetaminophen (Tylenol) rectal suppository 650 mg  650 mg Rectal Q4H PRN    labetalol (Trandate) 5 mg/mL injection 10 mg  10 mg Intravenous Q10 Min PRN    hydrALAzine (Apresoline) 20 mg/mL injection 10 mg  10 mg Intravenous Q2H PRN    [Held by provider] clopidogrel (Plavix) tab 75 mg  75 mg Oral Daily    [Held by provider] apixaban (Eliquis) tab 5 mg  5 mg Oral BID    melatonin tab 3 mg  3 mg Oral Nightly PRN    ondansetron (Zofran) 4 MG/2ML injection 4 mg  4 mg Intravenous Q6H PRN    LORazepam (Ativan) 2 mg/mL injection 1 mg  1 mg Intravenous Once    [Held by provider] amLODIPine (Norvasc) tab 10 mg  10 mg Oral Daily    isosorbide mononitrate ER (Imdur) 24 hr tab 60 mg  60 mg Oral Daily    rosuvastatin (Crestor) tab 10 mg  10 mg Oral Daily    venlafaxine (Effexor) tab 37.5 mg  37.5 mg Oral Daily    glucose (Dex4) 15 GM/59ML oral liquid 15 g  15 g Oral Q15 Min PRN    Or    glucose (Glutose) 40% oral gel 15 g  15 g Oral Q15 Min PRN    Or    glucose-vitamin C (Dex-4) chewable tab 4 tablet  4 tablet Oral Q15 Min PRN    Or    dextrose 50% injection 50 mL  50 mL Intravenous Q15 Min PRN    Or    glucose (Dex4) 15 GM/59ML oral liquid 30 g  30 g Oral Q15 Min PRN    Or    glucose (Glutose) 40% oral gel 30 g  30 g Oral Q15 Min PRN    Or    glucose-vitamin C (Dex-4) chewable tab 8 tablet  8 tablet Oral Q15 Min PRN

## 2025-05-10 LAB
ALBUMIN SERPL-MCNC: 3.6 G/DL (ref 3.2–4.8)
ANION GAP SERPL CALC-SCNC: 6 MMOL/L (ref 0–18)
BASOPHILS # BLD AUTO: 0.04 X10(3) UL (ref 0–0.2)
BASOPHILS NFR BLD AUTO: 0.6 %
BUN BLD-MCNC: 22 MG/DL (ref 9–23)
CALCIUM BLD-MCNC: 9.3 MG/DL (ref 8.7–10.6)
CHLORIDE SERPL-SCNC: 102 MMOL/L (ref 98–112)
CO2 SERPL-SCNC: 29 MMOL/L (ref 21–32)
CREAT BLD-MCNC: 3.78 MG/DL (ref 0.7–1.3)
EGFRCR SERPLBLD CKD-EPI 2021: 16 ML/MIN/1.73M2 (ref 60–?)
EOSINOPHIL # BLD AUTO: 0.2 X10(3) UL (ref 0–0.7)
EOSINOPHIL NFR BLD AUTO: 3.2 %
ERYTHROCYTE [DISTWIDTH] IN BLOOD BY AUTOMATED COUNT: 11.9 %
GLUCOSE BLD-MCNC: 105 MG/DL (ref 70–99)
GLUCOSE BLD-MCNC: 199 MG/DL (ref 70–99)
GLUCOSE BLD-MCNC: 204 MG/DL (ref 70–99)
GLUCOSE BLD-MCNC: 263 MG/DL (ref 70–99)
GLUCOSE BLD-MCNC: 95 MG/DL (ref 70–99)
HCT VFR BLD AUTO: 22.2 % (ref 39–53)
HGB BLD-MCNC: 7.3 G/DL (ref 13–17.5)
HGB BLD-MCNC: 7.3 G/DL (ref 13–17.5)
HGB BLD-MCNC: 7.5 G/DL (ref 13–17.5)
IMM GRANULOCYTES # BLD AUTO: 0.03 X10(3) UL (ref 0–1)
IMM GRANULOCYTES NFR BLD: 0.5 %
LYMPHOCYTES # BLD AUTO: 1.12 X10(3) UL (ref 1–4)
LYMPHOCYTES NFR BLD AUTO: 18 %
MAGNESIUM SERPL-MCNC: 2.1 MG/DL (ref 1.6–2.6)
MCH RBC QN AUTO: 33.8 PG (ref 26–34)
MCHC RBC AUTO-ENTMCNC: 33.8 G/DL (ref 31–37)
MCV RBC AUTO: 100 FL (ref 80–100)
MONOCYTES # BLD AUTO: 0.81 X10(3) UL (ref 0.1–1)
MONOCYTES NFR BLD AUTO: 13 %
NEUTROPHILS # BLD AUTO: 4.01 X10 (3) UL (ref 1.5–7.7)
NEUTROPHILS # BLD AUTO: 4.01 X10(3) UL (ref 1.5–7.7)
NEUTROPHILS NFR BLD AUTO: 64.7 %
OSMOLALITY SERPL CALC.SUM OF ELEC: 287 MOSM/KG (ref 275–295)
PHOSPHATE SERPL-MCNC: 4 MG/DL (ref 2.4–5.1)
PLATELET # BLD AUTO: 263 10(3)UL (ref 150–450)
POTASSIUM SERPL-SCNC: 4.8 MMOL/L (ref 3.5–5.1)
RBC # BLD AUTO: 2.22 X10(6)UL (ref 3.8–5.8)
SODIUM SERPL-SCNC: 137 MMOL/L (ref 136–145)
WBC # BLD AUTO: 6.2 X10(3) UL (ref 4–11)

## 2025-05-10 NOTE — CM/SW NOTE
SW consult for \"ALICIA\".     DC plan [facility]. NEERU/ALEX following for DC.    FLOYD Arana

## 2025-05-10 NOTE — PROGRESS NOTES
Called regarding ongoing bleeding from his incision.  Patient has saturated multiple dressings.    Small amount of oozing from the lateral aspect of his small incision.  2 nylon sutures were placed for hemostasis.  Patient tolerated well.  Wound redressed.    Continue to follow

## 2025-05-10 NOTE — PROGRESS NOTES
[facility]   part of [facility]    Nephrology Progress Note    Tariq Nolan Attending:  Rosalia Ulloa DO       SUBJECTIVE:     A&Ox2 and hallucinations mild per notes   Had some ozzing from PD catheter site, surgery came by    PHYSICAL EXAM:     Vital Signs: /55 (BP Location: Left arm)   Pulse (!) 44   Temp 98.2 °F (36.8 °C) (Oral)   Resp 15   Ht 5' 5\" (1.651 m)   Wt 199 lb 6.4 oz (90.4 kg)   SpO2 94%   BMI 33.18 kg/m²   Temp (24hrs), Av.2 °F (36.8 °C), Min:98.2 °F (36.8 °C), Max:98.3 °F (36.8 °C)       Intake/Output Summary (Last 24 hours) at 5/10/2025 1238  Last data filed at 2025 1500  Gross per 24 hour   Intake --   Output 3000 ml   Net -3000 ml     Wt Readings from Last 3 Encounters:   25 199 lb 6.4 oz (90.4 kg)   10/23/24 190 lb 0.6 oz (86.2 kg)   10/20/24 190 lb (86.2 kg)       Gen - laying in bed, nad  HEENT - NCAT  CV - bradycardic  Resp - CTAB  Abd - soft, abd dressing noted  Ext - warm, 2+ hand and leg edema  MS - slow to answer questions       LABORATORY DATA:     Lab Results   Component Value Date    GLU 95 05/10/2025    BUN 22 05/10/2025    BUNCREA 26.0 (H) 2022    CREATSERUM 3.78 (H) 05/10/2025    ANIONGAP 6 05/10/2025    GFR >59 10/05/2010    GFRNAA 28 (L) 2019    GFRAA 33 (L) 2019    CA 9.3 05/10/2025    OSMOCALC 287 05/10/2025    ALKPHO 185 (H) 2025    AST 23 2025    ALT 10 2025    BILT 0.2 2025    TP 6.9 2025    ALB 3.6 05/10/2025    GLOBULIN 2.6 2025    AGRATIO 2.1 06/10/2015     05/10/2025    K 4.8 05/10/2025     05/10/2025    CO2 29.0 05/10/2025     Lab Results   Component Value Date    WBC 6.2 05/10/2025    RBC 2.22 (L) 05/10/2025    HGB 7.5 (L) 05/10/2025    HCT 22.2 (L) 05/10/2025    .0 05/10/2025    MPV 9.9 2012    .0 05/10/2025    MCH 33.8 05/10/2025    MCHC 33.8 05/10/2025    RDW 11.9 05/10/2025    NEPRELIM 4.01 05/10/2025    NEUTABS 3.98 2019    LYMPHABS  1.06 03/26/2019    EOSABS 0.06 03/26/2019    BASABS 0.06 03/26/2019    NEUT 41 03/26/2019    LYMPH 19 03/26/2019    MON 8 03/26/2019    EOS 1 03/26/2019    BASO 1 03/26/2019    NEPERCENT 64.7 05/10/2025    LYPERCENT 18.0 05/10/2025    MOPERCENT 13.0 05/10/2025    EOPERCENT 3.2 05/10/2025    BAPERCENT 0.6 05/10/2025    NE 4.01 05/10/2025    LYMABS 1.12 05/10/2025    MOABSO 0.81 05/10/2025    EOABSO 0.20 05/10/2025    BAABSO 0.04 05/10/2025     Lab Results   Component Value Date    MALBP 5.9 02/28/2022    CREUR 33.38 02/28/2022     Lab Results   Component Value Date    COLORUR Light-Yellow 04/27/2025    CLARITY Clear 04/27/2025    SPECGRAVITY >1.030 (H) 04/27/2025    GLUUR 300 (A) 04/27/2025    BILUR Negative 04/27/2025    KETUR Negative 04/27/2025    BLOODURINE 1+ (A) 04/27/2025    PHURINE 6.0 04/27/2025    PROUR 100 (A) 04/27/2025    UROBILINOGEN Normal 04/27/2025    NITRITE Negative 04/27/2025    LEUUR Negative 04/27/2025    NMIC Microscopic not indicated 12/28/2016    WBCUR 11-20 (A) 04/27/2025    RBCUR 3-5 (A) 04/27/2025    EPIUR Few (A) 04/27/2025    BACUR None Seen 04/27/2025    HYLUR Present (A) 12/18/2018         IMAGING:     Reviewed.    MEDICATIONS:       Current Hospital Medications[1]    ASSESSMENT/PLAN:     75 yo M with history of ESRD on PD, CAD, DM, HTN, HL, presented with AMS, tremors/jerking movements and weakness.     Vasovagal episode 5/8.     ESRD:  -- PD on hold, PD catheter removed on 5/6  -- started iHD; first treatment on 4/29  -- tunnelled HD catheter placement on 5/5  -- HD MWF now  -- SW consult for outpatient HD placement appreciated    Hypotension on HD:  -- cool dialysate 35.5  -- midodrine 10 mg x1  -- hold hydralazine preHD     Anemia:  -- ANTONIO for Hb < 10, given on 5/7     MBD:  -- trend phos  -- continue sevelamer     HTN:  -- UF with HD  -- amlodipine, hydralazine, imdur --> hold amlodipine at least now; hold hydralazine pre HD; imdur per cardiology     Hyponatremia:  -- fluid  restriction <1.5/d when taking PO  -- UF with HD  -- Na 138 bath with HD     Next HD due on Monday. Nephrology will follow peripherally over the remaining weekend and see again monday    Thank you for allowing me to participate in the care of this patient. Please do not hesitate to call with questions or concerns.    Evelina Collado MD  [facility]         [1]   Current Facility-Administered Medications   Medication Dose Route Frequency    docusate sodium (Colace) cap 100 mg  100 mg Oral BID    sennosides (Senokot) tab 17.2 mg  17.2 mg Oral Daily    insulin degludec (Tresiba) 100 units/mL flextouch 12 Units  12 Units Subcutaneous Nightly    sodium chloride 0.9 % IV bolus 100 mL  100 mL Intravenous Q30 Min PRN    And    albumin human (Albumin) 25% injection 25 g  25 g Intravenous PRN Dialysis    baclofen (Lioresal) tab 5 mg  5 mg Oral BID    metoclopramide (Reglan) 5 mg/mL injection 5 mg  5 mg Intravenous Q8H PRN    naloxone (Narcan) 0.4 MG/ML injection 0.08 mg  0.08 mg Intravenous PRN    insulin aspart (NovoLOG) 100 Units/mL FlexPen 4-20 Units  4-20 Units Subcutaneous TID AC and HS    aspirin chewable tab 81 mg  81 mg Oral Daily    hydrALAZINE (Apresoline) tab 25 mg  25 mg Oral Q8H INDU    heparin (Porcine) 1000 UNIT/ML injection 1,500 Units  1.5 mL Intracatheter PRN Dialysis    sevelamer carbonate (Renvela) tab 800 mg  800 mg Oral TID CC    heparin (Porcine) 100 Units/mL lock flush 150 Units  1.5 mL Intravenous Once    acetaminophen (Tylenol) tab 650 mg  650 mg Oral Q4H PRN    Or    acetaminophen (Tylenol) rectal suppository 650 mg  650 mg Rectal Q4H PRN    labetalol (Trandate) 5 mg/mL injection 10 mg  10 mg Intravenous Q10 Min PRN    hydrALAzine (Apresoline) 20 mg/mL injection 10 mg  10 mg Intravenous Q2H PRN    [Held by provider] clopidogrel (Plavix) tab 75 mg  75 mg Oral Daily    [Held by provider] apixaban (Eliquis) tab 5 mg  5 mg Oral BID    melatonin tab 3 mg  3 mg Oral Nightly PRN    ondansetron (Zofran)  4 MG/2ML injection 4 mg  4 mg Intravenous Q6H PRN    [Held by provider] amLODIPine (Norvasc) tab 10 mg  10 mg Oral Daily    isosorbide mononitrate ER (Imdur) 24 hr tab 60 mg  60 mg Oral Daily    rosuvastatin (Crestor) tab 10 mg  10 mg Oral Daily    venlafaxine (Effexor) tab 37.5 mg  37.5 mg Oral Daily    glucose (Dex4) 15 GM/59ML oral liquid 15 g  15 g Oral Q15 Min PRN    Or    glucose (Glutose) 40% oral gel 15 g  15 g Oral Q15 Min PRN    Or    glucose-vitamin C (Dex-4) chewable tab 4 tablet  4 tablet Oral Q15 Min PRN    Or    dextrose 50% injection 50 mL  50 mL Intravenous Q15 Min PRN    Or    glucose (Dex4) 15 GM/59ML oral liquid 30 g  30 g Oral Q15 Min PRN    Or    glucose (Glutose) 40% oral gel 30 g  30 g Oral Q15 Min PRN    Or    glucose-vitamin C (Dex-4) chewable tab 8 tablet  8 tablet Oral Q15 Min PRN

## 2025-05-10 NOTE — PLAN OF CARE
Assumed care of patient at 1930  A&Ox4, RA, afib on tele  Neuro Qshift, NIH daily  Carb Controlled diet, QID accuchecks  HD MWF  Up w/ shagufta steady  Safety precautions in place  All needs met at this time     Problem: Patient/Family Goals  Goal: Patient/Family Long Term Goal  Description: Patient's Long Term Goal: go home  Interventions:--cardiac monitoring  -Tunneled HD cath placement  -Hemodialysis  -Labs  -monitor vital signs   See additional Care Plan goals for specific interventions  Outcome: Progressing  Goal: Patient/Family Short Term Goal  Description: Patient's Short Term Goal: pain controlled  Interventions: -pain medicine   See additional Care Plan goals for specific interventions  Outcome: Progressing     Problem: SAFETY ADULT - FALL  Goal: Free from fall injury  Description: INTERVENTIONS:- Assess pt frequently for physical needs- Identify cognitive and physical deficits and behaviors that affect risk of falls.- [place] fall precautions as indicated by assessment.- Educate pt/family on patient safety including physical limitations- Instruct pt to call for assistance with activity based on assessment- Modify environment to reduce risk of injury- Provide assistive devices as appropriate- Consider OT/PT consult to assist with strengthening/mobility- Encourage toileting schedule  Outcome: Progressing     Problem: CARDIOVASCULAR - ADULT  Goal: Absence of cardiac arrhythmias or at baseline  Description: INTERVENTIONS:- Continuous cardiac monitoring, monitor vital signs, obtain 12 lead EKG if indicated- Evaluate effectiveness of antiarrhythmic and heart rate control medications as ordered- Initiate emergency measures for life threatening arrhythmias- Monitor electrolytes and administer replacement therapy as ordered  Outcome: Progressing     Problem: SKIN/TISSUE INTEGRITY - ADULT  Goal: Skin integrity remains intact  Description: INTERVENTIONS- Assess and document risk factors for pressure ulcer development-  Assess and document skin integrity- Monitor for areas of redness and/or skin breakdown- Initiate interventions, skin care algorithm/standards of care as needed  Outcome: Progressing     Problem: NEUROLOGICAL - ADULT  Goal: Achieves stable or improved neurological status  Description: INTERVENTIONS- Assess for and report changes in neurological status- Initiate measures to prevent increased intracranial pressure- Maintain blood pressure and fluid volume within ordered parameters to optimize cerebral perfusion and minimize risk of hemorrhage- Monitor temperature, glucose, and sodium. Initiate appropriate interventions as ordered  Outcome: Progressing     Problem: Impaired Cognition  Goal: Patient will exhibit improved attention, thought processing and/or memory  Description: Interventions:- Minimize distractions in the room when full attention is required  Outcome: Progressing

## 2025-05-10 NOTE — PROGRESS NOTES
[facility]   part of [facility] Hospitalist Progress Note     Tariq Nolan Patient Status:  Inpatient    1950 MRN [id number]   Location [facility] 3NE-A Attending Jairon Oliva, DO   Hosp Day # 14 PCP Jacob Hicks MD     Assessment & Plan:    74-year-old male with past medical history of CAD with stents, GARY/AoCD, type 2 diabetes, hypertension, hyperlipidemia, ESRD on PD who presented to the hospital with chief complaints of confusion/hallucinations.    Acute metabolic encephalopathy/acute hospital acquired delirium  Hallucinations  Thought to be secondary to ineffective PD  CT/MRI negative for any acute findings, noted MRI most limited due to motion artifact  Repeat CT head on  due to vasovagal episode negative  No evidence of acute infection, ammonia and within normal limits  Status post EEG with no focal seizure-like status post activity  Status post tunneled HD catheter placement on   Plan:  - Psych liaison consult placed, however will need outpatient psychiatry follow-up  - Delirium precautions  - Continue baclofen 5 mg twice daily, can either decrease prior to discharge or discontinue altogether depending on if it is benefiting the patient as it can also cause altered mental status    ESRD on prior PD, subsequently transition to HD  PD catheter removed, status post tunneled HD catheter placed on   Plan:  - Defer HD needs to nephrology  - Continue Renvela    Acute blood loss anemia  Secondary to bleeding from PD catheter site  History of anemia of chronic disease  Plan:  - Will have surgery reevaluate as there was prior consideration for suture placement and patient has gone through multiple dressing changes due to saturations, and is also having hemoglobin drop.  - Continue holding Eliquis and Plavix, cautiously continue aspirin  - Check hemoglobin at 1400    History of A-fib  Possible sick sinus syndrome  Echo reviewed, normal EF, no valvular  disease, no evidence of PFO  Plan:  - Cardiology consulted, no acute indications for pacemaker placement.  - Beta-blocker stopped  -Continue hydralazine    History of CAD status post PCI  Hypertension  Plan:  - Cards on consult, okay for Plavix hold, Eliquis hold however does need to be on aspirin, cautiously we will continue  - Holding amlodipine for now, continue Imdur  - Continue statin    Type 2 diabetes  - Continue 12 units of Tresiba, medium dose insulin sliding scale    MDD/LIV  - Continue Effexor    Dispo: Likely will need ALICIA  DVT Ppx: Holding for now due to acute blood loss anemia    Subjective:     Patient seen and examined this morning at bedside.  Doing well.  Noted to have multiple dressing changes due to oozing from PD catheter site.  Otherwise continues to have mild hallucinations, AO x 2 for me during my evaluation.    Vital signs:  Temp:  [97.8 °F (36.6 °C)-98.3 °F (36.8 °C)] 98.2 °F (36.8 °C)  Pulse:  [44-53] 44  Resp:  [15-22] 15  BP: (120-159)/(41-62) 144/55  SpO2:  [94 %-100 %] 94 %    Physical Exam:    General: No acute distress.  AAO x 2  Respiratory: Clear to auscultation bilaterally. No wheezes.  Chest wall: HD catheter with no oozing noted in the right upper chest wall  Cardiovascular: S1, S2. Regular rate and rhythm  Abdomen: Soft, nontender, nondistended.  Positive bowel sounds. No rebound or guarding.  PD catheter site dressed however saturated with blood  Extremities: No edema.  Neuro:  Grossly non focal, no motor deficits.      Diagnostic Data:    Pertinent Labs and Imaging independently reviewed  Comments:  Hemoglobin 7.5 (slight drift from yesterday)    Jairon Oliva D.O.  [facility] Hospitalist     Supplementary Documentation:   DVT Mechanical Prophylaxis:   SCDs,    DVT Pharmacologic Prophylaxis   Medication    heparin (Porcine) 1000 UNIT/ML injection 1,500 Units    heparin (Porcine) 100 Units/mL lock flush 150 Units    [Held by provider] apixaban (Eliquis) tab 5 mg       DVT Pharmacologic prophylaxis: Aspirin 162 mg         Code Status: Not on file  Gavin: No urinary catheter in place  Gavin Duration (in days):   Central line:    BLAS:

## 2025-05-10 NOTE — PLAN OF CARE
Assumed patient care at 0730.  Vital signs stable.  Patient alert and oriented x 4 but very forgetful.  Patient denies pain.  Incision site from PD catheter removal was oozing blood.  Surgery notified and two stitches placed by Dr. Allen.  No further signs of bleeding.  Trending hemoglobin.     Problem: Patient/Family Goals  Goal: Patient/Family Long Term Goal  Description: Patient's Long Term Goal: go home  Interventions:--cardiac monitoring  -Tunneled HD cath placement  -Hemodialysis  -Labs  -monitor vital signs   See additional Care Plan goals for specific interventions  Outcome: Progressing  Goal: Patient/Family Short Term Goal  Description: Patient's Short Term Goal: pain controlled  Interventions: -pain medicine   See additional Care Plan goals for specific interventions  Outcome: Progressing     Problem: SAFETY ADULT - FALL  Goal: Free from fall injury  Description: INTERVENTIONS:- Assess pt frequently for physical needs- Identify cognitive and physical deficits and behaviors that affect risk of falls.- [place] fall precautions as indicated by assessment.- Educate pt/family on patient safety including physical limitations- Instruct pt to call for assistance with activity based on assessment- Modify environment to reduce risk of injury- Provide assistive devices as appropriate- Consider OT/PT consult to assist with strengthening/mobility- Encourage toileting schedule  Outcome: Progressing     Problem: CARDIOVASCULAR - ADULT  Goal: Absence of cardiac arrhythmias or at baseline  Description: INTERVENTIONS:- Continuous cardiac monitoring, monitor vital signs, obtain 12 lead EKG if indicated- Evaluate effectiveness of antiarrhythmic and heart rate control medications as ordered- Initiate emergency measures for life threatening arrhythmias- Monitor electrolytes and administer replacement therapy as ordered  Outcome: Progressing     Problem: SKIN/TISSUE INTEGRITY - ADULT  Goal: Skin integrity remains  intact  Description: INTERVENTIONS- Assess and document risk factors for pressure ulcer development- Assess and document skin integrity- Monitor for areas of redness and/or skin breakdown- Initiate interventions, skin care algorithm/standards of care as needed  Outcome: Progressing     Problem: NEUROLOGICAL - ADULT  Goal: Achieves stable or improved neurological status  Description: INTERVENTIONS- Assess for and report changes in neurological status- Initiate measures to prevent increased intracranial pressure- Maintain blood pressure and fluid volume within ordered parameters to optimize cerebral perfusion and minimize risk of hemorrhage- Monitor temperature, glucose, and sodium. Initiate appropriate interventions as ordered  Outcome: Progressing     Problem: Impaired Cognition  Goal: Patient will exhibit improved attention, thought processing and/or memory  Description: Interventions:- Minimize distractions in the room when full attention is required  Outcome: Progressing

## 2025-05-11 LAB
ALBUMIN SERPL-MCNC: 3.4 G/DL (ref 3.2–4.8)
ANION GAP SERPL CALC-SCNC: 6 MMOL/L (ref 0–18)
ANTIBODY SCREEN: NEGATIVE
BASOPHILS # BLD AUTO: 0.04 X10(3) UL (ref 0–0.2)
BASOPHILS NFR BLD AUTO: 0.7 %
BUN BLD-MCNC: 38 MG/DL (ref 9–23)
CALCIUM BLD-MCNC: 9.1 MG/DL (ref 8.7–10.6)
CHLORIDE SERPL-SCNC: 99 MMOL/L (ref 98–112)
CO2 SERPL-SCNC: 27 MMOL/L (ref 21–32)
CREAT BLD-MCNC: 5.39 MG/DL (ref 0.7–1.3)
EGFRCR SERPLBLD CKD-EPI 2021: 11 ML/MIN/1.73M2 (ref 60–?)
EOSINOPHIL # BLD AUTO: 0.12 X10(3) UL (ref 0–0.7)
EOSINOPHIL NFR BLD AUTO: 2 %
ERYTHROCYTE [DISTWIDTH] IN BLOOD BY AUTOMATED COUNT: 11.9 %
GLUCOSE BLD-MCNC: 134 MG/DL (ref 70–99)
GLUCOSE BLD-MCNC: 144 MG/DL (ref 70–99)
GLUCOSE BLD-MCNC: 155 MG/DL (ref 70–99)
GLUCOSE BLD-MCNC: 188 MG/DL (ref 70–99)
GLUCOSE BLD-MCNC: 189 MG/DL (ref 70–99)
GLUCOSE BLD-MCNC: 242 MG/DL (ref 70–99)
GLUCOSE BLD-MCNC: 325 MG/DL (ref 70–99)
GLUCOSE BLD-MCNC: 328 MG/DL (ref 70–99)
GLUCOSE BLD-MCNC: 62 MG/DL (ref 70–99)
GLUCOSE BLD-MCNC: 78 MG/DL (ref 70–99)
HCT VFR BLD AUTO: 18.8 % (ref 39–53)
HGB BLD-MCNC: 6.3 G/DL (ref 13–17.5)
HGB BLD-MCNC: 8.5 G/DL (ref 13–17.5)
IMM GRANULOCYTES # BLD AUTO: 0.03 X10(3) UL (ref 0–1)
IMM GRANULOCYTES NFR BLD: 0.5 %
LYMPHOCYTES # BLD AUTO: 0.87 X10(3) UL (ref 1–4)
LYMPHOCYTES NFR BLD AUTO: 14.5 %
MAGNESIUM SERPL-MCNC: 2.2 MG/DL (ref 1.6–2.6)
MCH RBC QN AUTO: 33.7 PG (ref 26–34)
MCHC RBC AUTO-ENTMCNC: 33.5 G/DL (ref 31–37)
MCV RBC AUTO: 100.5 FL (ref 80–100)
MONOCYTES # BLD AUTO: 0.61 X10(3) UL (ref 0.1–1)
MONOCYTES NFR BLD AUTO: 10.1 %
NEUTROPHILS # BLD AUTO: 4.34 X10 (3) UL (ref 1.5–7.7)
NEUTROPHILS # BLD AUTO: 4.34 X10(3) UL (ref 1.5–7.7)
NEUTROPHILS NFR BLD AUTO: 72.2 %
OSMOLALITY SERPL CALC.SUM OF ELEC: 286 MOSM/KG (ref 275–295)
PHOSPHATE SERPL-MCNC: 4.3 MG/DL (ref 2.4–5.1)
PLATELET # BLD AUTO: 254 10(3)UL (ref 150–450)
POTASSIUM SERPL-SCNC: 5 MMOL/L (ref 3.5–5.1)
RBC # BLD AUTO: 1.87 X10(6)UL (ref 3.8–5.8)
RH BLOOD TYPE: NEGATIVE
RH BLOOD TYPE: NEGATIVE
SODIUM SERPL-SCNC: 132 MMOL/L (ref 136–145)
WBC # BLD AUTO: 6 X10(3) UL (ref 4–11)

## 2025-05-11 PROCEDURE — 30233N1 TRANSFUSION OF NONAUTOLOGOUS RED BLOOD CELLS INTO PERIPHERAL VEIN, PERCUTANEOUS APPROACH: ICD-10-PCS | Performed by: HOSPITALIST

## 2025-05-11 RX ORDER — QUETIAPINE FUMARATE 25 MG/1
50 TABLET, FILM COATED ORAL NIGHTLY
Status: DISCONTINUED | OUTPATIENT
Start: 2025-05-11 | End: 2025-05-12

## 2025-05-11 RX ORDER — HEPARIN SODIUM 1000 [USP'U]/ML
1.5 INJECTION, SOLUTION INTRAVENOUS; SUBCUTANEOUS
Status: DISCONTINUED | OUTPATIENT
Start: 2025-05-12 | End: 2025-05-12

## 2025-05-11 RX ORDER — AMLODIPINE BESYLATE 5 MG/1
5 TABLET ORAL DAILY
Status: DISCONTINUED | OUTPATIENT
Start: 2025-05-11 | End: 2025-05-12

## 2025-05-11 RX ORDER — ALBUMIN (HUMAN) 12.5 G/50ML
25 SOLUTION INTRAVENOUS
Status: DISCONTINUED | OUTPATIENT
Start: 2025-05-11 | End: 2025-05-12

## 2025-05-11 RX ORDER — INSULIN DEGLUDEC 100 U/ML
10 INJECTION, SOLUTION SUBCUTANEOUS NIGHTLY
Status: DISCONTINUED | OUTPATIENT
Start: 2025-05-11 | End: 2025-05-12

## 2025-05-11 RX ORDER — SODIUM CHLORIDE 9 MG/ML
INJECTION, SOLUTION INTRAVENOUS ONCE
Status: COMPLETED | OUTPATIENT
Start: 2025-05-11 | End: 2025-05-11

## 2025-05-11 NOTE — CM/SW NOTE
NEERU spoke with YURIY Horowitz regarding DC for today.     RN stated pt will not DC today as his Hgb was 6.3 and pt is getting a blood transfusion.      Possible DC tomorrow if Hgb is stable.      FLOYD Garza  Discharge Planner  [phone]

## 2025-05-11 NOTE — PLAN OF CARE
Assumed patient care at 0730.  Vital signs stable.  Patient alert but slightly confused this morning and very forgetful throughout the rest of the day.  Informed patient and family that patient needs sleep.  Notified hospitalist and seroquel was ordered.   1 unit PRBCs given.  No signs of bleeding from surgical site.     Problem: Patient/Family Goals  Goal: Patient/Family Long Term Goal  Description: Patient's Long Term Goal: go home  Interventions:--cardiac monitoring  -Tunneled HD cath placement  -Hemodialysis  -Labs  -monitor vital signs   See additional Care Plan goals for specific interventions  Outcome: Progressing  Goal: Patient/Family Short Term Goal  Description: Patient's Short Term Goal: pain controlled  Interventions: -pain medicine   See additional Care Plan goals for specific interventions  Outcome: Progressing     Problem: SAFETY ADULT - FALL  Goal: Free from fall injury  Description: INTERVENTIONS:- Assess pt frequently for physical needs- Identify cognitive and physical deficits and behaviors that affect risk of falls.- [place] fall precautions as indicated by assessment.- Educate pt/family on patient safety including physical limitations- Instruct pt to call for assistance with activity based on assessment- Modify environment to reduce risk of injury- Provide assistive devices as appropriate- Consider OT/PT consult to assist with strengthening/mobility- Encourage toileting schedule  Outcome: Progressing     Problem: CARDIOVASCULAR - ADULT  Goal: Absence of cardiac arrhythmias or at baseline  Description: INTERVENTIONS:- Continuous cardiac monitoring, monitor vital signs, obtain 12 lead EKG if indicated- Evaluate effectiveness of antiarrhythmic and heart rate control medications as ordered- Initiate emergency measures for life threatening arrhythmias- Monitor electrolytes and administer replacement therapy as ordered  Outcome: Progressing     Problem: SKIN/TISSUE INTEGRITY - ADULT  Goal: Skin  integrity remains intact  Description: INTERVENTIONS- Assess and document risk factors for pressure ulcer development- Assess and document skin integrity- Monitor for areas of redness and/or skin breakdown- Initiate interventions, skin care algorithm/standards of care as needed  Outcome: Progressing     Problem: NEUROLOGICAL - ADULT  Goal: Achieves stable or improved neurological status  Description: INTERVENTIONS- Assess for and report changes in neurological status- Initiate measures to prevent increased intracranial pressure- Maintain blood pressure and fluid volume within ordered parameters to optimize cerebral perfusion and minimize risk of hemorrhage- Monitor temperature, glucose, and sodium. Initiate appropriate interventions as ordered  Outcome: Progressing     Problem: Impaired Cognition  Goal: Patient will exhibit improved attention, thought processing and/or memory  Description: Interventions:- Minimize distractions in the room when full attention is required  Outcome: Progressing

## 2025-05-11 NOTE — PROGRESS NOTES
[facility]    Division of Cardiology    Progress Note      Tariq Nolan Patient Status:  Inpatient    1950 MRN [id number]   Location [facility] 3NE-A Attending Jairon Oliva, DO   Hosp Day # 15 PCP Jacob Hicks MD     Impression:  Permanent atrial fibrillation with slow ventricular response  Previous CAD with history of PCI  ESRD on peritoneal dialysis  Diabetes  Hypertension  Hyperlipidemia  Syncope  Mental status changes    Plan:  Bradycardia continues but improved.  Asymptomatic.  No indication for PPM.  RR 5/8 for unresponsiveness. Chart states had low BP but nothing documented. BP has been normal to high. Amlodipine is on hold. Remains on hydral/nitrate. No advanced block or pause > 3 sec on tele.   Carvedilol stopped previously.   Neurology following.  Possible toxic/metabolic versus uremic encephalopathy.  EEG normal. He is lucid and incsive per my interview, although even he notes this is not always the case recently.  Fluid management per nephrology with HD  Echocardiogram  with normal LV function, no significant valvular disease and no evidence of PFO by bubble study   Continue telemetry.    AC on hold due to recurrent bleeding from PD site.  Hemoglobin this AM 6.3 - plan to continue to hold.   DC plan to discharge to [facility] when medically appropriate.             Subjective:  No CP or SOB.     Objective:  BP (!) 192/62 (BP Location: Right arm)   Pulse 61   Temp 97.6 °F (36.4 °C) (Oral)   Resp 15   Ht 5' 5\" (1.651 m)   Wt 199 lb 6.4 oz (90.4 kg)   SpO2 99%   BMI 33.18 kg/m²   Temp (24hrs), Av °F (36.7 °C), Min:97.6 °F (36.4 °C), Max:98.4 °F (36.9 °C)    No intake or output data in the 24 hours ending 25 1105    Wt Readings from Last 3 Encounters:   25 199 lb 6.4 oz (90.4 kg)   10/23/24 190 lb 0.6 oz (86.2 kg)   10/20/24 190 lb (86.2 kg)       General: Awake and alert; in no acute distress  Cardiac: irregular rhythm   Lungs:  Clear to auscultation bilaterally; no accessory muscle use  Abdomen: Soft, non-tender; bowel sounds are normoactive  Extremities: No clubbing/cyanosis; moves all 4 extremities normally, no sig edema    Current Hospital Medications[1]    Laboratory Data:  Lab Results   Component Value Date    WBC 6.0 05/11/2025    HGB 6.3 05/11/2025    HCT 18.8 05/11/2025    .0 05/11/2025         Lab Results   Component Value Date    INR 1.11 05/08/2025    INR 1.12 05/07/2025    INR 1.25 (H) 04/26/2025     Lab Results   Component Value Date     05/11/2025    K 5.0 05/11/2025    CL 99 05/11/2025    CO2 27.0 05/11/2025    BUN 38 05/11/2025    CREATSERUM 5.39 05/11/2025     05/11/2025    CA 9.1 05/11/2025    MG 2.2 05/11/2025         Telemetry: AF 30-40s asleep, 50-60s awake      Thank you for allowing our practice to participate in the care of your patient. Please do not hesitate to contact me if you have any questions.         BISHOP Hunt, 05/11/25, 11:06 AM  Cardiology           [1]   Current Facility-Administered Medications   Medication Dose Route Frequency    sodium chloride 0.9% infusion   Intravenous Once    insulin degludec (Tresiba) 100 units/mL flextouch 10 Units  10 Units Subcutaneous Nightly    insulin aspart (NovoLOG) 100 Units/mL FlexPen 1-5 Units  1-5 Units Subcutaneous TID AC and HS    QUEtiapine (SEROquel) tab 50 mg  50 mg Oral Nightly    amLODIPine (Norvasc) tab 5 mg  5 mg Oral Daily    traZODone (Desyrel) partial tab 25 mg  25 mg Oral Nightly PRN    docusate sodium (Colace) cap 100 mg  100 mg Oral BID    sennosides (Senokot) tab 17.2 mg  17.2 mg Oral Daily    [Held by provider] baclofen (Lioresal) tab 5 mg  5 mg Oral BID    naloxone (Narcan) 0.4 MG/ML injection 0.08 mg  0.08 mg Intravenous PRN    aspirin chewable tab 81 mg  81 mg Oral Daily    hydrALAZINE (Apresoline) tab 25 mg  25 mg Oral Q8H INDU    heparin (Porcine) 1000 UNIT/ML injection 1,500 Units  1.5 mL Intracatheter PRN Dialysis     sevelamer carbonate (Renvela) tab 800 mg  800 mg Oral TID CC    heparin (Porcine) 100 Units/mL lock flush 150 Units  1.5 mL Intravenous Once    acetaminophen (Tylenol) tab 650 mg  650 mg Oral Q4H PRN    Or    acetaminophen (Tylenol) rectal suppository 650 mg  650 mg Rectal Q4H PRN    labetalol (Trandate) 5 mg/mL injection 10 mg  10 mg Intravenous Q10 Min PRN    hydrALAzine (Apresoline) 20 mg/mL injection 10 mg  10 mg Intravenous Q2H PRN    [Held by provider] clopidogrel (Plavix) tab 75 mg  75 mg Oral Daily    [Held by provider] apixaban (Eliquis) tab 5 mg  5 mg Oral BID    melatonin tab 3 mg  3 mg Oral Nightly PRN    ondansetron (Zofran) 4 MG/2ML injection 4 mg  4 mg Intravenous Q6H PRN    isosorbide mononitrate ER (Imdur) 24 hr tab 60 mg  60 mg Oral Daily    rosuvastatin (Crestor) tab 10 mg  10 mg Oral Daily    venlafaxine (Effexor) tab 37.5 mg  37.5 mg Oral Daily    glucose (Dex4) 15 GM/59ML oral liquid 15 g  15 g Oral Q15 Min PRN    Or    glucose (Glutose) 40% oral gel 15 g  15 g Oral Q15 Min PRN    Or    glucose-vitamin C (Dex-4) chewable tab 4 tablet  4 tablet Oral Q15 Min PRN    Or    dextrose 50% injection 50 mL  50 mL Intravenous Q15 Min PRN    Or    glucose (Dex4) 15 GM/59ML oral liquid 30 g  30 g Oral Q15 Min PRN    Or    glucose (Glutose) 40% oral gel 30 g  30 g Oral Q15 Min PRN    Or    glucose-vitamin C (Dex-4) chewable tab 8 tablet  8 tablet Oral Q15 Min PRN

## 2025-05-11 NOTE — PROGRESS NOTES
[facility]    Division of Cardiology    Progress Note      Tariq Nolan Patient Status:  Inpatient    1950 MRN [id number]   Location [facility] 3NE-A Attending Jairon Oliva, DO   Hosp Day # 14 PCP Jacob Hicks MD     Impression:  Permanent atrial fibrillation with slow ventricular response  Previous CAD with history of PCI  ESRD on peritoneal dialysis  Diabetes  Hypertension  Hyperlipidemia  Syncope  Mental status changes    Plan:  Bradycardia continues but improved.  Asymptomatic.  No indication for PPM.  RR 5/8 for unresponsiveness. Chart states had low BP but nothing documented. BP has been normal to high. Amlodipine is on hold. Remains on hydral/nitrate. No advanced block or pause > 3 sec on tele.   Carvedilol stopped previously.   Neurology following.  Possible toxic/metabolic versus uremic encephalopathy.  EEG normal. He is lucid and incsive per my interview, although even he notes this is not always the case recently.  Fluid management per nephrology with HD  Echocardiogram  with normal LV function, no significant valvular disease and no evidence of PFO by bubble study   Continue telemetry.    AC on hold due to recurrent bleeding from PD site. Likely resume AC tomorrow.   Possible DC to rehab tomorrow or Monday.           Subjective:  Asleep. Was difficult to wake, but once awake, fully lucid and appropriate. No CP or SOB.     Objective:  /62 (BP Location: Left arm)   Pulse 55   Temp 98 °F (36.7 °C) (Oral)   Resp 16   Ht 65\"   Wt 199 lb 6.4 oz (90.4 kg)   SpO2 97%   BMI 33.18 kg/m²   Temp (24hrs), Av.1 °F (36.7 °C), Min:98 °F (36.7 °C), Max:98.2 °F (36.8 °C)    No intake or output data in the 24 hours ending 05/10/25 2016    Wt Readings from Last 3 Encounters:   25 199 lb 6.4 oz (90.4 kg)   10/23/24 190 lb 0.6 oz (86.2 kg)   10/20/24 190 lb (86.2 kg)       General: Awake and alert; in no acute distress  Cardiac: irregular  rhythm   Lungs: Clear to auscultation bilaterally; no accessory muscle use  Abdomen: Soft, non-tender; bowel sounds are normoactive  Extremities: No clubbing/cyanosis; moves all 4 extremities normally, no sig edema    Current Hospital Medications[1]    Laboratory Data:  Lab Results   Component Value Date    WBC 6.2 05/10/2025    HGB 7.3 05/10/2025    HCT 22.2 05/10/2025    .0 05/10/2025         Lab Results   Component Value Date    INR 1.11 05/08/2025    INR 1.12 05/07/2025    INR 1.25 (H) 04/26/2025     Lab Results   Component Value Date     05/10/2025    K 4.8 05/10/2025     05/10/2025    CO2 29.0 05/10/2025    BUN 22 05/10/2025    CREATSERUM 3.78 05/10/2025    GLU 95 05/10/2025    CA 9.3 05/10/2025    MG 2.1 05/10/2025         Telemetry: AF 30-40s asleep, 50-60s awake      Thank you for allowing our practice to participate in the care of your patient. Please do not hesitate to contact me if you have any questions.         Jonn Terrazas MD  General, Interventional  Structural & Endovascular  Cardiology           [1]   Current Facility-Administered Medications   Medication Dose Route Frequency    docusate sodium (Colace) cap 100 mg  100 mg Oral BID    sennosides (Senokot) tab 17.2 mg  17.2 mg Oral Daily    insulin degludec (Tresiba) 100 units/mL flextouch 12 Units  12 Units Subcutaneous Nightly    baclofen (Lioresal) tab 5 mg  5 mg Oral BID    metoclopramide (Reglan) 5 mg/mL injection 5 mg  5 mg Intravenous Q8H PRN    naloxone (Narcan) 0.4 MG/ML injection 0.08 mg  0.08 mg Intravenous PRN    insulin aspart (NovoLOG) 100 Units/mL FlexPen 4-20 Units  4-20 Units Subcutaneous TID AC and HS    aspirin chewable tab 81 mg  81 mg Oral Daily    hydrALAZINE (Apresoline) tab 25 mg  25 mg Oral Q8H INDU    heparin (Porcine) 1000 UNIT/ML injection 1,500 Units  1.5 mL Intracatheter PRN Dialysis    sevelamer carbonate (Renvela) tab 800 mg  800 mg Oral TID CC    heparin (Porcine) 100 Units/mL lock flush 150 Units   1.5 mL Intravenous Once    acetaminophen (Tylenol) tab 650 mg  650 mg Oral Q4H PRN    Or    acetaminophen (Tylenol) rectal suppository 650 mg  650 mg Rectal Q4H PRN    labetalol (Trandate) 5 mg/mL injection 10 mg  10 mg Intravenous Q10 Min PRN    hydrALAzine (Apresoline) 20 mg/mL injection 10 mg  10 mg Intravenous Q2H PRN    [Held by provider] clopidogrel (Plavix) tab 75 mg  75 mg Oral Daily    [Held by provider] apixaban (Eliquis) tab 5 mg  5 mg Oral BID    melatonin tab 3 mg  3 mg Oral Nightly PRN    ondansetron (Zofran) 4 MG/2ML injection 4 mg  4 mg Intravenous Q6H PRN    [Held by provider] amLODIPine (Norvasc) tab 10 mg  10 mg Oral Daily    isosorbide mononitrate ER (Imdur) 24 hr tab 60 mg  60 mg Oral Daily    rosuvastatin (Crestor) tab 10 mg  10 mg Oral Daily    venlafaxine (Effexor) tab 37.5 mg  37.5 mg Oral Daily    glucose (Dex4) 15 GM/59ML oral liquid 15 g  15 g Oral Q15 Min PRN    Or    glucose (Glutose) 40% oral gel 15 g  15 g Oral Q15 Min PRN    Or    glucose-vitamin C (Dex-4) chewable tab 4 tablet  4 tablet Oral Q15 Min PRN    Or    dextrose 50% injection 50 mL  50 mL Intravenous Q15 Min PRN    Or    glucose (Dex4) 15 GM/59ML oral liquid 30 g  30 g Oral Q15 Min PRN    Or    glucose (Glutose) 40% oral gel 30 g  30 g Oral Q15 Min PRN    Or    glucose-vitamin C (Dex-4) chewable tab 8 tablet  8 tablet Oral Q15 Min PRN

## 2025-05-11 NOTE — PROGRESS NOTES
[facility]   part of [facility] Hospitalist Progress Note     Tariq Nolan Patient Status:  Inpatient    1950 MRN [id number]   Location [facility] 3NE-A Attending Jairon Oliva, DO   Hosp Day # 15 PCP Jacob Hicks MD     Assessment & Plan:    74-year-old male with past medical history of CAD with stents, GARY/AoCD, type 2 diabetes, hypertension, hyperlipidemia, ESRD on PD who presented to the hospital with chief complaints of confusion/hallucinations.    Acute metabolic encephalopathy/acute hospital acquired delirium  Hallucinations  Thought to be secondary to ineffective PD  CT/MRI negative for any acute findings, noted MRI most limited due to motion artifact  Repeat CT head on  due to vasovagal episode negative  No evidence of acute infection, ammonia and within normal limits  Status post EEG with no focal seizure-like status post activity  Status post tunneled HD catheter placement on   Plan:  - Psych liaison consult placed, however will need outpatient psychiatry follow-up, will start Seroquel 50mg nightly today  - Delirium precautions  - Hold Baclofen for now    ESRD on prior PD, subsequently transition to HD  PD catheter removed, status post tunneled HD catheter placed on   Plan:  - Defer HD needs to nephrology  - Continue Renvela    Acute blood loss anemia  Secondary to bleeding from PD catheter site  History of anemia of chronic disease  Plan:  - Surgery re-evaluated on 5/10, 2 sutures placed  - Transfuse 1u PRBCs today  - Continue holding Eliquis and Plavix, cautiously continue aspirin    History of A-fib  Possible sick sinus syndrome  Echo reviewed, normal EF, no valvular disease, no evidence of PFO  Plan:  - Cardiology consulted, no acute indications for pacemaker placement.  - Beta-blocker stopped  -Continue hydralazine    History of CAD status post PCI  Hypertension  Plan:  - Cards on consult, okay for Plavix hold, Eliquis hold however does  need to be on aspirin, cautiously we will continue  - Resume Amlodipine, continue Imdur  - Continue statin    Type 2 diabetes c/b Hypoglycemia  - Decrease to 10 units of Tresiba, low dose insulin sliding scale    MDD/LIV  - Continue Effexor    Dispo: [facility]    DVT Ppx: Holding for now due to acute blood loss anemia    Subjective:     Patient seen and examined this morning at bedside. Mental status not worse than yesterday's exam, concerned about his BG and HgB numbers. Otherwise did not sleep well.    Vital signs:  Temp:  [97.6 °F (36.4 °C)-98.4 °F (36.9 °C)] 97.6 °F (36.4 °C)  Pulse:  [45-61] 61  Resp:  [11-18] 15  BP: (135-192)/(52-69) 192/62  SpO2:  [96 %-100 %] 99 %    Physical Exam:    General: No acute distress.  AAO x 2  Respiratory: Clear to auscultation bilaterally. No wheezes.  Chest wall: HD catheter with no oozing noted in the right upper chest wall  Cardiovascular: S1, S2. Regular rate and rhythm  Abdomen: Soft, nontender, nondistended.  Positive bowel sounds. No rebound or guarding.  Prior PD catheter site w/ no bleeding noted today  Extremities: No edema.  Neuro:  Grossly non focal, no motor deficits.      Diagnostic Data:    Pertinent Labs and Imaging independently reviewed  Comments:  HgB 6.3 today    Jairon Oliva D.O.  [facility] ERist     Supplementary Documentation:   DVT Mechanical Prophylaxis:   SCDs,    DVT Pharmacologic Prophylaxis   Medication    heparin (Porcine) 1000 UNIT/ML injection 1,500 Units    heparin (Porcine) 100 Units/mL lock flush 150 Units    [Held by provider] apixaban (Eliquis) tab 5 mg      DVT Pharmacologic prophylaxis: Aspirin 162 mg         Code Status: Not on file  Gavin: No urinary catheter in place  Gavin Duration (in days):   Central line:    BLAS: 5/11/2025

## 2025-05-11 NOTE — PLAN OF CARE
Assumed care of patient at 1930  A&Ox4, forgetful at times, RA, afib on tele  Neuro Qshift, NIH daily  Carb Controlled diet, QID accuchecks  Up w/ shagufta steady  Dressing to LLQ from PD cath removal CDI  Trend Hgb - last check at 2048 - 7.3  Pending [facility] per SW for discharge planning   Safety precautions in place  All needs met at this time     0000 - Patient reported feeling clammy and \"not well.\" Glucose checked - 62. Interventions per MAR. Recheck 78, further interventions per MAR. MD notified, no new orders. Recheck 134. Patient reported improved feelings. Follow up checks at 0238 (189) and 0448 (144).     0430 - Patient reported tingling sensation to legs, MD notified. No new orders at this time    Problem: Patient/Family Goals  Goal: Patient/Family Long Term Goal  Description: Patient's Long Term Goal: go home  Interventions:--cardiac monitoring  -Tunneled HD cath placement  -Hemodialysis  -Labs  -monitor vital signs   See additional Care Plan goals for specific interventions  Outcome: Progressing  Goal: Patient/Family Short Term Goal  Description: Patient's Short Term Goal: pain controlled  Interventions: -pain medicine   See additional Care Plan goals for specific interventions  Outcome: Progressing     Problem: SAFETY ADULT - FALL  Goal: Free from fall injury  Description: INTERVENTIONS:- Assess pt frequently for physical needs- Identify cognitive and physical deficits and behaviors that affect risk of falls.- [place] fall precautions as indicated by assessment.- Educate pt/family on patient safety including physical limitations- Instruct pt to call for assistance with activity based on assessment- Modify environment to reduce risk of injury- Provide assistive devices as appropriate- Consider OT/PT consult to assist with strengthening/mobility- Encourage toileting schedule  Outcome: Progressing     Problem: CARDIOVASCULAR - ADULT  Goal: Absence of cardiac arrhythmias or at baseline  Description:  INTERVENTIONS:- Continuous cardiac monitoring, monitor vital signs, obtain 12 lead EKG if indicated- Evaluate effectiveness of antiarrhythmic and heart rate control medications as ordered- Initiate emergency measures for life threatening arrhythmias- Monitor electrolytes and administer replacement therapy as ordered  Outcome: Progressing     Problem: SKIN/TISSUE INTEGRITY - ADULT  Goal: Skin integrity remains intact  Description: INTERVENTIONS- Assess and document risk factors for pressure ulcer development- Assess and document skin integrity- Monitor for areas of redness and/or skin breakdown- Initiate interventions, skin care algorithm/standards of care as needed  Outcome: Progressing     Problem: NEUROLOGICAL - ADULT  Goal: Achieves stable or improved neurological status  Description: INTERVENTIONS- Assess for and report changes in neurological status- Initiate measures to prevent increased intracranial pressure- Maintain blood pressure and fluid volume within ordered parameters to optimize cerebral perfusion and minimize risk of hemorrhage- Monitor temperature, glucose, and sodium. Initiate appropriate interventions as ordered  Outcome: Progressing     Problem: Impaired Cognition  Goal: Patient will exhibit improved attention, thought processing and/or memory  Description: Interventions:- Minimize distractions in the room when full attention is required  Outcome: Progressing

## 2025-05-12 VITALS
TEMPERATURE: 99 F | BODY MASS INDEX: 33.22 KG/M2 | WEIGHT: 199.38 LBS | HEART RATE: 43 BPM | OXYGEN SATURATION: 98 % | HEIGHT: 65 IN | SYSTOLIC BLOOD PRESSURE: 129 MMHG | DIASTOLIC BLOOD PRESSURE: 55 MMHG | RESPIRATION RATE: 18 BRPM

## 2025-05-12 LAB
ALBUMIN SERPL-MCNC: 3.7 G/DL (ref 3.2–4.8)
ANION GAP SERPL CALC-SCNC: 8 MMOL/L (ref 0–18)
BLOOD TYPE BARCODE: 9500
BUN BLD-MCNC: 47 MG/DL (ref 9–23)
CALCIUM BLD-MCNC: 9.4 MG/DL (ref 8.7–10.6)
CHLORIDE SERPL-SCNC: 98 MMOL/L (ref 98–112)
CO2 SERPL-SCNC: 26 MMOL/L (ref 21–32)
CREAT BLD-MCNC: 6.49 MG/DL (ref 0.7–1.3)
EGFRCR SERPLBLD CKD-EPI 2021: 8 ML/MIN/1.73M2 (ref 60–?)
ERYTHROCYTE [DISTWIDTH] IN BLOOD BY AUTOMATED COUNT: 13.9 %
GLUCOSE BLD-MCNC: 130 MG/DL (ref 70–99)
GLUCOSE BLD-MCNC: 151 MG/DL (ref 70–99)
GLUCOSE BLD-MCNC: 185 MG/DL (ref 70–99)
GLUCOSE BLD-MCNC: 205 MG/DL (ref 70–99)
HBV SURFACE AG SER-ACNC: <0.1 [IU]/L
HBV SURFACE AG SERPL QL IA: NONREACTIVE
HCT VFR BLD AUTO: 24 % (ref 39–53)
HCV AB SERPL QL IA: NONREACTIVE
HGB BLD-MCNC: 8.2 G/DL (ref 13–17.5)
HGB BLD-MCNC: 9.1 G/DL (ref 13–17.5)
HIV 1+2 AB+HIV1 P24 AG SERPL QL IA: NONREACTIVE
MAGNESIUM SERPL-MCNC: 2.3 MG/DL (ref 1.6–2.6)
MCH RBC QN AUTO: 32.5 PG (ref 26–34)
MCHC RBC AUTO-ENTMCNC: 34.2 G/DL (ref 31–37)
MCV RBC AUTO: 95.2 FL (ref 80–100)
OSMOLALITY SERPL CALC.SUM OF ELEC: 288 MOSM/KG (ref 275–295)
PHOSPHATE SERPL-MCNC: 4.5 MG/DL (ref 2.4–5.1)
PLATELET # BLD AUTO: 265 10(3)UL (ref 150–450)
POTASSIUM SERPL-SCNC: 5.2 MMOL/L (ref 3.5–5.1)
RBC # BLD AUTO: 2.52 X10(6)UL (ref 3.8–5.8)
SODIUM SERPL-SCNC: 132 MMOL/L (ref 136–145)
UNIT VOLUME: 250 ML
WBC # BLD AUTO: 7.9 X10(3) UL (ref 4–11)

## 2025-05-12 RX ORDER — ASPIRIN 81 MG/1
81 TABLET, CHEWABLE ORAL DAILY
Qty: 30 TABLET | Refills: 0 | Status: SHIPPED | OUTPATIENT
Start: 2025-05-13 | End: 2025-06-12

## 2025-05-12 RX ORDER — INSULIN GLARGINE 100 [IU]/ML
15 INJECTION, SOLUTION SUBCUTANEOUS NIGHTLY
Status: SHIPPED | COMMUNITY
Start: 2025-05-12

## 2025-05-12 RX ORDER — PSEUDOEPHEDRINE HCL 30 MG
100 TABLET ORAL 2 TIMES DAILY
Status: SHIPPED | COMMUNITY
Start: 2025-05-12

## 2025-05-12 RX ORDER — HYDRALAZINE HYDROCHLORIDE 25 MG/1
25 TABLET, FILM COATED ORAL EVERY 8 HOURS SCHEDULED
Qty: 90 TABLET | Refills: 0 | Status: SHIPPED | OUTPATIENT
Start: 2025-05-12 | End: 2025-06-11

## 2025-05-12 RX ORDER — AMLODIPINE BESYLATE 5 MG/1
5 TABLET ORAL DAILY
Qty: 30 TABLET | Refills: 0 | Status: SHIPPED | OUTPATIENT
Start: 2025-05-13 | End: 2025-06-12

## 2025-05-12 RX ORDER — SEVELAMER CARBONATE 800 MG/1
800 TABLET, FILM COATED ORAL
Qty: 90 TABLET | Refills: 0 | Status: SHIPPED | OUTPATIENT
Start: 2025-05-12

## 2025-05-12 RX ORDER — QUETIAPINE FUMARATE 50 MG/1
25 TABLET, FILM COATED ORAL NIGHTLY
Status: SHIPPED | COMMUNITY
Start: 2025-05-12

## 2025-05-12 NOTE — CM/SW NOTE
Pt is ready for dc today - his Hgb is now 9.1 so he can dc to [facility].  He will go to [facility] with outpt HD at [facility].  Coordinated dc time with Sarita from [facility] to call report to [phone].  [facility] is scheduled to  pt at 5:15pm today.  PCS form completed.

## 2025-05-12 NOTE — OCCUPATIONAL THERAPY NOTE
OT following. Treatment attempted. Patient is currently receiving dialysis, will follow up as able.

## 2025-05-12 NOTE — PROGRESS NOTES
[facility]   part of [facility] Hospitalist Progress Note     Tariq Nolan Patient Status:  Inpatient    1950 MRN [id number]   Location [facility] 3NE-A Attending Jairon Oliva, DO   Hosp Day # 16 PCP Jacob Hicks MD     Assessment & Plan:    74-year-old male with past medical history of CAD with stents, GARY/AoCD, type 2 diabetes, hypertension, hyperlipidemia, ESRD on PD who presented to the hospital with chief complaints of confusion/hallucinations.    Acute metabolic encephalopathy/acute hospital acquired delirium  Hallucinations  Thought to be secondary to ineffective PD  CT/MRI negative for any acute findings, noted MRI most limited due to motion artifact  Repeat CT head on  due to vasovagal episode negative  No evidence of acute infection, ammonia and within normal limits  Status post EEG with no focal seizure-like status post activity  Status post tunneled HD catheter placement on   Plan:  - Psych liaison consult placed, however will need outpatient psychiatry follow-up, will start Seroquel 50mg nightly  - Delirium precautions  - Hold Baclofen for now    ESRD on prior PD, subsequently transition to HD  PD catheter removed, status post tunneled HD catheter placed on   Plan:  - Defer HD needs to nephrology  - Continue Renvela    Acute blood loss anemia  Secondary to bleeding from PD catheter site  History of anemia of chronic disease  Plan:  - Surgery re-evaluated on 5/10, 2 sutures placed  - Transfuse 1u PRBCs--> hgb  improved  - Continue holding Eliquis and Plavix, cautiously continue aspirin    History of A-fib  Possible sick sinus syndrome  Echo reviewed, normal EF, no valvular disease, no evidence of PFO  Plan:  - Cardiology consulted, no acute indications for pacemaker placement.  - Beta-blocker stopped  -Continue hydralazine  - resume eliquis when ok with surgery and hgb stable    History of CAD status post PCI  Hypertension  Plan:  - Cards  on consult, okay for Plavix hold, Eliquis hold however does need to be on aspirin, cautiously we will continue  - Resume Amlodipine, continue Imdur  - Continue statin    Type 2 diabetes c/b Hypoglycemia  - Decrease to 10 units of Tresiba, low dose insulin sliding scale    MDD/LIV  - Continue Effexor    Dispo: ALICIA, possibly today if hgb repeat stable and consults ok with dc    DVT Ppx: Holding for now due to acute blood loss anemia    Subjective:     Patient seen and examined this morning at bedside. Pt arousable and answers questions. Patient alert to person, place but not date and why he was admitted. Appears close to recent baseline.     Vital signs:  Temp:  [97.5 °F (36.4 °C)-98.2 °F (36.8 °C)] 97.6 °F (36.4 °C)  Pulse:  [41-59] 41  Resp:  [13-17] 15  BP: (130-165)/(52-78) 130/52  SpO2:  [97 %-100 %] 100 %    Physical Exam:    General: No acute distress.  AAO x 2  Respiratory: Clear to auscultation bilaterally. No wheezes. No crackles  Chest wall: HD catheter with no oozing noted in the right upper chest wall  Cardiovascular: S1, S2. Regular rate and rhythm  Abdomen: Soft, nontender, nondistended.  Positive bowel sounds. No rebound or guarding.  Prior PD catheter site w/ no bleeding noted today  Extremities: No significant pitting edema.    Diagnostic Data:    Pertinent Labs and Imaging independently reviewed  Comments:      Herrera Mariscal MD  [facility] Hospitalist     Supplementary Documentation:   DVT Mechanical Prophylaxis:   SCDs,    DVT Pharmacologic Prophylaxis   Medication    heparin (Porcine) 1000 UNIT/ML injection 1,500 Units    heparin (Porcine) 1000 UNIT/ML injection 1,500 Units    heparin (Porcine) 100 Units/mL lock flush 150 Units    [Held by provider] apixaban (Eliquis) tab 5 mg      DVT Pharmacologic prophylaxis: Aspirin 162 mg         Code Status: Not on file  Gavin: No urinary catheter in place  Gavin Duration (in days):   Central line:    BLAS: 5/11/2025

## 2025-05-12 NOTE — PHYSICAL THERAPY NOTE
Following for PT.  Pt receiving dialysis this AM with possible dc in PM.  Will f/u for PT at a later date if pt remains in house.

## 2025-05-12 NOTE — PROGRESS NOTES
NURSING DISCHARGE NOTE    Discharged Rehab facility via Ambulance.  Accompanied by Support staff  Belongings Taken by patient/family.    AVS printed and reviewed with patient and patients family at bedside  PIV removed  Paper prescriptions sent with patients wife  Report called to nurse Arrington at [facility] at [phone]

## 2025-05-12 NOTE — PLAN OF CARE
Assumed care at 1930  A&Ox3, forgetful at times  RA  [facility]shift neuro, NIH daily  Afib on tele, HR 40s, Eliquis on hold per MD MULLIGAN Accu checks  Regular/carb controlled diet  Incontinent at times  Swallows pills whole  Denies pain  PIV saline locked, flushed, capped, dressing c/d/I  Max assist, shagufta steady  Needs met at this time, bed lowered and locked, pt updated on plan of care, call light within reach, will continue with plan of care.           Problem: SAFETY ADULT - FALL  Goal: Free from fall injury  Description: INTERVENTIONS:- Assess pt frequently for physical needs- Identify cognitive and physical deficits and behaviors that affect risk of falls.- [place] fall precautions as indicated by assessment.- Educate pt/family on patient safety including physical limitations- Instruct pt to call for assistance with activity based on assessment- Modify environment to reduce risk of injury- Provide assistive devices as appropriate- Consider OT/PT consult to assist with strengthening/mobility- Encourage toileting schedule  Outcome: Progressing     Problem: CARDIOVASCULAR - ADULT  Goal: Absence of cardiac arrhythmias or at baseline  Description: INTERVENTIONS:- Continuous cardiac monitoring, monitor vital signs, obtain 12 lead EKG if indicated- Evaluate effectiveness of antiarrhythmic and heart rate control medications as ordered- Initiate emergency measures for life threatening arrhythmias- Monitor electrolytes and administer replacement therapy as ordered  Outcome: Progressing     Problem: SKIN/TISSUE INTEGRITY - ADULT  Goal: Skin integrity remains intact  Description: INTERVENTIONS- Assess and document risk factors for pressure ulcer development- Assess and document skin integrity- Monitor for areas of redness and/or skin breakdown- Initiate interventions, skin care algorithm/standards of care as needed  Outcome: Progressing     Problem: NEUROLOGICAL - ADULT  Goal: Achieves stable or improved neurological  status  Description: INTERVENTIONS- Assess for and report changes in neurological status- Initiate measures to prevent increased intracranial pressure- Maintain blood pressure and fluid volume within ordered parameters to optimize cerebral perfusion and minimize risk of hemorrhage- Monitor temperature, glucose, and sodium. Initiate appropriate interventions as ordered  Outcome: Progressing     Problem: Impaired Cognition  Goal: Patient will exhibit improved attention, thought processing and/or memory  Description: Interventions:- Minimize distractions in the room when full attention is required  Outcome: Progressing      Face Mask

## 2025-05-12 NOTE — PROGRESS NOTES
[facility]   part of [facility]    Nephrology Progress Note    Tariq Nolan Attending:  Rosalia Ulloa DO       SUBJECTIVE:     Seen and examined on HD, tolerating well   No complaints    PHYSICAL EXAM:     Vital Signs: /55 (BP Location: Right arm)   Pulse (!) 47   Temp 98 °F (36.7 °C) (Oral)   Resp 13   Ht 5' 5\" (1.651 m)   Wt 199 lb 6.4 oz (90.4 kg)   SpO2 97%   BMI 33.18 kg/m²   Temp (24hrs), Av.8 °F (36.6 °C), Min:97.5 °F (36.4 °C), Max:98.2 °F (36.8 °C)       Intake/Output Summary (Last 24 hours) at 2025 1615  Last data filed at 2025 1300  Gross per 24 hour   Intake 60 ml   Output 3 ml   Net 57 ml     Wt Readings from Last 3 Encounters:   25 199 lb 6.4 oz (90.4 kg)   10/23/24 190 lb 0.6 oz (86.2 kg)   10/20/24 190 lb (86.2 kg)       Gen - laying in bed, nad  HEENT - NCAT  CV - bradycardic  Resp - CTAB  Abd - soft, abd dressing noted  Ext - warm, 2+ hand and leg edema  MS - slow to answer questions       LABORATORY DATA:     Lab Results   Component Value Date     (H) 2025    BUN 47 (H) 2025    BUNCREA 26.0 (H) 2022    CREATSERUM 6.49 (H) 2025    ANIONGAP 8 2025    GFR >59 10/05/2010    GFRNAA 28 (L) 2019    GFRAA 33 (L) 2019    CA 9.4 2025    OSMOCALC 288 2025    ALKPHO 185 (H) 2025    AST 23 2025    ALT 10 2025    BILT 0.2 2025    TP 6.9 2025    ALB 3.7 2025    GLOBULIN 2.6 2025    AGRATIO 2.1 06/10/2015     (L) 2025    K 5.2 (H) 2025    CL 98 2025    CO2 26.0 2025     Lab Results   Component Value Date    WBC 7.9 2025    RBC 2.52 (L) 2025    HGB 9.1 (L) 2025    HCT 24.0 (L) 2025    .0 2025    MPV 9.9 2012    MCV 95.2 2025    MCH 32.5 2025    MCHC 34.2 2025    RDW 13.9 2025    NEPRELIM 4.34 2025    NEUTABS 3.98 2019    LYMPHABS 1.06 2019    EOSABS  0.06 03/26/2019    BASABS 0.06 03/26/2019    NEUT 41 03/26/2019    LYMPH 19 03/26/2019    MON 8 03/26/2019    EOS 1 03/26/2019    BASO 1 03/26/2019    NEPERCENT 72.2 05/11/2025    LYPERCENT 14.5 05/11/2025    MOPERCENT 10.1 05/11/2025    EOPERCENT 2.0 05/11/2025    BAPERCENT 0.7 05/11/2025    NE 4.34 05/11/2025    LYMABS 0.87 (L) 05/11/2025    MOABSO 0.61 05/11/2025    EOABSO 0.12 05/11/2025    BAABSO 0.04 05/11/2025     Lab Results   Component Value Date    MALBP 5.9 02/28/2022    CREUR 33.38 02/28/2022     Lab Results   Component Value Date    COLORUR Light-Yellow 04/27/2025    CLARITY Clear 04/27/2025    SPECGRAVITY >1.030 (H) 04/27/2025    GLUUR 300 (A) 04/27/2025    BILUR Negative 04/27/2025    KETUR Negative 04/27/2025    BLOODURINE 1+ (A) 04/27/2025    PHURINE 6.0 04/27/2025    PROUR 100 (A) 04/27/2025    UROBILINOGEN Normal 04/27/2025    NITRITE Negative 04/27/2025    LEUUR Negative 04/27/2025    NMIC Microscopic not indicated 12/28/2016    WBCUR 11-20 (A) 04/27/2025    RBCUR 3-5 (A) 04/27/2025    EPIUR Few (A) 04/27/2025    BACUR None Seen 04/27/2025    HYLUR Present (A) 12/18/2018         IMAGING:     Reviewed.    MEDICATIONS:       Current Hospital Medications[1]    ASSESSMENT/PLAN:     75 yo M with history of ESRD on PD, CAD, DM, HTN, HL, presented with AMS, tremors/jerking movements and weakness.     Vasovagal episode 5/8.     ESRD:  -- PD on hold, PD catheter removed on 5/6  -- started iHD; first treatment on 4/29  -- tunnelled HD catheter placement on 5/5  -- HD MWF now  -- seen and examined on HD, tolerating well, 3L removed  -- SW consult for outpatient HD placement appreciated    Hypotension on HD:  -- cool dialysate 35.5  -- sp midodrine 10 mg x1 only  -- hold hydralazine preHD     Anemia:  -- ANTONIO for Hb < 10, given on 5/7     MBD:  -- trend phos  -- continue sevelamer     HTN:  -- UF with HD  -- amlodipine, hydralazine, imdur --> hold amlodipine at least now; hold hydralazine pre HD; imdur per  cardiology     Hyponatremia:  -- fluid restriction <1.5/d when taking PO  -- UF with HD  -- Na 138 bath with HD    Thank you for allowing me to participate in the care of this patient. Please do not hesitate to call with questions or concerns.    Evelina Collado MD  [facility]         [1]   Current Facility-Administered Medications   Medication Dose Route Frequency    insulin degludec (Tresiba) 100 units/mL flextouch 10 Units  10 Units Subcutaneous Nightly    insulin aspart (NovoLOG) 100 Units/mL FlexPen 1-5 Units  1-5 Units Subcutaneous TID AC and HS    QUEtiapine (SEROquel) tab 50 mg  50 mg Oral Nightly    amLODIPine (Norvasc) tab 5 mg  5 mg Oral Daily    sodium chloride 0.9 % IV bolus 100 mL  100 mL Intravenous Q30 Min PRN    And    albumin human (Albumin) 25% injection 25 g  25 g Intravenous PRN Dialysis    heparin (Porcine) 1000 UNIT/ML injection 1,500 Units  1.5 mL Intracatheter Once    traZODone (Desyrel) partial tab 25 mg  25 mg Oral Nightly PRN    docusate sodium (Colace) cap 100 mg  100 mg Oral BID    sennosides (Senokot) tab 17.2 mg  17.2 mg Oral Daily    [Held by provider] baclofen (Lioresal) tab 5 mg  5 mg Oral BID    naloxone (Narcan) 0.4 MG/ML injection 0.08 mg  0.08 mg Intravenous PRN    aspirin chewable tab 81 mg  81 mg Oral Daily    hydrALAZINE (Apresoline) tab 25 mg  25 mg Oral Q8H INDU    heparin (Porcine) 1000 UNIT/ML injection 1,500 Units  1.5 mL Intracatheter PRN Dialysis    sevelamer carbonate (Renvela) tab 800 mg  800 mg Oral TID CC    heparin (Porcine) 100 Units/mL lock flush 150 Units  1.5 mL Intravenous Once    acetaminophen (Tylenol) tab 650 mg  650 mg Oral Q4H PRN    Or    acetaminophen (Tylenol) rectal suppository 650 mg  650 mg Rectal Q4H PRN    labetalol (Trandate) 5 mg/mL injection 10 mg  10 mg Intravenous Q10 Min PRN    hydrALAzine (Apresoline) 20 mg/mL injection 10 mg  10 mg Intravenous Q2H PRN    [Held by provider] clopidogrel (Plavix) tab 75 mg  75 mg Oral Daily    [Held by  provider] apixaban (Eliquis) tab 5 mg  5 mg Oral BID    melatonin tab 3 mg  3 mg Oral Nightly PRN    ondansetron (Zofran) 4 MG/2ML injection 4 mg  4 mg Intravenous Q6H PRN    isosorbide mononitrate ER (Imdur) 24 hr tab 60 mg  60 mg Oral Daily    rosuvastatin (Crestor) tab 10 mg  10 mg Oral Daily    venlafaxine (Effexor) tab 37.5 mg  37.5 mg Oral Daily    glucose (Dex4) 15 GM/59ML oral liquid 15 g  15 g Oral Q15 Min PRN    Or    glucose (Glutose) 40% oral gel 15 g  15 g Oral Q15 Min PRN    Or    glucose-vitamin C (Dex-4) chewable tab 4 tablet  4 tablet Oral Q15 Min PRN    Or    dextrose 50% injection 50 mL  50 mL Intravenous Q15 Min PRN    Or    glucose (Dex4) 15 GM/59ML oral liquid 30 g  30 g Oral Q15 Min PRN    Or    glucose (Glutose) 40% oral gel 30 g  30 g Oral Q15 Min PRN    Or    glucose-vitamin C (Dex-4) chewable tab 8 tablet  8 tablet Oral Q15 Min PRN

## 2025-05-12 NOTE — PROGRESS NOTES
[facility]  Progress Note    Tariq Nolan Patient Status:  Inpatient    1950 MRN [id number]   Location [facility] 3NE-A Attending Herrera Mariscal MD   Hosp Day # 16 PCP Jacob Hicks MD     Subjective:    Patient denies any new complaints  HD finishing at time of progress note    Objective/Physical Exam:    Vital Signs:  Blood pressure 125/55, pulse (!) 47, temperature 98 °F (36.7 °C), temperature source Oral, resp. rate 13, height 5' 5\" (1.651 m), weight 199 lb 6.4 oz (90.4 kg), SpO2 97%.    General:  Alert, orientated x3.  Cooperative.  No apparent distress.  Abdomen:  dressings removed, suture x 2 intact, no signs of active bleeding, dressing applied     Labs:  Reviewed    Lab Results   Component Value Date    WBC 7.9 2025    HGB 8.2 2025    HCT 24.0 2025    .0 2025    CREATSERUM 6.49 2025    BUN 47 2025     2025    K 5.2 2025    CL 98 2025    CO2 26.0 2025     2025    CA 9.4 2025    ALB 3.7 2025    MG 2.3 2025    PHOS 4.5 2025    PGLU 185 2025     Problem List:  Problem List[1]        Impression/Plan:    73 y/o S/P removal of PD catheter  No active bleeding, sutures in place  Ok to resume anticoagulation per cardiology  F./u in 2 weeks to have suture removed  Will sign off  All questions answered  Patient seen and examined with HENNY Martinez  [facility]  General Surgery  2025         [1]   Patient Active Problem List  Diagnosis    Essential hypertension    Pure hypercholesterolemia    Obesity (BMI 30.0-34.9)    Plantar fasciitis    Hallux rigidus, unspecified laterality    Degeneration of lumbar intervertebral disc    Non-ST elevation myocardial infarction (NSTEMI), subsequent episode of care ([facility]) 2017    Presence of drug coated stent in right coronary artery Distal 3/28 mm Xience & Prox 3.5x18mm Xience 2017    Coronary artery  disease involving native coronary artery of native heart without angina pectoris    CKD stage 4 due to type 1 diabetes mellitus (HCC)    Primary osteoarthritis of both knees    Impingement syndrome of shoulder, left    Mixed hyperlipidemia    Diabetes mellitus (HCC)    Anemia due to stage 4 chronic kidney disease (HCC)    Peritonitis associated with peritoneal dialysis, initial encounter    ESRD on peritoneal dialysis ([facility])    Hypokalemia    Acute confusion    Anticoagulated    ESRD on dialysis ([facility])    Type 2 diabetes mellitus with hyperglycemia, with long-term current use of insulin (HCC)    Toxic metabolic encephalopathy

## 2025-05-12 NOTE — PLAN OF CARE
Assumed care of pt after shift report. Received pt A&Ox4, follows commands. NIH daily, neuro checks. Afib/SB per tele monitor. RA. VSS. Pt asymptomatic. Regular diet, carb controlled. ACHS. Incontx1, passing gas, stool softeners given. Max assist. R chest permacath, dialysis RN removed 3L today 5/12. Bed alarm on, bed low and locked in position. Call light within reach.    Problem: Patient/Family Goals  Goal: Patient/Family Long Term Goal  Description: Patient's Long Term Goal: go home  Interventions:--cardiac monitoring  -Tunneled HD cath placement  -Hemodialysis  -Labs  -monitor vital signs   See additional Care Plan goals for specific interventions  Outcome: Progressing  Goal: Patient/Family Short Term Goal  Description: Patient's Short Term Goal: pain controlled, control bleeding  Interventions: -pain medicine, -c/s general sx, per gen sx ok to continue anticoagulants   See additional Care Plan goals for specific interventions  Outcome: Progressing     Problem: SAFETY ADULT - FALL  Goal: Free from fall injury  Description: INTERVENTIONS:- Assess pt frequently for physical needs- Identify cognitive and physical deficits and behaviors that affect risk of falls.- [place] fall precautions as indicated by assessment.- Educate pt/family on patient safety including physical limitations- Instruct pt to call for assistance with activity based on assessment- Modify environment to reduce risk of injury- Provide assistive devices as appropriate- Consider OT/PT consult to assist with strengthening/mobility- Encourage toileting schedule  Outcome: Progressing     Problem: CARDIOVASCULAR - ADULT  Goal: Absence of cardiac arrhythmias or at baseline  Description: INTERVENTIONS:- Continuous cardiac monitoring, monitor vital signs, obtain 12 lead EKG if indicated- Evaluate effectiveness of antiarrhythmic and heart rate control medications as ordered- Initiate emergency measures for life threatening arrhythmias- Monitor  electrolytes and administer replacement therapy as ordered  Outcome: Progressing     Problem: SKIN/TISSUE INTEGRITY - ADULT  Goal: Skin integrity remains intact  Description: INTERVENTIONS- Assess and document risk factors for pressure ulcer development- Assess and document skin integrity- Monitor for areas of redness and/or skin breakdown- Initiate interventions, skin care algorithm/standards of care as needed  Outcome: Progressing     Problem: NEUROLOGICAL - ADULT  Goal: Achieves stable or improved neurological status  Description: INTERVENTIONS- Assess for and report changes in neurological status- Initiate measures to prevent increased intracranial pressure- Maintain blood pressure and fluid volume within ordered parameters to optimize cerebral perfusion and minimize risk of hemorrhage- Monitor temperature, glucose, and sodium. Initiate appropriate interventions as ordered  Outcome: Progressing     Problem: Impaired Cognition  Goal: Patient will exhibit improved attention, thought processing and/or memory  Description: Interventions:- Minimize distractions in the room when full attention is required  Outcome: Progressing

## 2025-05-12 NOTE — PROGRESS NOTES
Cardiology Progress Note    Tariq Nolan Patient Status:  Inpatient    1950 MRN [id number]   Location [facility] 3NE-A Attending Herrera Mariscal MD   Hosp Day # 16 PCP Jacob Hicks MD     Impression:  Permanent atrial fibrillation with slow ventricular response  CAD - h/o NSTEMI s/p PCI 2017 (LAD, RI, RCA)  ESRD on peritoneal dialysis  Diabetes  Hypertension  Hyperlipidemia  Syncope  Mental status changes    Plan:  HR chronically 30-40s while sleeping. When I wake him, up to 50-60 range. Had an episode of unresponsiveness 5/, states maybe felt a little dizzy earlier. But not consistent, states he has not felt any dizziness over entire the last week, and does not have recurrent syncope even with his HR in the 30-40s. Has been evaluated by EP Dr. Perez. No indication for pacemaker at present.    RR 5/8 for unresponsiveness. Had transient low BP and now BP has been normal to high. Amlodipine is on hold. Remains on hydral/nitrate. No advanced block or pause > 3 sec on tele.   Carvedilol stopped previously.   Neurology following.  Possible toxic/metabolic versus uremic encephalopathy.  EEG normal. He is lucid today with me.   Fluid management per nephrology with HD  Echocardiogram  with normal LV function, no significant valvular disease and no evidence of PFO by bubble study   Continue telemetry.    Eliquis and plavix are his usual thinners, both on hold due to recurrent bleeding from PD site. S/p transfusion. Resume when able. Consider changing plavix to ASA. Consider Watchman.     Subjective:  Asleep. Was difficult to wake, but once awake, fully lucid and appropriate. No CP or SOB.     Objective:  /52 (BP Location: Right arm)   Pulse (!) 41   Temp 97.6 °F (36.4 °C) (Axillary)   Resp 15   Ht 65\"   Wt 199 lb 6.4 oz (90.4 kg)   SpO2 100%   BMI 33.18 kg/m²   Temp (24hrs), Av.8 °F (36.6 °C), Min:97.5 °F (36.4 °C), Max:98.2 °F (36.8 °C)      Intake/Output Summary (Last 24  hours) at 5/12/2025 1146  Last data filed at 5/11/2025 1430  Gross per 24 hour   Intake 638.33 ml   Output --   Net 638.33 ml     Wt Readings from Last 3 Encounters:   05/02/25 199 lb 6.4 oz (90.4 kg)   10/23/24 190 lb 0.6 oz (86.2 kg)   10/20/24 190 lb (86.2 kg)       General: Awake and alert; in no acute distress  Cardiac: irregular rhythm   Lungs: Clear to auscultation bilaterally; no accessory muscle use  Abdomen: Soft, non-tender; bowel sounds are normoactive  Extremities: No clubbing/cyanosis; moves all 4 extremities normally, no sig edema    Current Hospital Medications[1]    Laboratory Data:  Lab Results   Component Value Date    WBC 7.9 05/12/2025    HGB 8.2 05/12/2025    HCT 24.0 05/12/2025    .0 05/12/2025         Lab Results   Component Value Date    INR 1.11 05/08/2025    INR 1.12 05/07/2025    INR 1.25 (H) 04/26/2025     Lab Results   Component Value Date     05/12/2025    K 5.2 05/12/2025    CL 98 05/12/2025    CO2 26.0 05/12/2025    BUN 47 05/12/2025    CREATSERUM 6.49 05/12/2025     05/12/2025    CA 9.4 05/12/2025    MG 2.3 05/12/2025         Telemetry: AF 30-40s asleep, 50-60s awake      Thank you for allowing our practice to participate in the care of your patient. Please do not hesitate to contact me if you have any questions.           [1]   Current Facility-Administered Medications   Medication Dose Route Frequency    insulin degludec (Tresiba) 100 units/mL flextouch 10 Units  10 Units Subcutaneous Nightly    insulin aspart (NovoLOG) 100 Units/mL FlexPen 1-5 Units  1-5 Units Subcutaneous TID AC and HS    QUEtiapine (SEROquel) tab 50 mg  50 mg Oral Nightly    amLODIPine (Norvasc) tab 5 mg  5 mg Oral Daily    sodium chloride 0.9 % IV bolus 100 mL  100 mL Intravenous Q30 Min PRN    And    albumin human (Albumin) 25% injection 25 g  25 g Intravenous PRN Dialysis    heparin (Porcine) 1000 UNIT/ML injection 1,500 Units  1.5 mL Intracatheter Once    traZODone (Desyrel) partial tab  25 mg  25 mg Oral Nightly PRN    docusate sodium (Colace) cap 100 mg  100 mg Oral BID    sennosides (Senokot) tab 17.2 mg  17.2 mg Oral Daily    [Held by provider] baclofen (Lioresal) tab 5 mg  5 mg Oral BID    naloxone (Narcan) 0.4 MG/ML injection 0.08 mg  0.08 mg Intravenous PRN    aspirin chewable tab 81 mg  81 mg Oral Daily    hydrALAZINE (Apresoline) tab 25 mg  25 mg Oral Q8H INDU    heparin (Porcine) 1000 UNIT/ML injection 1,500 Units  1.5 mL Intracatheter PRN Dialysis    sevelamer carbonate (Renvela) tab 800 mg  800 mg Oral TID CC    heparin (Porcine) 100 Units/mL lock flush 150 Units  1.5 mL Intravenous Once    acetaminophen (Tylenol) tab 650 mg  650 mg Oral Q4H PRN    Or    acetaminophen (Tylenol) rectal suppository 650 mg  650 mg Rectal Q4H PRN    labetalol (Trandate) 5 mg/mL injection 10 mg  10 mg Intravenous Q10 Min PRN    hydrALAzine (Apresoline) 20 mg/mL injection 10 mg  10 mg Intravenous Q2H PRN    [Held by provider] clopidogrel (Plavix) tab 75 mg  75 mg Oral Daily    [Held by provider] apixaban (Eliquis) tab 5 mg  5 mg Oral BID    melatonin tab 3 mg  3 mg Oral Nightly PRN    ondansetron (Zofran) 4 MG/2ML injection 4 mg  4 mg Intravenous Q6H PRN    isosorbide mononitrate ER (Imdur) 24 hr tab 60 mg  60 mg Oral Daily    rosuvastatin (Crestor) tab 10 mg  10 mg Oral Daily    venlafaxine (Effexor) tab 37.5 mg  37.5 mg Oral Daily    glucose (Dex4) 15 GM/59ML oral liquid 15 g  15 g Oral Q15 Min PRN    Or    glucose (Glutose) 40% oral gel 15 g  15 g Oral Q15 Min PRN    Or    glucose-vitamin C (Dex-4) chewable tab 4 tablet  4 tablet Oral Q15 Min PRN    Or    dextrose 50% injection 50 mL  50 mL Intravenous Q15 Min PRN    Or    glucose (Dex4) 15 GM/59ML oral liquid 30 g  30 g Oral Q15 Min PRN    Or    glucose (Glutose) 40% oral gel 30 g  30 g Oral Q15 Min PRN    Or    glucose-vitamin C (Dex-4) chewable tab 8 tablet  8 tablet Oral Q15 Min PRN

## 2025-05-14 ENCOUNTER — EXTERNAL FACILITY (OUTPATIENT)
Dept: FAMILY MEDICINE CLINIC | Facility: CLINIC | Age: 75
End: 2025-05-14

## 2025-05-14 DIAGNOSIS — N18.4 ANEMIA DUE TO STAGE 4 CHRONIC KIDNEY DISEASE (HCC): ICD-10-CM

## 2025-05-14 DIAGNOSIS — G92.8 TOXIC METABOLIC ENCEPHALOPATHY: ICD-10-CM

## 2025-05-14 DIAGNOSIS — E11.65 TYPE 2 DIABETES MELLITUS WITH HYPERGLYCEMIA, WITH LONG-TERM CURRENT USE OF INSULIN (HCC): ICD-10-CM

## 2025-05-14 DIAGNOSIS — N18.6 ESRD ON DIALYSIS (HCC): ICD-10-CM

## 2025-05-14 DIAGNOSIS — Z99.2 ESRD ON DIALYSIS (HCC): ICD-10-CM

## 2025-05-14 DIAGNOSIS — D63.1 ANEMIA DUE TO STAGE 4 CHRONIC KIDNEY DISEASE (HCC): ICD-10-CM

## 2025-05-14 DIAGNOSIS — I10 ESSENTIAL HYPERTENSION: ICD-10-CM

## 2025-05-14 DIAGNOSIS — I48.21 PERMANENT ATRIAL FIBRILLATION (HCC): ICD-10-CM

## 2025-05-14 DIAGNOSIS — R53.1 GENERALIZED WEAKNESS: Primary | ICD-10-CM

## 2025-05-14 DIAGNOSIS — Z79.4 TYPE 2 DIABETES MELLITUS WITH HYPERGLYCEMIA, WITH LONG-TERM CURRENT USE OF INSULIN (HCC): ICD-10-CM

## 2025-05-14 PROCEDURE — 99306 1ST NF CARE HIGH MDM 50: CPT | Performed by: FAMILY MEDICINE

## 2025-05-14 PROCEDURE — G0317 PROLNG NSG FAC E/M EA ADDL 15 MIN: HCPCS | Performed by: FAMILY MEDICINE

## 2025-05-14 NOTE — CM/SW NOTE
NEERU received VM from Ileana at [facility] requesting discharge location for pt.   NEERU called [facility] [phone] and spoke with Robert. NEERU informed Robert pt discharged to [facility].     FLOYD Maya  Discharge Planner

## 2025-05-23 NOTE — PROGRESS NOTES
Tariq Nolan Author: Ton Verduzco MD     1950 MRN [id number]   Last Hospital  Admission 25      Last Hospital Discharge 25 PCP Jacob Hicks MD   Hospital of Discharge  [facility]        CC --admitted to [facility] from [facility] for subacute rehab, altered mental status end-stage renal disease on hemodialysis    H.P.I Tariq Nolan is a 74 year old male with past medical history significant for end-stage renal disease on peritoneal dialysis coronary artery disease, atrial fibrillation with slow ventricular response type 2 diabetes mellitus hypertension hyperlipidemia who presented to the emergency room with altered mental status tremors and jerking movements as well as weakness, diagnosed with acute metabolic encephalopathy and delirium thought to be secondary to ineffective peritoneal dialysis workup included CT MRI of the head without any acute findings  Patient also had repeat CT of his head due to a vasovagal episode that was negative he underwent EEG and no focal seizures were found.  Patient underwent hemodialysis catheter placement on  and subsequently transition to hemodialysis from peritoneal dialysis and the peritoneal catheter was removed  His hospital course was complicated by acute blood loss anemia secondary to bleeding from peritoneal catheter site.  He went blood transfusion 1 unit with improvement in hemoglobin  His Eliquis and Plavix were held however aspirin was continued  Patient was seen by nephrology, cardiology, psychiatry underwent echocardiogram with normal ejection fraction, no acute indication for pacemaker placement, his beta-blocker was held however hydralazine was continued  Psychiatry was consulted and was started on Seroquel 50 mg nightly, his baclofen was held  Patient was stabilized and evaluated by PT/OT and subacute rehab was recommended  He was transferred to [facility]  Discharge diagnosis  Acute  metabolic encephalopathy secondary to ineffective peritoneal dialysis  End-stage renal disease on hemodialysis  Acute blood loss anemia  Atrial fibrillation with slow ventricular response  Coronary artery disease  Hypertension  Type 2 diabetes mellitus  Generalized anxiety disorder    Patient seen and examined in his room  He is doing well and denies any complaints no chest pain shortness of breath dizziness headache nausea vomiting diarrhea abdominal pain  He is looking forward to physical therapy/Occupational Therapy as well as hemodialysis onsite      Past Medical History[1]  Past Surgical History[2]  Family History[3]  Short Social Hx on File[4]    ALLERGIES:  Allergies[5]    CODE STATUS:  Full Code    CURRENT MEDICATIONS   Current Medications[6]    REVIEW OF SYSTEMS:  Review of Systems:   Constitutional: No fevers, chills, fatigue or night sweats.  ENT: No mouth pain, neck pain, running nose, headaches or swollen glands.  Skin: No rashes, pruritus or skin changes,  Respiratory: Denies cough, wheezing or shortness of breath.  CV: Denies chest pain, palpitations, orthopnea, PND or dizziness.  Musculoskeletal: No joint pain, stiffness or swelling.  GI: No nausea, vomiting or diarrhea. No blood in stools.  Neurologic: No seizures, tremors, weakness or numbness    VITALS: Temperature 97.5 pulse 46/min respiration 16/min blood pressure 130/50, pulse ox 100%      PHYSICAL EXAM:  GENERAL: well developed, well nourished, in no apparent distress  SKIN: no rashes, no suspicious lesions  Wound; hemodialysis catheter on the right upper chest wall, and dressing  HEENT: atraumatic, normocephalic,  EYES: PERRLA, EOMI, conjunctiva are clear  NECK: supple, no adenopathy, no bruits  LUNGS: clear to auscultation  CARDIO: RRR without murmur  GI: good BS's, no masses, HSM or tenderness  Prior peritoneal dialysis site without any bleeding dressing is dry and clean  : deferred  RECTAL: deferred  MUSCULOSKELETAL: back is not tender,  FROM of the back  EXTREMITIES: no cyanosis, clubbing or edema  NEURO: Oriented times three, cranial nerves are intact, motor and sensory are grossly intact      DIAGNOSTICS REVIEWED AT THIS VISIT:  Lab Results   Component Value Date     (H) 05/14/2025    BUN 55 (H) 05/14/2025    BUNCREA 26.0 (H) 02/28/2022    CREATSERUM 6.82 (H) 05/14/2025    ANIONGAP 8 05/14/2025    GFR >59 10/05/2010    GFRNAA 28 (L) 11/20/2019    GFRAA 33 (L) 11/20/2019    CA 9.1 05/14/2025    OSMOCALC 298 (H) 05/14/2025    ALKPHO 122 (H) 05/14/2025    AST 14 05/14/2025    ALT <7 (L) 05/14/2025    BILT <0.2 (L) 05/14/2025    TP 5.7 05/14/2025    ALB 3.6 05/14/2025    GLOBULIN 2.1 05/14/2025    AGRATIO 2.1 06/10/2015     (L) 05/14/2025    K 6.3 (HH) 05/14/2025    CL 99 05/14/2025    CO2 26.0 05/14/2025       Lab Results   Component Value Date    WBC 7.7 05/14/2025    RBC 2.38 (L) 05/14/2025    HGB 8.0 (L) 05/14/2025    HCT 24.3 (L) 05/14/2025    .0 05/14/2025    MPV 9.9 09/18/2012    .1 (H) 05/14/2025    MCH 33.6 05/14/2025    MCHC 32.9 05/14/2025    RDW 14.1 05/14/2025    NEPRELIM 5.93 05/14/2025    NEUTABS 3.98 03/26/2019    LYMPHABS 1.06 03/26/2019    EOSABS 0.06 03/26/2019    BASABS 0.06 03/26/2019    NEUT 41 03/26/2019    LYMPH 19 03/26/2019    MON 8 03/26/2019    EOS 1 03/26/2019    BASO 1 03/26/2019    NEPERCENT 77.4 05/14/2025    LYPERCENT 9.7 05/14/2025    MOPERCENT 8.6 05/14/2025    EOPERCENT 3.5 05/14/2025    BAPERCENT 0.5 05/14/2025    NE 5.93 05/14/2025    LYMABS 0.74 (L) 05/14/2025    MOABSO 0.66 05/14/2025    EOABSO 0.27 05/14/2025    BAABSO 0.04 05/14/2025     ASSESSMENT AND PLAN:      Diagnoses and all orders for this visit:    Generalized weakness  - PT to work on ambulation, gait, balance, strength, endurance, transfers, safety  - OT to work on fine motor skills, ADLs, hygiene, toileting, transfers, safety  Fall precautions  Toxic metabolic encephalopathy  Resolved, possibly secondary to infected  peritoneal dialysis  Generalized anxiety disorder --  continue Seroquel 25 mg nightly  Continue Effexor 37.5 mg once a day  ESRD on dialysis (HCC)  On hemodialysis  Continue sevelamer 800 mg 3 times a day  Calcitrol 0.25 mcg on Mondays and Thursdays  In-house nephrology on consult  Type 2 diabetes mellitus with hyperglycemia, with long-term current use of insulin ([facility])  Check sugars AC and at bedtime  Continue Lantus 15 units nightly  Insulin sliding scale with aspart insulin 1 to 5 units 4 times a day  Monitor for hypoglycemia  Anemia due to stage 4 chronic kidney disease (HCC)  Last H&H 8 and 24.3  Patient underwent transfusion in the hospital with 1 unit of packed blood cells  Permanent atrial fibrillation ([facility])  With slow ventricular response  Monitor the heart rate  Beta-blockers are held  Anticoagulated on aspirin Eliquis  Essential hypertension  Under control  Monitor blood pressures every shift  Continue amlodipine 5 mg daily  - 24h nursing for medication administration, bowel/bladder care, pain/sleep assessment  - Physician supervision for multiple medical comorbidities, fall risk, DVT risk, infection risk, pain management  - Psych for adjustment to disability and cognitive deficits  - Social work/: for discharge planning needs and access to community resources as needed     -Hospital medications reviewed reconciled and restarted   Check the labs in the morning, CBC CMP TSH, vitamin D, UA    Time spent at appointment today is 95 minutes including preparing to see patient, reviewing test results, performing medically appropriate examination and evaluation and coordinating care, counseling and educating patient/family, ordering medications and testing, and documenting clinical information in EMR.       Ton Verduzco MD   [facility]  [address]  [facility] [address]    Electronically signed      This dictation was performed with a verbal recognition program (DRAGON) and it was  checked for errors. It is possible that there are still dictated errors within this office note. If so, please bring any errors to my attention for an addendum. All efforts were made to ensure that this office note is accurate          [1]   Past Medical History:   Anemia due to stage 4 chronic kidney disease (HCC)    sees Dr. Cochran    Asymptomatic PVCs    EKG at [facility]    Cardiac LV ejection fraction 55%    Chronic kidney disease due to type 2 diabetes mellitus (HCC)    Constipation    Coronary atherosclerosis    Coronary atherosclerosis of native coronary artery    sees Dr. Redding, last visit 11/11/20, 5 stents total since 2009    Diabetes mellitus (HCC)    Diabetic eye exam ([facility])    [facility] vision    Essential hypertension    Gastric lesion    Dr. Hearn, repeat egd in 1 year    H/O echocardiogram    at [facility] mild LAE    Hearing impairment    no hearing in right ear    High blood pressure    History of atherectomy    Dr. Orr, SHAYNE LAD    History of cardiovascular stress test    IPMN (intraductal papillary mucinous neoplasm)    saw Dr. Moura, seeing Dr. Hearn, EUS 12/29/20    Living will in place    OBESITY    JANAY (obstructive sleep apnea)    AHI 6 RDI 6 REM AHI 0 Supine AHI 46 non-supine AHI 1.3 Sao2 Dirk 90%     Osteoarthritis    knees    Pneumonia due to organism    Post covid-19 condition, unspecified    asymptomatic; not hospitalized; no current symptoms    Presence of drug coated stent in LAD coronary artery 1/31/2017 3x12 mm Xience     good Shahid      Presence of drug coated stent in right coronary artery Distal 3/28 mm Xience & Prox 3.5x18mm Xience 1/24/2017    Good Shahid     Presence of DRUG stent in coronary artery 1/31/2017 mid RI 3/38mm Xience     Good Shahid      Pure hypercholesterolemia    TGA (transient global amnesia)    at [facility]. couldn't remember about 12 hr period, had CT, MRI, EEG, labs, carotid dop and echo all nl, will see Dr. Laguna for neuro f/u    Type 1 diabetes mellitus  (HCC)    Vertigo    Visual impairment    prescription glasses   [2]   Past Surgical History:  Procedure Laterality Date    Cath bare metal stent (bms)      x5 ?    Cath percutaneous  transluminal coronary angioplasty      Colonoscopy  12/15/2008    tics, rhoids, polyps, repeat in 2013    Colonoscopy  02/14/2020    mult tubulovillous adenomas, Dr. Moura. repeat 2023    Colonoscopy N/A 2/11/2020    Procedure: COLONOSCOPY, POSSIBLE BIOPSY, POSSIBLE POLYPECTOMY 37438;  Surgeon: Tay Moura MD;  Location: [facility]    Egd  12/2020    gastric cardia lesion- intestinal metaplasia    Hemorrhoidectomy      Tonsillectomy  age 6   [3]   Family History  Adopted: Yes   Problem Relation Age of Onset    No Known Problems Daughter     No Known Problems Daughter     No Known Problems Daughter    [4]   Social History  Socioeconomic History    Marital status:     Number of children: 3   Occupational History    Occupation: retail, part time   Tobacco Use    Smoking status: Never    Smokeless tobacco: Never   Vaping Use    Vaping status: Never Used   Substance and Sexual Activity    Alcohol use: No    Drug use: No    Sexual activity: Yes     Partners: Female   Other Topics Concern     Service No    Blood Transfusions No    Exercise No     Comment: discussed, \" little to none.\" 12/19/19, 12/23/20    Seat Belt Yes    Self-Exams Yes     Social Drivers of Health     Food Insecurity: No Food Insecurity (4/26/2025)    NCSS - Food Insecurity     Worried About Running Out of Food in the Last Year: No     Ran Out of Food in the Last Year: No   Transportation Needs: No Transportation Needs (4/26/2025)    NCSS - Transportation     Lack of Transportation: No   Housing Stability: Not At Risk (4/26/2025)    NCSS - Housing/Utilities     Has Housing: Yes     Worried About Losing Housing: No     Unable to Get Utilities: No   [5]   Allergies  Allergen Reactions    Tetanus-Diphtheria Toxoids Td  [Diphteria And Tetanus] OTHER  (SEE COMMENTS)    Tetanus Toxoid    [6]   Current Outpatient Medications   Medication Sig Dispense Refill    docusate sodium 100 MG Oral Cap Take 100 mg by mouth 2 (two) times daily.      QUEtiapine 50 MG Oral Tab Take 0.5 tablets (25 mg total) by mouth nightly.      insulin glargine (LANTUS SOLOSTAR) 100 UNIT/ML Subcutaneous Solution Pen-injector Inject 15 Units into the skin nightly.      insulin aspart 100 Units/mL Subcutaneous Solution Pen-injector Inject 1-5 Units into the skin 4 (four) times daily before meals and nightly.      amLODIPine 5 MG Oral Tab Take 1 tablet (5 mg total) by mouth daily. 30 tablet 0    hydrALAZINE 25 MG Oral Tab Take 1 tablet (25 mg total) by mouth every 8 (eight) hours. Hold dose before dialysis. 90 tablet 0    aspirin 81 MG Oral Chew Tab Chew 1 tablet (81 mg total) by mouth daily. 30 tablet 0    sevelamer carbonate 800 MG Oral Tab Take 1 tablet (800 mg total) by mouth 3 (three) times daily with meals. 90 tablet 0    venlafaxine 37.5 MG Oral Tab Take 1 tablet (37.5 mg total) by mouth daily.      calcitriol 0.25 MCG Oral Cap Take 1 capsule (0.25 mcg total) by mouth See Admin Instructions. 0.25 mg only on Mondays and Thursday.      rosuvastatin 20 MG Oral Tab Take 1 tablet (20 mg total) by mouth in the morning.      apixaban 5 MG Oral Tab Take 1 tablet (5 mg total) by mouth in the morning and 1 tablet (5 mg total) before bedtime.      isosorbide mononitrate ER 60 MG Oral Tablet 24 Hr Take 1 tablet (60 mg total) by mouth in the morning.      Glucose Blood (ONETOUCH VERIO) In Vitro Strip USE TO CHECK BLOOD GLUCOSE FOUR TIMES DAILY AS DIRECTED. 150 strip 3    OneTouch Delica Lancets 33G Does not apply Misc Check BG 4 times daily 150 each 6    Insulin Pen Needle (BD PEN NEEDLE JAVIER U/F) 32G X 4 MM Does not apply Misc Inject 1 each into the skin daily. 100 each 4    Glucose Blood (BLOOD GLUCOSE TEST) In Vitro Strip Check blood sugars 4x daily. 400 strip 3    Sera PrognosticsTOUCH DELICA LANCETS FINE Does  not apply Misc 1 lancet by Finger stick route 2 (two) times daily. 300 each 5    Blood Glucose Monitoring Suppl (ONETOUCH ULTRA 2) W/DEVICE Does not apply Kit One Touch Ultra 2 device for testing 1 kit 0    Cholecalciferol (VITAMIN D) 2000 UNITS Oral Tab Take 2,000 Units by mouth daily. 30 tablet 11

## 2025-08-11 RX ORDER — FOLIC ACID/VIT B COMPLEX AND C 0.8 MG
0.8 TABLET ORAL DAILY
COMMUNITY

## 2025-08-11 RX ORDER — ASPIRIN 81 MG/1
81 TABLET ORAL DAILY
COMMUNITY

## 2025-08-11 RX ORDER — CETIRIZINE HYDROCHLORIDE 10 MG/1
10 TABLET ORAL DAILY
COMMUNITY

## 2025-08-11 RX ORDER — FLUTICASONE PROPIONATE 50 MCG
1 SPRAY, SUSPENSION (ML) NASAL DAILY
COMMUNITY

## 2025-08-11 RX ORDER — MECLIZINE HYDROCHLORIDE 25 MG/1
25 TABLET ORAL 3 TIMES DAILY PRN
COMMUNITY

## 2025-08-21 ENCOUNTER — ANESTHESIA (OUTPATIENT)
Dept: CARDIAC SURGERY | Facility: HOSPITAL | Age: 75
End: 2025-08-21

## 2025-08-21 ENCOUNTER — ANESTHESIA EVENT (OUTPATIENT)
Dept: CARDIAC SURGERY | Facility: HOSPITAL | Age: 75
End: 2025-08-21

## 2025-08-21 ENCOUNTER — HOSPITAL ENCOUNTER (OUTPATIENT)
Facility: HOSPITAL | Age: 75
Setting detail: HOSPITAL OUTPATIENT SURGERY
Discharge: HOME | End: 2025-08-21
Attending: SURGERY | Admitting: SURGERY

## 2025-08-21 VITALS
BODY MASS INDEX: 32.99 KG/M2 | HEART RATE: 68 BPM | DIASTOLIC BLOOD PRESSURE: 71 MMHG | TEMPERATURE: 98 F | SYSTOLIC BLOOD PRESSURE: 130 MMHG | RESPIRATION RATE: 20 BRPM | OXYGEN SATURATION: 95 % | HEIGHT: 65 IN | WEIGHT: 198 LBS

## 2025-08-21 DIAGNOSIS — Z99.2 ESRD ON DIALYSIS (HCC): ICD-10-CM

## 2025-08-21 DIAGNOSIS — Z01.818 PRE-OP TESTING: ICD-10-CM

## 2025-08-21 DIAGNOSIS — N18.6 ESRD ON PERITONEAL DIALYSIS (HCC): Primary | ICD-10-CM

## 2025-08-21 DIAGNOSIS — Z99.2 ESRD ON PERITONEAL DIALYSIS (HCC): Primary | ICD-10-CM

## 2025-08-21 DIAGNOSIS — N18.6 ESRD ON DIALYSIS (HCC): ICD-10-CM

## 2025-08-21 LAB
ANION GAP SERPL CALC-SCNC: 15 MMOL/L (ref 0–18)
BUN BLD-MCNC: 42 MG/DL (ref 9–23)
CALCIUM BLD-MCNC: 10.2 MG/DL (ref 8.7–10.6)
CHLORIDE SERPL-SCNC: 99 MMOL/L (ref 98–112)
CO2 SERPL-SCNC: 26 MMOL/L (ref 21–32)
CREAT BLD-MCNC: 5.87 MG/DL (ref 0.7–1.3)
EGFRCR SERPLBLD CKD-EPI 2021: 9 ML/MIN/1.73M2 (ref 60–?)
GLUCOSE BLD-MCNC: 136 MG/DL (ref 70–99)
GLUCOSE BLD-MCNC: 144 MG/DL (ref 70–99)
OSMOLALITY SERPL CALC.SUM OF ELEC: 303 MOSM/KG (ref 275–295)
POTASSIUM SERPL-SCNC: 4.6 MMOL/L (ref 3.5–5.1)
SODIUM SERPL-SCNC: 140 MMOL/L (ref 136–145)

## 2025-08-21 PROCEDURE — 80048 BASIC METABOLIC PNL TOTAL CA: CPT | Performed by: SURGERY

## 2025-08-21 PROCEDURE — 82962 GLUCOSE BLOOD TEST: CPT

## 2025-08-21 RX ORDER — SODIUM CHLORIDE 9 MG/ML
INJECTION, SOLUTION INTRAVENOUS CONTINUOUS PRN
Status: DISCONTINUED | OUTPATIENT
Start: 2025-08-21 | End: 2025-08-21 | Stop reason: SURG

## 2025-08-21 RX ORDER — NALOXONE HYDROCHLORIDE 0.4 MG/ML
0.08 INJECTION, SOLUTION INTRAMUSCULAR; INTRAVENOUS; SUBCUTANEOUS AS NEEDED
Status: DISCONTINUED | OUTPATIENT
Start: 2025-08-21 | End: 2025-08-21

## 2025-08-21 RX ORDER — ROCURONIUM BROMIDE 10 MG/ML
INJECTION, SOLUTION INTRAVENOUS AS NEEDED
Status: DISCONTINUED | OUTPATIENT
Start: 2025-08-21 | End: 2025-08-21 | Stop reason: SURG

## 2025-08-21 RX ORDER — HYDROCODONE BITARTRATE AND ACETAMINOPHEN 5; 325 MG/1; MG/1
1-2 TABLET ORAL EVERY 4 HOURS PRN
Qty: 30 TABLET | Refills: 0 | Status: SHIPPED | OUTPATIENT
Start: 2025-08-21

## 2025-08-21 RX ORDER — INSULIN ASPART 100 [IU]/ML
INJECTION, SOLUTION INTRAVENOUS; SUBCUTANEOUS ONCE
Status: COMPLETED | OUTPATIENT
Start: 2025-08-21 | End: 2025-08-21

## 2025-08-21 RX ORDER — DEXAMETHASONE SODIUM PHOSPHATE 4 MG/ML
VIAL (ML) INJECTION AS NEEDED
Status: DISCONTINUED | OUTPATIENT
Start: 2025-08-21 | End: 2025-08-21 | Stop reason: SURG

## 2025-08-21 RX ORDER — HYDROMORPHONE HYDROCHLORIDE 1 MG/ML
0.6 INJECTION, SOLUTION INTRAMUSCULAR; INTRAVENOUS; SUBCUTANEOUS EVERY 5 MIN PRN
Status: DISCONTINUED | OUTPATIENT
Start: 2025-08-21 | End: 2025-08-21

## 2025-08-21 RX ORDER — BUPIVACAINE HYDROCHLORIDE 5 MG/ML
INJECTION, SOLUTION EPIDURAL; INTRACAUDAL; PERINEURAL AS NEEDED
Status: DISCONTINUED | OUTPATIENT
Start: 2025-08-21 | End: 2025-08-21 | Stop reason: HOSPADM

## 2025-08-21 RX ORDER — INSULIN ASPART 100 [IU]/ML
INJECTION, SOLUTION INTRAVENOUS; SUBCUTANEOUS
Status: COMPLETED
Start: 2025-08-21 | End: 2025-08-21

## 2025-08-21 RX ORDER — SODIUM CHLORIDE 9 MG/ML
INJECTION, SOLUTION INTRAVENOUS CONTINUOUS
Status: DISCONTINUED | OUTPATIENT
Start: 2025-08-21 | End: 2025-08-21

## 2025-08-21 RX ORDER — LIDOCAINE HYDROCHLORIDE 10 MG/ML
INJECTION, SOLUTION EPIDURAL; INFILTRATION; INTRACAUDAL; PERINEURAL AS NEEDED
Status: DISCONTINUED | OUTPATIENT
Start: 2025-08-21 | End: 2025-08-21 | Stop reason: SURG

## 2025-08-21 RX ORDER — HYDROMORPHONE HYDROCHLORIDE 1 MG/ML
0.4 INJECTION, SOLUTION INTRAMUSCULAR; INTRAVENOUS; SUBCUTANEOUS EVERY 5 MIN PRN
Status: DISCONTINUED | OUTPATIENT
Start: 2025-08-21 | End: 2025-08-21

## 2025-08-21 RX ORDER — ONDANSETRON 2 MG/ML
4 INJECTION INTRAMUSCULAR; INTRAVENOUS EVERY 6 HOURS PRN
Status: DISCONTINUED | OUTPATIENT
Start: 2025-08-21 | End: 2025-08-21

## 2025-08-21 RX ORDER — DEXTROSE MONOHYDRATE 25 G/50ML
50 INJECTION, SOLUTION INTRAVENOUS
Status: DISCONTINUED | OUTPATIENT
Start: 2025-08-21 | End: 2025-08-21

## 2025-08-21 RX ORDER — METOCLOPRAMIDE HYDROCHLORIDE 5 MG/ML
10 INJECTION INTRAMUSCULAR; INTRAVENOUS EVERY 8 HOURS PRN
Status: DISCONTINUED | OUTPATIENT
Start: 2025-08-21 | End: 2025-08-21

## 2025-08-21 RX ORDER — ONDANSETRON 2 MG/ML
INJECTION INTRAMUSCULAR; INTRAVENOUS AS NEEDED
Status: DISCONTINUED | OUTPATIENT
Start: 2025-08-21 | End: 2025-08-21 | Stop reason: SURG

## 2025-08-21 RX ORDER — HYDROMORPHONE HYDROCHLORIDE 1 MG/ML
0.2 INJECTION, SOLUTION INTRAMUSCULAR; INTRAVENOUS; SUBCUTANEOUS EVERY 5 MIN PRN
Status: DISCONTINUED | OUTPATIENT
Start: 2025-08-21 | End: 2025-08-21

## 2025-08-21 RX ORDER — NICOTINE POLACRILEX 4 MG
15 LOZENGE BUCCAL
Status: DISCONTINUED | OUTPATIENT
Start: 2025-08-21 | End: 2025-08-21

## 2025-08-21 RX ORDER — HYDROCODONE BITARTRATE AND ACETAMINOPHEN 5; 325 MG/1; MG/1
1 TABLET ORAL ONCE AS NEEDED
Status: DISCONTINUED | OUTPATIENT
Start: 2025-08-21 | End: 2025-08-21

## 2025-08-21 RX ORDER — HYDROCODONE BITARTRATE AND ACETAMINOPHEN 5; 325 MG/1; MG/1
2 TABLET ORAL ONCE AS NEEDED
Status: DISCONTINUED | OUTPATIENT
Start: 2025-08-21 | End: 2025-08-21

## 2025-08-21 RX ORDER — NICOTINE POLACRILEX 4 MG
30 LOZENGE BUCCAL
Status: DISCONTINUED | OUTPATIENT
Start: 2025-08-21 | End: 2025-08-21

## 2025-08-21 RX ORDER — ACETAMINOPHEN 500 MG
1000 TABLET ORAL ONCE AS NEEDED
Status: DISCONTINUED | OUTPATIENT
Start: 2025-08-21 | End: 2025-08-21

## 2025-08-21 RX ADMIN — ONDANSETRON 4 MG: 2 INJECTION INTRAMUSCULAR; INTRAVENOUS at 08:28:00

## 2025-08-21 RX ADMIN — SODIUM CHLORIDE: 9 INJECTION, SOLUTION INTRAVENOUS at 07:10:00

## 2025-08-21 RX ADMIN — LIDOCAINE HYDROCHLORIDE 50 MG: 10 INJECTION, SOLUTION EPIDURAL; INFILTRATION; INTRACAUDAL; PERINEURAL at 07:17:00

## 2025-08-21 RX ADMIN — ROCURONIUM BROMIDE 50 MG: 10 INJECTION, SOLUTION INTRAVENOUS at 07:19:00

## 2025-08-21 RX ADMIN — DEXAMETHASONE SODIUM PHOSPHATE 4 MG: 4 MG/ML VIAL (ML) INJECTION at 07:24:00

## 2025-08-21 RX ADMIN — ROCURONIUM BROMIDE 10 MG: 10 INJECTION, SOLUTION INTRAVENOUS at 08:21:00

## (undated) DEVICE — 3M™ RED DOT™ MONITORING ELECTRODE WITH FOAM TAPE AND STICKY GEL, 50/BAG, 20/CASE, 72/PLT 2570: Brand: RED DOT™

## (undated) DEVICE — ALCOHOL PREP,2 PLY, MEDIUM: Brand: WEBCOL

## (undated) DEVICE — 1200CC GUARDIAN II: Brand: GUARDIAN

## (undated) DEVICE — Device

## (undated) DEVICE — FILTERLINE NASAL ADULT O2/CO2

## (undated) DEVICE — GEL US 20 GM PKT ST AQSNC 100

## (undated) DEVICE — UNDYED BRAIDED (POLYGLACTIN 910), SYNTHETIC ABSORBABLE SUTURE: Brand: COATED VICRYL

## (undated) DEVICE — ANTIBACTERIAL UNDYED BRAIDED (POLYGLACTIN 910), SYNTHETIC ABSORBABLE SUTURE: Brand: COATED VICRYL

## (undated) DEVICE — GOWN SUR 2XL LEV 4 BLU W/ WHT V NK BND AERO

## (undated) DEVICE — APPLICATOR PREP 26ML CHG 2% ISO ALC 70%

## (undated) DEVICE — BLANKET WRM PED W36XL84IN UND BODY FULL ACCS

## (undated) DEVICE — GLOVE SURG SENSICARE SZ 7

## (undated) DEVICE — SOLUTION IRRIG 1000ML 0.9% NACL USP BTL

## (undated) DEVICE — SUT COAT VCRL 3-0 18IN SH ABSRB UD CR 26MM

## (undated) DEVICE — SUT PERMA- 3-0 30IN NABSRB BLK TIE SILK

## (undated) DEVICE — MANIFOLD SCTN INTELLICART BLUE

## (undated) DEVICE — ENDOSCOPY PACK - LOWER: Brand: MEDLINE INDUSTRIES, INC.

## (undated) DEVICE — CONMED DISPOSABLE GASTROINTESTINAL CYTOLOGY BRUSH

## (undated) DEVICE — GLOVE SUR 8 SENSICARE PI PIP CRM PWD F

## (undated) DEVICE — BITE BLOCK ADULT 60F ELASTIC

## (undated) DEVICE — SUT PROL 6-0 30IN C-1 NABSRB BLU 13MM 3/8 CIR

## (undated) DEVICE — SOLUTION IV 500ML 0.9% NACL FLX CONT

## (undated) DEVICE — BREAST-HERNIA-PORT CDS-LF: Brand: MEDLINE INDUSTRIES, INC.

## (undated) DEVICE — FORCEP RADIAL JAW 4

## (undated) DEVICE — SUT PROL 6-0 24IN C-1 NABSRB BLU 13MM 3/8 CIR

## (undated) DEVICE — Device: Brand: DEFENDO AIR/WATER/SUCTION AND BIOPSY VALVE

## (undated) DEVICE — SLEEVE COMPR MD KNEE LEN SGL USE KENDALL SCD

## (undated) DEVICE — SYRINGE 10ML SLIP TIP

## (undated) DEVICE — SYRINGE IV FLUSH PRE-FILL10M

## (undated) DEVICE — GLOVE SUR 7.5 PROTEXIS PI PIP CRM PWD F

## (undated) DEVICE — ANTIBACTERIAL VIOLET BRAIDED (POLYGLACTIN 910), SYNTHETIC ABSORBABLE SUTURE: Brand: COATED VICRYL

## (undated) DEVICE — SUT MCRYL 4-0 18IN PS-2 ABSRB UD 19MM 3/8 CIR

## (undated) DEVICE — PACK AV FISTULA

## (undated) NOTE — LETTER
[address]  [address]  Consent for Procedure/Sedation  Date: 5/5/25         Time: 1230    I hereby authorize Dr Donato, my physician and his/her assistants (if applicable), which may include medical students, residents, and/or fellows, to perform the following surgical operation/ procedure and administer such anesthesia as may be determined necessary by my physician: Permanent Dialysis Catheter Insertion on Tariq Nolan  2.   I recognize that during the surgical operation/procedure, unforeseen conditions may necessitate additional or different procedures than those listed above.  I, therefore, further authorize and request that the above-named surgeon, assistants, or designees perform such procedures as are, in their judgment, necessary and desirable.    3.   My surgeon/physician has discussed prior to my surgery the potential benefits, risks and side effects of this procedure; the likelihood of achieving goals; and potential problems that might occur during recuperation.  They also discussed reasonable alternatives to the procedure, including risks, benefits, and side effects related to the alternatives and risks related to not receiving this procedure.  I have had all my questions answered and I acknowledge that no guarantee has been made as to the result that may be obtained.    4.   Should the need arise during my operation/procedure, which includes change of level of care prior to discharge, I also consent to the administration of blood and/or blood products.  Further, I understand that despite careful testing and screening of blood or blood products by collecting agencies, I may still be subject to ill effects as a result of receiving a blood transfusion and/or blood products.  The following are some, but not all, of the potential risks that can occur: fever and allergic reactions, hemolytic reactions, transmission of diseases such as Hepatitis, AIDS and  Cytomegalovirus (CMV) and fluid overload.  In the event that I wish to have an autologous transfusion of my own blood, or a directed donor transfusion, I will discuss this with my physician.   Check only if Refusing Blood or Blood Products  I understand refusal of blood or blood products as deemed necessary by my physician may have serious consequences to my condition to include possible death. I hereby assume responsibility for my refusal and release the hospital, its personnel, and my physicians from any responsibility for the consequences of my refusal.         o  Refuse         5.   I authorize the use of any specimen, organs, tissues, body parts or foreign objects that may be removed from my body during the operation/procedure for diagnosis, research or teaching purposes and their subsequent disposal by hospital authorities.  I also authorize the release of specimen test results and/or written reports to my treating physician on the hospital medical staff or other referring or consulting physicians involved in my care, at the discretion of the Pathologist or my treating physician.    6.   I consent to the photographing or videotaping of the operations or procedures to be performed, including appropriate portions of my body for medical, scientific, or educational purposes, provided my identity is not revealed by the pictures or by descriptive texts accompanying them.  If the procedure has been photographed/videotaped, the surgeon will obtain the original picture, image, videotape or CD.  The hospital will not be responsible for storage, release or maintenance of the picture, image, tape or CD.    7.   I consent to the presence of a  or observers in the operating room as deemed necessary by my physician or their designees.    8.   I recognize that in the event my procedure results in extended X-Ray/fluoroscopy time, I may develop a skin reaction.    9. If I have a Do Not Attempt Resuscitation  (DNAR) order in place, that status will be suspended while in the operating room, procedural suite, and during the recovery period unless otherwise explicitly stated by me (or a person authorized to consent on my behalf). The surgeon or my attending physician will determine when the applicable recovery period ends for purposes of reinstating the DNAR order.  10. Patients having a sterilization procedure: I understand that if the procedure is successful the results will be permanent and it will therefore be impossible for me to inseminate, conceive, or bear children.  I also understand that the procedure is intended to result in sterility, although the result has not been guaranteed.   11. I acknowledge that my physician has explained sedation/analgesia administration to me including the risk and benefits I consent to the administration of sedation/analgesia as may be necessary or desirable in the judgment of my physician.    I CERTIFY THAT I HAVE READ AND FULLY UNDERSTAND THE ABOVE CONSENT TO OPERATION and/or OTHER PROCEDURE.        ____________________________________       _________________________________      ______________________________  Signature of Patient         Signature of Responsible Person        Printed Name of Responsible Person        ____________________________________      _________________________________      ______________________________       Signature of Witness          Relationship to Patient                       Date                                       Time  Patient Name: Tariq Nolan  : 1950    Reviewed: 2024   Printed: May 5, 2025  Medical Record #: [id number] Page 1 of 1

## (undated) NOTE — LETTER
Date & Time: 11/20/2019, 9:52 AM  Patient: Lauryn Jesus  Encounter Provider(s):    Odalis Morales MD       To Whom It May Concern:    Katharine Kuo was seen and treated in our department on 11/20/2019. He may return to work on 11/21/19.     If you hav

## (undated) NOTE — LETTER
[address]  [address]  Consent for Procedure/Sedation  Date: 4/29/25         Time: 1230    I hereby authorize Dr Romo, my physician and his/her assistants (if applicable), which may include medical students, residents, and/or fellows, to perform the following surgical operation/ procedure and administer such anesthesia as may be determined necessary by my physician: Temporary Dialysis Catheter Insertion on Tariq Nolan  2.   I recognize that during the surgical operation/procedure, unforeseen conditions may necessitate additional or different procedures than those listed above.  I, therefore, further authorize and request that the above-named surgeon, assistants, or designees perform such procedures as are, in their judgment, necessary and desirable.    3.   My surgeon/physician has discussed prior to my surgery the potential benefits, risks and side effects of this procedure; the likelihood of achieving goals; and potential problems that might occur during recuperation.  They also discussed reasonable alternatives to the procedure, including risks, benefits, and side effects related to the alternatives and risks related to not receiving this procedure.  I have had all my questions answered and I acknowledge that no guarantee has been made as to the result that may be obtained.    4.   Should the need arise during my operation/procedure, which includes change of level of care prior to discharge, I also consent to the administration of blood and/or blood products.  Further, I understand that despite careful testing and screening of blood or blood products by collecting agencies, I may still be subject to ill effects as a result of receiving a blood transfusion and/or blood products.  The following are some, but not all, of the potential risks that can occur: fever and allergic reactions, hemolytic reactions, transmission of diseases such as Hepatitis, AIDS and Cytomegalovirus  (CMV) and fluid overload.  In the event that I wish to have an autologous transfusion of my own blood, or a directed donor transfusion, I will discuss this with my physician.   Check only if Refusing Blood or Blood Products  I understand refusal of blood or blood products as deemed necessary by my physician may have serious consequences to my condition to include possible death. I hereby assume responsibility for my refusal and release the hospital, its personnel, and my physicians from any responsibility for the consequences of my refusal.         o  Refuse         5.   I authorize the use of any specimen, organs, tissues, body parts or foreign objects that may be removed from my body during the operation/procedure for diagnosis, research or teaching purposes and their subsequent disposal by hospital authorities.  I also authorize the release of specimen test results and/or written reports to my treating physician on the hospital medical staff or other referring or consulting physicians involved in my care, at the discretion of the Pathologist or my treating physician.    6.   I consent to the photographing or videotaping of the operations or procedures to be performed, including appropriate portions of my body for medical, scientific, or educational purposes, provided my identity is not revealed by the pictures or by descriptive texts accompanying them.  If the procedure has been photographed/videotaped, the surgeon will obtain the original picture, image, videotape or CD.  The hospital will not be responsible for storage, release or maintenance of the picture, image, tape or CD.    7.   I consent to the presence of a  or observers in the operating room as deemed necessary by my physician or their designees.    8.   I recognize that in the event my procedure results in extended X-Ray/fluoroscopy time, I may develop a skin reaction.    9. If I have a Do Not Attempt Resuscitation (DNAR) order in  place, that status will be suspended while in the operating room, procedural suite, and during the recovery period unless otherwise explicitly stated by me (or a person authorized to consent on my behalf). The surgeon or my attending physician will determine when the applicable recovery period ends for purposes of reinstating the DNAR order.  10. Patients having a sterilization procedure: I understand that if the procedure is successful the results will be permanent and it will therefore be impossible for me to inseminate, conceive, or bear children.  I also understand that the procedure is intended to result in sterility, although the result has not been guaranteed.   11. I acknowledge that my physician has explained sedation/analgesia administration to me including the risk and benefits I consent to the administration of sedation/analgesia as may be necessary or desirable in the judgment of my physician.    I CERTIFY THAT I HAVE READ AND FULLY UNDERSTAND THE ABOVE CONSENT TO OPERATION and/or OTHER PROCEDURE.        ____________________________________       _________________________________      ______________________________  Signature of Patient         Signature of Responsible Person        Printed Name of Responsible Person        ____________________________________      _________________________________      ______________________________       Signature of Witness          Relationship to Patient                       Date                                       Time  Patient Name: Tariq Nolan  : 1950    Reviewed: 2024   Printed: 2025  Medical Record #: [id number] Page 1 of 1

## (undated) NOTE — ED AVS SNAPSHOT
Alma Manual   MRN: [id number]    Department:  [facility] Emergency Department   Date of Visit:  11/20/2019           Disclosure     Insurance plans vary and the physician(s) referred by the ER may not be covered by your plan.  Please contact you tell this physician (or your personal doctor if your instructions are to return to your personal doctor) about any new or lasting problems. The primary care or specialist physician will see patients referred from the [facility] Emergency Department.  Norberto Nugent

## (undated) NOTE — LETTER
[address]  [address]  Authorization for Surgical Operation and Procedure     Date:___________                                                                                                         Time:__________  I hereby authorize Surgeon(s):  Stephen Lombardi MD, my physician and his/her assistants (if applicable), which may include medical students, residents, and/or fellows, to perform the following surgical operation/ procedure and administer such anesthesia as may be determined necessary by my physician:  Operation/Procedure name (s) Procedure(s):  REMOVAL OF PERITONEAL DIALYSIS CATHETER on Tariq Nolan   2.   I recognize that during the surgical operation/procedure, unforeseen conditions may necessitate additional or different procedures than those listed above.  I, therefore, further authorize and request that the above-named surgeon, assistants, or designees perform such procedures as are, in their judgment, necessary and desirable.    3.   My surgeon/physician has discussed prior to my surgery the potential benefits, risks and side effects of this procedure; the likelihood of achieving goals; and potential problems that might occur during recuperation.  They also discussed reasonable alternatives to the procedure, including risks, benefits, and side effects related to the alternatives and risks related to not receiving this procedure.  I have had all my questions answered and I acknowledge that no guarantee has been made as to the result that may be obtained.    4.   Should the need arise during my operation/procedure, which includes change of level of care prior to discharge, I also consent to the administration of blood and/or blood products.  Further, I understand that despite careful testing and screening of blood or blood products by collecting agencies, I may still be subject to ill effects as a result of receiving a blood transfusion and/or blood products.   The following are some, but not all, of the potential risks that can occur: fever and allergic reactions, hemolytic reactions, transmission of diseases such as Hepatitis, AIDS and Cytomegalovirus (CMV) and fluid overload.  In the event that I wish to have an autologous transfusion of my own blood, or a directed donor transfusion, I will discuss this with my physician.  Check only if Refusing Blood or Blood Products  I understand refusal of blood or blood products as deemed necessary by my physician may have serious consequences to my condition to include possible death. I hereby assume responsibility for my refusal and release the hospital, its personnel, and my physicians from any responsibility for the consequences of my refusal.          o  Refuse      5.   I authorize the use of any specimen, organs, tissues, body parts or foreign objects that may be removed from my body during the operation/procedure for diagnosis, research or teaching purposes and their subsequent disposal by hospital authorities.  I also authorize the release of specimen test results and/or written reports to my treating physician on the hospital medical staff or other referring or consulting physicians involved in my care, at the discretion of the Pathologist or my treating physician.    6.   I consent to the photographing or videotaping of the operations or procedures to be performed, including appropriate portions of my body for medical, scientific, or educational purposes, provided my identity is not revealed by the pictures or by descriptive texts accompanying them.  If the procedure has been photographed/videotaped, the surgeon will obtain the original picture, image, videotape or CD.  The hospital will not be responsible for storage, release or maintenance of the picture, image, tape or CD.    7.   I consent to the presence of a  or observers in the operating room as deemed necessary by my physician or their  designees.    8.   I recognize that in the event my procedure results in extended X-Ray/fluoroscopy time, I may develop a skin reaction.    9. If I have a Do Not Attempt Resuscitation (DNAR) order in place, that status will be suspended while in the operating room, procedural suite, and during the recovery period unless otherwise explicitly stated by me (or a person authorized to consent on my behalf). The surgeon or my attending physician will determine when the applicable recovery period ends for purposes of reinstating the DNAR order.  10. Patients having a sterilization procedure: I understand that if the procedure is successful the results will be permanent and it will therefore be impossible for me to inseminate, conceive, or bear children.  I also understand that the procedure is intended to result in sterility, although the result has not been guaranteed.   11. I acknowledge that my physician has explained sedation/analgesia administration to me including the risk and benefits I consent to the administration of sedation/analgesia as may be necessary or desirable in the judgment of my physician.    I CERTIFY THAT I HAVE READ AND FULLY UNDERSTAND THE ABOVE CONSENT TO OPERATION and/or OTHER PROCEDURE.    _________________________________________  __________________________________  Signature of Patient     Signature of Responsible Person         ___________________________________         Printed Name of Responsible Person           _________________________________                 Relationship to Patient  _________________________________________  ______________________________  Signature of Witness          Date  Time      Patient Name: Tariq Nolan     : 1950                 Printed: May 5, 2025     Medical Record #: [id number]                     Page 1 of [address] [place] [address] [place]  [address]    Consent for Anesthesia    I, Tariq Nolan  agree to be cared for by an anesthesiologist, who is specially trained to monitor me and give me medicine to put me to sleep or keep me comfortable during my procedure    I understand that my anesthesiologist is not an employee or agent of [facility] or [employer]. He or she works for [employer].    As the patient asking for anesthesia services, I agree to:  Allow the anesthesiologist (anesthesia doctor) to give me medicine and do additional procedures as necessary. Some examples are: Starting or using an “IV” to give me medicine, fluids or blood during my procedure, and having a breathing tube placed to help me breathe when I’m asleep (intubation). In the event that my heart stops working properly, I understand that my anesthesiologist will make every effort to sustain my life, unless otherwise directed by [facility] Do Not Resuscitate documents.  Tell my anesthesia doctor before my procedure:  If I am pregnant.  The last time that I ate or drank.  All of the medicines I take (including prescriptions, herbal supplements, and pills I can buy without a prescription (including street drugs/illegal medications). Failure to inform my anesthesiologist about these medicines may increase my risk of anesthetic complications.  If I am allergic to anything or have had a reaction to anesthesia before.  I understand how the anesthesia medicine will help me (benefits).  I understand that with any type of anesthesia medicine there are risks:  The most common risks are: nausea, vomiting, sore throat, muscle soreness, damage to my eyes, mouth, or teeth (from breathing tube placement).  Rare risks include: remembering what happened during my procedure, allergic reactions to medications, injury to my airway, heart, lungs, vision, nerves, or muscles and in extremely rare instances death.  My doctor has explained to me other choices available to me for my care (alternatives).  Pregnant Patients  (“epidural”):  I understand that the risks of having an epidural (medicine given into my back to help control pain during labor), include itching, low blood pressure, difficulty urinating, headache or slowing of the baby’s heart. Very rare risks include infection, bleeding, seizure, irregular heart rhythms and nerve injury.  Regional Anesthesia (“spinal”, “epidural”, & “nerve blocks”):  I understand that rare but potential complications include headache, bleeding, infection, seizure, irregular heart rhythms, and nerve injury.    I can change my mind about having anesthesia services at any time before I get the medicine.    _____________________________________________________________________________  Patient (or Representative) Signature/Relationship to Patient  Date   Time    _____________________________________________________________________________   Name (if used)    Language/Organization   Time    _____________________________________________________________________________  Anesthesiologist Signature     Date   Time  I have discussed the procedure and information above with the patient (or patient’s representative) and answered their questions. The patient or their representative has agreed to have anesthesia services.    _____________________________________________________________________________  Witness        Date   Time  I have verified that the signature is that of the patient or patient’s representative, and that it was signed before the procedure  Patient Name: Tariq Nolan     : 1950                 Printed: May 5, 2025     Medical Record #: [id number]                     Page 2 of 2

## (undated) NOTE — LETTER
[facility]  [address] [address]  [phone]    Blood Transfusion Consent    In the course of your treatment, it may become necessary to administer a transfusion of blood or blood components. This form provides basic information concerning this procedure and, if signed by you, authorizes its administration. By signing this form, you agree that all of your questions about the administration of blood or blood products have been answered by the ordering medical professional or designee.    Description of Procedure  Blood is introduced into one of your veins, commonly in the arm, using a sterilized disposable needle. The amount of blood transfused, and whether the transfusion will be of blood or blood components is a judgement the physician will make based on your particular needs.    Risks  The transfusion is a common procedure of low risk.  MINOR AND TEMPORARY REACTIONS ARE NOT UNCOMMON, including a slight bruise, swelling or local reaction in the area where the needle pierces your skin, or a nonserious reaction to the transfused material itself, including headache, fever or mild skin reaction, such as rash.  Serious reactions are possible, though very unlikely, and include severe allergic reaction (shock) and destruction (hemolysis) of transfused blood cells.  Infectious diseases which are known to be transmitted by blood transfusion include certain types of viral Hepatitis(liver infection from a virus), Human Immunodeficiency Virus (HIV-1,2) infection, a viral infection known to cause Acquired Immunodeficiency Syndrome (AIDS), as well as certain other bacterial, viral, and parasitic diseases. While a minimal risk of acquiring an infectious disease from transfused blood exists, in accordance with the Federal and State law, all due care has been taken in donor selection and testing to avoid transmission of disease.    Alternatives  If loss of blood poses serious threats during your  treatment, THERE IS NO EFFECTIVE ALTERNATIVE TO BLOOD TRANSFUSION. However, if you have any further questions on this matter, your provider will fully explain the alternatives to you if it has not already been done.    I, ______________________________, have read/had read to me the above. I understand the matters bearing on the decision whether or not to authorize a transfusion of blood or blood components. I have no questions which have not been answered to my full satisfaction. I hereby consent to such transfusion as my physician may deem necessary or advisable in the course of my treatment.    ______________________________________________                    ___________________________  (Signature of Patient or Responsible party in case of minor,                 (Printed Name of Patient or incompetent, or unconscious patient)              Responsible Party)    ___________________________               _____________________  (Relationship to Patient if not self)                                    (Date and Time)    __________________________                                                           ______________________              (Signature of Witness)               (Printed Name of Witness)     Language line ()    Telephone/Verbal/Video Consent    __________________________                     ____________________  (Signature of 2nd Witness           (Printed Name of 2nd  Telephone/Verbal/Video Consent)           Witness)    Patient Name: Tariq Nolan     : 1950                 Printed: May 11, 2025     Medical Record #: [id number]      Rev: 2023